# Patient Record
Sex: MALE | Race: WHITE | NOT HISPANIC OR LATINO | URBAN - METROPOLITAN AREA
[De-identification: names, ages, dates, MRNs, and addresses within clinical notes are randomized per-mention and may not be internally consistent; named-entity substitution may affect disease eponyms.]

---

## 2022-10-17 ENCOUNTER — INPATIENT (INPATIENT)
Facility: HOSPITAL | Age: 82
LOS: 20 days | Discharge: NURSING, REHAB OR LONG-TERM CARE | DRG: 291 | End: 2022-11-07
Attending: STUDENT IN AN ORGANIZED HEALTH CARE EDUCATION/TRAINING PROGRAM | Admitting: INTERNAL MEDICINE
Payer: MEDICARE

## 2022-10-17 VITALS
HEIGHT: 72 IN | RESPIRATION RATE: 19 BRPM | SYSTOLIC BLOOD PRESSURE: 117 MMHG | WEIGHT: 169.98 LBS | HEART RATE: 90 BPM | TEMPERATURE: 97 F | DIASTOLIC BLOOD PRESSURE: 73 MMHG

## 2022-10-17 DIAGNOSIS — Z95.1 PRESENCE OF AORTOCORONARY BYPASS GRAFT: Chronic | ICD-10-CM

## 2022-10-17 DIAGNOSIS — I50.9 HEART FAILURE, UNSPECIFIED: ICD-10-CM

## 2022-10-17 LAB
ALBUMIN SERPL ELPH-MCNC: 2.4 G/DL — LOW (ref 3.3–5)
ALP SERPL-CCNC: 230 U/L — HIGH (ref 40–120)
ALT FLD-CCNC: 131 U/L — HIGH (ref 10–45)
ANION GAP SERPL CALC-SCNC: 5 MMOL/L — SIGNIFICANT CHANGE UP (ref 5–17)
APPEARANCE UR: CLEAR — SIGNIFICANT CHANGE UP
AST SERPL-CCNC: 86 U/L — HIGH (ref 10–40)
BACTERIA # UR AUTO: ABNORMAL /HPF
BASOPHILS # BLD AUTO: 0.06 K/UL — SIGNIFICANT CHANGE UP (ref 0–0.2)
BASOPHILS NFR BLD AUTO: 0.3 % — SIGNIFICANT CHANGE UP (ref 0–2)
BILIRUB SERPL-MCNC: 0.3 MG/DL — SIGNIFICANT CHANGE UP (ref 0.2–1.2)
BILIRUB UR-MCNC: NEGATIVE — SIGNIFICANT CHANGE UP
BUN SERPL-MCNC: 28 MG/DL — HIGH (ref 7–23)
CALCIUM SERPL-MCNC: 8.3 MG/DL — LOW (ref 8.4–10.5)
CHLORIDE SERPL-SCNC: 108 MMOL/L — SIGNIFICANT CHANGE UP (ref 96–108)
CO2 SERPL-SCNC: 30 MMOL/L — SIGNIFICANT CHANGE UP (ref 22–31)
COLOR SPEC: YELLOW — SIGNIFICANT CHANGE UP
CREAT SERPL-MCNC: 1.14 MG/DL — SIGNIFICANT CHANGE UP (ref 0.5–1.3)
DIFF PNL FLD: ABNORMAL
EGFR: 64 ML/MIN/1.73M2 — SIGNIFICANT CHANGE UP
EOSINOPHIL # BLD AUTO: 0.25 K/UL — SIGNIFICANT CHANGE UP (ref 0–0.5)
EOSINOPHIL NFR BLD AUTO: 1.4 % — SIGNIFICANT CHANGE UP (ref 0–6)
EPI CELLS # UR: SIGNIFICANT CHANGE UP
GLUCOSE SERPL-MCNC: 105 MG/DL — HIGH (ref 70–99)
GLUCOSE UR QL: NEGATIVE — SIGNIFICANT CHANGE UP
HCT VFR BLD CALC: 31.4 % — LOW (ref 39–50)
HGB BLD-MCNC: 9.5 G/DL — LOW (ref 13–17)
IMM GRANULOCYTES NFR BLD AUTO: 0.7 % — SIGNIFICANT CHANGE UP (ref 0–0.9)
KETONES UR-MCNC: NEGATIVE — SIGNIFICANT CHANGE UP
LACTATE SERPL-SCNC: 1.3 MMOL/L — SIGNIFICANT CHANGE UP (ref 0.7–2)
LEUKOCYTE ESTERASE UR-ACNC: ABNORMAL
LYMPHOCYTES # BLD AUTO: 19.4 % — SIGNIFICANT CHANGE UP (ref 13–44)
LYMPHOCYTES # BLD AUTO: 3.46 K/UL — HIGH (ref 1–3.3)
MCHC RBC-ENTMCNC: 29.4 PG — SIGNIFICANT CHANGE UP (ref 27–34)
MCHC RBC-ENTMCNC: 30.3 GM/DL — LOW (ref 32–36)
MCV RBC AUTO: 97.2 FL — SIGNIFICANT CHANGE UP (ref 80–100)
MONOCYTES # BLD AUTO: 0.96 K/UL — HIGH (ref 0–0.9)
MONOCYTES NFR BLD AUTO: 5.4 % — SIGNIFICANT CHANGE UP (ref 2–14)
NEUTROPHILS # BLD AUTO: 12.99 K/UL — HIGH (ref 1.8–7.4)
NEUTROPHILS NFR BLD AUTO: 72.8 % — SIGNIFICANT CHANGE UP (ref 43–77)
NITRITE UR-MCNC: NEGATIVE — SIGNIFICANT CHANGE UP
NRBC # BLD: 0 /100 WBCS — SIGNIFICANT CHANGE UP (ref 0–0)
PH UR: 6.5 — SIGNIFICANT CHANGE UP (ref 5–8)
PLATELET # BLD AUTO: 435 K/UL — HIGH (ref 150–400)
POTASSIUM SERPL-MCNC: 4.1 MMOL/L — SIGNIFICANT CHANGE UP (ref 3.5–5.3)
POTASSIUM SERPL-SCNC: 4.1 MMOL/L — SIGNIFICANT CHANGE UP (ref 3.5–5.3)
PROT SERPL-MCNC: 6.1 G/DL — SIGNIFICANT CHANGE UP (ref 6–8.3)
PROT UR-MCNC: 30 MG/DL
RBC # BLD: 3.23 M/UL — LOW (ref 4.2–5.8)
RBC # FLD: 16.2 % — HIGH (ref 10.3–14.5)
RBC CASTS # UR COMP ASSIST: ABNORMAL /HPF (ref 0–4)
SODIUM SERPL-SCNC: 143 MMOL/L — SIGNIFICANT CHANGE UP (ref 135–145)
SP GR SPEC: 1 — LOW (ref 1.01–1.02)
TROPONIN I, HIGH SENSITIVITY RESULT: 86.4 NG/L — HIGH
UROBILINOGEN FLD QL: NEGATIVE — SIGNIFICANT CHANGE UP
WBC # BLD: 17.84 K/UL — HIGH (ref 3.8–10.5)
WBC # FLD AUTO: 17.84 K/UL — HIGH (ref 3.8–10.5)
WBC UR QL: >50 /HPF (ref 0–5)

## 2022-10-17 PROCEDURE — 99223 1ST HOSP IP/OBS HIGH 75: CPT

## 2022-10-17 PROCEDURE — 99285 EMERGENCY DEPT VISIT HI MDM: CPT

## 2022-10-17 PROCEDURE — 93010 ELECTROCARDIOGRAM REPORT: CPT

## 2022-10-17 PROCEDURE — 71250 CT THORAX DX C-: CPT | Mod: 26,MA

## 2022-10-17 PROCEDURE — 71045 X-RAY EXAM CHEST 1 VIEW: CPT | Mod: 26

## 2022-10-17 RX ORDER — FUROSEMIDE 40 MG
40 TABLET ORAL ONCE
Refills: 0 | Status: COMPLETED | OUTPATIENT
Start: 2022-10-17 | End: 2022-10-17

## 2022-10-17 RX ORDER — ACETAMINOPHEN 500 MG
650 TABLET ORAL ONCE
Refills: 0 | Status: COMPLETED | OUTPATIENT
Start: 2022-10-17 | End: 2022-10-17

## 2022-10-17 RX ORDER — CEFEPIME 1 G/1
1000 INJECTION, POWDER, FOR SOLUTION INTRAMUSCULAR; INTRAVENOUS ONCE
Refills: 0 | Status: COMPLETED | OUTPATIENT
Start: 2022-10-17 | End: 2022-10-17

## 2022-10-17 RX ADMIN — Medication 650 MILLIGRAM(S): at 23:10

## 2022-10-17 RX ADMIN — Medication 40 MILLIGRAM(S): at 22:20

## 2022-10-17 RX ADMIN — CEFEPIME 100 MILLIGRAM(S): 1 INJECTION, POWDER, FOR SOLUTION INTRAMUSCULAR; INTRAVENOUS at 22:41

## 2022-10-17 NOTE — ED ADULT TRIAGE NOTE - CHIEF COMPLAINT QUOTE
BIB [facility] from [facility] for low O2 sat 83% on baseline 3LNC. Per EMS, pt with poor perfusion to fingers, increased to 4LNC and O2 went up to 97%. pt now on 4LNC with O2 sat 88% on arrival. [facility] for low O2 sat 83% on baseline 2LNC. Per EMS, pt with poor perfusion to fingers, increased to 4LNC and O2 went up to 97%. pt now on 4LNC with O2 sat 88% on arrival.

## 2022-10-17 NOTE — INPATIENT CERTIFICATION FOR MEDICARE PATIENTS - PHYSICIAN CONCUR
INTERVAL HPI/OVERNIGHT EVENTS: Pt resting comfortably. No complaints    MEDICATIONS  (STANDING):  atorvastatin 10 milliGRAM(s) Oral at bedtime  clopidogrel Tablet 75 milliGRAM(s) Oral daily  diltiazem    milliGRAM(s) Oral daily  donepezil 5 milliGRAM(s) Oral at bedtime  multivitamin 1 Tablet(s) Oral daily  sodium chloride 0.9%. 1000 milliLiter(s) (75 mL/Hr) IV Continuous <Continuous>  tamsulosin 0.4 milliGRAM(s) Oral at bedtime  thiamine 100 milliGRAM(s) Oral daily    MEDICATIONS  (PRN):  haloperidol    Injectable 0.5 milliGRAM(s) IV Push every 6 hours PRN agitation      Vital Signs Last 24 Hrs  T(C): 36.7 (30 Jul 2019 05:47), Max: 36.7 (30 Jul 2019 05:47)  T(F): 98 (30 Jul 2019 05:47), Max: 98 (30 Jul 2019 05:47)  HR: 82 (30 Jul 2019 05:47) (68 - 84)  BP: 137/88 (30 Jul 2019 05:47) (114/65 - 137/88)  BP(mean): --  RR: 18 (30 Jul 2019 05:47) (18 - 18)  SpO2: 98% (30 Jul 2019 05:47) (96% - 100%)  I&O's Detail    Abdominal: Soft, NT, ND +BS, reducible, Lt inguino-scrotal harnia, containing sigmoid      LABS:    07-29    144  |  106  |  14  ----------------------------<  101<H>  3.6   |  27  |  1.07    Ca    9.8      29 Jul 2019 06:15            RADIOLOGY & ADDITIONAL STUDIES: I concur with the Admission Order and I certify that services are provided in accordance with Section 42 CFR § 412.3

## 2022-10-17 NOTE — H&P ADULT - HISTORY OF PRESENT ILLNESS
[facility] from [facility] for low O2 sat 83% on baseline 2LNC. Per EMS, pt with poor perfusion to fingers, increased to 4LNC and O2 went up to 97%. pt now on 4LNC with O2 sat 88% on arrival.  low O2 sat   83 y/o male with HTN, CAD, hx of CABG, severe cardiomyopathy (EF 20%), Afib (not on AC due to hematuria), BPH, hx of LUE DVT, recently hospitalized at [facility] for urinary retention, urosepsis, also has RUE DVT, BIB EMS from [facility] for lethargy and  low O2 sat 83%, even on O2 2LNC.  O2 increased to 4LNC and O2 went up to 97%. Cxray with bilat increased markings, pleural eff c/w CHF.  Pt has dubois cath.  He denies chest pain, fever, chills, cough, nausea, vomiting, abd pain.  Pt admits to intermittent dyspnea, he also c/o achiness all over.     81 y/o male with HTN, CAD, hx of CABG, severe cardiomyopathy (EF 20%), Afib (not on AC due to hematuria), BPH, hx of LUE DVT, recently hospitalized at [facility] for urinary retention, urosepsis, also has RUE DVT, BIB EMS from [facility] for lethargy and  low O2 sat 83%, even on O2 2LNC.  O2 increased to 4LNC and O2 went up to 97%. Cxray with bilat increased markings, pleural eff c/w CHF.  Pt has dubois cath.  He denies chest pain, fever, chills, cough, nausea, vomiting, abd pain.  Pt admits to intermittent dyspnea, he also c/o achiness all over.    Pt received Lasix 40 mg IVPx 1, with good diuresis.    81 y/o male with HTN, CAD, hx of CABG, severe cardiomyopathy (EF 20%), Afib (not on AC due to hematuria), BPH, hx of LUE DVT, recently hospitalized at [facility] for urinary retention, urosepsis, also has RUE DVT, BIB EMS from [facility] for lethargy and  low O2 sat 83%, even on O2 2LNC.  O2 increased to 4LNC and O2 went up to 97%. Cxray with bilat increased markings, pleural eff c/w CHF.  Pt has dubois cath.  He denies chest pain, fever, chills, cough, nausea, vomiting, abd pain.  Pt admits to intermittent dyspnea, he also c/o achiness all over.    Pt received Lasix 40 mg IVPx 1, with good diuresis.   Pt is not a good historian.

## 2022-10-17 NOTE — H&P ADULT - NSICDXPASTMEDICALHX_GEN_ALL_CORE_FT
PAST MEDICAL HISTORY:  BPH with obstruction/lower urinary tract symptoms     CAD (coronary artery disease)     Chronic atrial fibrillation     History of cardiomyopathy     History of CVA (cerebrovascular accident) left sided weakness    Hyperlipidemia

## 2022-10-17 NOTE — ED ADULT NURSE NOTE - NS ED NOTE ABUSE RESPONSE YN
Patient called back and informed with lab results and thyroid medication change.
Requesting refill for: oxyCODONE-acetaminophen (PERCOCET)  MG per tablet    Medication & Dose:   mg     Quantity requested: 30 day supply    Pharmacy:   [facility] -   Location or Phone Number: [phone]    Last office visit: Visit date not found   Next office visit: Visit date not found   
Unable to assess due to medical condition

## 2022-10-17 NOTE — ED ADULT NURSE REASSESSMENT NOTE - NS ED NURSE REASSESS COMMENT FT1
Pt with chronic dubois to be replaced in the ED as per orders. Pt noted to have a 26 Maltese on arrival, but only 22 Maltese available- MD Hsieh made aware and advised to place a 22 Maltese dubois at this time.

## 2022-10-17 NOTE — ED PROVIDER NOTE - PHYSICAL EXAMINATION
Gen: alert, NAD  HEENT:  NC/AT, PERR, no exophthalmos  CV:  well perfused, rrr   Pulm:  b/l crackles, normal RR, I/E ratio and chest excursion  Abd: s/nt/nd  MSK: moving all extremities  Neuro:  non-focal  Skin:  visualized areas intact

## 2022-10-17 NOTE — H&P ADULT - ASSESSMENT
81 y/o male with HTN, CAD, hx of CABG, severe cardiomyopathy (EF 20%), Afib (not on AC due to hematuria), BPH, hx of LUE DVT, recently hospitalized at [facility] for urinary retention, urosepsis, also has RUE DVT, BIB EMS from [facility] for lethargy and  low O2 sat 83%, even on O2 2LNC.  O2 increased to 4LNC and O2 went up to 97%. Cxray with bilat increased markings, pleural eff c/w CHF.  Pt has dubois cath.  He denies chest pain, fever, chills, cough, nausea, vomiting, abd pain.  Pt admits to intermittent dyspnea, he also c/o achiness all over.   83 y/o male with HTN, CAD, hx of CABG, severe cardiomyopathy (EF 20%), Afib (not on AC due to hematuria), BPH, hx of LUE DVT, recently hospitalized at [facility] for urinary retention, urosepsis, also has RUE DVT, BIB EMS from [facility] for lethargy and  low O2 sat 83%, even on O2 2LNC.  O2 increased to 4LNC and O2 went up to 97%. Cxray with bilat increased markings, pleural eff c/w CHF.  Pt has dubois cath.  He denies chest pain, fever, chills, cough, nausea, vomiting, abd pain.  Pt admits to intermittent dyspnea, he also c/o achiness all over.    Pt received Lasix 40 mg IVPx 1, with good diuresis.     Acute on chronic systolic CHF  Pt with severe cardiomyopathy  Hx of CAD, remote CABG  has RUE DVT, afib, but not on AC due to hematuria  Pt with BPH, urinary retention, has indwelling dubois    Admit  O2 supp-4 L Via NC to keep O2 sat >92%  Lasix 20 mg IVP daily for now, then would change to Lasix 40 mg/day   Trend trop, echo ordered  Cont current meds: ASA, Mexiletine, Amiodarone, Metoprolol, Simvastatin, ASA, Flomax  Elev WBC, but no left shift and PCT nml  Pt received a dose of Cefepime in the ED, would hold off further antibx for now  urinalysis also does not appear infected  Cardio consult  Palliative care consult  Pt's prognosis is poor, has MOLST and is full code  Will discuss with pt (when he's more alert), will reach out to   83 y/o male with HTN, CAD, hx of CABG, severe cardiomyopathy (EF 20%), Afib (not on AC due to hematuria), BPH, hx of LUE DVT, recently hospitalized at [facility] for urinary retention, urosepsis, also has RUE DVT, BIB EMS from [facility] for lethargy and  low O2 sat 83%, even on O2 2LNC.  O2 increased to 4LNC and O2 went up to 97%. Cxray with bilat increased markings, pleural eff c/w CHF.  Pt has dubois cath.  He denies chest pain, fever, chills, cough, nausea, vomiting, abd pain.  Pt admits to intermittent dyspnea, he also c/o achiness all over.    Pt received Lasix 40 mg IVPx 1, with good diuresis.     Acute on chronic systolic CHF  Pt with severe cardiomyopathy  Hx of CAD, remote CABG  has RUE DVT, afib, but not on AC due to hematuria  Pt with BPH, urinary retention, has indwelling dubois    Admit  O2 supp-4 L Via NC to keep O2 sat >92%  Lasix 20 mg IVP daily for now, then would change to Lasix 40 mg/day   Trend trop, echo ordered  Cont current meds: ASA, Mexiletine, Amiodarone, Metoprolol, Simvastatin, ASA, Flomax  Elev WBC, but no left shift and PCT nml  Pt received a dose of Cefepime in the ED, would hold off further antibx for now  urinalysis also does not appear infected  Leg dopplers ordered  DVT prophylaxis  FFup labs in AM  Cardio consult  Palliative care consult  Pt's prognosis is poor, has MOLST and is full code  Will discuss with pt (when he's more alert), will contact pt's wife in AM   81 y/o male with HTN, CAD, hx of CABG, severe cardiomyopathy (EF 20%), Afib (not on AC due to hematuria), BPH, hx of LUE DVT, recently hospitalized at [facility] for urinary retention, urosepsis, also has RUE DVT, BIB EMS from [facility] for lethargy and  low O2 sat 83%, even on O2 2LNC.  O2 increased to 4LNC and O2 went up to 97%. Cxray with bilat increased markings, pleural eff c/w CHF.  Pt has dubois cath.  He denies chest pain, fever, chills, cough, nausea, vomiting, abd pain.  Pt admits to intermittent dyspnea, he also c/o achiness all over.    Pt received Lasix 40 mg IVPx 1, with good diuresis.     Acute hypoxic resp failure due to Acute on chronic systolic CHF  Pt with severe cardiomyopathy  Hx of CAD, remote CABG  has RUE DVT, afib, but not on AC due to hematuria  Pt with BPH, urinary retention, has indwelling dubois    Admit  O2 supp-4 L Via NC to keep O2 sat >92%  Lasix 20 mg IVP daily for now, then would change to Lasix 40 mg/day   Trend trop, echo ordered  Cont current meds: ASA, Mexiletine, Amiodarone, Metoprolol, Simvastatin, ASA, Flomax  Elev WBC, but no left shift and PCT nml  Pt received a dose of Cefepime in the ED, would hold off further antibx for now  urinalysis also does not appear infected  Leg dopplers ordered  DVT prophylaxis  FFup labs in AM  Cardio consult  Palliative care consult  Pt's prognosis is poor, has MOLST and is full code  Will discuss with pt (when he's more alert), will contact pt's wife in AM

## 2022-10-17 NOTE — ED ADULT NURSE NOTE - OBJECTIVE STATEMENT
Pt BIB EMS from [facility] for c/o for low o2. As per [facility], pt has had an increased oxygen requirement for the past week and pts o2 sat was low today when they checked ( no specific value specified). Pt with hx HTN, CVA and CAD. Pt alert and confused and unable to provide any history or information. Pt on 3 L NC on arrival satting 99%.

## 2022-10-17 NOTE — ED PROVIDER NOTE - OBJECTIVE STATEMENT
pt sent from SNF for increasing oxygen requirement over the last 1 week, today oxygen saturation was very low at facility as per EMS. pt unable to provide HPI. pt w h/o cad, htn, cva, low EF 20%.

## 2022-10-17 NOTE — ED ADULT NURSE NOTE - NSIMPLEMENTINTERV_GEN_ALL_ED
Implemented All Fall with Harm Risk Interventions:  [place] to call system. Call bell, personal items and telephone within reach. Instruct patient to call for assistance. Room bathroom lighting operational. Non-slip footwear when patient is off stretcher. Physically safe environment: no spills, clutter or unnecessary equipment. Stretcher in lowest position, wheels locked, appropriate side rails in place. Provide visual cue, wrist band, yellow gown, etc. Monitor gait and stability. Monitor for mental status changes and reorient to person, place, and time. Review medications for side effects contributing to fall risk. Reinforce activity limits and safety measures with patient and family. Provide visual clues: red socks.

## 2022-10-17 NOTE — H&P ADULT - NSHPSOCIALHISTORY_GEN_ALL_CORE
lived at home prior to recent hospitalization - currently at [facility] for [facility]ILSON lived at home prior to recent hospitalization - currently at [facility] for [facility]  non smoker, denies etoh

## 2022-10-17 NOTE — H&P ADULT - NSHPPHYSICALEXAM_GEN_ALL_CORE
Vital Signs (24 Hrs):  T(C): 36.8 (10-17-22 @ 22:00), Max: 36.8 (10-17-22 @ 22:00)  HR: 77 (10-17-22 @ 22:00) (77 - 90)  BP: 114/72 (10-17-22 @ 22:00) (114/72 - 117/73)  RR: 18 (10-17-22 @ 22:00) (18 - 19)  SpO2: 100% (10-17-22 @ 22:00) (99% - 100%)  Daily Height in cm: 182.88 (17 Oct 2022 19:43)

## 2022-10-17 NOTE — ED ADULT NURSE NOTE - CHIEF COMPLAINT QUOTE
B[facility] for low O2 sat 83% on baseline 2LNC. Per EMS, pt with poor perfusion to fingers, increased to 4LNC and O2 went up to 97%. pt now on 4LNC with O2 sat 88% on arrival.

## 2022-10-17 NOTE — ED PROVIDER NOTE - CLINICAL SUMMARY MEDICAL DECISION MAKING FREE TEXT BOX
pt with acute chf exacerbation that was likely progressive over the last 1 week. will need admission for IV lasix.

## 2022-10-18 LAB — PROCALCITONIN SERPL-MCNC: 0.02 NG/ML — SIGNIFICANT CHANGE UP

## 2022-10-18 PROCEDURE — 99233 SBSQ HOSP IP/OBS HIGH 50: CPT

## 2022-10-18 PROCEDURE — 99222 1ST HOSP IP/OBS MODERATE 55: CPT

## 2022-10-18 PROCEDURE — 93306 TTE W/DOPPLER COMPLETE: CPT | Mod: 26

## 2022-10-18 PROCEDURE — 99497 ADVNCD CARE PLAN 30 MIN: CPT | Mod: 25

## 2022-10-18 PROCEDURE — 99223 1ST HOSP IP/OBS HIGH 75: CPT

## 2022-10-18 RX ORDER — FUROSEMIDE 40 MG
20 TABLET ORAL ONCE
Refills: 0 | Status: COMPLETED | OUTPATIENT
Start: 2022-10-18 | End: 2022-10-18

## 2022-10-18 RX ORDER — MEXILETINE HYDROCHLORIDE 150 MG/1
150 CAPSULE ORAL EVERY 12 HOURS
Refills: 0 | Status: ACTIVE | OUTPATIENT
Start: 2022-10-18 | End: 2023-09-16

## 2022-10-18 RX ORDER — LACTULOSE 10 G/15ML
20 SOLUTION ORAL DAILY
Refills: 0 | Status: ACTIVE | OUTPATIENT
Start: 2022-10-18 | End: 2023-09-16

## 2022-10-18 RX ORDER — SERTRALINE 25 MG/1
150 TABLET, FILM COATED ORAL DAILY
Refills: 0 | Status: DISCONTINUED | OUTPATIENT
Start: 2022-10-18 | End: 2022-11-03

## 2022-10-18 RX ORDER — TRAZODONE HCL 50 MG
25 TABLET ORAL AT BEDTIME
Refills: 0 | Status: ACTIVE | OUTPATIENT
Start: 2022-10-18 | End: 2023-09-16

## 2022-10-18 RX ORDER — ASPIRIN/CALCIUM CARB/MAGNESIUM 324 MG
81 TABLET ORAL DAILY
Refills: 0 | Status: DISCONTINUED | OUTPATIENT
Start: 2022-10-18 | End: 2022-10-19

## 2022-10-18 RX ORDER — FAMOTIDINE 10 MG/ML
20 INJECTION INTRAVENOUS DAILY
Refills: 0 | Status: ACTIVE | OUTPATIENT
Start: 2022-10-18 | End: 2023-09-16

## 2022-10-18 RX ORDER — ENOXAPARIN SODIUM 100 MG/ML
40 INJECTION SUBCUTANEOUS EVERY 24 HOURS
Refills: 0 | Status: DISCONTINUED | OUTPATIENT
Start: 2022-10-18 | End: 2022-10-19

## 2022-10-18 RX ORDER — METOPROLOL TARTRATE 50 MG
100 TABLET ORAL DAILY
Refills: 0 | Status: DISCONTINUED | OUTPATIENT
Start: 2022-10-18 | End: 2022-10-21

## 2022-10-18 RX ORDER — FUROSEMIDE 40 MG
40 TABLET ORAL DAILY
Refills: 0 | Status: DISCONTINUED | OUTPATIENT
Start: 2022-10-19 | End: 2022-10-21

## 2022-10-18 RX ORDER — AMIODARONE HYDROCHLORIDE 400 MG/1
200 TABLET ORAL DAILY
Refills: 0 | Status: ACTIVE | OUTPATIENT
Start: 2022-10-18 | End: 2023-09-16

## 2022-10-18 RX ORDER — SENNA PLUS 8.6 MG/1
1 TABLET ORAL AT BEDTIME
Refills: 0 | Status: ACTIVE | OUTPATIENT
Start: 2022-10-18 | End: 2023-09-16

## 2022-10-18 RX ORDER — LEVOTHYROXINE SODIUM 125 MCG
150 TABLET ORAL DAILY
Refills: 0 | Status: ACTIVE | OUTPATIENT
Start: 2022-10-18 | End: 2023-09-16

## 2022-10-18 RX ORDER — TAMSULOSIN HYDROCHLORIDE 0.4 MG/1
0.4 CAPSULE ORAL AT BEDTIME
Refills: 0 | Status: ACTIVE | OUTPATIENT
Start: 2022-10-18 | End: 2023-09-16

## 2022-10-18 RX ORDER — FUROSEMIDE 40 MG
20 TABLET ORAL DAILY
Refills: 0 | Status: DISCONTINUED | OUTPATIENT
Start: 2022-10-18 | End: 2022-10-18

## 2022-10-18 RX ORDER — ATORVASTATIN CALCIUM 80 MG/1
80 TABLET, FILM COATED ORAL AT BEDTIME
Refills: 0 | Status: DISCONTINUED | OUTPATIENT
Start: 2022-10-18 | End: 2022-10-18

## 2022-10-18 RX ADMIN — FAMOTIDINE 20 MILLIGRAM(S): 10 INJECTION INTRAVENOUS at 22:25

## 2022-10-18 RX ADMIN — Medication 81 MILLIGRAM(S): at 22:14

## 2022-10-18 RX ADMIN — ENOXAPARIN SODIUM 40 MILLIGRAM(S): 100 INJECTION SUBCUTANEOUS at 05:04

## 2022-10-18 RX ADMIN — Medication 100 MILLIGRAM(S): at 05:04

## 2022-10-18 RX ADMIN — TAMSULOSIN HYDROCHLORIDE 0.4 MILLIGRAM(S): 0.4 CAPSULE ORAL at 22:14

## 2022-10-18 RX ADMIN — MEXILETINE HYDROCHLORIDE 150 MILLIGRAM(S): 150 CAPSULE ORAL at 05:03

## 2022-10-18 RX ADMIN — Medication 20 MILLIGRAM(S): at 16:38

## 2022-10-18 RX ADMIN — Medication 20 MILLIGRAM(S): at 05:02

## 2022-10-18 RX ADMIN — AMIODARONE HYDROCHLORIDE 200 MILLIGRAM(S): 400 TABLET ORAL at 05:03

## 2022-10-18 RX ADMIN — Medication 25 MILLIGRAM(S): at 22:26

## 2022-10-18 RX ADMIN — SENNA PLUS 1 TABLET(S): 8.6 TABLET ORAL at 22:09

## 2022-10-18 RX ADMIN — MEXILETINE HYDROCHLORIDE 150 MILLIGRAM(S): 150 CAPSULE ORAL at 18:01

## 2022-10-18 RX ADMIN — SERTRALINE 150 MILLIGRAM(S): 25 TABLET, FILM COATED ORAL at 22:25

## 2022-10-18 RX ADMIN — Medication 150 MICROGRAM(S): at 05:02

## 2022-10-18 NOTE — CONSULT NOTE ADULT - SUBJECTIVE AND OBJECTIVE BOX
YUNIEL DE LA CRUZ  [id number]      HPI:    Grover De La Cruz is an 82 year old man with past medical history of Coronary artery disease (s/p CABG), HFrEF (LVEF 20% per chart notes), Hypertension, Atrial fibrillation (not on anticoagulation due to hematuria), CVA and BPH with recent hospitalization at [facility] for urosepsis and right upper extremity DVT was brought in by EMS from living facility due to lethargy and hypoxia.       ALLERGIES:  PC Pen VK (Other)      PAST MEDICAL & SURGICAL HISTORY:  Chronic atrial fibrillation  History of cardiomyopathy  CAD (coronary artery disease)  BPH with obstruction/lower urinary tract symptoms  Hyperlipidemia  History of CVA (cerebrovascular accident)  S/P CABG (coronary artery bypass graft)      CURRENT MEDICATIONS:  aMIOdarone    Tablet 200 milliGRAM(s) Oral daily  aspirin  chewable 81 milliGRAM(s) Oral daily  atorvastatin 80 milliGRAM(s) Oral at bedtime  bisacodyl Suppository 10 milliGRAM(s) Rectal daily PRN  enoxaparin Injectable 40 milliGRAM(s) SubCutaneous every 24 hours  famotidine    Tablet 20 milliGRAM(s) Oral daily  furosemide   Injectable 20 milliGRAM(s) IV Push daily  lactulose Syrup 20 Gram(s) Oral daily PRN  levothyroxine 150 MICROGram(s) Oral daily  metoprolol succinate  milliGRAM(s) Oral daily  mexiletine 150 milliGRAM(s) Oral every 12 hours  senna 1 Tablet(s) Oral at bedtime  sertraline 150 milliGRAM(s) Oral daily  tamsulosin 0.4 milliGRAM(s) Oral at bedtime  traZODone 25 milliGRAM(s) Oral at bedtime          ROS:  All 10 systems reviewed and positives noted in HPI    OBJECTIVE:    VITAL SIGNS:  Vital Signs Last 24 Hrs  T(C): 37.1 (18 Oct 2022 07:06), Max: 37.1 (18 Oct 2022 07:06)  T(F): 98.8 (18 Oct 2022 07:06), Max: 98.8 (18 Oct 2022 07:06)  HR: 84 (18 Oct 2022 07:06) (77 - 90)  BP: 146/85 (18 Oct 2022 07:06) (114/72 - 146/85)  BP(mean): --  RR: 18 (18 Oct 2022 07:06) (18 - 19)  SpO2: 95% (18 Oct 2022 07:06) (95% - 100%)    Parameters below as of 18 Oct 2022 07:06  Patient On (Oxygen Delivery Method): nasal cannula  O2 Flow (L/min): 3      PHYSICAL EXAM:  General: well appearing, no distress  HEENT: sclera anicteric  Neck: supple, no carotid bruits b/l  CVS: JVP ~ 7 cm H20, RRR, s1, s2, no murmurs/rubs/gallops  Chest: unlabored respirations, clear to auscultation b/l  Abdomen: non-distended  Extremities: no lower extremity edema b/l  Neuro: awake, alert & oriented x 3  Psych: normal affect      LABS:                        9.5    17.84 )-----------( 435      ( 17 Oct 2022 19:55 )             31.4     10-17    143  |  108  |  28<H>  ----------------------------<  105<H>  4.1   |  30  |  1.14    Ca    8.3<L>      17 Oct 2022 19:55    TPro  6.1  /  Alb  2.4<L>  /  TBili  0.3  /  DBili  x   /  AST  86<H>  /  ALT  131<H>  /  AlkPhos  230<H>  10-17      ECG (10/17/22): atrial fibrillation, old septal infarct, prolonged QT (no prior ECG for comparison)    CT Chest (10/17/22):  VESSELS: Ascending thoracic aorta measures 4.3 cm in diameter. Aberrant right subclavian artery. Atherosclerotic calcifications of the aorta. Status post CABG.  IMPRESSION:  Pulmonary edema, likely secondary to congestive heart failure.  Small right and small to moderate left pleural effusions.      No prior cardiac workup in chart   YUNIEL DE LA CRUZ  [id number]      HPI:    Grover De La Cruz is an 82 year old man with past medical history of Coronary artery disease (s/p CABG), HFrEF (LVEF 20% per chart notes), Hypertension, Atrial fibrillation (not on anticoagulation due to hematuria), CVA and BPH with recent hospitalization at [facility] for urosepsis and right upper extremity DVT was brought in by EMS from living facility due to lethargy and hypoxia, per chart notes.    Patient was seen and examined at bedside but is altered and unable to provide history. Denies chest pain or shortness of breath presently and appears comfortable.       ALLERGIES:  PC Pen VK (Other)      PAST MEDICAL & SURGICAL HISTORY:  Chronic atrial fibrillation  History of cardiomyopathy  CAD (coronary artery disease)  BPH with obstruction/lower urinary tract symptoms  Hyperlipidemia  History of CVA (cerebrovascular accident)  S/P CABG (coronary artery bypass graft)      CURRENT MEDICATIONS:  aMIOdarone    Tablet 200 milliGRAM(s) Oral daily  aspirin  chewable 81 milliGRAM(s) Oral daily  atorvastatin 80 milliGRAM(s) Oral at bedtime  bisacodyl Suppository 10 milliGRAM(s) Rectal daily PRN  enoxaparin Injectable 40 milliGRAM(s) SubCutaneous every 24 hours  famotidine    Tablet 20 milliGRAM(s) Oral daily  furosemide   Injectable 20 milliGRAM(s) IV Push daily  lactulose Syrup 20 Gram(s) Oral daily PRN  levothyroxine 150 MICROGram(s) Oral daily  metoprolol succinate  milliGRAM(s) Oral daily  mexiletine 150 milliGRAM(s) Oral every 12 hours  senna 1 Tablet(s) Oral at bedtime  sertraline 150 milliGRAM(s) Oral daily  tamsulosin 0.4 milliGRAM(s) Oral at bedtime  traZODone 25 milliGRAM(s) Oral at bedtime          ROS:  All 10 systems reviewed and positives noted in HPI    OBJECTIVE:    VITAL SIGNS:  Vital Signs Last 24 Hrs  T(C): 37.1 (18 Oct 2022 07:06), Max: 37.1 (18 Oct 2022 07:06)  T(F): 98.8 (18 Oct 2022 07:06), Max: 98.8 (18 Oct 2022 07:06)  HR: 84 (18 Oct 2022 07:06) (77 - 90)  BP: 146/85 (18 Oct 2022 07:06) (114/72 - 146/85)  BP(mean): --  RR: 18 (18 Oct 2022 07:06) (18 - 19)  SpO2: 95% (18 Oct 2022 07:06) (95% - 100%)    Parameters below as of 18 Oct 2022 07:06  Patient On (Oxygen Delivery Method): nasal cannula  O2 Flow (L/min): 3      PHYSICAL EXAM:  General: elderly man, no acute distress  HEENT: sclera anicteric  Neck: supple  CVS: JVP ~ 9 cm H20, irregularly irregular, s1, s2  Chest: unlabored respirations, decreased anterior breath sounds   Extremities: no lower extremity edema b/l  Neuro: awake, alert, not oriented       LABS:                        9.5    17.84 )-----------( 435      ( 17 Oct 2022 19:55 )             31.4     10-17    143  |  108  |  28<H>  ----------------------------<  105<H>  4.1   |  30  |  1.14    Ca    8.3<L>      17 Oct 2022 19:55    TPro  6.1  /  Alb  2.4<L>  /  TBili  0.3  /  DBili  x   /  AST  86<H>  /  ALT  131<H>  /  AlkPhos  230<H>  10-17      ECG (10/17/22): atrial fibrillation, old septal infarct, prolonged QT (no prior ECG for comparison)    CT Chest (10/17/22):  VESSELS: Ascending thoracic aorta measures 4.3 cm in diameter. Aberrant right subclavian artery. Atherosclerotic calcifications of the aorta. Status post CABG.  IMPRESSION:  Pulmonary edema, likely secondary to congestive heart failure.  Small right and small to moderate left pleural effusions.      No prior cardiac workup in chart

## 2022-10-18 NOTE — PROGRESS NOTE ADULT - ASSESSMENT
82 year old M PMH HTN, CAD s/p CABG, severe cardiomyopathy (EF 20%), afib (not on AC due to hematuria), BPH, hx of LUE DVT and now with RUE DVT, recently hospitalized at [facility] for uorsepsis and urinary retention (now with dubois), brought in from [facility] for low saturations, admitted for acute on chronic systolic CHF exacerbation.    #acute hypoxic respiratory failure  #acute on chronic systolic CHF exacerbation  - continue with lasix 20mg IV daily for now  - continue O2 as needed, wean off as tolerated  - monitor I&Os, daily weights  - follow up echo  - trop elevated x2 but possible due to demand ischemia  - cardio consulted, follow up recs    #CAD  - s/p CABG  - continue asa, simvastatin, metoprolol  - trops elevated, possible due to demand ischemia however given history monitor closely  - cardio consulted    #afib   - not on AC due to hematuria  - continue mexiletine, amiodarone metoprolol    leukocytosis  0     has RUE DVT, afib, but not on AC due to hematuria  Pt with BPH, urinary retention, has indwelling dubois    Cont current meds: ASA, Mexiletine, Amiodarone, Metoprolol, Simvastatin, ASA, Flomax  Elev WBC, but no left shift and PCT nml  Pt received a dose of Cefepime in the ED, would hold off further antibx for now  urinalysis also does not appear infected  Leg dopplers ordered   82 year old M PMH HTN, CAD s/p CABG, severe cardiomyopathy (EF 20%), afib (not on AC due to hematuria), BPH, hx of LUE DVT and now with RUE DVT, recently hospitalized at [facility] for uorsepsis and urinary retention (now with dubois), brought in from [facility] for low saturations, admitted for acute on chronic systolic CHF exacerbation.    #acute hypoxic respiratory failure  #acute on chronic systolic CHF exacerbation  - continue with lasix 20mg IV daily for now  - continue O2 as needed, wean off as tolerated  - monitor I&Os, daily weights  - follow up echo  - trop elevated x2 but possible due to demand ischemia  - cardio consulted, follow up recs    #CAD  - s/p CABG  - continue asa, atorvastatin, metoprolol succinate  - trops elevated, possible due to demand ischemia however given history monitor closely  - cardio consulted    #afib   - not on AC due to hematuria  - continue mexiletine, amiodarone metoprolol    #leukocytosis  - possibly elevated in setting of acute RUE DVT  - does not appear infectious at this time, afebrile  - s/p cefepime in the ED, will hold off on further abx for now, procal negative    #RUE DVT  - found last month during hospitalization at [facility]  - not on AC due to hematuria  - follow up dopplers b/l LE     #hypothyroid  - continue synthroid 150mcg    #DVT ppx  - lovenox    palliative care consult noted- full code as of now. Discussion to be continued tomorrow with wife after she speaks to son 82 year old M PMH HTN, CAD s/p CABG, severe cardiomyopathy (EF 20%), afib (not on AC due to hematuria), BPH, hx of LUE DVT and now with RUE DVT, recently hospitalized at [facility] for uorsepsis and urinary retention (now with dubois), brought in from [facility] for low saturations, admitted for acute on chronic systolic CHF exacerbation.    #acute hypoxic respiratory failure  #acute on chronic systolic CHF exacerbation  - increase lasix to 40mg IV daily  - continue O2 as needed, wean off as tolerated  - monitor I&Os, daily weights  - follow up echo  - trop elevated x2 but possible due to demand ischemia  - cardio consulted, follow up recs    #CAD  - s/p CABG  - continue asa, metoprolol succinate  - trops elevated, possible due to demand ischemia however given history monitor closely  - cardio consulted    #transaminitis  - hold statin for transaminitis  - follow up CMP in the AM    #afib   - not on AC due to hematuria  - continue mexiletine, amiodarone metoprolol    #leukocytosis  - possibly elevated in setting of acute RUE DVT  - does not appear infectious at this time, afebrile  - s/p cefepime in the ED, will hold off on further abx for now, procal negative    #RUE DVT  - found last month during hospitalization at [facility]  - not on AC due to hematuria  - follow up dopplers b/l LE     #hypothyroid  - continue synthroid 150mcg    #DVT ppx  - lovenox    palliative care consult noted- full code as of now. Discussion to be continued tomorrow with wife after she speaks to son 82 year old M PMH HTN, CAD s/p CABG, severe cardiomyopathy (EF 20%), afib (not on AC due to hematuria), BPH, hx of LUE DVT and now with RUE DVT, recently hospitalized at [facility] for uorsepsis and urinary retention (now with dubois), brought in from [facility] for low saturations, admitted for acute on chronic systolic CHF exacerbation.    #acute hypoxic respiratory failure  #acute on chronic systolic CHF exacerbation  - increase lasix to 40mg IV daily  - continue O2 as needed, wean off as tolerated  - monitor I&Os, daily weights  - follow up echo  - trop elevated x2 but possible due to demand ischemia  - will start on entresto/ spironolactone once BP is better and can allow agents to be started  - will consider starting farxiga on discharge once off IV lasix  - cardio consulted, follow up recs    #CAD  - s/p CABG  - continue asa, metoprolol succinate  - trops elevated, possible due to demand ischemia however given history monitor closely  - cardio consulted    #transaminitis  - hold statin for transaminitis  - follow up CMP in the AM    #afib   - not on AC due to hematuria  - continue mexiletine, amiodarone metoprolol    #leukocytosis  - possibly elevated in setting of acute RUE DVT  - does not appear infectious at this time, afebrile  - s/p cefepime in the ED, will hold off on further abx for now, procal negative    #RUE DVT  - found last month during hospitalization at [facility]  - not on AC due to hematuria  - follow up dopplers b/l LE     #hypothyroid  - continue synthroid 150mcg    #DVT ppx  - lovenox    palliative care consult noted- full code as of now. Discussion to be continued tomorrow with wife after she speaks to son 82 year old M PMH HTN, CAD s/p CABG, severe cardiomyopathy (EF 20%), afib (not on AC due to hematuria), BPH, hx of LUE DVT and now with RUE DVT, recently hospitalized at [facility] for uorsepsis and urinary retention (now with dubois), brought in from [facility] for low saturations, admitted for acute on chronic systolic CHF exacerbation.    #acute hypoxic respiratory failure  #acute on chronic systolic CHF exacerbation  - increase lasix to 40mg IV daily  - continue O2 as needed, wean off as tolerated  - monitor I&Os, daily weights  - follow up echo  - trop elevated x2 but possible due to demand ischemia  - will start on entresto/ spironolactone once BP is better and can allow agents to be started  - will consider starting farxiga on discharge once off IV lasix  - cardio consulted, follow up recs    #CAD  - s/p CABG  - continue asa, metoprolol succinate  - trops elevated, possible due to demand ischemia however given history monitor closely  - cardio consulted    #transaminitis  - hold statin for transaminitis  - follow up CMP in the AM    #afib   - not on AC due to hematuria  - continue mexiletine, amiodarone metoprolol    #leukocytosis  - possibly elevated in setting of acute RUE DVT  - does not appear infectious at this time, afebrile  - s/p cefepime in the ED, will hold off on further abx for now, procal negative    #RUE DVT  - found last month during hospitalization at [facility]  - not on AC due to hematuria  - follow up dopplers b/l LE     #hypothyroid  - continue synthroid 150mcg    #DVT ppx  - lovenox    palliative care consult noted- full code as of now. Discussion to be continued tomorrow with wife after she speaks to son    spoke to wife Kirsten and son Meliton at bedside

## 2022-10-18 NOTE — PHARMACOTHERAPY INTERVENTION NOTE - COMMENTS
patient with reduced ejection heart failure currently on metoprolol XL  recommended to begin Entresto, spironolactone and Farxiga.

## 2022-10-18 NOTE — PATIENT PROFILE ADULT - FALL HARM RISK - HARM RISK INTERVENTIONS

## 2022-10-18 NOTE — CONSULT NOTE ADULT - ASSESSMENT
Assessment:  Grover De La Cruz is an 82 year old man with past medical history of Coronary artery disease (s/p CABG), HFrEF (LVEF 20% per chart notes), Hypertension, Atrial fibrillation (not on anticoagulation due to hematuria), CVA and BPH with recent hospitalization at [facility] for urosepsis and right upper extremity DVT was brought in by EMS from living facility due to lethargy and hypoxia, found to have acute on chronic systolic heart failure exacerbation and urinary tract infection.  Assessment:  Grover De La Cruz is an 82 year old man with past medical history of Coronary artery disease (s/p CABG), HFrEF (LVEF 20% per chart notes), Hypertension, Atrial fibrillation (not on anticoagulation due to hematuria), CVA and BPH with recent hospitalization at [facility] for urosepsis and right upper extremity DVT was brought in by EMS from living facility due to lethargy and hypoxia, found to have acute on chronic systolic heart failure exacerbation and urinary tract infection.     ECG consistent with atrial fibrillation, old septal infarct, no prior ECG for comparison. Troponin mildly elevated and has peaked likely demand ischemia from CHF. CT chest consistent with thoracic ascending aortic aneurysm (4.3 m) and pulmonary edema.     Recommendations:  [] Acute on chronic systolic heart failure exacerbation: Notes report prior LVEF 20%, will follow up echo today. Recommend to obtain old echocardiogram report from [facility], and also cardiology as appears patient does not have ICD. Dose Lasix 40 mg IVP daily, monitor renal function. Patient does not appear to be on guideline-directed medical therapy. Would hold beta blocker if any signs of poor perfusion.   [] Atrial fibrillation: Currently rate controlled, not on anticoagulation due to hematuria (per chart notes)  [] CAD s/p CABG: Appears stable at this time, continue Aspirin 81 mg daily, would hold statin due to elevated LFTs  [] Ascending aortic aneurysm: Does not appear to be acute issues at this time, would benefit from Vascular evaluation   [] UTI: Treatment per primary team     We will continue to follow along.    Vignesh Wallace MD  Cardiology      Assessment:  Grover De La Cruz is an 82 year old man with past medical history of Coronary artery disease (s/p CABG), HFrEF (LVEF 20% per chart notes), Hypertension, Atrial fibrillation (not on anticoagulation due to hematuria), CVA and BPH with recent hospitalization at [facility] for urosepsis and right upper extremity DVT was brought in by EMS from living facility due to lethargy and hypoxia, found to have acute on chronic systolic heart failure exacerbation and urinary tract infection.     ECG consistent with atrial fibrillation, old septal infarct, no prior ECG for comparison. Troponin mildly elevated and has peaked likely demand ischemia from CHF. CT chest consistent with thoracic ascending aortic aneurysm (4.3 m) and pulmonary edema.     Recommendations:  [] Acute on chronic systolic heart failure exacerbation: Notes report prior LVEF 20%, will follow up echo today. Recommend to obtain old echocardiogram report from [facility], and also cardiology notes as appears patient does not have ICD. Dose Lasix 40 mg IVP daily, monitor renal function. Patient does not appear to be on guideline-directed medical therapy. Would hold beta blocker if any signs of poor perfusion.   [] Atrial fibrillation: Currently rate controlled, not on anticoagulation due to hematuria (per chart notes)  [] CAD s/p CABG: Appears stable at this time, continue Aspirin 81 mg daily, would hold statin due to elevated LFTs  [] Ascending aortic aneurysm: Does not appear to be acute issues at this time, would benefit from Vascular evaluation   [] UTI: Treatment per primary team     We will continue to follow along.    Vignesh Wallace MD  Cardiology

## 2022-10-18 NOTE — CONSULT NOTE ADULT - CONVERSATION DETAILS
Called wife Kirsten today , who is unable to visit in person ,as she does not drive, and does not have ride today. Explained role of palliative care . Wife reviewed pts history with me and spoke about his recent course of hospitalization at [facility]. Says pt was there for one month, and then went into [facility] for [facility]. Discussed pt currently not in distress and is comfortable and is eating. Reviewed pts poor heart function and that he has chf and explained what that meant. Wife says she has heard in past that his heart was not good. I asked her about family support, they have one son who is  and who is actively involved with helping his parents.  Asked wife if pt has any advanced directives, living will, she said no. I explained that it is important to know pts wishes, as far as how much intervention he would want if he declined. reviewed CPR and intubation. Wife became a little upset, but was leaning towards dnr. She said she must speak with her son first. She will be in to visit tomorrow, plan to meet to continue discussion.  Pt remains full code at this time. Called wife Kirsten today , who is unable to visit in person ,as she does not drive, and does not have ride today. Explained role of palliative care . Wife reviewed pts history with me and spoke about his recent course of hospitalization at [facility]. Says pt was there for one month, and then went into [facility] for [facility]. Discussed pt currently not in distress and is comfortable and is eating. Reviewed pts poor heart function and that he has chf and explained what that meant. Wife says she has heard in past that his heart was not good. I asked her about family support, they have one son who is  and who is actively involved with helping his parents.  Asked wife if pt has any advanced directives, living will, she said no. I explained that it is important to know pts wishes, as far as how much intervention he would want if he declined. reviewed CPR and intubation. Wife became a little upset, but was leaning towards dnr. She said she must speak with her son first. She will be in to visit tomorrow, plan to meet to continue discussion.   ADD:   Met with wife and son this afternoon, they had discussion at home and now they wanted to review Molst. We reviewed form and discussed cpr/intubation again , they have decided pt would not want that and completed form for dnr/dni , also do not want any alt means of nutrition .

## 2022-10-18 NOTE — CONSULT NOTE ADULT - ASSESSMENT
A/P 81 y/o male with HTN, CAD, hx of CABG, severe cardiomyopathy (EF 20%), Afib (not on AC due to hematuria), BPH, hx of LUE DVT, recently hospitalized at [facility] for urinary retention, urosepsis, also has RUE DVT, BIB EMS from [facility] for lethargy and hypoxia, low O2 sat 83%, even on O2 2LNC.    O2 increased to 4LNC and O2 went up to 97%.  Being admitted for chf, needs IV lasix .        Assessment/Plan:     Acute hypoxic resp failure due to Acute on chronic systolic CHF  severe cardiomyopathy  Hx of CAD, remote CABG  has RUE DVT, afib, - not on AC due to hematuria  Pt with BPH, urinary retention,   - indwelling dbuois    O2 supp-4 L Via NC to keep O2 sat >92%  Lasix 20 mg IVP daily  then consider change to Lasix 40 mg/day   Cont current meds: ASA, Mexiletine, Amiodarone, Metoprolol, Simvastatin, ASA, Flomax  Cardio consult- pending     Palliative ; asked for goc/ pt w/ heart failure, cardiomyopathy. Chart reviewed, pt seen in [facility], he is awake alert to self, sitting up feeding himself, in NAD. He has very good appetite , is not sob, denies pain , is on N/C.  he is confused as to where he is, and time,  but is able to make needs known. Pt here from [facility] for lethargy and hypoxic episode. Called and had lengthy conversation w/ wife Kirsten today . She is unable to visit today, she has no ride , but plans to come in tomorrow.  See Goc note above - advanced directives, cpr/intubation reviewed today - wife would like to speak to son first.  Currently  remains full code.    plan to follow pts clinical course, and cont goc discussions.          A/P 81 y/o male with HTN, CAD, hx of CABG, severe cardiomyopathy (EF 20%), Afib (not on AC due to hematuria), BPH, hx of LUE DVT, recently hospitalized at [facility] for urinary retention, urosepsis, also has RUE DVT, BIB EMS from [facility] for lethargy and hypoxia, low O2 sat 83%, even on O2 2LNC.    O2 increased to 4LNC and O2 went up to 97%.  Being admitted for chf, needs IV lasix .        Assessment/Plan:     Acute hypoxic resp failure due to Acute on chronic systolic CHF  severe cardiomyopathy  Hx of CAD, remote CABG  has RUE DVT, afib, - not on AC due to hematuria  Pt with BPH, urinary retention,   - indwelling dubois    O2 supp-4 L Via NC to keep O2 sat >92%  Lasix 20 mg IVP daily  then consider change to Lasix 40 mg/day   Cont current meds: ASA, Mexiletine, Amiodarone, Metoprolol, Simvastatin, ASA, Flomax  Cardio consult- pending     Palliative ; asked for goc/ pt w/ heart failure, cardiomyopathy. Chart reviewed, pt seen in [facility], he is awake alert to self, sitting up feeding himself, in NAD. He has very good appetite , is not sob, denies pain , is on N/C.  he is confused as to where he is, and time,  but is able to make needs known. Pt here from [facility] for lethargy and hypoxic episode. Called and had lengthy conversation w/ wife Kirsten today . She is unable to visit today, she has no ride , but plans to come in tomorrow.  See Goc note above - advanced directives, cpr/intubation reviewed today - wife would like to speak to son first.      plan to follow pts clinical course, and cont goc discussions.     ADD; family came in this afternoon to review and complete Molst for dnr/dni   see goc note addendum  . RN and med team made aware . Molst on chart .

## 2022-10-18 NOTE — PROGRESS NOTE ADULT - SUBJECTIVE AND OBJECTIVE BOX
Patient is a 82y old  Male who presents with a chief complaint of dyspnea, hypoxia - acute on chronic systolic CHF (18 Oct 2022 10:38)      Patient seen and examined at bedside. Admitted last night for CHF exacerbation. Patient currently on RA while eating breakfast, denies SOB, chest pain and feels a little better compared to yesterday. Poor historian therefore ROS limited    ALLERGIES:  PC Pen VK (Other)    MEDICATIONS  (STANDING):  aMIOdarone    Tablet 200 milliGRAM(s) Oral daily  aspirin  chewable 81 milliGRAM(s) Oral daily  atorvastatin 80 milliGRAM(s) Oral at bedtime  enoxaparin Injectable 40 milliGRAM(s) SubCutaneous every 24 hours  famotidine    Tablet 20 milliGRAM(s) Oral daily  furosemide   Injectable 20 milliGRAM(s) IV Push daily  levothyroxine 150 MICROGram(s) Oral daily  metoprolol succinate  milliGRAM(s) Oral daily  mexiletine 150 milliGRAM(s) Oral every 12 hours  senna 1 Tablet(s) Oral at bedtime  sertraline 150 milliGRAM(s) Oral daily  tamsulosin 0.4 milliGRAM(s) Oral at bedtime  traZODone 25 milliGRAM(s) Oral at bedtime    MEDICATIONS  (PRN):  bisacodyl Suppository 10 milliGRAM(s) Rectal daily PRN Constipation  lactulose Syrup 20 Gram(s) Oral daily PRN constipation    Vital Signs Last 24 Hrs  T(F): 98.8 (18 Oct 2022 07:06), Max: 98.8 (18 Oct 2022 07:06)  HR: 84 (18 Oct 2022 07:06) (77 - 90)  BP: 146/85 (18 Oct 2022 07:06) (114/72 - 146/85)  RR: 18 (18 Oct 2022 07:06) (18 - 19)  SpO2: 95% (18 Oct 2022 07:06) (95% - 100%)  I&O's Summary    17 Oct 2022 07:01  -  18 Oct 2022 07:00  --------------------------------------------------------  IN: 0 mL / OUT: 200 mL / NET: -200 mL    18 Oct 2022 07:01  -  18 Oct 2022 10:54  --------------------------------------------------------  IN: 0 mL / OUT: 800 mL / NET: -800 mL        PHYSICAL EXAM:  GENERAL: NAD, sitting in bed, elderly male  HEAD:  Atraumatic, Normocephalic  EYES: PEERL, conjunctiva and sclera clear  ENMT: Moist mucous membranes, Supple, No JVD  CHEST/LUNG: diminished breath sounds more at bases, bibasilar rales, no wheezing or rhonchi  HEART: irregular rate and rhythm; S1/S2, No murmur  ABDOMEN: Soft, Nontender, Nondistended; Bowel sounds present  VASCULAR: Normal pulses, Normal capillary refill  EXTREMITIES: No calf tenderness, No cyanosis, No edema  SKIN: Warm, perfused    LABS:                        9.5    17.84 )-----------( 435      ( 17 Oct 2022 19:55 )             31.4     10-17    143  |  108  |  28  ----------------------------<  105  4.1   |  30  |  1.14    Ca    8.3      17 Oct 2022 19:55    TPro  6.1  /  Alb  2.4  /  TBili  0.3  /  DBili  x   /  AST  86  /  ALT  131  /  AlkPhos  230  10-17        Lactate, Blood: 1.3 mmol/L (10-17 @ 22:40)    CARDIAC MARKERS ( 17 Oct 2022 23:15 )  x     / 86.4 ng/L / x     / x     / x      CARDIAC MARKERS ( 17 Oct 2022 19:55 )  x     / 88.7 ng/L / x     / x     / x                            Urinalysis Basic - ( 17 Oct 2022 22:40 )    Color: Yellow / Appearance: Clear / S.005 / pH: x  Gluc: x / Ketone: Negative  / Bili: Negative / Urobili: Negative   Blood: x / Protein: 30 mg/dL / Nitrite: Negative   Leuk Esterase: Moderate / RBC: 11-25 /HPF / WBC >50 /HPF   Sq Epi: x / Non Sq Epi: Neg.-Few / Bacteria: Few /HPF            RADIOLOGY & ADDITIONAL TESTS:    Care Discussed with Consultants/Other Providers:

## 2022-10-18 NOTE — CONSULT NOTE ADULT - SUBJECTIVE AND OBJECTIVE BOX
HPI: 81 y/o male with HTN, CAD, hx of CABG, severe cardiomyopathy (EF 20%), Afib (not on AC due to hematuria), BPH, hx of LUE DVT, recently hospitalized at [facility] for urinary retention, urosepsis, also has RUE DVT, BIB EMS from [facility] for lethargy and  low O2 sat 83%, even on O2 2LNC.  O2 increased to 4LNC and O2 went up to 97%. Cxray with bilat increased markings, pleural eff c/w CHF.  Pt has dubois cath.  He denied chest pain, fever, chills, cough, nausea, vomiting, abd pain.      Pt received Lasix 40 mg IVPx 1, in ED with good diuresis.   Pt is not a good historian.          PAST MEDICAL & SURGICAL HISTORY:  Chronic atrial fibrillation      History of cardiomyopathy      CAD (coronary artery disease)      BPH with obstruction/lower urinary tract symptoms      Hyperlipidemia      History of CVA (cerebrovascular accident)  left sided weakness      S/P CABG (coronary artery bypass graft)          SOCIAL HISTORY:    Admitted from:  [facility]   Substance abuse history:              Tobacco hx:                  Alcohol hx:              Home Opioid hx:  Voodoo:                                    Preferred Language:    Surrogate/HCP/:  wife  Kirsten          Phone#:  [phone]    FAMILY HISTORY:  No pertinent family history in first degree relatives      Baseline ADLs (prior to admission):    Allergies    PC Pen VK (Other)    Intolerances      Present Symptoms:   Dyspnea: no  Nausea/Vomiting:   Anxiety:  Depressed   Fatigue:  Loss of appetite: no  Pain:      no                          location:          Review of Systems:  Unable to obtain due to poor mentation    MEDICATIONS  (STANDING):  aMIOdarone    Tablet 200 milliGRAM(s) Oral daily  aspirin  chewable 81 milliGRAM(s) Oral daily  atorvastatin 80 milliGRAM(s) Oral at bedtime  enoxaparin Injectable 40 milliGRAM(s) SubCutaneous every 24 hours  famotidine    Tablet 20 milliGRAM(s) Oral daily  furosemide   Injectable 20 milliGRAM(s) IV Push daily  levothyroxine 150 MICROGram(s) Oral daily  metoprolol succinate  milliGRAM(s) Oral daily  mexiletine 150 milliGRAM(s) Oral every 12 hours  senna 1 Tablet(s) Oral at bedtime  sertraline 150 milliGRAM(s) Oral daily  tamsulosin 0.4 milliGRAM(s) Oral at bedtime  traZODone 25 milliGRAM(s) Oral at bedtime    MEDICATIONS  (PRN):  bisacodyl Suppository 10 milliGRAM(s) Rectal daily PRN Constipation  lactulose Syrup 20 Gram(s) Oral daily PRN constipation      PHYSICAL EXAM:    Vital Signs Last 24 Hrs  T(C): 37.1 (18 Oct 2022 07:06), Max: 37.1 (18 Oct 2022 07:06)  T(F): 98.8 (18 Oct 2022 07:06), Max: 98.8 (18 Oct 2022 07:06)  HR: 84 (18 Oct 2022 07:06) (77 - 90)  BP: 146/85 (18 Oct 2022 07:06) (114/72 - 146/85)  BP(mean): --  RR: 18 (18 Oct 2022 07:06) (18 - 19)  SpO2: 95% (18 Oct 2022 07:06) (95% - 100%)    Parameters below as of 18 Oct 2022 07:06  Patient On (Oxygen Delivery Method): nasal cannula  O2 Flow (L/min): 3      General: alert  oriented x 1, and family names, verbal, w/ confusion   Karnofsky Performance Score/Palliative Performance Status Version2:   40  %  PPSV: 40%  HEENT: n/c, a/t   dry mouth/lips    Lungs: ess clear , dim to bases, breathing comfortably   CV: normal rate   GI: abd soft, flat, n/t + bs    : normal , has dubois  Musculoskeletal: acosta's,  w/ weakness , no LE  edema   Skin: pale, w/d   Neuro: + deficits awake, alert, oriented to self, not location or time, follows commands   Oral intake ability: full capability  Diet: reg as emile - feeds self w/ set up     LABS:                        9.5    17.84 )-----------( 435      ( 17 Oct 2022 19:55 )             31.4     10-17    143  |  108  |  28<H>  ----------------------------<  105<H>  4.1   |  30  |  1.14    Ca    8.3<L>      17 Oct 2022 19:55    TPro  6.1  /  Alb  2.4<L>  /  TBili  0.3  /  DBili  x   /  AST  86<H>  /  ALT  131<H>  /  AlkPhos  230<H>  10-17    Urinalysis Basic - ( 17 Oct 2022 22:40 )    Color: Yellow / Appearance: Clear / S.005 / pH: x  Gluc: x / Ketone: Negative  / Bili: Negative / Urobili: Negative   Blood: x / Protein: 30 mg/dL / Nitrite: Negative   Leuk Esterase: Moderate / RBC: 11-25 /HPF / WBC >50 /HPF   Sq Epi: x / Non Sq Epi: Neg.-Few / Bacteria: Few /HPF        RADIOLOGY & ADDITIONAL STUDIES: < from: CT Chest No Cont (10.17.22 @ 21:24) >    ACC: [id number] EXAM:  CT CHEST                          PROCEDURE DATE:  10/17/2022          INTERPRETATION:  CLINICAL INFORMATION: Shortness of breath    COMPARISON: None.    CONTRAST/COMPLICATIONS:  IV Contrast: NONE  Oral Contrast: NONE  Complications: None reported at time of study completion    PROCEDURE:  CT of the Chest was performed.  Sagittal and coronal reformats were performed.    FINDINGS:    LUNGS AND AIRWAYS: Patent central airways.  Bilateral interlobular septal   thickening and patchy groundglass opacities. Bilateral lower lobe   compressive atelectasis adjacent to pleural effusions.  PLEURA: Small right and small-to-moderate left pleural effusions.  MEDIASTINUM AND GLENN: No lymphadenopathy.  VESSELS: Ascending thoracic aortameasures 4.3 cm in diameter. Aberrant   right subclavian artery. Atherosclerotic calcifications of the aorta.   Status post CABG..  HEART: Cardiomegaly. No pericardial effusion.  CHEST WALL AND LOWER NECK: Within normal limits.  VISUALIZED UPPER ABDOMEN: Colonic diverticulosis. Thickened adrenal   glands. Atherosclerotic calcifications. Abdominal wall edema.  BONES: Degenerative changes of the spine..    IMPRESSION:  Pulmonary edema, likely secondary to congestive heart failure.    Small right and small to moderate left pleural effusions.          ADVANCE DIRECTIVES:   Advanced Care Planning discussion total time spent:

## 2022-10-19 LAB
ALBUMIN SERPL ELPH-MCNC: 2.3 G/DL — LOW (ref 3.3–5)
ALBUMIN SERPL ELPH-MCNC: 2.4 G/DL — LOW (ref 3.3–5)
ALP SERPL-CCNC: 203 U/L — HIGH (ref 40–120)
ALP SERPL-CCNC: 208 U/L — HIGH (ref 40–120)
ALT FLD-CCNC: 97 U/L — HIGH (ref 10–45)
ALT FLD-CCNC: 98 U/L — HIGH (ref 10–45)
ANION GAP SERPL CALC-SCNC: 5 MMOL/L — SIGNIFICANT CHANGE UP (ref 5–17)
ANION GAP SERPL CALC-SCNC: 6 MMOL/L — SIGNIFICANT CHANGE UP (ref 5–17)
AST SERPL-CCNC: 48 U/L — HIGH (ref 10–40)
AST SERPL-CCNC: 51 U/L — HIGH (ref 10–40)
BASOPHILS # BLD AUTO: 0.06 K/UL — SIGNIFICANT CHANGE UP (ref 0–0.2)
BASOPHILS NFR BLD AUTO: 0.4 % — SIGNIFICANT CHANGE UP (ref 0–2)
BILIRUB SERPL-MCNC: 0.4 MG/DL — SIGNIFICANT CHANGE UP (ref 0.2–1.2)
BILIRUB SERPL-MCNC: 0.4 MG/DL — SIGNIFICANT CHANGE UP (ref 0.2–1.2)
BUN SERPL-MCNC: 25 MG/DL — HIGH (ref 7–23)
BUN SERPL-MCNC: 28 MG/DL — HIGH (ref 7–23)
CALCIUM SERPL-MCNC: 8.6 MG/DL — SIGNIFICANT CHANGE UP (ref 8.4–10.5)
CALCIUM SERPL-MCNC: 8.8 MG/DL — SIGNIFICANT CHANGE UP (ref 8.4–10.5)
CHLORIDE SERPL-SCNC: 100 MMOL/L — SIGNIFICANT CHANGE UP (ref 96–108)
CHLORIDE SERPL-SCNC: 101 MMOL/L — SIGNIFICANT CHANGE UP (ref 96–108)
CO2 SERPL-SCNC: 34 MMOL/L — HIGH (ref 22–31)
CO2 SERPL-SCNC: 35 MMOL/L — HIGH (ref 22–31)
CREAT SERPL-MCNC: 1.02 MG/DL — SIGNIFICANT CHANGE UP (ref 0.5–1.3)
CREAT SERPL-MCNC: 1.16 MG/DL — SIGNIFICANT CHANGE UP (ref 0.5–1.3)
EGFR: 63 ML/MIN/1.73M2 — SIGNIFICANT CHANGE UP
EGFR: 73 ML/MIN/1.73M2 — SIGNIFICANT CHANGE UP
EOSINOPHIL # BLD AUTO: 0.29 K/UL — SIGNIFICANT CHANGE UP (ref 0–0.5)
EOSINOPHIL NFR BLD AUTO: 1.7 % — SIGNIFICANT CHANGE UP (ref 0–6)
GLUCOSE SERPL-MCNC: 101 MG/DL — HIGH (ref 70–99)
GLUCOSE SERPL-MCNC: 94 MG/DL — SIGNIFICANT CHANGE UP (ref 70–99)
HCT VFR BLD CALC: 31.6 % — LOW (ref 39–50)
HGB BLD-MCNC: 9.8 G/DL — LOW (ref 13–17)
IMM GRANULOCYTES NFR BLD AUTO: 0.5 % — SIGNIFICANT CHANGE UP (ref 0–0.9)
LYMPHOCYTES # BLD AUTO: 30.5 % — SIGNIFICANT CHANGE UP (ref 13–44)
LYMPHOCYTES # BLD AUTO: 5.06 K/UL — HIGH (ref 1–3.3)
MAGNESIUM SERPL-MCNC: 1.8 MG/DL — SIGNIFICANT CHANGE UP (ref 1.6–2.6)
MCHC RBC-ENTMCNC: 29 PG — SIGNIFICANT CHANGE UP (ref 27–34)
MCHC RBC-ENTMCNC: 31 GM/DL — LOW (ref 32–36)
MCV RBC AUTO: 93.5 FL — SIGNIFICANT CHANGE UP (ref 80–100)
MONOCYTES # BLD AUTO: 0.78 K/UL — SIGNIFICANT CHANGE UP (ref 0–0.9)
MONOCYTES NFR BLD AUTO: 4.7 % — SIGNIFICANT CHANGE UP (ref 2–14)
NEUTROPHILS # BLD AUTO: 10.31 K/UL — HIGH (ref 1.8–7.4)
NEUTROPHILS NFR BLD AUTO: 62.2 % — SIGNIFICANT CHANGE UP (ref 43–77)
NRBC # BLD: 0 /100 WBCS — SIGNIFICANT CHANGE UP (ref 0–0)
PLATELET # BLD AUTO: 402 K/UL — HIGH (ref 150–400)
POTASSIUM SERPL-MCNC: 2.7 MMOL/L — CRITICAL LOW (ref 3.5–5.3)
POTASSIUM SERPL-MCNC: 3.4 MMOL/L — LOW (ref 3.5–5.3)
POTASSIUM SERPL-SCNC: 2.7 MMOL/L — CRITICAL LOW (ref 3.5–5.3)
POTASSIUM SERPL-SCNC: 3.4 MMOL/L — LOW (ref 3.5–5.3)
PROT SERPL-MCNC: 6.1 G/DL — SIGNIFICANT CHANGE UP (ref 6–8.3)
PROT SERPL-MCNC: 6.2 G/DL — SIGNIFICANT CHANGE UP (ref 6–8.3)
RBC # BLD: 3.38 M/UL — LOW (ref 4.2–5.8)
RBC # FLD: 16.1 % — HIGH (ref 10.3–14.5)
SODIUM SERPL-SCNC: 140 MMOL/L — SIGNIFICANT CHANGE UP (ref 135–145)
SODIUM SERPL-SCNC: 141 MMOL/L — SIGNIFICANT CHANGE UP (ref 135–145)
WBC # BLD: 16.59 K/UL — HIGH (ref 3.8–10.5)
WBC # FLD AUTO: 16.59 K/UL — HIGH (ref 3.8–10.5)

## 2022-10-19 PROCEDURE — 99233 SBSQ HOSP IP/OBS HIGH 50: CPT

## 2022-10-19 PROCEDURE — 93970 EXTREMITY STUDY: CPT | Mod: 26

## 2022-10-19 PROCEDURE — 99232 SBSQ HOSP IP/OBS MODERATE 35: CPT

## 2022-10-19 RX ORDER — POTASSIUM CHLORIDE 20 MEQ
40 PACKET (EA) ORAL ONCE
Refills: 0 | Status: COMPLETED | OUTPATIENT
Start: 2022-10-19 | End: 2022-10-19

## 2022-10-19 RX ORDER — LIDOCAINE 4 G/100G
1 CREAM TOPICAL THREE TIMES A DAY
Refills: 0 | Status: ACTIVE | OUTPATIENT
Start: 2022-10-19 | End: 2023-09-17

## 2022-10-19 RX ORDER — ENOXAPARIN SODIUM 100 MG/ML
80 INJECTION SUBCUTANEOUS EVERY 12 HOURS
Refills: 0 | Status: DISCONTINUED | OUTPATIENT
Start: 2022-10-19 | End: 2022-10-22

## 2022-10-19 RX ORDER — POTASSIUM CHLORIDE 20 MEQ
40 PACKET (EA) ORAL EVERY 4 HOURS
Refills: 0 | Status: COMPLETED | OUTPATIENT
Start: 2022-10-19 | End: 2022-10-19

## 2022-10-19 RX ORDER — IBUPROFEN 200 MG
400 TABLET ORAL ONCE
Refills: 0 | Status: COMPLETED | OUTPATIENT
Start: 2022-10-19 | End: 2022-10-19

## 2022-10-19 RX ORDER — POTASSIUM CHLORIDE 20 MEQ
10 PACKET (EA) ORAL
Refills: 0 | Status: COMPLETED | OUTPATIENT
Start: 2022-10-19 | End: 2022-10-19

## 2022-10-19 RX ORDER — ASPIRIN/CALCIUM CARB/MAGNESIUM 324 MG
81 TABLET ORAL DAILY
Refills: 0 | Status: DISCONTINUED | OUTPATIENT
Start: 2022-10-20 | End: 2022-10-30

## 2022-10-19 RX ADMIN — SERTRALINE 150 MILLIGRAM(S): 25 TABLET, FILM COATED ORAL at 21:53

## 2022-10-19 RX ADMIN — Medication 100 MILLIEQUIVALENT(S): at 09:00

## 2022-10-19 RX ADMIN — FAMOTIDINE 20 MILLIGRAM(S): 10 INJECTION INTRAVENOUS at 21:53

## 2022-10-19 RX ADMIN — TAMSULOSIN HYDROCHLORIDE 0.4 MILLIGRAM(S): 0.4 CAPSULE ORAL at 21:53

## 2022-10-19 RX ADMIN — Medication 40 MILLIEQUIVALENT(S): at 14:40

## 2022-10-19 RX ADMIN — Medication 400 MILLIGRAM(S): at 16:07

## 2022-10-19 RX ADMIN — ENOXAPARIN SODIUM 40 MILLIGRAM(S): 100 INJECTION SUBCUTANEOUS at 05:54

## 2022-10-19 RX ADMIN — Medication 100 MILLIEQUIVALENT(S): at 11:41

## 2022-10-19 RX ADMIN — MEXILETINE HYDROCHLORIDE 150 MILLIGRAM(S): 150 CAPSULE ORAL at 05:55

## 2022-10-19 RX ADMIN — SENNA PLUS 1 TABLET(S): 8.6 TABLET ORAL at 21:53

## 2022-10-19 RX ADMIN — Medication 100 MILLIEQUIVALENT(S): at 10:09

## 2022-10-19 RX ADMIN — Medication 40 MILLIEQUIVALENT(S): at 18:16

## 2022-10-19 RX ADMIN — MEXILETINE HYDROCHLORIDE 150 MILLIGRAM(S): 150 CAPSULE ORAL at 18:16

## 2022-10-19 RX ADMIN — Medication 100 MILLIGRAM(S): at 05:55

## 2022-10-19 RX ADMIN — ENOXAPARIN SODIUM 80 MILLIGRAM(S): 100 INJECTION SUBCUTANEOUS at 18:15

## 2022-10-19 RX ADMIN — Medication 81 MILLIGRAM(S): at 14:40

## 2022-10-19 RX ADMIN — Medication 40 MILLIGRAM(S): at 05:54

## 2022-10-19 RX ADMIN — Medication 40 MILLIEQUIVALENT(S): at 10:10

## 2022-10-19 RX ADMIN — Medication 400 MILLIGRAM(S): at 15:07

## 2022-10-19 RX ADMIN — Medication 25 MILLIGRAM(S): at 21:53

## 2022-10-19 RX ADMIN — AMIODARONE HYDROCHLORIDE 200 MILLIGRAM(S): 400 TABLET ORAL at 05:54

## 2022-10-19 RX ADMIN — Medication 150 MICROGRAM(S): at 05:54

## 2022-10-19 NOTE — CHART NOTE - NSCHARTNOTEFT_GEN_A_CORE
Radiologist called regarding positive results for bilateral lower extremity dopplers. Patient noted to have positive DVT in both lower extremities above the knee. Results were conveyed to both wife, Kirsten De La Cruz, and son, Gabriel De La Cruz, separately over the phone. It was explained that given patient's history of afib and limited mobility, he is at high risk for forming blood clots. Patient was started on Eliquis at [place] a month ago for afib and LUE DVT however was stopped due to hematuria. Given patient's stable H/H and no longer having hematuria, benefits of AC outweigh risk and this was explained to family, who are in agreement with plan and okay with continuing with AC at this time. Option of IVC filter was also explained however given patient's current medical status and fluid overload, would likely not be cleared at this time for the procedure. Family understands and is in agreement with plan, will start on full dose lovenox. Will monitor H/H and dubois closely for signs of bleeding. Radiologist called regarding positive results for bilateral lower extremity dopplers. Patient noted to have positive DVT in both lower extremities above the knee. Results were conveyed to both wife, Kirsten De La Cruz, and son, Gabriel De La Cruz, separately over the phone. It was explained that given patient's history of afib and limited mobility, he is at high risk for forming blood clots. Patient was started on Eliquis at [place] a month ago for afib and LUE DVT however was stopped due to hematuria. Given patient's stable H/H and no longer having hematuria, benefits of AC outweigh risk and this was explained to family, who are in agreement with plan and okay with continuing with AC at this time. Option of IVC filter was also explained however given patient's current medical status and fluid overload, would likely not be cleared at this time for the procedure. Family understands and is in agreement with plan, will start on full dose lovenox. Will monitor H/H and dubois closely for signs of bleeding. Will also stop aspirin as it was started in lieu of Eliquis for treatment of LUE DVT and afib. Radiologist called regarding positive results for bilateral lower extremity dopplers. Patient noted to have positive DVT in both lower extremities above the knee. Results were conveyed to both wife, Kirsten De La Cruz, and son, Gabriel De La Cruz, separately over the phone. It was explained that given patient's history of afib and limited mobility, he is at high risk for forming blood clots. Patient was started on Eliquis at [place] a month ago for afib and LUE DVT however was stopped due to hematuria. Given patient's stable H/H and no longer having hematuria, benefits of AC outweigh risk and this was explained to family, who are in agreement with plan and okay with continuing with AC at this time. Option of IVC filter was also explained however given patient's current medical status and fluid overload, would likely not be cleared at this time for the procedure. Family understands and is in agreement with plan, will start on full dose lovenox. Will monitor H/H and dubois closely for signs of bleeding.

## 2022-10-19 NOTE — PROGRESS NOTE ADULT - SUBJECTIVE AND OBJECTIVE BOX
YUNIEL ALBERTO  [id number]      Chief Complaint: Acute on chronic systolic heart failure exacerbation/UTI    Interval History: The patient has some confusion but reports breathing is stable. Denies chest pain.     Tele: atrial fibrillation 60s BPM      Current meds:   aMIOdarone    Tablet 200 milliGRAM(s) Oral daily  aspirin  chewable 81 milliGRAM(s) Oral daily  bisacodyl Suppository 10 milliGRAM(s) Rectal daily PRN  enoxaparin Injectable 40 milliGRAM(s) SubCutaneous every 24 hours  famotidine    Tablet 20 milliGRAM(s) Oral daily  furosemide   Injectable 40 milliGRAM(s) IV Push daily  lactulose Syrup 20 Gram(s) Oral daily PRN  levothyroxine 150 MICROGram(s) Oral daily  metoprolol succinate  milliGRAM(s) Oral daily  mexiletine 150 milliGRAM(s) Oral every 12 hours  potassium chloride   Powder 40 milliEquivalent(s) Oral every 4 hours  potassium chloride  10 mEq/100 mL IVPB 10 milliEquivalent(s) IV Intermittent every 1 hour  senna 1 Tablet(s) Oral at bedtime  sertraline 150 milliGRAM(s) Oral daily  tamsulosin 0.4 milliGRAM(s) Oral at bedtime  traZODone 25 milliGRAM(s) Oral at bedtime      Objective:     Vital Signs:   T(C): 36.6 (10-19-22 @ 06:00), Max: 36.9 (10-18-22 @ 15:50)  HR: 68 (10-19-22 @ 06:00) (68 - 78)  BP: 103/64 (10-19-22 @ 06:00) (103/64 - 125/76)  RR: 18 (10-19-22 @ 06:00) (18 - 18)  SpO2: 99% (10-19-22 @ 06:00) (96% - 100%)  Wt(kg): --    PHYSICAL EXAM:  General: elderly man, no acute distress  HEENT: sclera anicteric  Neck: supple  CVS: JVP ~ 9 cm H20, irregularly irregular, s1, s2  Chest: unlabored respirations, decreased anterior breath sounds   Extremities: no lower extremity edema b/l  Neuro: awake, alert, not oriented     Labs:   19 Oct 2022 07:00    141    |  101    |  25     ----------------------------<  94     2.7     |  34     |  1.02     Ca    8.8        19 Oct 2022 07:00    TPro  6.1    /  Alb  2.3    /  TBili  0.4    /  DBili  x      /  AST  48     /  ALT  98     /  AlkPhos  203    19 Oct 2022 07:00                          9.8    16.59 )-----------( 402      ( 19 Oct 2022 07:00 )             31.6               ECG (10/17/22): atrial fibrillation, old septal infarct, prolonged QT (no prior ECG for comparison)    CT Chest (10/17/22):  VESSELS: Ascending thoracic aorta measures 4.3 cm in diameter. Aberrant right subclavian artery. Atherosclerotic calcifications of the aorta. Status post CABG.  IMPRESSION:  Pulmonary edema, likely secondary to congestive heart failure.  Small right and small to moderate left pleural effusions.    TTE (10/18/22):   1. Severely decreased global left ventricular systolic function.   2. Left ventricular ejection fraction, by visual estimation, is 20 to 25%.   3. Severe global left ventricle hypokinesis with regional variation: the   inferolateral wall and inferior wall appear akinetic.   4. Normal left ventricular internal cavity size.   5. The mitral in-flow pattern reveals no discernable A-wave, therefore no comment on diastolic function can be made.   6. There is moderate septal left ventricular hypertrophy.   7. Moderately reduced RV systolic function.   8. Severely enlarged left atrium.   9. Right atrial enlargement.  10. Mild mitral annular calcification.  11. Mild thickening and calcification of the anterior and posterior   mitral valve leaflets.  12. Moderate mitral valve regurgitation.  13. Mild tricuspid regurgitation.  14. Mild aortic valve leaflet thickening and calcification.  15. Sclerotic aortic valve with normal opening.  16. Moderate aortic regurgitation.  17. Dilated proximal ascending aorta (4.4 cm).  18. Mildly dilated pulmonary artery.  19. Large pleural effusion in the left lateral region.  20. There is no evidence of pericardial effusion.      No prior cardiac workup in chart

## 2022-10-19 NOTE — DIETITIAN INITIAL EVALUATION ADULT - ORAL INTAKE PTA/DIET HISTORY
Pt reports good appetite at home. Does no follow any diet or eat in any particular way at home. Pt is a poor historian. Reports for breakfast consumes pancakes, oatmeal, eggs, or Telugu toast. Reports UBW to be 180lbs. Pt did not notice changes in his weight in the past 6 months.  Pt reports good appetite at home. Does no follow any diet or eat in any particular way at home. Obtained diet history although pt is noted to a poor historian. Reports for breakfast consumes pancakes, oatmeal, eggs, or [place] toast. Reports UBW to be 180lbs. Pt did not notice changes in his weight in the past 6 months.

## 2022-10-19 NOTE — DIETITIAN INITIAL EVALUATION ADULT - PERTINENT LABORATORY DATA
10-19    141  |  101  |  25<H>  ----------------------------<  94  2.7<LL>   |  34<H>  |  1.02    Ca    8.8      19 Oct 2022 07:00    TPro  6.1  /  Alb  2.3<L>  /  TBili  0.4  /  DBili  x   /  AST  48<H>  /  ALT  98<H>  /  AlkPhos  203<H>  10-19

## 2022-10-19 NOTE — DIETITIAN INITIAL EVALUATION ADULT - OTHER INFO
Observed pt this am while consuming his breakfast.  Pt has great appetite. Pt is tolerating current diet. Currently on Soft Bite Sized, however pt has no chewing or swallowing difficulties to denote him to be on one. Food preferences were noted. Conducted NFPE, severe signs of temporal, orbital, buccal and tricep depletion. Pt agreeable to having Ensure Plus High Protein BID (provides 700kcal 40g pro) as extra nutritional supplementation in-between meals. Pt reports no known food allergies at this time.  Observed pt this am while consuming his breakfast.  Pt has great appetite. Pt is tolerating current diet. Currently on Soft Bite Sized, however pt has no chewing or swallowing difficulties to denote him to be on one. Pt was transferred from a nursing home and previous diet was regular. Pt requested bread this AM and no signs of aspiration was noted. Food preferences were noted. Conducted NFPE, severe signs of temporal, orbital, buccal and tricep depletion. Pt agreeable to having Ensure Plus High Protein BID (provides 700kcal 40g pro) as extra nutritional supplementation in-between meals. Pt reports no known food allergies at this time.

## 2022-10-19 NOTE — DIETITIAN NUTRITION RISK NOTIFICATION - TREATMENT: THE FOLLOWING DIET HAS BEEN RECOMMENDED
Diet, Soft and Bite Sized:   DASH/TLC {Sodium & Cholesterol Restricted} (10-18-22 @ 02:22) [Active]

## 2022-10-19 NOTE — DIETITIAN INITIAL EVALUATION ADULT - NUTRITIONGOAL OUTCOME1
1. Pt to meet >75% nutritional needs per course of hospitalization  2. Pt to maintain weight per hospitalization course.

## 2022-10-19 NOTE — PHARMACOTHERAPY INTERVENTION NOTE - COMMENTS
discussed current medications with patients family  they expressed understanding  all questions answered  time spent on heart failure education 10 min

## 2022-10-19 NOTE — PROGRESS NOTE ADULT - SUBJECTIVE AND OBJECTIVE BOX
Patient is a 82y old  Male who presents with a chief complaint of dyspnea, hypoxia - acute on chronic systolic CHF (19 Oct 2022 09:54)      Patient seen and examined at bedside. No events overnight. Patient on 2L NC saturating well. Appears to be diuresing well. Denies SOB, chest pain, abd pain. Limited ROS due to mental status    ALLERGIES:  PC Pen VK (Other)    MEDICATIONS  (STANDING):  aMIOdarone    Tablet 200 milliGRAM(s) Oral daily  aspirin  chewable 81 milliGRAM(s) Oral daily  enoxaparin Injectable 40 milliGRAM(s) SubCutaneous every 24 hours  famotidine    Tablet 20 milliGRAM(s) Oral daily  furosemide   Injectable 40 milliGRAM(s) IV Push daily  levothyroxine 150 MICROGram(s) Oral daily  metoprolol succinate  milliGRAM(s) Oral daily  mexiletine 150 milliGRAM(s) Oral every 12 hours  potassium chloride   Powder 40 milliEquivalent(s) Oral every 4 hours  potassium chloride  10 mEq/100 mL IVPB 10 milliEquivalent(s) IV Intermittent every 1 hour  senna 1 Tablet(s) Oral at bedtime  sertraline 150 milliGRAM(s) Oral daily  tamsulosin 0.4 milliGRAM(s) Oral at bedtime  traZODone 25 milliGRAM(s) Oral at bedtime    MEDICATIONS  (PRN):  bisacodyl Suppository 10 milliGRAM(s) Rectal daily PRN Constipation  lactulose Syrup 20 Gram(s) Oral daily PRN constipation    Vital Signs Last 24 Hrs  T(F): 97.8 (19 Oct 2022 06:00), Max: 98.4 (18 Oct 2022 15:50)  HR: 68 (19 Oct 2022 06:00) (68 - 78)  BP: 103/64 (19 Oct 2022 06:00) (103/64 - 125/76)  RR: 18 (19 Oct 2022 06:00) (18 - 18)  SpO2: 99% (19 Oct 2022 06:00) (96% - 100%)  I&O's Summary    18 Oct 2022 07:01  -  19 Oct 2022 07:00  --------------------------------------------------------  IN: 0 mL / OUT: 3300 mL / NET: -3300 mL        PHYSICAL EXAM:  GENERAL: NAD, laying in bed, elderly male, on 2L NC  HEAD:  Atraumatic, Normocephalic  EYES: PEERL, conjunctiva and sclera clear  ENMT: Moist mucous membranes, Supple, No JVD  CHEST/LUNG: diminished breath sounds more at bases, bibasilar rales, no wheezing or rhonchi  HEART: irregular rate and rhythm; S1/S2, No murmur  ABDOMEN: Soft, Nontender, Nondistended; Bowel sounds present  VASCULAR: Normal pulses, Normal capillary refill  EXTREMITIES: No calf tenderness, No cyanosis, No edema  SKIN: Warm, perfused    LABS:                        9.8    16.59 )-----------( 402      ( 19 Oct 2022 07:00 )             31.6     10-    141  |  101  |  25  ----------------------------<  94  2.7   |  34  |  1.02    Ca    8.8      19 Oct 2022 07:00    TPro  6.1  /  Alb  2.3  /  TBili  0.4  /  DBili  x   /  AST  48  /  ALT  98  /  AlkPhos  203  10-19        Lactate, Blood: 1.3 mmol/L (10-17 @ 22:40)    CARDIAC MARKERS ( 17 Oct 2022 23:15 )  x     / 86.4 ng/L / x     / x     / x      CARDIAC MARKERS ( 17 Oct 2022 19:55 )  x     / 88.7 ng/L / x     / x     / x                            Urinalysis Basic - ( 17 Oct 2022 22:40 )    Color: Yellow / Appearance: Clear / S.005 / pH: x  Gluc: x / Ketone: Negative  / Bili: Negative / Urobili: Negative   Blood: x / Protein: 30 mg/dL / Nitrite: Negative   Leuk Esterase: Moderate / RBC: 11-25 /HPF / WBC >50 /HPF   Sq Epi: x / Non Sq Epi: Neg.-Few / Bacteria: Few /HPF        Culture - Blood (collected 17 Oct 2022 22:40)  Source: .Blood Blood  Preliminary Report (19 Oct 2022 05:01):    No growth to date.    Culture - Blood (collected 17 Oct 2022 22:40)  Source: .Blood Blood  Preliminary Report (19 Oct 2022 05:01):    No growth to date.          RADIOLOGY & ADDITIONAL TESTS:    Care Discussed with Consultants/Other Providers:

## 2022-10-19 NOTE — DIETITIAN INITIAL EVALUATION ADULT - PERTINENT MEDS FT
MEDICATIONS  (STANDING):  aMIOdarone    Tablet 200 milliGRAM(s) Oral daily  aspirin  chewable 81 milliGRAM(s) Oral daily  enoxaparin Injectable 40 milliGRAM(s) SubCutaneous every 24 hours  famotidine    Tablet 20 milliGRAM(s) Oral daily  furosemide   Injectable 40 milliGRAM(s) IV Push daily  levothyroxine 150 MICROGram(s) Oral daily  metoprolol succinate  milliGRAM(s) Oral daily  mexiletine 150 milliGRAM(s) Oral every 12 hours  potassium chloride   Powder 40 milliEquivalent(s) Oral every 4 hours  senna 1 Tablet(s) Oral at bedtime  sertraline 150 milliGRAM(s) Oral daily  tamsulosin 0.4 milliGRAM(s) Oral at bedtime  traZODone 25 milliGRAM(s) Oral at bedtime    MEDICATIONS  (PRN):  bisacodyl Suppository 10 milliGRAM(s) Rectal daily PRN Constipation  lactulose Syrup 20 Gram(s) Oral daily PRN constipation

## 2022-10-19 NOTE — DIETITIAN INITIAL EVALUATION ADULT - REASON FOR ADMISSION
82 year old M PMH HTN, CAD s/p CABG, severe cardiomyopathy (EF 20%), afib (not on AC due to hematuria), BPH, hx of LUE DVT and now with RUE DVT, recently hospitalized at [facility] for uorsepsis and urinary retention (now with dubois), brought in from [facility] for low saturations, admitted for acute on chronic systolic CHF exacerbation.

## 2022-10-19 NOTE — PROGRESS NOTE ADULT - ASSESSMENT
Assessment:  Grover De La Cruz is an 82 year old man with past medical history of Coronary artery disease (s/p CABG), HFrEF (LVEF 20% per chart notes), Hypertension, Atrial fibrillation (not on anticoagulation due to hematuria), CVA and BPH with recent hospitalization at [facility] for urosepsis and right upper extremity DVT was brought in by EMS from living facility due to lethargy and hypoxia, found to have acute on chronic systolic heart failure exacerbation and urinary tract infection.     ECG consistent with atrial fibrillation, old septal infarct, no prior ECG for comparison. Troponin mildly elevated and has peaked likely demand ischemia from CHF. CT chest consistent with thoracic ascending aortic aneurysm (4.3 m) and pulmonary edema.     Recommendations:  [] Acute on chronic systolic heart failure exacerbation: Notes report prior LVEF 20%. Echo here consistent with LVEF 20-25% with wall motion abnormalities, reduced RV function, large left pleural effusion.  Recommend to obtain old echocardiogram report from [facility], and also cardiology notes as appears patient does not have ICD. Continue Lasix 40 mg IVP daily, monitor renal function and replete electrolytes. Patient does not appear to be on guideline-directed medical therapy. Would hold beta blocker if any signs of poor perfusion.   [] Atrial fibrillation: Currently rate controlled, not on anticoagulation due to hematuria (per chart notes)  [] CAD s/p CABG: Appears stable at this time, continue Aspirin 81 mg daily, would hold statin due to elevated LFTs  [] Ascending aortic aneurysm: Does not appear to be acute issues at this time, would benefit from Vascular evaluation   [] UTI: Treatment per primary team     Will sign out to cardiologist to follow along tomorrow.     Vignesh Wallace MD  Cardiology      Assessment:  Grover De La Cruz is an 82 year old man with past medical history of Coronary artery disease (s/p CABG), HFrEF (LVEF 20% per chart notes), Hypertension, Atrial fibrillation (not on anticoagulation due to hematuria), CVA and BPH with recent hospitalization at [facility] for urosepsis and right upper extremity DVT was brought in by EMS from living facility due to lethargy and hypoxia, found to have acute on chronic systolic heart failure exacerbation and urinary tract infection.     ECG consistent with atrial fibrillation, old septal infarct, no prior ECG for comparison. Troponin mildly elevated and has peaked likely demand ischemia from CHF. CT chest consistent with thoracic ascending aortic aneurysm (4.3 m) and pulmonary edema.     Recommendations:  [] Acute on chronic systolic heart failure exacerbation: Notes report prior LVEF 20%. Echo here consistent with LVEF 20-25% with wall motion abnormalities, reduced RV function, large left pleural effusion.  Recommend to obtain old echocardiogram report from [facility], and also cardiology notes as appears patient does not have ICD. Continue Lasix 40 mg IVP daily, monitor renal function and replete electrolytes. Patient does not appear to be on guideline-directed medical therapy. Would hold beta blocker if any signs of poor perfusion.   [] Atrial fibrillation: Currently rate controlled, not on anticoagulation due to hematuria (per chart notes)  [] CAD s/p CABG: Appears stable at this time, continue Aspirin 81 mg daily, would hold statin due to elevated LFTs  [] Ascending aortic aneurysm: Does not appear to be acute issues at this time, would benefit from Vascular evaluation   [] UTI: Treatment per primary team     Addendum:  [facility] records obtained:    TTE (9/12/22):  LVEF 20%, minor regional variation  Normal RV size and severely reduced RV systolic function  Moderate MR, Mild AR   No pericardial effusion     Coronary angiogram (10/3/22):  LIMA-LAD: patent  SVG-LCx: occluded  SVG-RCA: occluded  Recommendations: Medical therapy, EP consult     Records obtained confirms that patient has a history of reduced LVEF 20% and history of occluded vein bypass grafts based on recent coronary angiogram above. Would consider EP evaluation regarding ICD if within goals of care in discussion with family     Will sign out to cardiologist to follow along tomorrow.     Vignesh Wallace MD  Cardiology

## 2022-10-19 NOTE — DIETITIAN INITIAL EVALUATION ADULT - NSFNSGIASSESSMENTFT_GEN_A_CORE
No issues with nausea, vomiting, constipation, diarrhea noted at this time. Pt reports last BM 2-3 days ago.

## 2022-10-19 NOTE — PROGRESS NOTE ADULT - ASSESSMENT
82 year old M PMH HTN, CAD s/p CABG, severe cardiomyopathy (EF 20%), afib (not on AC due to hematuria), BPH, hx of LUE DVT and now with RUE DVT, recently hospitalized at [facility] for uorsepsis and urinary retention (now with dubois), brought in from [facility] for low saturations, admitted for acute on chronic systolic CHF exacerbation.    #acute hypoxic respiratory failure  #acute on chronic systolic CHF exacerbation  - continue lasix 40mg IV daily  - continue O2 as needed, wean off as tolerated  - monitor I&Os, daily weights  - echo wth EF 20-25%, consistent with previous echos  - trop elevated x2 but possible due to demand ischemia  - will start on entresto/ spironolactone once BP is better and can allow agents to be started  - will consider starting farxiga on discharge once off IV lasix  - cardio consulted, recs appreciated  - as per family, cardiologist at [facility] was considering ICD however patient was "too weak" therefore decided against it     #CAD  - s/p CABG  - continue asa, metoprolol succinate  - trops elevated, possible due to demand ischemia however given history monitor closely  - cardio following    #transaminitis  - hold statin for transaminitis, trending down but still elevated  - follow up CMP in the AM    #afib   - not on AC due to hematuria  - continue mexiletine, amiodarone metoprolol    #leukocytosis  - possibly elevated in setting of acute RUE DVT  - does not appear infectious at this time, afebrile  - s/p cefepime in the ED, will hold off on further abx for now, procal negative    #RUE DVT  - found last month during hospitalization at [facility]  - not on AC due to hematuria  - follow up dopplers b/l LE     #hypothyroid  - continue synthroid 150mcg    #hypokalemia  - likely from diuresis  - repleted    #DVT ppx  - lovenox 82 year old M PMH HTN, CAD s/p CABG, severe cardiomyopathy (EF 20%), afib (not on AC due to hematuria), BPH, hx of LUE DVT and now with RUE DVT, recently hospitalized at [place] for uorsepsis and urinary retention (now with dubois), brought in from [facility] for low saturations, admitted for acute on chronic systolic CHF exacerbation.    #acute hypoxic respiratory failure  #acute on chronic systolic CHF exacerbation  - continue lasix 40mg IV daily  - continue O2 as needed, wean off as tolerated  - monitor I&Os, daily weights  - echo wth EF 20-25%, consistent with previous echos  - trop elevated x2 but possible due to demand ischemia  - will start on entresto/ spironolactone once BP is better and can allow agents to be started  - will consider starting farxiga on discharge once off IV lasix  - cardio consulted, recs appreciated  - as per family, cardiologist at [place] was considering ICD however patient was "too weak" therefore decided against it     #CAD  - s/p CABG  - continue asa, metoprolol succinate  - trops elevated, possible due to demand ischemia however given history monitor closely  - cardio following    #transaminitis  - hold statin for transaminitis, trending down but still elevated  - follow up CMP in the AM    #afib   - not on AC due to hematuria  - continue mexiletine, amiodarone metoprolol    #leukocytosis  - possibly elevated in setting of acute RUE DVT  - does not appear infectious at this time, afebrile  - s/p cefepime in the ED, will hold off on further abx for now, procal negative    #RUE DVT  - found last month during hospitalization at [facility]  - not on AC due to hematuria  - follow up dopplers b/l LE     #hypothyroid  - continue synthroid 150mcg    #hypokalemia  - likely from diuresis  - repleted    #DVT ppx  - lovenox    updated son Gabriel Ladd [phone] about patient's status, all questions answered. 82 year old M PMH HTN, CAD s/p CABG, severe cardiomyopathy (EF 20%), afib (not on AC due to hematuria), BPH, hx of RUE DVT and now with LUE DVT, recently hospitalized at [facility] for uorsepsis and urinary retention (now with dubois), brought in from [facility] for low saturations, admitted for acute on chronic systolic CHF exacerbation.    #acute hypoxic respiratory failure  #acute on chronic systolic CHF exacerbation  - continue lasix 40mg IV daily  - continue O2 as needed, wean off as tolerated  - monitor I&Os, daily weights  - echo wth EF 20-25%, consistent with previous echos  - trop elevated x2 but possible due to demand ischemia  - will start on entresto/ spironolactone once BP is better and can allow agents to be started  - will consider starting farxiga on discharge once off IV lasix  - cardio consulted, recs appreciated  - as per family, cardiologist at [facility] was considering ICD however patient was "too weak" therefore decided against it     #CAD  - s/p CABG  - continue asa, metoprolol succinate  - trops elevated, possible due to demand ischemia however given history monitor closely  - cardio following    #transaminitis  - hold statin for transaminitis, trending down but still elevated  - follow up CMP in the AM    #afib   - not on AC due to hematuria  - continue mexiletine, amiodarone metoprolol    #leukocytosis  - possibly elevated in setting of acute RUE DVT  - does not appear infectious at this time, afebrile  - s/p cefepime in the ED, will hold off on further abx for now, procal negative    #LUE DVT  - found last month during hospitalization at [facility]  - not on AC due to hematuria  - follow up dopplers b/l LE  - monitor follow up 11/10 for LUE DVT     #hypothyroid  - continue synthroid 150mcg    #hypokalemia  - likely from diuresis  - repleted    #DVT ppx  - lovenox    updated son Gabriel Ladd [phone] about patient's status, all questions answered. 82 year old M PMH HTN, CAD s/p CABG, severe cardiomyopathy (EF 20%), afib (not on AC due to hematuria), BPH, LUE DVT, history of LLE DVT, recently hospitalized at [place] for uorsepsis and urinary retention (now with dubois), brought in from [facility] for low saturations, admitted for acute on chronic systolic CHF exacerbation.    #acute hypoxic respiratory failure  #acute on chronic systolic CHF exacerbation  - continue lasix 40mg IV daily  - continue O2 as needed, wean off as tolerated  - monitor I&Os, daily weights  - echo wth EF 20-25%, consistent with previous echos  - trop elevated x2 but possible due to demand ischemia  - will start on entresto/ spironolactone once BP is better and can allow agents to be started  - will consider starting farxiga on discharge once off IV lasix  - cardio consulted, recs appreciated  - as per family, cardiologist at [place] was considering ICD however patient was "too weak" therefore decided against it     #CAD  - s/p CABG  - continue asa, metoprolol succinate  - trops elevated, possible due to demand ischemia however given history monitor closely  - cardio following    #transaminitis  - hold statin for transaminitis, trending down but still elevated  - follow up CMP in the AM    #afib   - not on AC due to hematuria  - continue mexiletine, amiodarone metoprolol    #leukocytosis  - possibly elevated in setting of acute RUE DVT  - does not appear infectious at this time, afebrile  - s/p cefepime in the ED, will hold off on further abx for now, procal negative    #LUE DVT  #history of LLE DVT  - found last month during hospitalization at [place]  - not on AC due to hematuria  - follow up dopplers b/l LE  - monitor follow up 11/10 for LUE DVT     #hypothyroid  - continue synthroid 150mcg    #hypokalemia  - likely from diuresis  - repleted    #BPH  - continue flomax  - dubois was placed at [place] about 3 weeks ago, would attempt TOV in 1-2 days    #DVT ppx  - lovenox    updated jose a Ladd [phone] about patient's status, all questions answered. Spoke to wife Kirsten and son Gabriel again at bedside.

## 2022-10-20 LAB
-  AMIKACIN: SIGNIFICANT CHANGE UP
-  AZTREONAM: SIGNIFICANT CHANGE UP
-  CEFEPIME: SIGNIFICANT CHANGE UP
-  CEFTAZIDIME: SIGNIFICANT CHANGE UP
-  CIPROFLOXACIN: SIGNIFICANT CHANGE UP
-  GENTAMICIN: SIGNIFICANT CHANGE UP
-  IMIPENEM: SIGNIFICANT CHANGE UP
-  LEVOFLOXACIN: SIGNIFICANT CHANGE UP
-  MEROPENEM: SIGNIFICANT CHANGE UP
-  PIPERACILLIN/TAZOBACTAM: SIGNIFICANT CHANGE UP
-  TOBRAMYCIN: SIGNIFICANT CHANGE UP
ALBUMIN SERPL ELPH-MCNC: 2.6 G/DL — LOW (ref 3.3–5)
ALP SERPL-CCNC: 215 U/L — HIGH (ref 40–120)
ALT FLD-CCNC: 112 U/L — HIGH (ref 10–45)
ANION GAP SERPL CALC-SCNC: 6 MMOL/L — SIGNIFICANT CHANGE UP (ref 5–17)
AST SERPL-CCNC: 60 U/L — HIGH (ref 10–40)
BASOPHILS # BLD AUTO: 0 K/UL — SIGNIFICANT CHANGE UP (ref 0–0.2)
BASOPHILS NFR BLD AUTO: 0 % — SIGNIFICANT CHANGE UP (ref 0–2)
BILIRUB SERPL-MCNC: 0.4 MG/DL — SIGNIFICANT CHANGE UP (ref 0.2–1.2)
BUN SERPL-MCNC: 30 MG/DL — HIGH (ref 7–23)
CALCIUM SERPL-MCNC: 9.2 MG/DL — SIGNIFICANT CHANGE UP (ref 8.4–10.5)
CHLORIDE SERPL-SCNC: 98 MMOL/L — SIGNIFICANT CHANGE UP (ref 96–108)
CO2 SERPL-SCNC: 34 MMOL/L — HIGH (ref 22–31)
CREAT SERPL-MCNC: 0.99 MG/DL — SIGNIFICANT CHANGE UP (ref 0.5–1.3)
CULTURE RESULTS: SIGNIFICANT CHANGE UP
EGFR: 76 ML/MIN/1.73M2 — SIGNIFICANT CHANGE UP
EOSINOPHIL # BLD AUTO: 0.33 K/UL — SIGNIFICANT CHANGE UP (ref 0–0.5)
EOSINOPHIL NFR BLD AUTO: 2 % — SIGNIFICANT CHANGE UP (ref 0–6)
GLUCOSE SERPL-MCNC: 84 MG/DL — SIGNIFICANT CHANGE UP (ref 70–99)
HCT VFR BLD CALC: 32.3 % — LOW (ref 39–50)
HGB BLD-MCNC: 10 G/DL — LOW (ref 13–17)
HYPOCHROMIA BLD QL: SLIGHT — SIGNIFICANT CHANGE UP
LYMPHOCYTES # BLD AUTO: 29 % — SIGNIFICANT CHANGE UP (ref 13–44)
LYMPHOCYTES # BLD AUTO: 4.81 K/UL — HIGH (ref 1–3.3)
MAGNESIUM SERPL-MCNC: 2.1 MG/DL — SIGNIFICANT CHANGE UP (ref 1.6–2.6)
MANUAL SMEAR VERIFICATION: SIGNIFICANT CHANGE UP
MCHC RBC-ENTMCNC: 28.9 PG — SIGNIFICANT CHANGE UP (ref 27–34)
MCHC RBC-ENTMCNC: 31 GM/DL — LOW (ref 32–36)
MCV RBC AUTO: 93.4 FL — SIGNIFICANT CHANGE UP (ref 80–100)
METHOD TYPE: SIGNIFICANT CHANGE UP
MONOCYTES # BLD AUTO: 1 K/UL — HIGH (ref 0–0.9)
MONOCYTES NFR BLD AUTO: 6 % — SIGNIFICANT CHANGE UP (ref 2–14)
NEUTROPHILS # BLD AUTO: 9.62 K/UL — HIGH (ref 1.8–7.4)
NEUTROPHILS NFR BLD AUTO: 58 % — SIGNIFICANT CHANGE UP (ref 43–77)
NRBC # BLD: 0 /100 — SIGNIFICANT CHANGE UP (ref 0–0)
ORGANISM # SPEC MICROSCOPIC CNT: SIGNIFICANT CHANGE UP
ORGANISM # SPEC MICROSCOPIC CNT: SIGNIFICANT CHANGE UP
PLAT MORPH BLD: NORMAL — SIGNIFICANT CHANGE UP
PLATELET # BLD AUTO: 416 K/UL — HIGH (ref 150–400)
POTASSIUM SERPL-MCNC: 3 MMOL/L — LOW (ref 3.5–5.3)
POTASSIUM SERPL-SCNC: 3 MMOL/L — LOW (ref 3.5–5.3)
PROT SERPL-MCNC: 6.5 G/DL — SIGNIFICANT CHANGE UP (ref 6–8.3)
RBC # BLD: 3.46 M/UL — LOW (ref 4.2–5.8)
RBC # FLD: 15.8 % — HIGH (ref 10.3–14.5)
RBC BLD AUTO: ABNORMAL
SODIUM SERPL-SCNC: 138 MMOL/L — SIGNIFICANT CHANGE UP (ref 135–145)
SPECIMEN SOURCE: SIGNIFICANT CHANGE UP
VARIANT LYMPHS # BLD: 5 % — SIGNIFICANT CHANGE UP (ref 0–6)
WBC # BLD: 16.59 K/UL — HIGH (ref 3.8–10.5)
WBC # FLD AUTO: 16.59 K/UL — HIGH (ref 3.8–10.5)

## 2022-10-20 PROCEDURE — 99232 SBSQ HOSP IP/OBS MODERATE 35: CPT

## 2022-10-20 PROCEDURE — 99233 SBSQ HOSP IP/OBS HIGH 50: CPT

## 2022-10-20 RX ORDER — POTASSIUM CHLORIDE 20 MEQ
40 PACKET (EA) ORAL EVERY 4 HOURS
Refills: 0 | Status: COMPLETED | OUTPATIENT
Start: 2022-10-20 | End: 2022-10-20

## 2022-10-20 RX ORDER — POTASSIUM CHLORIDE 20 MEQ
40 PACKET (EA) ORAL EVERY 4 HOURS
Refills: 0 | Status: DISCONTINUED | OUTPATIENT
Start: 2022-10-20 | End: 2022-10-20

## 2022-10-20 RX ORDER — SACUBITRIL AND VALSARTAN 24; 26 MG/1; MG/1
1 TABLET, FILM COATED ORAL
Refills: 0 | Status: DISCONTINUED | OUTPATIENT
Start: 2022-10-20 | End: 2022-10-21

## 2022-10-20 RX ADMIN — SENNA PLUS 1 TABLET(S): 8.6 TABLET ORAL at 21:34

## 2022-10-20 RX ADMIN — Medication 150 MICROGRAM(S): at 06:36

## 2022-10-20 RX ADMIN — Medication 100 MILLIGRAM(S): at 06:36

## 2022-10-20 RX ADMIN — Medication 40 MILLIEQUIVALENT(S): at 16:12

## 2022-10-20 RX ADMIN — SERTRALINE 150 MILLIGRAM(S): 25 TABLET, FILM COATED ORAL at 11:23

## 2022-10-20 RX ADMIN — MEXILETINE HYDROCHLORIDE 150 MILLIGRAM(S): 150 CAPSULE ORAL at 17:23

## 2022-10-20 RX ADMIN — ENOXAPARIN SODIUM 80 MILLIGRAM(S): 100 INJECTION SUBCUTANEOUS at 17:23

## 2022-10-20 RX ADMIN — Medication 40 MILLIEQUIVALENT(S): at 21:32

## 2022-10-20 RX ADMIN — MEXILETINE HYDROCHLORIDE 150 MILLIGRAM(S): 150 CAPSULE ORAL at 06:36

## 2022-10-20 RX ADMIN — Medication 81 MILLIGRAM(S): at 11:22

## 2022-10-20 RX ADMIN — FAMOTIDINE 20 MILLIGRAM(S): 10 INJECTION INTRAVENOUS at 11:23

## 2022-10-20 RX ADMIN — Medication 40 MILLIGRAM(S): at 06:35

## 2022-10-20 RX ADMIN — Medication 25 MILLIGRAM(S): at 21:33

## 2022-10-20 RX ADMIN — AMIODARONE HYDROCHLORIDE 200 MILLIGRAM(S): 400 TABLET ORAL at 06:36

## 2022-10-20 RX ADMIN — ENOXAPARIN SODIUM 80 MILLIGRAM(S): 100 INJECTION SUBCUTANEOUS at 06:36

## 2022-10-20 RX ADMIN — TAMSULOSIN HYDROCHLORIDE 0.4 MILLIGRAM(S): 0.4 CAPSULE ORAL at 21:33

## 2022-10-20 RX ADMIN — SACUBITRIL AND VALSARTAN 1 TABLET(S): 24; 26 TABLET, FILM COATED ORAL at 17:23

## 2022-10-20 NOTE — PROGRESS NOTE ADULT - SUBJECTIVE AND OBJECTIVE BOX
Patient is a 82y old  Male who presents with a chief complaint of dyspnea, hypoxia - acute on chronic systolic CHF (20 Oct 2022 11:56)    Patient seen and examined at bedside.  no acute events overnight    ALLERGIES:  PC Pen VK (Other)        Vital Signs Last 24 Hrs  T(F): 97.8 (20 Oct 2022 05:50), Max: 98 (19 Oct 2022 13:31)  HR: 97 (20 Oct 2022 05:50) (69 - 97)  BP: 127/76 (20 Oct 2022 05:50) (87/54 - 127/76)  RR: 18 (20 Oct 2022 05:50) (18 - 18)  SpO2: 96% (20 Oct 2022 05:50) (96% - 99%)  I&O's Summary    19 Oct 2022 07:01  -  20 Oct 2022 07:00  --------------------------------------------------------  IN: 240 mL / OUT: 2800 mL / NET: -2560 mL    20 Oct 2022 07:01  -  20 Oct 2022 13:08  --------------------------------------------------------  IN: 240 mL / OUT: 1000 mL / NET: -760 mL      MEDICATIONS:  aMIOdarone    Tablet 200 milliGRAM(s) Oral daily  aspirin  chewable 81 milliGRAM(s) Oral daily  bisacodyl Suppository 10 milliGRAM(s) Rectal daily PRN  enoxaparin Injectable 80 milliGRAM(s) SubCutaneous every 12 hours  famotidine    Tablet 20 milliGRAM(s) Oral daily  furosemide   Injectable 40 milliGRAM(s) IV Push daily  lactulose Syrup 20 Gram(s) Oral daily PRN  levothyroxine 150 MICROGram(s) Oral daily  lidocaine 2% Gel 1 Application(s) Topical three times a day PRN  metoprolol succinate  milliGRAM(s) Oral daily  mexiletine 150 milliGRAM(s) Oral every 12 hours  potassium chloride    Tablet ER 40 milliEquivalent(s) Oral every 4 hours  Preservision Areds2 2 Capsule(s) 2 Capsule(s) Oral daily  sacubitril 49 mG/valsartan 51 mG 1 Tablet(s) Oral two times a day  senna 1 Tablet(s) Oral at bedtime  sertraline 150 milliGRAM(s) Oral daily  tamsulosin 0.4 milliGRAM(s) Oral at bedtime  traZODone 25 milliGRAM(s) Oral at bedtime      PHYSICAL EXAM:  General: NAD, A/O x 3  ENT: MMM, no oral thrush  Neck: Supple, No JVD  Lungs: Diminished, good air entry, non labored  Cardio: S1S2 irregular  Abdomen: Soft, Nontender, Nondistended; Bowel sounds present  Extremities: No cyanosis, No edema    LABS:                        10.0   16.59 )-----------( 416      ( 20 Oct 2022 08:00 )             32.3     10-20    138  |  98  |  30  ----------------------------<  84  3.0   |  34  |  0.99    Ca    9.2      20 Oct 2022 08:00  Mg     2.1     10-20    TPro  6.5  /  Alb  2.6  /  TBili  0.4  /  DBili  x   /  AST  60  /  ALT  112  /  AlkPhos  215  10-20        Lactate, Blood: 1.3 mmol/L (10-17 @ 22:40)    CARDIAC MARKERS ( 17 Oct 2022 23:15 )  x     / 86.4 ng/L / x     / x     / x      CARDIAC MARKERS ( 17 Oct 2022 19:55 )  x     / 88.7 ng/L / x     / x     / x                            Urinalysis Basic - ( 17 Oct 2022 22:40 )    Color: Yellow / Appearance: Clear / S.005 / pH: x  Gluc: x / Ketone: Negative  / Bili: Negative / Urobili: Negative   Blood: x / Protein: 30 mg/dL / Nitrite: Negative   Leuk Esterase: Moderate / RBC: 11-25 /HPF / WBC >50 /HPF   Sq Epi: x / Non Sq Epi: Neg.-Few / Bacteria: Few /HPF        Culture - Urine (collected 17 Oct 2022 22:40)  Source: Catheterized Catheterized  Final Report (20 Oct 2022 09:47):    >100,000 CFU/ml Pseudomonas aeruginosa  Organism: Pseudomonas aeruginosa (20 Oct 2022 09:47)  Organism: Pseudomonas aeruginosa (20 Oct 2022 09:47)      -  Amikacin: S <=16      -  Aztreonam: S <=4      -  Cefepime: S <=2      -  Ceftazidime: S 4      -  Ciprofloxacin: S <=0.25      -  Gentamicin: S <=2      -  Imipenem: S <=1      -  Levofloxacin: S <=0.5      -  Meropenem: S <=1      -  Piperacillin/Tazobactam: S <=8      -  Tobramycin: S <=2      Method Type: ARIE    Culture - Blood (collected 17 Oct 2022 22:40)  Source: .Blood Blood  Preliminary Report (19 Oct 2022 05:01):    No growth to date.    Culture - Blood (collected 17 Oct 2022 22:40)  Source: .Blood Blood  Preliminary Report (19 Oct 2022 05:01):    No growth to date.          RADIOLOGY & ADDITIONAL TESTS:    < from: US Duplex Venous Lower Ext Complete, Bilateral (10.19.22 @ 16:41) >  IMPRESSION: Bilateral DVT involving the right deep femoral vein, left   common femoral and deep femoral veins.  Superficial thrombus is also seen the left greater saphenous vein.    Results were discussed with Dr. Regalado at 4:50 PM on 10/19/2022.    --- End of Report ---            JAISON PA MD; Attending Radiologist  This document has been electronically signed. Oct 19 2022  4:49PM    < end of copied text >      Care Discussed with Consultants/Other Providers:

## 2022-10-20 NOTE — PROGRESS NOTE ADULT - ASSESSMENT
82 year old man admitted with acute UTI and exacerbation of chronic systolic CHF.  Cardiac history includes CAD s/p remote CABG and recent angiogram with occluded grafts and medical therapy recommended.  Known HFrEF.  AF not on a/c due to hematuria.   Elevated troponin likely due to demand ischemia  s/p recent hospitalization at [facility]     Plan  - continue diuresis  - guideline directed medical therapy for HFrEF is indicated  - start long acting metoprolol and Entresto   - monitor labs and vital signs   - continue ASA and amiodarone  - he needs outpatient followup with his primary cardiologist to discuss prophylactic ICD     discussed with patient and with Medicine team

## 2022-10-20 NOTE — PROGRESS NOTE ADULT - SUBJECTIVE AND OBJECTIVE BOX
SUBJ:  Patient is a 82y old  Male who presents with a chief complaint of 82 year old M PMH HTN, CAD s/p CABG, severe cardiomyopathy (EF 20%), afib (not on AC due to hematuria), BPH, hx of LUE DVT and now with RUE DVT, recently hospitalized at [place] for uorsepsis and urinary retention (now with dubois), brought in from [facility] for low saturations, admitted for acute on chronic systolic CHF exacerbation.     (19 Oct 2022 12:34)    patient seen and examined  chart is reviewed  case discussed with Dr. Wallace   patient awake, confused, no distress       PAST MEDICAL & SURGICAL HISTORY:  Chronic atrial fibrillation      History of cardiomyopathy      CAD (coronary artery disease)      BPH with obstruction/lower urinary tract symptoms      Hyperlipidemia      History of CVA (cerebrovascular accident)  left sided weakness      S/P CABG (coronary artery bypass graft)          MEDICATIONS  (STANDING):  aMIOdarone    Tablet 200 milliGRAM(s) Oral daily  aspirin  chewable 81 milliGRAM(s) Oral daily  enoxaparin Injectable 80 milliGRAM(s) SubCutaneous every 12 hours  famotidine    Tablet 20 milliGRAM(s) Oral daily  furosemide   Injectable 40 milliGRAM(s) IV Push daily  levothyroxine 150 MICROGram(s) Oral daily  metoprolol succinate  milliGRAM(s) Oral daily  mexiletine 150 milliGRAM(s) Oral every 12 hours  Preservision Areds2 2 Capsule(s) 2 Capsule(s) Oral daily  senna 1 Tablet(s) Oral at bedtime  sertraline 150 milliGRAM(s) Oral daily  tamsulosin 0.4 milliGRAM(s) Oral at bedtime  traZODone 25 milliGRAM(s) Oral at bedtime    MEDICATIONS  (PRN):  bisacodyl Suppository 10 milliGRAM(s) Rectal daily PRN Constipation  lactulose Syrup 20 Gram(s) Oral daily PRN constipation  lidocaine 2% Gel 1 Application(s) Topical three times a day PRN pain at dubois          Vital Signs Last 24 Hrs  T(C): 36.6 (20 Oct 2022 05:50), Max: 36.7 (19 Oct 2022 13:31)  T(F): 97.8 (20 Oct 2022 05:50), Max: 98 (19 Oct 2022 13:31)  HR: 97 (20 Oct 2022 05:50) (69 - 97)  BP: 127/76 (20 Oct 2022 05:50) (87/54 - 127/76)  BP(mean): 75 (19 Oct 2022 14:35) (75 - 75)  RR: 18 (20 Oct 2022 05:50) (18 - 18)  SpO2: 96% (20 Oct 2022 05:50) (96% - 99%)    Parameters below as of 20 Oct 2022 05:50  Patient On (Oxygen Delivery Method): room air        REVIEW OF SYSTEMS: not able to obtain       PHYSICAL EXAM  Constitutional:  No acute distress  HEENT: normocephalic, atraumatic.  PERRLA. EOMI  Neck : No JVD. no carotid bruits  Lungs:  decreased breath sounds bilaterally   Heart:  S1 and S2. No S3, S4. II/VI systolic murmur.  Abdomen:  soft, non tender.  Extremities: No clubbing, cyanoisis or edema  Skin:  no rashes        LABS:                        10.0   16.59 )-----------( 416      ( 20 Oct 2022 08:00 )             32.3     10-20    138  |  98  |  30<H>  ----------------------------<  84  3.0<L>   |  34<H>  |  0.99    Ca    9.2      20 Oct 2022 08:00  Mg     2.1     10-20    TPro  6.5  /  Alb  2.6<L>  /  TBili  0.4  /  DBili  x   /  AST  60<H>  /  ALT  112<H>  /  AlkPhos  215<H>  10-20    I&O's Summary    19 Oct 2022 07:01  -  20 Oct 2022 07:00  --------------------------------------------------------  IN: 240 mL / OUT: 2800 mL / NET: -2560 mL    20 Oct 2022 07:01  -  20 Oct 2022 11:57  --------------------------------------------------------  IN: 240 mL / OUT: 1000 mL / NET: -760 mL    < from: 12 Lead ECG (10.17.22 @ 22:32) >  Diagnosis Line Atrial fibrillation  Septal infarct , age undetermined  Prolonged QT  Abnormal ECG  No previous ECGs available    < end of copied text >  < from: TTE Echo Complete w/o Contrast w/ Doppler (10.18.22 @ 07:55) >  1. Severely decreased global left ventricular systolic function.   2. Left ventricular ejection fraction, by visual estimation, is 20 to   25%.   3. Severe global left ventricle hypokinesis with regional variation: the   inferolateral wall and inferior wall appear akinetic.   4. Normal left ventricular internal cavity size.   5. The mitral in-flow pattern reveals no discernable A-wave, therefore   no comment on diastolic function can be made.   6. There is moderate septal left ventricular hypertrophy.   7. Moderately reduced RV systolic function.   8. Severely enlarged left atrium.   9. Right atrial enlargement.  10. Mild mitral annular calcification.  11. Mild thickening and calcification of the anterior and posterior   mitral valve leaflets.  12. Moderate mitral valve regurgitation.  13. Mild tricuspid regurgitation.  14. Mild aortic valve leaflet thickening and calcification.  15. Sclerotic aortic valve with normal opening.  16. Moderate aortic regurgitation.  17. Dilated proximal ascending aorta (4.4 cm).  18. Mildly dilated pulmonary artery.  19. Large pleural effusion in the left lateral region.  20. There is no evidence of pericardial effusion.    [id number] Vignesh Wallace MD Electronically signed on   10/18/2022 at 1:31:28 PM        *** Final ***    < end of copied text >

## 2022-10-20 NOTE — PROGRESS NOTE ADULT - ASSESSMENT
82 year old M PMH HTN, CAD s/p CABG, severe cardiomyopathy (EF 20%), afib (not on AC due to hematuria), BPH, LUE DVT, history of LLE DVT, recently hospitalized at [facility] for uorsepsis and urinary retention (now with dubois), brought in from [facility] for low saturations, admitted for acute on chronic systolic CHF exacerbation.    Acute Hypoxic Respiratory Failure  Acute on Chronic Systolic CHF Exacerbation  Severe Cardiomyopathy (EF 20%)  cardiology appreciated  ECHO with EF 20-25% c/w previous echos  supplemental oxygen therapy to maintain sats if necessary, wean as tolerated  guideline directed medical therapy for HFrEF indicated  plan to continue diuresis, continue long acting BB  started entresto today  continue ASA and amiodarone  consider farxiga on discharge once off IV lasix  will need follow up with primary cardiologist to discuss prophylactic ICD    Bilateral DVTs  US revealed B/L DVTs involving RT deep femoral vein, LT common femoral and deep veins  pt with hx of DVTs found last month during hospitalization at [facility]  not on AC due to hematuria  continue therapeutic lovenox for now    CAD s/p CABG  pt with hx of remote CABG and recent angiogram with occluded grafts and medical therapy recommended  elevated Tn likely demand ischemia  appears stable at this time, continue ASA, hold statin due to elevated LFTs    Ascending Aortic Aneurysm  CT chest showed ascending thoracic aorta 4.3 cm in diameter  aberrant right subclavian artery  atherosclerotic calcifications of the aorts s/p CABG  consider vascular evaluation outpatient    Transaminitis  hold statin for now, trending down  follow CMP    Atrial Fibrillation  not on AC due to hematuria  now on therapeutic lovenox +DVTs  continue mexiletine, amiodarone and metoprolol    Leukocytosis  may be reactive in setting of DVTs, no infectious etiology apparent, PCT negative  s/p cefepime in ED  monitor off antibiotics    Hypothyroid  continue synthroid 150 mcg daily    Hypokalemia  likely from diuresis  replete as needed  follow labs    BPH  continue flomax  dubois was placed at [facility] about four weeks ago, attempt TOV?    Severe Protein Calorie Malnutrition  nutrition evaluation appreciated  continue ensure plus high protein cans BID    Prophylactic Measure  therapuetic lovenox    spoke to and updated family at bedside today               82 year old M PMH HTN, CAD s/p CABG, severe cardiomyopathy (EF 20%), afib (not on AC due to hematuria), BPH, LUE DVT, history of LLE DVT, recently hospitalized at [place] for uorsepsis and urinary retention (now with dubois), brought in from [facility] for low saturations, admitted for acute on chronic systolic CHF exacerbation.    Acute Hypoxic Respiratory Failure  Acute on Chronic Systolic CHF Exacerbation  Severe Cardiomyopathy (EF 20%)  cardiology appreciated  ECHO with EF 20-25% c/w previous echos  supplemental oxygen therapy to maintain sats if necessary, wean as tolerated  guideline directed medical therapy for HFrEF indicated  plan to continue diuresis, continue long acting BB  started entresto today  continue ASA and amiodarone  consider farxiga on discharge once off IV lasix  will need follow up with primary cardiologist to discuss prophylactic ICD    Bilateral DVTs  US revealed B/L DVTs involving RT deep femoral vein, LT common femoral and deep veins  pt with hx of DVTs found last month during hospitalization at [facility]  not on AC due to hematuria  continue therapeutic lovenox for now    CAD s/p CABG  pt with hx of remote CABG and recent angiogram with occluded grafts and medical therapy recommended  elevated Tn likely demand ischemia  appears stable at this time, continue ASA, hold statin due to elevated LFTs    Ascending Aortic Aneurysm  CT chest showed ascending thoracic aorta 4.3 cm in diameter  aberrant right subclavian artery  atherosclerotic calcifications of the aorts s/p CABG  consider vascular evaluation outpatient    Transaminitis  hold statin for now, LFTs appear stable  follow CMP    Atrial Fibrillation  not on AC due to hematuria  now on therapeutic lovenox +DVTs  continue mexiletine, amiodarone and metoprolol    Leukocytosis  may be reactive in setting of DVTs, no infectious etiology apparent, PCT negative  s/p cefepime in ED  monitor off antibiotics    Hypothyroid  continue synthroid 150 mcg daily    Hypokalemia  likely from diuresis  replete as needed  follow labs    BPH  continue flomax  dubois was placed at [facility] about four weeks ago, attempt TOV?    Severe Protein Calorie Malnutrition  nutrition evaluation appreciated  continue ensure plus high protein cans BID    Prophylactic Measure  therapuetic lovenox    spoke to and updated family at bedside today               82 year old M [facility] HTN, CAD s/p CABG, severe cardiomyopathy (EF 20%), afib (not on AC due to hematuria), BPH, LUE DVT, history of LLE DVT, recently hospitalized at [place] for uorsepsis and urinary retention (now with dubois), brought in from [facility] for low saturations, admitted for acute on chronic systolic CHF exacerbation.    Acute Hypoxic Respiratory Failure  Acute on Chronic Systolic CHF Exacerbation  Severe Cardiomyopathy (EF 20%)  cardiology appreciated  ECHO with EF 20-25% c/w previous echos  supplemental oxygen therapy to maintain sats if necessary, wean as tolerated  guideline directed medical therapy for HFrEF indicated  plan to continue diuresis, continue long acting BB  started entresto today  continue ASA and amiodarone  consider farxiga on discharge once off IV lasix  will need follow up with primary cardiologist to discuss prophylactic ICD    Bilateral DVTs  US revealed B/L DVTs involving RT deep femoral vein, LT common femoral and deep veins  pt with hx of DVTs found last month during hospitalization at [facility]  not on AC due to hematuria  continue therapeutic lovenox for now    CAD s/p CABG  pt with hx of remote CABG and recent angiogram with occluded grafts and medical therapy recommended  elevated Tn likely demand ischemia  appears stable at this time, continue ASA, hold statin due to elevated LFTs    Ascending Aortic Aneurysm  CT chest showed ascending thoracic aorta 4.3 cm in diameter  aberrant right subclavian artery  atherosclerotic calcifications of the aorts s/p CABG  consider vascular evaluation outpatient    Transaminitis  hold statin for now, LFTs appear stable  follow CMP    Atrial Fibrillation  not on AC due to hematuria  now on therapeutic lovenox +DVTs  continue mexiletine, amiodarone and metoprolol    Leukocytosis  may be reactive in setting of DVTs, no infectious etiology apparent, PCT negative  s/p cefepime in ED  monitor off antibiotics    Hypothyroid  continue synthroid 150 mcg daily    Hypokalemia  likely from diuresis  replete as needed  follow labs    BPH  continue flomax  dubois was placed at [facility] about four weeks ago, attempt TOV?    Severe Protein Calorie Malnutrition  nutrition evaluation appreciated  continue ensure plus high protein cans BID    Prophylactic Measure  therapuetic lovenox    called and spoke to bushra cloud today [phone] and offered an update

## 2022-10-21 LAB
ALBUMIN SERPL ELPH-MCNC: 2.5 G/DL — LOW (ref 3.3–5)
ALP SERPL-CCNC: 201 U/L — HIGH (ref 40–120)
ALT FLD-CCNC: 91 U/L — HIGH (ref 10–45)
ANION GAP SERPL CALC-SCNC: 3 MMOL/L — LOW (ref 5–17)
AST SERPL-CCNC: 49 U/L — HIGH (ref 10–40)
BILIRUB SERPL-MCNC: 0.4 MG/DL — SIGNIFICANT CHANGE UP (ref 0.2–1.2)
BUN SERPL-MCNC: 25 MG/DL — HIGH (ref 7–23)
CALCIUM SERPL-MCNC: 9 MG/DL — SIGNIFICANT CHANGE UP (ref 8.4–10.5)
CHLORIDE SERPL-SCNC: 100 MMOL/L — SIGNIFICANT CHANGE UP (ref 96–108)
CO2 SERPL-SCNC: 35 MMOL/L — HIGH (ref 22–31)
CREAT SERPL-MCNC: 0.87 MG/DL — SIGNIFICANT CHANGE UP (ref 0.5–1.3)
EGFR: 86 ML/MIN/1.73M2 — SIGNIFICANT CHANGE UP
GLUCOSE SERPL-MCNC: 93 MG/DL — SIGNIFICANT CHANGE UP (ref 70–99)
HCT VFR BLD CALC: 36.9 % — LOW (ref 39–50)
HGB BLD-MCNC: 11.3 G/DL — LOW (ref 13–17)
MCHC RBC-ENTMCNC: 29 PG — SIGNIFICANT CHANGE UP (ref 27–34)
MCHC RBC-ENTMCNC: 30.6 GM/DL — LOW (ref 32–36)
MCV RBC AUTO: 94.6 FL — SIGNIFICANT CHANGE UP (ref 80–100)
NRBC # BLD: 0 /100 WBCS — SIGNIFICANT CHANGE UP (ref 0–0)
PLATELET # BLD AUTO: 449 K/UL — HIGH (ref 150–400)
POTASSIUM SERPL-MCNC: 3.8 MMOL/L — SIGNIFICANT CHANGE UP (ref 3.5–5.3)
POTASSIUM SERPL-SCNC: 3.8 MMOL/L — SIGNIFICANT CHANGE UP (ref 3.5–5.3)
PROT SERPL-MCNC: 6.5 G/DL — SIGNIFICANT CHANGE UP (ref 6–8.3)
RBC # BLD: 3.9 M/UL — LOW (ref 4.2–5.8)
RBC # FLD: 15.9 % — HIGH (ref 10.3–14.5)
SODIUM SERPL-SCNC: 138 MMOL/L — SIGNIFICANT CHANGE UP (ref 135–145)
WBC # BLD: 17.84 K/UL — HIGH (ref 3.8–10.5)
WBC # FLD AUTO: 17.84 K/UL — HIGH (ref 3.8–10.5)

## 2022-10-21 PROCEDURE — 93010 ELECTROCARDIOGRAM REPORT: CPT

## 2022-10-21 PROCEDURE — 99233 SBSQ HOSP IP/OBS HIGH 50: CPT

## 2022-10-21 PROCEDURE — 99232 SBSQ HOSP IP/OBS MODERATE 35: CPT

## 2022-10-21 RX ORDER — METOPROLOL TARTRATE 50 MG
50 TABLET ORAL DAILY
Refills: 0 | Status: DISCONTINUED | OUTPATIENT
Start: 2022-10-21 | End: 2022-10-23

## 2022-10-21 RX ORDER — CEFEPIME 1 G/1
INJECTION, POWDER, FOR SOLUTION INTRAMUSCULAR; INTRAVENOUS
Refills: 0 | Status: DISCONTINUED | OUTPATIENT
Start: 2022-10-21 | End: 2022-10-26

## 2022-10-21 RX ORDER — SACUBITRIL AND VALSARTAN 24; 26 MG/1; MG/1
1 TABLET, FILM COATED ORAL
Refills: 0 | Status: DISCONTINUED | OUTPATIENT
Start: 2022-10-21 | End: 2022-10-24

## 2022-10-21 RX ORDER — FUROSEMIDE 40 MG
20 TABLET ORAL DAILY
Refills: 0 | Status: DISCONTINUED | OUTPATIENT
Start: 2022-10-22 | End: 2022-10-24

## 2022-10-21 RX ORDER — CEFEPIME 1 G/1
1000 INJECTION, POWDER, FOR SOLUTION INTRAMUSCULAR; INTRAVENOUS ONCE
Refills: 0 | Status: COMPLETED | OUTPATIENT
Start: 2022-10-21 | End: 2022-10-21

## 2022-10-21 RX ORDER — CEFEPIME 1 G/1
1000 INJECTION, POWDER, FOR SOLUTION INTRAMUSCULAR; INTRAVENOUS EVERY 12 HOURS
Refills: 0 | Status: DISCONTINUED | OUTPATIENT
Start: 2022-10-21 | End: 2022-10-26

## 2022-10-21 RX ORDER — SODIUM CHLORIDE 9 MG/ML
1000 INJECTION INTRAMUSCULAR; INTRAVENOUS; SUBCUTANEOUS
Refills: 0 | Status: ACTIVE | OUTPATIENT
Start: 2022-10-21 | End: 2022-10-21

## 2022-10-21 RX ADMIN — Medication 25 MILLIGRAM(S): at 21:56

## 2022-10-21 RX ADMIN — Medication 40 MILLIGRAM(S): at 06:25

## 2022-10-21 RX ADMIN — FAMOTIDINE 20 MILLIGRAM(S): 10 INJECTION INTRAVENOUS at 12:14

## 2022-10-21 RX ADMIN — AMIODARONE HYDROCHLORIDE 200 MILLIGRAM(S): 400 TABLET ORAL at 06:24

## 2022-10-21 RX ADMIN — Medication 50 MILLIGRAM(S): at 17:52

## 2022-10-21 RX ADMIN — Medication 150 MICROGRAM(S): at 06:24

## 2022-10-21 RX ADMIN — SACUBITRIL AND VALSARTAN 1 TABLET(S): 24; 26 TABLET, FILM COATED ORAL at 06:24

## 2022-10-21 RX ADMIN — MEXILETINE HYDROCHLORIDE 150 MILLIGRAM(S): 150 CAPSULE ORAL at 18:00

## 2022-10-21 RX ADMIN — Medication 81 MILLIGRAM(S): at 14:45

## 2022-10-21 RX ADMIN — CEFEPIME 100 MILLIGRAM(S): 1 INJECTION, POWDER, FOR SOLUTION INTRAMUSCULAR; INTRAVENOUS at 21:58

## 2022-10-21 RX ADMIN — SENNA PLUS 1 TABLET(S): 8.6 TABLET ORAL at 21:58

## 2022-10-21 RX ADMIN — TAMSULOSIN HYDROCHLORIDE 0.4 MILLIGRAM(S): 0.4 CAPSULE ORAL at 21:56

## 2022-10-21 RX ADMIN — MEXILETINE HYDROCHLORIDE 150 MILLIGRAM(S): 150 CAPSULE ORAL at 06:24

## 2022-10-21 RX ADMIN — SERTRALINE 150 MILLIGRAM(S): 25 TABLET, FILM COATED ORAL at 12:14

## 2022-10-21 RX ADMIN — SODIUM CHLORIDE 50 MILLILITER(S): 9 INJECTION INTRAMUSCULAR; INTRAVENOUS; SUBCUTANEOUS at 12:15

## 2022-10-21 RX ADMIN — CEFEPIME 100 MILLIGRAM(S): 1 INJECTION, POWDER, FOR SOLUTION INTRAMUSCULAR; INTRAVENOUS at 12:15

## 2022-10-21 NOTE — PROGRESS NOTE ADULT - ASSESSMENT
82 year old man admitted with acute UTI and exacerbation of chronic systolic CHF.  Cardiac history includes CAD s/p remote CABG and recent angiogram with occluded grafts and medical therapy recommended.  Known HFrEF.  AF not on a/c due to hematuria.     +DVT. Hypotensive today. + UTI . Hematuria   Elevated troponin likely due to demand ischemia  s/p recent hospitalization at [facility]     Plan  - hold diuresis and metoprolol and Entresto   - gentle IV fluids  - encourage PO  - monitor labs   - consider a/c though recognize hematuria last night   - continue ASA and amiodarone and mexiletine   - he needs outpatient followup with his primary cardiologist to discuss prophylactic ICD     discussed with patient and with Medicine team

## 2022-10-21 NOTE — PROGRESS NOTE ADULT - SUBJECTIVE AND OBJECTIVE BOX
SUBJ:  Patient is a 82y old  Male who presents with a chief complaint of dyspnea, hypoxia - acute on chronic systolic CHF (21 Oct 2022 07:25)  patient seen and examined  chart is reviewed  patient eating breakfast... appears confused       PAST MEDICAL & SURGICAL HISTORY:  Chronic atrial fibrillation      History of cardiomyopathy      CAD (coronary artery disease)      BPH with obstruction/lower urinary tract symptoms      Hyperlipidemia      History of CVA (cerebrovascular accident)  left sided weakness      S/P CABG (coronary artery bypass graft)          MEDICATIONS  (STANDING):  aMIOdarone    Tablet 200 milliGRAM(s) Oral daily  aspirin  chewable 81 milliGRAM(s) Oral daily  cefepime   IVPB      cefepime   IVPB 1000 milliGRAM(s) IV Intermittent once  cefepime   IVPB 1000 milliGRAM(s) IV Intermittent every 12 hours  enoxaparin Injectable 80 milliGRAM(s) SubCutaneous every 12 hours  famotidine    Tablet 20 milliGRAM(s) Oral daily  furosemide   Injectable 40 milliGRAM(s) IV Push daily  levothyroxine 150 MICROGram(s) Oral daily  metoprolol succinate  milliGRAM(s) Oral daily  mexiletine 150 milliGRAM(s) Oral every 12 hours  Preservision Areds2 2 Capsule(s) 2 Capsule(s) Oral daily  sacubitril 49 mG/valsartan 51 mG 1 Tablet(s) Oral two times a day  senna 1 Tablet(s) Oral at bedtime  sertraline 150 milliGRAM(s) Oral daily  tamsulosin 0.4 milliGRAM(s) Oral at bedtime  traZODone 25 milliGRAM(s) Oral at bedtime    MEDICATIONS  (PRN):  bisacodyl Suppository 10 milliGRAM(s) Rectal daily PRN Constipation  lactulose Syrup 20 Gram(s) Oral daily PRN constipation  lidocaine 2% Gel 1 Application(s) Topical three times a day PRN pain at dubois          Vital Signs Last 24 Hrs  T(C): 36.3 (21 Oct 2022 08:40), Max: 36.4 (20 Oct 2022 14:21)  T(F): 97.4 (21 Oct 2022 08:40), Max: 97.5 (20 Oct 2022 14:21)  HR: 110 (21 Oct 2022 10:39) (70 - 111)  BP: 83/49 (21 Oct 2022 10:39) (80/58 - 112/73)  BP(mean): --  RR: 20 (21 Oct 2022 10:39) (15 - 20)  SpO2: 96% (21 Oct 2022 06:50) (93% - 99%)    Parameters below as of 21 Oct 2022 06:50  Patient On (Oxygen Delivery Method): room air        REVIEW OF SYSTEMS:  CONSTITUTIONAL: fatigue   RESPIRATORY: No cough, wheezing, chills or hemoptysis; No shortness of breath  CARDIOVASCULAR: No chest pain or chest pressure.  No shortness of breath or dyspnea on exertion.  No palpitations, dizziness, light headedness, syncope or near syncope.  No edema, no orthopnea.   NEUROLOGICAL: No headaches, memory loss, loss of strength, numbness, or tremors      PHYSICAL EXAM  Constitutional:  No acute distress  HEENT: normocephalic, atraumatic.  PERRLA. EOMI  Neck : No JVD. no carotid bruits  Lungs:  decreased breath sounds bilaterally   Heart:  S1 and S2. No S3, S4. II/VI systolic murmur.  Abdomen:  soft, non tender.  Extremities: No clubbing, cyanoisis or edema  Skin:  no rashes        LABS:                        11.3   17.84 )-----------( 449      ( 21 Oct 2022 06:30 )             36.9     10-21    138  |  100  |  25<H>  ----------------------------<  93  3.8   |  35<H>  |  0.87    Ca    9.0      21 Oct 2022 06:30  Mg     2.1     10-20    TPro  6.5  /  Alb  2.5<L>  /  TBili  0.4  /  DBili  x   /  AST  49<H>  /  ALT  91<H>  /  AlkPhos  201<H>  10-21    I&O's Summary    20 Oct 2022 07:01  -  21 Oct 2022 07:00  --------------------------------------------------------  IN: 240 mL / OUT: 1600 mL / NET: -1360 mL    < from: 12 Lead ECG (10.21.22 @ 08:07) >  Diagnosis Line Atrial fibrillation  Left axis deviation  Septal infarct (cited on or before 17-OCT-2022)  Prolonged QT  Abnormal ECG  When compared with ECG of 17-OCT-2022 22:32,  there does not appear to be a signficant interval change    < end of copied text >  < from: TTE Echo Complete w/o Contrast w/ Doppler (10.18.22 @ 07:55) >  1. Severely decreased global left ventricular systolic function.   2. Left ventricular ejection fraction, by visual estimation, is 20 to   25%.   3. Severe global left ventricle hypokinesis with regional variation: the   inferolateral wall and inferior wall appear akinetic.   4. Normal left ventricular internal cavity size.   5. The mitral in-flow pattern reveals no discernable A-wave, therefore   no comment on diastolic function can be made.   6. There is moderate septal left ventricular hypertrophy.   7. Moderately reduced RV systolic function.   8. Severely enlarged left atrium.   9. Right atrial enlargement.  10. Mild mitral annular calcification.  11. Mild thickening and calcification of the anterior and posterior   mitral valve leaflets.  12. Moderate mitral valve regurgitation.  13. Mild tricuspid regurgitation.  14. Mild aortic valve leaflet thickening and calcification.  15. Sclerotic aortic valve with normal opening.  16. Moderate aortic regurgitation.  17. Dilated proximal ascending aorta (4.4 cm).  18. Mildly dilated pulmonary artery.  19. Large pleural effusion in the left lateral region.  20. There is no evidence of pericardial effusion.    [id number] Vignesh Wallace MD Electronically signed on   10/18/2022 at 1:31:28 PM        < end of copied text >

## 2022-10-21 NOTE — PROGRESS NOTE ADULT - ASSESSMENT
82 year old M [facility] HTN, CAD s/p CABG, severe cardiomyopathy (EF 20%), afib (not on AC due to hematuria), BPH, LUE DVT, history of LLE DVT, recently hospitalized at [place] for uorsepsis and urinary retention (now with dubois), brought in from [facility] for low saturations, admitted for acute on chronic systolic CHF exacerbation.    Acute Hypoxic Respiratory Failure  Acute on Chronic Systolic CHF Exacerbation  Severe Cardiomyopathy (EF 20%)  cardiology appreciated  ECHO with EF 20-25% c/w previous echos  supplemental oxygen therapy to maintain sats if necessary, wean as tolerated  guideline directed medical therapy for HFrEF indicated  plan to continue diuresis, continue long acting BB  started entresto today  continue ASA and amiodarone  consider farxiga on discharge once off IV lasix  will need follow up with primary cardiologist to discuss prophylactic ICD    Bilateral DVTs  US revealed B/L DVTs involving RT deep femoral vein, LT common femoral and deep veins  pt with hx of DVTs found last month during hospitalization at [facility]  not on AC due to hematuria  continue therapeutic lovenox for now    CAD s/p CABG  pt with hx of remote CABG and recent angiogram with occluded grafts and medical therapy recommended  elevated Tn likely demand ischemia  appears stable at this time, continue ASA, hold statin due to elevated LFTs    Ascending Aortic Aneurysm  CT chest showed ascending thoracic aorta 4.3 cm in diameter  aberrant right subclavian artery  atherosclerotic calcifications of the aorts s/p CABG  consider vascular evaluation outpatient    Transaminitis  hold statin for now, LFTs appear stable  follow CMP    Atrial Fibrillation  not on AC due to hematuria  now on therapeutic lovenox +DVTs  continue mexiletine, amiodarone and metoprolol    Leukocytosis  may be reactive in setting of DVTs, no infectious etiology apparent, PCT negative  s/p cefepime in ED  monitor off antibiotics    Hypothyroid  continue synthroid 150 mcg daily    Hypokalemia  likely from diuresis  replete as needed  follow labs    BPH  continue flomax  dubois was placed at [facility] about four weeks ago, attempt TOV?    Severe Protein Calorie Malnutrition  nutrition evaluation appreciated  continue ensure plus high protein cans BID    Prophylactic Measure  therapuetic lovenox     bushra cloud [phone]               82 year old M PMH HTN, CAD s/p CABG, severe cardiomyopathy (EF 20%), afib (not on AC due to hematuria), BPH, LUE DVT, history of LLE DVT, recently hospitalized at [place] for uorsepsis and urinary retention (now with dubois), brought in from [facility] for low saturations, admitted for acute on chronic systolic CHF exacerbation.    Acute Hypoxic Respiratory Failure  Acute on Chronic Systolic CHF Exacerbation  Severe Cardiomyopathy (EF 20%)  cardiology following Discussion with Dr Valverde about andticoagulation and hematuria as well as hypotension and low BP   ECHO with EF 20-25% c/w previous echos  supplemental oxygen therapy to maintain sats if necessary, wean as tolerated  guideline directed medical therapy for HFrEF indicated  plan to continue diuresis, continue long acting BB  continue entresto   continue ASA and amiodarone  consider farxiga on discharge once off IV lasix  follow up with primary cardiologist to discuss prophylactic ICD    Bilateral DVTs  US revealed B/L DVTs involving RT deep femoral vein, LT common femoral and deep veins  pt with hx of DVTs found last month during hospitalization at [facility]  not on AC at home  due to hematuria  currently  lovenox on hold for hematuria . Will continue to hold and monitor bleeding     CAD s/p CABG  pt with hx of remote CABG and recent angiogram with occluded grafts and medical therapy recommended  elevated Tn likely demand ischemia  appears stable at this time, continue ASA, hold statin due to elevated LFTs    Ascending Aortic Aneurysm  CT chest showed ascending thoracic aorta 4.3 cm in diameter  aberrant right subclavian artery  atherosclerotic calcifications of the aorts s/p CABG  consider vascular evaluation outpatient    Transaminitis  hold statin for now, LFTs slowly down trending   monitor CMP    Atrial Fibrillation  not on AC at home due to hematuria  currently  lovenox  on hold due to hematuria   continue mexiletine an d  amiodarone - metoprolol held this am due to hypotension     Leukocytosis  found to have UTI -pseudo A -  will start cefepime - today   s/p cefepime in ED  blood cx negative     h/o Hypothyroid  continue synthroid 150 mcg daily  stable    Hypokalemia resolved   mostlikely due to diuretics  continue to monitor       BPH  continue flomax  dubois was placed at [facility] about four weeks ago, attempt TOV? currently with hematuria     Severe Protein Calorie Malnutrition  nutrition evaluation appreciated  continue ensure plus high protein cans BID    Prophylactic Measure  therapuetic lovenox    10/21 updated bushra cloud [phone]               82 year old M PMH HTN, CAD s/p CABG, severe cardiomyopathy (EF 20%), afib (not on AC due to hematuria), BPH, LUE DVT, history of LLE DVT, recently hospitalized at [place] for uorsepsis and urinary retention (now with dubois), brought in from [facility] for low saturations, admitted for acute on chronic systolic CHF exacerbation.    Acute Hypoxic Respiratory Failure  Acute on Chronic Systolic CHF Exacerbation  Severe Cardiomyopathy (EF 20%)  cardiology following Discussion with Dr Valverde about andticoagulation and hematuria as well as hypotension and low BP   ECHO with EF 20-25% c/w previous echos  supplemental oxygen therapy to maintain sats if necessary, wean as tolerated  guideline directed medical therapy for HFrEF indicated  plan to hold diuresis, hold  long acting BB- for hypotension   hold  entresto - as per cardio  continue ASA and amiodarone  consider farxiga on discharge once off IV lasix  follow up with primary cardiologist to discuss prophylactic ICD    Bilateral DVTs  US revealed B/L DVTs involving RT deep femoral vein, LT common femoral and deep veins  pt with hx of DVTs found last month during hospitalization at [facility]  not on AC at home  due to hematuria  currently  lovenox on hold for hematuria . Will continue to hold and monitor bleeding     CAD s/p CABG  pt with hx of remote CABG and recent angiogram with occluded grafts and medical therapy recommended  elevated Tn likely demand ischemia  appears stable at this time, continue ASA, hold statin due to elevated LFTs    Ascending Aortic Aneurysm  CT chest showed ascending thoracic aorta 4.3 cm in diameter  aberrant right subclavian artery  atherosclerotic calcifications of the aorts s/p CABG  consider vascular evaluation outpatient    Transaminitis  hold statin for now, LFTs slowly down trending   monitor CMP    Atrial Fibrillation  not on AC at home due to hematuria  currently  lovenox  on hold due to hematuria   continue mexiletine and  amiodarone - metoprolol held this am due to hypotension then will restart at 50 mg daily with parameters     Leukocytosis  UTI   found to have UTI -pseudo A -  will start cefepime - today  NS at 50cc/hr for 5 hours    s/p cefepime in ED  blood cx negative     h/o Hypothyroid  continue synthroid 150 mcg daily  stable    Hypokalemia resolved   mostlikely due to diuretics  continue to monitor     BPH  continue flomax  dubois was placed at [facility] about four weeks ago, attempt TOV? currently with hematuria     Severe Protein Calorie Malnutrition  nutrition evaluation appreciated  continue ensure plus high protein cans BID    Prophylactic Measure  therapuetic lovenox    10/21 updated bushra cloud [phone]

## 2022-10-21 NOTE — PROGRESS NOTE ADULT - SUBJECTIVE AND OBJECTIVE BOX
Patient is a 82y old  Male who presents with a chief complaint of dyspnea, hypoxia - acute on chronic systolic CHF (20 Oct 2022 13:07)      Patient seen and examined at bedside.    ALLERGIES:  PC Pen VK (Other)    MEDICATIONS  (STANDING):  aMIOdarone    Tablet 200 milliGRAM(s) Oral daily  aspirin  chewable 81 milliGRAM(s) Oral daily  enoxaparin Injectable 80 milliGRAM(s) SubCutaneous every 12 hours  famotidine    Tablet 20 milliGRAM(s) Oral daily  furosemide   Injectable 40 milliGRAM(s) IV Push daily  levothyroxine 150 MICROGram(s) Oral daily  metoprolol succinate  milliGRAM(s) Oral daily  mexiletine 150 milliGRAM(s) Oral every 12 hours  Preservision Areds2 2 Capsule(s) 2 Capsule(s) Oral daily  sacubitril 49 mG/valsartan 51 mG 1 Tablet(s) Oral two times a day  senna 1 Tablet(s) Oral at bedtime  sertraline 150 milliGRAM(s) Oral daily  tamsulosin 0.4 milliGRAM(s) Oral at bedtime  traZODone 25 milliGRAM(s) Oral at bedtime    MEDICATIONS  (PRN):  bisacodyl Suppository 10 milliGRAM(s) Rectal daily PRN Constipation  lactulose Syrup 20 Gram(s) Oral daily PRN constipation  lidocaine 2% Gel 1 Application(s) Topical three times a day PRN pain at dubois    Vital Signs Last 24 Hrs  T(F): 97.2 (21 Oct 2022 06:50), Max: 97.5 (20 Oct 2022 14:21)  HR: 96 (21 Oct 2022 06:50) (70 - 96)  BP: 98/64 (21 Oct 2022 06:50) (93/62 - 112/73)  RR: 15 (21 Oct 2022 06:50) (15 - 16)  SpO2: 96% (21 Oct 2022 06:50) (93% - 99%)  I&O's Summary    20 Oct 2022 07:01  -  21 Oct 2022 07:00  --------------------------------------------------------  IN: 240 mL / OUT: 1600 mL / NET: -1360 mL      PHYSICAL EXAM:  General: NAD, A/O x 3  ENT: MMM  Neck: Supple, No JVD  Lungs: Clear to auscultation bilaterally, Non labored breathing   Cardio: RRR, S1/S2, No murmurs  Abdomen: Soft, Nontender, Nondistended; Bowel sounds present  Extremities: No calf tenderness, No pitting edema    LABS:                        11.3   17.84 )-----------( 449      ( 21 Oct 2022 06:30 )             36.9     10-20    138  |  98  |  30  ----------------------------<  84  3.0   |  34  |  0.99    Ca    9.2      20 Oct 2022 08:00  Mg     2.1     10-20    TPro  6.5  /  Alb  2.6  /  TBili  0.4  /  DBili  x   /  AST  60  /  ALT  112  /  AlkPhos  215  10-20                                    Culture - Urine (collected 17 Oct 2022 22:40)  Source: Catheterized Catheterized  Final Report (20 Oct 2022 09:47):    >100,000 CFU/ml Pseudomonas aeruginosa  Organism: Pseudomonas aeruginosa (20 Oct 2022 09:47)  Organism: Pseudomonas aeruginosa (20 Oct 2022 09:47)      -  Amikacin: S <=16      -  Aztreonam: S <=4      -  Cefepime: S <=2      -  Ceftazidime: S 4      -  Ciprofloxacin: S <=0.25      -  Gentamicin: S <=2      -  Imipenem: S <=1      -  Levofloxacin: S <=0.5      -  Meropenem: S <=1      -  Piperacillin/Tazobactam: S <=8      -  Tobramycin: S <=2      Method Type: ARIE    Culture - Blood (collected 17 Oct 2022 22:40)  Source: .Blood Blood  Preliminary Report (19 Oct 2022 05:01):    No growth to date.    Culture - Blood (collected 17 Oct 2022 22:40)  Source: .Blood Blood  Preliminary Report (19 Oct 2022 05:01):    No growth to date.        RADIOLOGY & ADDITIONAL TESTS:    Care Discussed with Consultants/Other Providers:    Patient is a 82y old  Male who presents with a chief complaint of dyspnea, hypoxia - acute on chronic systolic CHF (20 Oct 2022 13:07)      Patient seen and examined at bedside.    ALLERGIES:  PC Pen VK (Other)    MEDICATIONS  (STANDING):  aMIOdarone    Tablet 200 milliGRAM(s) Oral daily  aspirin  chewable 81 milliGRAM(s) Oral daily  enoxaparin Injectable 80 milliGRAM(s) SubCutaneous every 12 hours  famotidine    Tablet 20 milliGRAM(s) Oral daily  furosemide   Injectable 40 milliGRAM(s) IV Push daily  levothyroxine 150 MICROGram(s) Oral daily  metoprolol succinate  milliGRAM(s) Oral daily  mexiletine 150 milliGRAM(s) Oral every 12 hours  Preservision Areds2 2 Capsule(s) 2 Capsule(s) Oral daily  sacubitril 49 mG/valsartan 51 mG 1 Tablet(s) Oral two times a day  senna 1 Tablet(s) Oral at bedtime  sertraline 150 milliGRAM(s) Oral daily  tamsulosin 0.4 milliGRAM(s) Oral at bedtime  traZODone 25 milliGRAM(s) Oral at bedtime    MEDICATIONS  (PRN):  bisacodyl Suppository 10 milliGRAM(s) Rectal daily PRN Constipation  lactulose Syrup 20 Gram(s) Oral daily PRN constipation  lidocaine 2% Gel 1 Application(s) Topical three times a day PRN pain at dubois    Vital Signs Last 24 Hrs  T(F): 97.2 (21 Oct 2022 06:50), Max: 97.5 (20 Oct 2022 14:21)  HR: 96 (21 Oct 2022 06:50) (70 - 96)  BP: 98/64 (21 Oct 2022 06:50) (93/62 - 112/73)  RR: 15 (21 Oct 2022 06:50) (15 - 16)  SpO2: 96% (21 Oct 2022 06:50) (93% - 99%)  I&O's Summary    20 Oct 2022 07:01  -  21 Oct 2022 07:00  --------------------------------------------------------  IN: 240 mL / OUT: 1600 mL / NET: -1360 mL    PHYSICAL EXAM:  General: NAD, A/O x 3  ENT: MMM  Neck: Supple, No JVD  Lungs: Clear to auscultation bilaterally, Non labored breathing   Cardio: RRR, S1/S2, No murmurs  Abdomen: Soft, Nontender, Nondistended; Bowel sounds present  Extremities: No calf tenderness, No pitting edema    LABS:                        11.3   17.84 )-----------( 449      ( 21 Oct 2022 06:30 )             36.9     10-20    138  |  98  |  30  ----------------------------<  84  3.0   |  34  |  0.99    Ca    9.2      20 Oct 2022 08:00  Mg     2.1     10-20    TPro  6.5  /  Alb  2.6  /  TBili  0.4  /  DBili  x   /  AST  60  /  ALT  112  /  AlkPhos  215  10-20    Culture - Urine (collected 17 Oct 2022 22:40)  Source: Catheterized Catheterized  Final Report (20 Oct 2022 09:47):    >100,000 CFU/ml Pseudomonas aeruginosa  Organism: Pseudomonas aeruginosa (20 Oct 2022 09:47)  Organism: Pseudomonas aeruginosa (20 Oct 2022 09:47)      -  Amikacin: S <=16      -  Aztreonam: S <=4      -  Cefepime: S <=2      -  Ceftazidime: S 4      -  Ciprofloxacin: S <=0.25      -  Gentamicin: S <=2      -  Imipenem: S <=1      -  Levofloxacin: S <=0.5      -  Meropenem: S <=1      -  Piperacillin/Tazobactam: S <=8      -  Tobramycin: S <=2      Method Type: ARIE    Culture - Blood (collected 17 Oct 2022 22:40)  Source: .Blood Blood  Preliminary Report (19 Oct 2022 05:01):    No growth to date.    Culture - Blood (collected 17 Oct 2022 22:40)  Source: .Blood Blood  Preliminary Report (19 Oct 2022 05:01):    No growth to date.    RADIOLOGY & ADDITIONAL TESTS:    Care Discussed with Consultants/Other Providers:

## 2022-10-22 LAB
ANION GAP SERPL CALC-SCNC: 4 MMOL/L — LOW (ref 5–17)
BUN SERPL-MCNC: 30 MG/DL — HIGH (ref 7–23)
CALCIUM SERPL-MCNC: 8.9 MG/DL — SIGNIFICANT CHANGE UP (ref 8.4–10.5)
CHLORIDE SERPL-SCNC: 101 MMOL/L — SIGNIFICANT CHANGE UP (ref 96–108)
CO2 SERPL-SCNC: 34 MMOL/L — HIGH (ref 22–31)
CREAT SERPL-MCNC: 1.05 MG/DL — SIGNIFICANT CHANGE UP (ref 0.5–1.3)
EGFR: 71 ML/MIN/1.73M2 — SIGNIFICANT CHANGE UP
GLUCOSE SERPL-MCNC: 96 MG/DL — SIGNIFICANT CHANGE UP (ref 70–99)
HCT VFR BLD CALC: 33.8 % — LOW (ref 39–50)
HGB BLD-MCNC: 10.7 G/DL — LOW (ref 13–17)
MCHC RBC-ENTMCNC: 29.6 PG — SIGNIFICANT CHANGE UP (ref 27–34)
MCHC RBC-ENTMCNC: 31.7 GM/DL — LOW (ref 32–36)
MCV RBC AUTO: 93.4 FL — SIGNIFICANT CHANGE UP (ref 80–100)
NRBC # BLD: 0 /100 WBCS — SIGNIFICANT CHANGE UP (ref 0–0)
PLATELET # BLD AUTO: 413 K/UL — HIGH (ref 150–400)
POTASSIUM SERPL-MCNC: 3.4 MMOL/L — LOW (ref 3.5–5.3)
POTASSIUM SERPL-SCNC: 3.4 MMOL/L — LOW (ref 3.5–5.3)
RBC # BLD: 3.62 M/UL — LOW (ref 4.2–5.8)
RBC # FLD: 15.7 % — HIGH (ref 10.3–14.5)
SODIUM SERPL-SCNC: 139 MMOL/L — SIGNIFICANT CHANGE UP (ref 135–145)
WBC # BLD: 17.5 K/UL — HIGH (ref 3.8–10.5)
WBC # FLD AUTO: 17.5 K/UL — HIGH (ref 3.8–10.5)

## 2022-10-22 PROCEDURE — 99232 SBSQ HOSP IP/OBS MODERATE 35: CPT

## 2022-10-22 RX ORDER — POTASSIUM CHLORIDE 20 MEQ
40 PACKET (EA) ORAL DAILY
Refills: 0 | Status: COMPLETED | OUTPATIENT
Start: 2022-10-22 | End: 2022-10-25

## 2022-10-22 RX ORDER — APIXABAN 2.5 MG/1
10 TABLET, FILM COATED ORAL
Refills: 0 | Status: DISCONTINUED | OUTPATIENT
Start: 2022-10-22 | End: 2022-10-22

## 2022-10-22 RX ORDER — APIXABAN 2.5 MG/1
10 TABLET, FILM COATED ORAL
Refills: 0 | Status: DISCONTINUED | OUTPATIENT
Start: 2022-10-22 | End: 2022-10-25

## 2022-10-22 RX ORDER — MAGNESIUM SULFATE 500 MG/ML
1 VIAL (ML) INJECTION ONCE
Refills: 0 | Status: COMPLETED | OUTPATIENT
Start: 2022-10-22 | End: 2022-10-22

## 2022-10-22 RX ADMIN — MEXILETINE HYDROCHLORIDE 150 MILLIGRAM(S): 150 CAPSULE ORAL at 06:22

## 2022-10-22 RX ADMIN — AMIODARONE HYDROCHLORIDE 200 MILLIGRAM(S): 400 TABLET ORAL at 06:22

## 2022-10-22 RX ADMIN — CEFEPIME 100 MILLIGRAM(S): 1 INJECTION, POWDER, FOR SOLUTION INTRAMUSCULAR; INTRAVENOUS at 06:20

## 2022-10-22 RX ADMIN — Medication 25 MILLIGRAM(S): at 21:20

## 2022-10-22 RX ADMIN — Medication 100 GRAM(S): at 16:31

## 2022-10-22 RX ADMIN — CEFEPIME 100 MILLIGRAM(S): 1 INJECTION, POWDER, FOR SOLUTION INTRAMUSCULAR; INTRAVENOUS at 17:46

## 2022-10-22 RX ADMIN — SENNA PLUS 1 TABLET(S): 8.6 TABLET ORAL at 21:21

## 2022-10-22 RX ADMIN — APIXABAN 10 MILLIGRAM(S): 2.5 TABLET, FILM COATED ORAL at 11:49

## 2022-10-22 RX ADMIN — SERTRALINE 150 MILLIGRAM(S): 25 TABLET, FILM COATED ORAL at 17:56

## 2022-10-22 RX ADMIN — Medication 150 MICROGRAM(S): at 06:27

## 2022-10-22 RX ADMIN — FAMOTIDINE 20 MILLIGRAM(S): 10 INJECTION INTRAVENOUS at 11:30

## 2022-10-22 RX ADMIN — APIXABAN 10 MILLIGRAM(S): 2.5 TABLET, FILM COATED ORAL at 21:22

## 2022-10-22 RX ADMIN — MEXILETINE HYDROCHLORIDE 150 MILLIGRAM(S): 150 CAPSULE ORAL at 17:55

## 2022-10-22 RX ADMIN — Medication 40 MILLIEQUIVALENT(S): at 17:53

## 2022-10-22 RX ADMIN — SACUBITRIL AND VALSARTAN 1 TABLET(S): 24; 26 TABLET, FILM COATED ORAL at 06:22

## 2022-10-22 RX ADMIN — TAMSULOSIN HYDROCHLORIDE 0.4 MILLIGRAM(S): 0.4 CAPSULE ORAL at 21:21

## 2022-10-22 RX ADMIN — Medication 50 MILLIGRAM(S): at 06:22

## 2022-10-22 RX ADMIN — Medication 81 MILLIGRAM(S): at 11:30

## 2022-10-22 RX ADMIN — Medication 20 MILLIGRAM(S): at 06:33

## 2022-10-22 NOTE — PROGRESS NOTE ADULT - SUBJECTIVE AND OBJECTIVE BOX
Follow up for  SUBJ:  PMH  Chronic atrial fibrillation    History of cardiomyopathy    CAD (coronary artery disease)    BPH with obstruction/lower urinary tract symptoms    Hyperlipidemia    History of CVA (cerebrovascular accident)        MEDICATIONS  (STANDING):  aMIOdarone    Tablet 200 milliGRAM(s) Oral daily  apixaban 10 milliGRAM(s) Oral two times a day  aspirin  chewable 81 milliGRAM(s) Oral daily  cefepime   IVPB      cefepime   IVPB 1000 milliGRAM(s) IV Intermittent every 12 hours  famotidine    Tablet 20 milliGRAM(s) Oral daily  furosemide   Injectable 20 milliGRAM(s) IV Push daily  levothyroxine 150 MICROGram(s) Oral daily  metoprolol succinate ER 50 milliGRAM(s) Oral daily  mexiletine 150 milliGRAM(s) Oral every 12 hours  Preservision Areds2 2 Capsule(s) 2 Capsule(s) Oral daily  sacubitril 49 mG/valsartan 51 mG 1 Tablet(s) Oral two times a day  senna 1 Tablet(s) Oral at bedtime  sertraline 150 milliGRAM(s) Oral daily  tamsulosin 0.4 milliGRAM(s) Oral at bedtime  traZODone 25 milliGRAM(s) Oral at bedtime    MEDICATIONS  (PRN):  bisacodyl Suppository 10 milliGRAM(s) Rectal daily PRN Constipation  lactulose Syrup 20 Gram(s) Oral daily PRN constipation  lidocaine 2% Gel 1 Application(s) Topical three times a day PRN pain at dubois        PHYSICAL EXAM:  Vital Signs Last 24 Hrs  T(C): 36.4 (22 Oct 2022 12:25), Max: 37.2 (21 Oct 2022 16:02)  T(F): 97.6 (22 Oct 2022 12:25), Max: 98.9 (21 Oct 2022 16:02)  HR: 67 (22 Oct 2022 12:25) (67 - 110)  BP: 94/57 (22 Oct 2022 12:25) (94/57 - 107/70)  BP(mean): --  RR: 16 (22 Oct 2022 12:25) (10 - 18)  SpO2: 96% (22 Oct 2022 12:25) (90% - 96%)    Parameters below as of 22 Oct 2022 12:25  Patient On (Oxygen Delivery Method): room air        GENERAL: NAD, well-groomed, well-developed  HEAD:  Atraumatic, Normocephalic  EYES: EOMI, PERRLA, conjunctiva and sclera clear  ENT: Moist mucous membranes,  NECK: Supple, No JVD, no bruits  CHEST/LUNG: Clear to percussion bilaterally; No rales, rhonchi, wheezing, or rubs  HEART: Regular rate and rhythm; No murmurs, rubs, or gallops PMI non displaced.  ABDOMEN: Soft, Nontender, Nondistended; Bowel sounds present  EXTREMITIES:  2+ Peripheral Pulses, No clubbing, cyanosis, or edema  SKIN: No rashes or lesions        TELEMETRY:af with moderate response occ wct most probably consistent with aberration        LABS:                        10.7   17.50 )-----------( 413      ( 22 Oct 2022 11:24 )             33.8     10-22    139  |  101  |  30<H>  ----------------------------<  96  3.4<L>   |  34<H>  |  1.05    Ca    8.9      22 Oct 2022 11:24    TPro  6.5  /  Alb  2.5<L>  /  TBili  0.4  /  DBili  x   /  AST  49<H>  /  ALT  91<H>  /  AlkPhos  201<H>  10-21            I&O's Summary    21 Oct 2022 07:01  -  22 Oct 2022 07:00  --------------------------------------------------------  IN: 0 mL / OUT: 600 mL / NET: -600 mL    22 Oct 2022 07:01  -  22 Oct 2022 12:48  --------------------------------------------------------  IN: 0 mL / OUT: 900 mL / NET: -900 mL      BNP    RADIOLOGY & ADDITIONAL STUDIES:    ECHO:

## 2022-10-22 NOTE — PROGRESS NOTE ADULT - SUBJECTIVE AND OBJECTIVE BOX
Patient is a 82y old  Male who presents with a chief complaint of dyspnea, hypoxia - acute on chronic systolic CHF (21 Oct 2022 11:20)      Patient seen and examined at bedside.    ALLERGIES:  PC Pen VK (Other)    MEDICATIONS  (STANDING):  aMIOdarone    Tablet 200 milliGRAM(s) Oral daily  aspirin  chewable 81 milliGRAM(s) Oral daily  cefepime   IVPB      cefepime   IVPB 1000 milliGRAM(s) IV Intermittent every 12 hours  enoxaparin Injectable 80 milliGRAM(s) SubCutaneous every 12 hours  famotidine    Tablet 20 milliGRAM(s) Oral daily  furosemide   Injectable 20 milliGRAM(s) IV Push daily  levothyroxine 150 MICROGram(s) Oral daily  metoprolol succinate ER 50 milliGRAM(s) Oral daily  mexiletine 150 milliGRAM(s) Oral every 12 hours  Preservision Areds2 2 Capsule(s) 2 Capsule(s) Oral daily  sacubitril 49 mG/valsartan 51 mG 1 Tablet(s) Oral two times a day  senna 1 Tablet(s) Oral at bedtime  sertraline 150 milliGRAM(s) Oral daily  sodium chloride 0.9%. 1000 milliLiter(s) (50 mL/Hr) IV Continuous <Continuous>  tamsulosin 0.4 milliGRAM(s) Oral at bedtime  traZODone 25 milliGRAM(s) Oral at bedtime    MEDICATIONS  (PRN):  bisacodyl Suppository 10 milliGRAM(s) Rectal daily PRN Constipation  lactulose Syrup 20 Gram(s) Oral daily PRN constipation  lidocaine 2% Gel 1 Application(s) Topical three times a day PRN pain at dubois    Vital Signs Last 24 Hrs  T(F): 97.5 (22 Oct 2022 06:21), Max: 98.9 (21 Oct 2022 16:02)  HR: 93 (22 Oct 2022 06:21) (93 - 111)  BP: 105/69 (22 Oct 2022 06:21) (80/58 - 107/70)  RR: 18 (22 Oct 2022 06:21) (10 - 20)  SpO2: 95% (22 Oct 2022 06:21) (90% - 96%)  I&O's Summary    21 Oct 2022 07:01  -  22 Oct 2022 07:00  --------------------------------------------------------  IN: 0 mL / OUT: 600 mL / NET: -600 mL      PHYSICAL EXAM:  General: NAD, A/O x 3  ENT: MMM  Neck: Supple, No JVD  Lungs: Clear to auscultation bilaterally, Non labored breathing   Cardio: RRR, S1/S2, No murmurs  Abdomen: Soft, Nontender, Nondistended; Bowel sounds present  Extremities: No calf tenderness, No pitting edema    LABS:                        11.3   17.84 )-----------( 449      ( 21 Oct 2022 06:30 )             36.9     10-21    138  |  100  |  25  ----------------------------<  93  3.8   |  35  |  0.87    Ca    9.0      21 Oct 2022 06:30  Mg     2.1     10-20    TPro  6.5  /  Alb  2.5  /  TBili  0.4  /  DBili  x   /  AST  49  /  ALT  91  /  AlkPhos  201  10-21                                    Culture - Urine (collected 17 Oct 2022 22:40)  Source: Catheterized Catheterized  Final Report (20 Oct 2022 09:47):    >100,000 CFU/ml Pseudomonas aeruginosa  Organism: Pseudomonas aeruginosa (20 Oct 2022 09:47)  Organism: Pseudomonas aeruginosa (20 Oct 2022 09:47)      -  Amikacin: S <=16      -  Aztreonam: S <=4      -  Cefepime: S <=2      -  Ceftazidime: S 4      -  Ciprofloxacin: S <=0.25      -  Gentamicin: S <=2      -  Imipenem: S <=1      -  Levofloxacin: S <=0.5      -  Meropenem: S <=1      -  Piperacillin/Tazobactam: S <=8      -  Tobramycin: S <=2      Method Type: ARIE    Culture - Blood (collected 17 Oct 2022 22:40)  Source: .Blood Blood  Preliminary Report (19 Oct 2022 05:01):    No growth to date.    Culture - Blood (collected 17 Oct 2022 22:40)  Source: .Blood Blood  Preliminary Report (19 Oct 2022 05:01):    No growth to date.        RADIOLOGY & ADDITIONAL TESTS:    Care Discussed with Consultants/Other Providers:    Patient is a 82y old  Male who presents with a chief complaint of dyspnea, hypoxia - acute on chronic systolic CHF with new biat lower ext DVTs       Patient seen and examined at bedside.  Pt more awake this am .  denies pain . not events overnight - hematuria resolved over night off lovenox    ALLERGIES:  PC Pen VK (Other)    MEDICATIONS  (STANDING):  aMIOdarone    Tablet 200 milliGRAM(s) Oral daily  aspirin  chewable 81 milliGRAM(s) Oral daily  cefepime   IVPB      cefepime   IVPB 1000 milliGRAM(s) IV Intermittent every 12 hours  enoxaparin Injectable 80 milliGRAM(s) SubCutaneous every 12 hours  famotidine    Tablet 20 milliGRAM(s) Oral daily  furosemide   Injectable 20 milliGRAM(s) IV Push daily  levothyroxine 150 MICROGram(s) Oral daily  metoprolol succinate ER 50 milliGRAM(s) Oral daily  mexiletine 150 milliGRAM(s) Oral every 12 hours  Preservision Areds2 2 Capsule(s) 2 Capsule(s) Oral daily  sacubitril 49 mG/valsartan 51 mG 1 Tablet(s) Oral two times a day  senna 1 Tablet(s) Oral at bedtime  sertraline 150 milliGRAM(s) Oral daily  sodium chloride 0.9%. 1000 milliLiter(s) (50 mL/Hr) IV Continuous <Continuous>  tamsulosin 0.4 milliGRAM(s) Oral at bedtime  traZODone 25 milliGRAM(s) Oral at bedtime    MEDICATIONS  (PRN):  bisacodyl Suppository 10 milliGRAM(s) Rectal daily PRN Constipation  lactulose Syrup 20 Gram(s) Oral daily PRN constipation  lidocaine 2% Gel 1 Application(s) Topical three times a day PRN pain at dubois    Vital Signs Last 24 Hrs  T(F): 97.5 (22 Oct 2022 06:21), Max: 98.9 (21 Oct 2022 16:02)  HR: 93 (22 Oct 2022 06:21) (93 - 111)  BP: 105/69 (22 Oct 2022 06:21) (80/58 - 107/70)  RR: 18 (22 Oct 2022 06:21) (10 - 20)  SpO2: 95% (22 Oct 2022 06:21) (90% - 96%)  I&O's Summary    21 Oct 2022 07:01  -  22 Oct 2022 07:00  --------------------------------------------------------  IN: 0 mL / OUT: 600 mL / NET: -600 mL      PHYSICAL EXAM:  General: 81 y/o male in NAD, A/O x 3   ENT: MMM  Neck: Supple, No JVD  Lungs: Clear to auscultation bilaterally, Non labored breathing   Cardio: RRR, S1/S2, No murmurs  Abdomen: Soft, Nontender, Nondistended; Bowel sounds present  Extremities: No calf tenderness, No pitting edema  Left upper ext with pitting edema     LABS:                        11.3   17.84 )-----------( 449      ( 21 Oct 2022 06:30 )             36.9     10-21    138  |  100  |  25  ----------------------------<  93  3.8   |  35  |  0.87    Ca    9.0      21 Oct 2022 06:30  Mg     2.1     10-20    TPro  6.5  /  Alb  2.5  /  TBili  0.4  /  DBili  x   /  AST  49  /  ALT  91  /  AlkPhos  201  10-21                                    Culture - Urine (collected 17 Oct 2022 22:40)  Source: Catheterized Catheterized  Final Report (20 Oct 2022 09:47):    >100,000 CFU/ml Pseudomonas aeruginosa  Organism: Pseudomonas aeruginosa (20 Oct 2022 09:47)  Organism: Pseudomonas aeruginosa (20 Oct 2022 09:47)      -  Amikacin: S <=16      -  Aztreonam: S <=4      -  Cefepime: S <=2      -  Ceftazidime: S 4      -  Ciprofloxacin: S <=0.25      -  Gentamicin: S <=2      -  Imipenem: S <=1      -  Levofloxacin: S <=0.5      -  Meropenem: S <=1      -  Piperacillin/Tazobactam: S <=8      -  Tobramycin: S <=2      Method Type: ARIE    Culture - Blood (collected 17 Oct 2022 22:40)  Source: .Blood Blood  Preliminary Report (19 Oct 2022 05:01):    No growth to date.    Culture - Blood (collected 17 Oct 2022 22:40)  Source: .Blood Blood  Preliminary Report (19 Oct 2022 05:01):    No growth to date.        RADIOLOGY & ADDITIONAL TESTS:    Care Discussed with Consultants/Other Providers:

## 2022-10-22 NOTE — PROGRESS NOTE ADULT - ASSESSMENT
82 year old M PMH HTN, CAD s/p CABG, severe cardiomyopathy (EF 20%), afib (not on AC due to hematuria), BPH, LUE DVT, history of LLE DVT, recently hospitalized at [facility] for uorsepsis and urinary retention (now with dubois), brought in from [facility] for low saturations, admitted for acute on chronic systolic CHF exacerbation.    Acute Hypoxic Respiratory Failure  Acute on Chronic Systolic CHF Exacerbation  Severe Cardiomyopathy (EF 20%)  cardiology following Discussion with Dr Valverde about andticoagulation and hematuria as well as hypotension and low BP   ECHO with EF 20-25% c/w previous echos  supplemental oxygen therapy to maintain sats if necessary, wean as tolerated  guideline directed medical therapy for HFrEF indicated  plan to hold diuresis, hold  long acting BB- for hypotension   hold  entresto - as per cardio  continue ASA and amiodarone  consider farxiga on discharge once off IV lasix  follow up with primary cardiologist to discuss prophylactic ICD    Bilateral DVTs  US revealed B/L DVTs involving RT deep femoral vein, LT common femoral and deep veins  pt with hx of DVTs found last month during hospitalization at [facility]  not on AC at home  due to hematuria  currently  lovenox on hold for hematuria . Will continue to hold and monitor bleeding     CAD s/p CABG  pt with hx of remote CABG and recent angiogram with occluded grafts and medical therapy recommended  elevated Tn likely demand ischemia  appears stable at this time, continue ASA, hold statin due to elevated LFTs    Ascending Aortic Aneurysm  CT chest showed ascending thoracic aorta 4.3 cm in diameter  aberrant right subclavian artery  atherosclerotic calcifications of the aorts s/p CABG  consider vascular evaluation outpatient    Transaminitis  hold statin for now, LFTs slowly down trending   monitor CMP    Atrial Fibrillation  not on AC at home due to hematuria  currently  lovenox  on hold due to hematuria   continue mexiletine and  amiodarone - metoprolol held this am due to hypotension then will restart at 50 mg daily with parameters     Leukocytosis  UTI   found to have UTI -pseudo A -  will start cefepime - today  NS at 50cc/hr for 5 hours    s/p cefepime in ED  blood cx negative     h/o Hypothyroid  continue synthroid 150 mcg daily  stable    Hypokalemia resolved   mostlikely due to diuretics  continue to monitor     BPH  continue flomax  dubois was placed at [facility] about four weeks ago, attempt TOV? currently with hematuria     Severe Protein Calorie Malnutrition  nutrition evaluation appreciated  continue ensure plus high protein cans BID    Prophylactic Measure  therapuetic lovenox    10/22 updated [facility] cloud [phone]               82 year old M PMH HTN, CAD s/p CABG, severe cardiomyopathy (EF 20%), afib (not on AC due to hematuria), BPH, LUE DVT, history of LLE DVT, recently hospitalized at [facility] for uorsepsis and urinary retention (now with dubois), brought in from [facility] for low saturations, admitted for acute on chronic systolic CHF exacerbation.    Acute Hypoxic Respiratory Failure  Acute on Chronic Systolic CHF Exacerbation  Severe Cardiomyopathy (EF 20%)  cardiology following Discussion with Dr Aguilar about anticoagulation and hematuria  - will start eliquis today and monitor for bleeding   ECHO with EF 20-25% c/w previous echos  supplemental oxygen therapy to maintain sats if necessary, wean as tolerated  guideline directed medical therapy for HFrEF indicated  plan to hold diuresis, hold  long acting BB- for hypotension   hold  entresto - as per cardio  continue ASA and amiodarone  consider farxiga on discharge once off IV lasix  follow up with primary cardiologist to discuss prophylactic ICD    Bilateral DVTs  US revealed B/L DVTs involving RT deep femoral vein, LT common femoral and deep veins  pt with hx of DVTs found last month during hospitalization at [facility]  not on AC at home  due to hematuria  hematuria resolved over night off lovenox will start eliquis today and monitor for bleeding     CAD s/p CABG  pt with hx of remote CABG and recent angiogram with occluded grafts and medical therapy recommended  elevated Tn likely demand ischemia  appears stable at this time, continue ASA, hold statin due to elevated LFTs    Ascending Aortic Aneurysm  CT chest showed ascending thoracic aorta 4.3 cm in diameter  aberrant right subclavian artery  atherosclerotic calcifications of the aorts s/p CABG  consider vascular evaluation outpatient    Transaminitis  hold statin for now, LFTs slowly down trending   monitor CMP    Atrial Fibrillation  not on AC at home due to hematuria  currently  lovenox  on hold due to hematuria   continue mexiletine and  amiodarone - metoprolol held this am due to hypotension then will restart at 50 mg daily with parameters     Leukocytosis  UTI   found to have UTI -pseudo A -  continue  cefepime - day 2   s/p cefepime in ED  blood cx negative   labs pending     h/o Hypothyroid  continue synthroid 150 mcg daily  stable    Hypokalemia resolved   mostlikely due to diuretics  continue to monitor     BPH  continue flomax  dubois was placed at [facility] about four weeks ago, attempt TOV? currently with hematuria     Severe Protein Calorie Malnutrition  nutrition evaluation appreciated  continue ensure plus high protein cans BID    Prophylactic Measure  therapuetic lovenox    10/22 updated [facility] cloud [phone]

## 2022-10-22 NOTE — PROGRESS NOTE ADULT - ASSESSMENT
imp    chf in pt with cad af and lv systolic dysfunction    suggest  continue current regimen  add farxiga if no contraindication  repeat cxr

## 2022-10-23 LAB
ANION GAP SERPL CALC-SCNC: 4 MMOL/L — LOW (ref 5–17)
BUN SERPL-MCNC: 28 MG/DL — HIGH (ref 7–23)
CALCIUM SERPL-MCNC: 8.9 MG/DL — SIGNIFICANT CHANGE UP (ref 8.4–10.5)
CHLORIDE SERPL-SCNC: 102 MMOL/L — SIGNIFICANT CHANGE UP (ref 96–108)
CO2 SERPL-SCNC: 32 MMOL/L — HIGH (ref 22–31)
CREAT SERPL-MCNC: 0.92 MG/DL — SIGNIFICANT CHANGE UP (ref 0.5–1.3)
CULTURE RESULTS: SIGNIFICANT CHANGE UP
CULTURE RESULTS: SIGNIFICANT CHANGE UP
EGFR: 83 ML/MIN/1.73M2 — SIGNIFICANT CHANGE UP
GLUCOSE SERPL-MCNC: 78 MG/DL — SIGNIFICANT CHANGE UP (ref 70–99)
HCT VFR BLD CALC: 33.9 % — LOW (ref 39–50)
HGB BLD-MCNC: 10.5 G/DL — LOW (ref 13–17)
MAGNESIUM SERPL-MCNC: 2.2 MG/DL — SIGNIFICANT CHANGE UP (ref 1.6–2.6)
MCHC RBC-ENTMCNC: 28.8 PG — SIGNIFICANT CHANGE UP (ref 27–34)
MCHC RBC-ENTMCNC: 31 GM/DL — LOW (ref 32–36)
MCV RBC AUTO: 92.9 FL — SIGNIFICANT CHANGE UP (ref 80–100)
NRBC # BLD: 0 /100 WBCS — SIGNIFICANT CHANGE UP (ref 0–0)
PLATELET # BLD AUTO: 390 K/UL — SIGNIFICANT CHANGE UP (ref 150–400)
POTASSIUM SERPL-MCNC: 4.1 MMOL/L — SIGNIFICANT CHANGE UP (ref 3.5–5.3)
POTASSIUM SERPL-SCNC: 4.1 MMOL/L — SIGNIFICANT CHANGE UP (ref 3.5–5.3)
RBC # BLD: 3.65 M/UL — LOW (ref 4.2–5.8)
RBC # FLD: 15.9 % — HIGH (ref 10.3–14.5)
SODIUM SERPL-SCNC: 138 MMOL/L — SIGNIFICANT CHANGE UP (ref 135–145)
SPECIMEN SOURCE: SIGNIFICANT CHANGE UP
SPECIMEN SOURCE: SIGNIFICANT CHANGE UP
WBC # BLD: 17.04 K/UL — HIGH (ref 3.8–10.5)
WBC # FLD AUTO: 17.04 K/UL — HIGH (ref 3.8–10.5)

## 2022-10-23 RX ORDER — METOPROLOL TARTRATE 50 MG
100 TABLET ORAL DAILY
Refills: 0 | Status: DISCONTINUED | OUTPATIENT
Start: 2022-10-23 | End: 2022-10-23

## 2022-10-23 RX ORDER — METOPROLOL TARTRATE 50 MG
50 TABLET ORAL DAILY
Refills: 0 | Status: DISCONTINUED | OUTPATIENT
Start: 2022-10-24 | End: 2022-10-24

## 2022-10-23 RX ADMIN — SENNA PLUS 1 TABLET(S): 8.6 TABLET ORAL at 20:18

## 2022-10-23 RX ADMIN — AMIODARONE HYDROCHLORIDE 200 MILLIGRAM(S): 400 TABLET ORAL at 06:14

## 2022-10-23 RX ADMIN — TAMSULOSIN HYDROCHLORIDE 0.4 MILLIGRAM(S): 0.4 CAPSULE ORAL at 20:18

## 2022-10-23 RX ADMIN — MEXILETINE HYDROCHLORIDE 150 MILLIGRAM(S): 150 CAPSULE ORAL at 17:42

## 2022-10-23 RX ADMIN — Medication 40 MILLIEQUIVALENT(S): at 12:18

## 2022-10-23 RX ADMIN — Medication 150 MICROGRAM(S): at 06:12

## 2022-10-23 RX ADMIN — Medication 81 MILLIGRAM(S): at 12:17

## 2022-10-23 RX ADMIN — MEXILETINE HYDROCHLORIDE 150 MILLIGRAM(S): 150 CAPSULE ORAL at 06:14

## 2022-10-23 RX ADMIN — Medication 20 MILLIGRAM(S): at 06:14

## 2022-10-23 RX ADMIN — APIXABAN 10 MILLIGRAM(S): 2.5 TABLET, FILM COATED ORAL at 06:12

## 2022-10-23 RX ADMIN — SACUBITRIL AND VALSARTAN 1 TABLET(S): 24; 26 TABLET, FILM COATED ORAL at 17:43

## 2022-10-23 RX ADMIN — Medication 25 MILLIGRAM(S): at 20:19

## 2022-10-23 RX ADMIN — SERTRALINE 150 MILLIGRAM(S): 25 TABLET, FILM COATED ORAL at 12:17

## 2022-10-23 RX ADMIN — CEFEPIME 100 MILLIGRAM(S): 1 INJECTION, POWDER, FOR SOLUTION INTRAMUSCULAR; INTRAVENOUS at 17:30

## 2022-10-23 RX ADMIN — Medication 50 MILLIGRAM(S): at 06:13

## 2022-10-23 RX ADMIN — CEFEPIME 100 MILLIGRAM(S): 1 INJECTION, POWDER, FOR SOLUTION INTRAMUSCULAR; INTRAVENOUS at 06:14

## 2022-10-23 RX ADMIN — FAMOTIDINE 20 MILLIGRAM(S): 10 INJECTION INTRAVENOUS at 12:17

## 2022-10-23 RX ADMIN — APIXABAN 10 MILLIGRAM(S): 2.5 TABLET, FILM COATED ORAL at 17:44

## 2022-10-23 NOTE — PROGRESS NOTE ADULT - ASSESSMENT
82 year old M PMH HTN, CAD s/p CABG, severe cardiomyopathy (EF 20%), afib (not on AC due to hematuria), BPH, LUE DVT, history of LLE DVT, recently hospitalized at [facility] for uorsepsis and urinary retention (now with dubois), brought in from [facility] for low saturations, admitted for acute on chronic systolic CHF exacerbation.    Acute Hypoxic Respiratory Failure  Acute on Chronic Systolic CHF Exacerbation  Severe Cardiomyopathy (EF 20%)  cardiology following Discussion with Dr Aguilar about anticoagulation and hematuria  - will start eliquis today and monitor for bleeding   ECHO with EF 20-25% c/w previous echos  supplemental oxygen therapy to maintain sats if necessary, wean as tolerated  guideline directed medical therapy for HFrEF indicated  plan to hold diuresis, hold  long acting BB- for hypotension   hold  entresto - as per cardio  continue ASA and amiodarone  consider farxiga on discharge once off IV lasix  follow up with primary cardiologist to discuss prophylactic ICD    Bilateral DVTs  US revealed B/L DVTs involving RT deep femoral vein, LT common femoral and deep veins  pt with hx of DVTs found last month during hospitalization at [facility]  not on AC at home  due to hematuria  hematuria resolved over night off lovenox will start eliquis today and monitor for bleeding     CAD s/p CABG  pt with hx of remote CABG and recent angiogram with occluded grafts and medical therapy recommended  elevated Tn likely demand ischemia  appears stable at this time, continue ASA, hold statin due to elevated LFTs    Ascending Aortic Aneurysm  CT chest showed ascending thoracic aorta 4.3 cm in diameter  aberrant right subclavian artery  atherosclerotic calcifications of the aorts s/p CABG  consider vascular evaluation outpatient    Transaminitis  hold statin for now, LFTs slowly down trending   monitor CMP    Atrial Fibrillation  not on AC at home due to hematuria  currently  lovenox  on hold due to hematuria   continue mexiletine and  amiodarone - metoprolol held this am due to hypotension then will restart at 50 mg daily with parameters     Leukocytosis  UTI   found to have UTI -pseudo A -  continue  cefepime - day 2   s/p cefepime in ED  blood cx negative   labs pending     h/o Hypothyroid  continue synthroid 150 mcg daily  stable    Hypokalemia resolved   mostlikely due to diuretics  continue to monitor     BPH  continue flomax  dubois was placed at [facility] about four weeks ago, attempt TOV? currently with hematuria     Severe Protein Calorie Malnutrition  nutrition evaluation appreciated  continue ensure plus high protein cans BID    Prophylactic Measure  therapuetic lovenox    10/23 updated bush[facility] cloud [phone]               82 year old M PMH HTN, CAD s/p CABG, severe cardiomyopathy (EF 20%), afib (not on AC due to hematuria), BPH, LUE DVT, history of LLE DVT, recently hospitalized at [place] for uorsepsis and urinary retention (now with dubois), brought in from [facility] for low saturations, admitted for acute on chronic systolic CHF exacerbation.    Acute Hypoxic Respiratory Failure  Acute on Chronic Systolic CHF Exacerbation  Severe Cardiomyopathy (EF 20%)  cardiology following Discussion with Dr Aguilar about anticoagulation and hematuria  -no bleeding over night on Eliquis will continue and monitor   ECHO with EF 20-25% c/w previous echos  supplemental oxygen therapy to maintain sats if necessary, wean as tolerated  guideline directed medical therapy for HFrEF indicated  plan to hold diuresis, hold  long acting BB- for hypotension   hold  entresto - as per cardio  continue ASA and amiodarone  consider farxiga on discharge   follow up with primary cardiologist to discuss prophylactic ICD    Bilateral DVTs  US revealed B/L DVTs involving RT deep femoral vein, LT common femoral and deep veins  pt with hx of DVTs found last month during hospitalization at [facility]  not on AC at home  due to hematuria  hematuria resolved over night off lovenox will start eliquis today and monitor for bleeding     CAD s/p CABG  pt with hx of remote CABG and recent angiogram with occluded grafts and medical therapy recommended  elevated Tn likely demand ischemia  appears stable at this time, continue ASA, hold statin due to elevated LFTs    Ascending Aortic Aneurysm  CT chest showed ascending thoracic aorta 4.3 cm in diameter  aberrant right subclavian artery  atherosclerotic calcifications of the aorts s/p CABG  consider vascular evaluation outpatient    Transaminitis  Continue holding statin for now, LFTs slowly down trending   monitor CMP in am     Atrial Fibrillation  Eliquis started 10/22 no hematuria noted at this point- continue to monitor   continue mexiletine and  amiodarone - metoprolol  50 mg daily with parameters     Leukocytosis  UTI   found to have UTI -pseudo A -  continue  cefepime - day 3   s/p cefepime in ED  blood cx negative to date      h/o Hypothyroid  continue synthroid 150 mcg daily  stable    Hypokalemia resolved 4.1  mostlikely due to diuretics  continue to monitor     BPH  continue flomax  dubois was placed at [facility] about four weeks ago, continue for now     Severe Protein Calorie Malnutrition  nutrition evaluation appreciated  continue ensure plus high protein cans BID    Prophylactic Measure  therapuetic lovenox  DNR     10/23 updated [facility] cloud [phone]

## 2022-10-23 NOTE — PROGRESS NOTE ADULT - SUBJECTIVE AND OBJECTIVE BOX
Patient is a 82y old  Male who presents with a chief complaint of dyspnea, hypoxia - acute on chronic systolic CHF (22 Oct 2022 12:48)      Patient seen and examined at bedside.    ALLERGIES:  PC Pen VK (Other)    MEDICATIONS  (STANDING):  aMIOdarone    Tablet 200 milliGRAM(s) Oral daily  apixaban 10 milliGRAM(s) Oral two times a day  aspirin  chewable 81 milliGRAM(s) Oral daily  cefepime   IVPB      cefepime   IVPB 1000 milliGRAM(s) IV Intermittent every 12 hours  famotidine    Tablet 20 milliGRAM(s) Oral daily  furosemide   Injectable 20 milliGRAM(s) IV Push daily  levothyroxine 150 MICROGram(s) Oral daily  metoprolol succinate ER 50 milliGRAM(s) Oral daily  mexiletine 150 milliGRAM(s) Oral every 12 hours  potassium chloride   Solution 40 milliEquivalent(s) Oral daily  Preservision Areds2 2 Capsule(s) 2 Capsule(s) Oral daily  sacubitril 49 mG/valsartan 51 mG 1 Tablet(s) Oral two times a day  senna 1 Tablet(s) Oral at bedtime  sertraline 150 milliGRAM(s) Oral daily  tamsulosin 0.4 milliGRAM(s) Oral at bedtime  traZODone 25 milliGRAM(s) Oral at bedtime    MEDICATIONS  (PRN):  bisacodyl Suppository 10 milliGRAM(s) Rectal daily PRN Constipation  lactulose Syrup 20 Gram(s) Oral daily PRN constipation  lidocaine 2% Gel 1 Application(s) Topical three times a day PRN pain at dubois    Vital Signs Last 24 Hrs  T(F): 97.5 (23 Oct 2022 05:58), Max: 97.6 (22 Oct 2022 12:25)  HR: 73 (23 Oct 2022 05:58) (59 - 73)  BP: 104/68 (23 Oct 2022 05:58) (93/49 - 104/68)  RR: 18 (23 Oct 2022 05:58) (16 - 18)  SpO2: 94% (23 Oct 2022 05:58) (94% - 97%)  I&O's Summary    22 Oct 2022 07:01  -  23 Oct 2022 07:00  --------------------------------------------------------  IN: 0 mL / OUT: 1700 mL / NET: -1700 mL      PHYSICAL EXAM:  General: NAD, A/O x 3  ENT: MMM  Neck: Supple, No JVD  Lungs: Clear to auscultation bilaterally, Non labored breathing   Cardio: RRR, S1/S2, No murmurs  Abdomen: Soft, Nontender, Nondistended; Bowel sounds present  Extremities: No calf tenderness, No pitting edema    LABS:                        10.5   17.04 )-----------( 390      ( 23 Oct 2022 06:15 )             33.9     10-22    139  |  101  |  30  ----------------------------<  96  3.4   |  34  |  1.05    Ca    8.9      22 Oct 2022 11:24  Mg     2.1     10-20    TPro  6.5  /  Alb  2.5  /  TBili  0.4  /  DBili  x   /  AST  49  /  ALT  91  /  AlkPhos  201  10-21                                    Culture - Urine (collected 17 Oct 2022 22:40)  Source: Catheterized Catheterized  Final Report (20 Oct 2022 09:47):    >100,000 CFU/ml Pseudomonas aeruginosa  Organism: Pseudomonas aeruginosa (20 Oct 2022 09:47)  Organism: Pseudomonas aeruginosa (20 Oct 2022 09:47)      -  Amikacin: S <=16      -  Aztreonam: S <=4      -  Cefepime: S <=2      -  Ceftazidime: S 4      -  Ciprofloxacin: S <=0.25      -  Gentamicin: S <=2      -  Imipenem: S <=1      -  Levofloxacin: S <=0.5      -  Meropenem: S <=1      -  Piperacillin/Tazobactam: S <=8      -  Tobramycin: S <=2      Method Type: ARIE    Culture - Blood (collected 17 Oct 2022 22:40)  Source: .Blood Blood  Final Report (23 Oct 2022 05:00):    No Growth Final    Culture - Blood (collected 17 Oct 2022 22:40)  Source: .Blood Blood  Final Report (23 Oct 2022 05:00):    No Growth Final        RADIOLOGY & ADDITIONAL TESTS:    Care Discussed with Consultants/Other Providers:    Patient is a 82y old  Male who presents with a chief complaint of dyspnea, hypoxia - acute on chronic systolic CHF (22 Oct 2022 12:48)      Patient seen and examined at bedside.  Pt doing well this am.  no complaints.      ALLERGIES:  PC Pen VK (Other)    MEDICATIONS  (STANDING):  aMIOdarone    Tablet 200 milliGRAM(s) Oral daily  apixaban 10 milliGRAM(s) Oral two times a day  aspirin  chewable 81 milliGRAM(s) Oral daily  cefepime   IVPB      cefepime   IVPB 1000 milliGRAM(s) IV Intermittent every 12 hours  famotidine    Tablet 20 milliGRAM(s) Oral daily  furosemide   Injectable 20 milliGRAM(s) IV Push daily  levothyroxine 150 MICROGram(s) Oral daily  metoprolol succinate ER 50 milliGRAM(s) Oral daily  mexiletine 150 milliGRAM(s) Oral every 12 hours  potassium chloride   Solution 40 milliEquivalent(s) Oral daily  Preservision Areds2 2 Capsule(s) 2 Capsule(s) Oral daily  sacubitril 49 mG/valsartan 51 mG 1 Tablet(s) Oral two times a day  senna 1 Tablet(s) Oral at bedtime  sertraline 150 milliGRAM(s) Oral daily  tamsulosin 0.4 milliGRAM(s) Oral at bedtime  traZODone 25 milliGRAM(s) Oral at bedtime    MEDICATIONS  (PRN):  bisacodyl Suppository 10 milliGRAM(s) Rectal daily PRN Constipation  lactulose Syrup 20 Gram(s) Oral daily PRN constipation  lidocaine 2% Gel 1 Application(s) Topical three times a day PRN pain at dubois    Vital Signs Last 24 Hrs  T(F): 97.5 (23 Oct 2022 05:58), Max: 97.6 (22 Oct 2022 12:25)  HR: 73 (23 Oct 2022 05:58) (59 - 73)  BP: 104/68 (23 Oct 2022 05:58) (93/49 - 104/68)  RR: 18 (23 Oct 2022 05:58) (16 - 18)  SpO2: 94% (23 Oct 2022 05:58) (94% - 97%)  I&O's Summary    22 Oct 2022 07:01  -  23 Oct 2022 07:00  --------------------------------------------------------  IN: 0 mL / OUT: 1700 mL / NET: -1700 mL      PHYSICAL EXAM:  General: 81 y/o male in NAD, A/O x 2-3  ENT: MMM  Neck: Supple, No JVD  Lungs: Clear to auscultation bilaterally, Non labored breathing   Cardio: RRR, S1/S2, No murmurs  Abdomen: Soft, Nontender, Nondistended; Bowel sounds present  Extremities: No calf tenderness, No pitting edema Left upper extremity swelling - Hx of DVT     LABS:                        10.5   17.04 )-----------( 390      ( 23 Oct 2022 06:15 )             33.9     10-22    139  |  101  |  30  ----------------------------<  96  3.4   |  34  |  1.05    Ca    8.9      22 Oct 2022 11:24  Mg     2.1     10-20    TPro  6.5  /  Alb  2.5  /  TBili  0.4  /  DBili  x   /  AST  49  /  ALT  91  /  AlkPhos  201  10-21                                    Culture - Urine (collected 17 Oct 2022 22:40)  Source: Catheterized Catheterized  Final Report (20 Oct 2022 09:47):    >100,000 CFU/ml Pseudomonas aeruginosa  Organism: Pseudomonas aeruginosa (20 Oct 2022 09:47)  Organism: Pseudomonas aeruginosa (20 Oct 2022 09:47)      -  Amikacin: S <=16      -  Aztreonam: S <=4      -  Cefepime: S <=2      -  Ceftazidime: S 4      -  Ciprofloxacin: S <=0.25      -  Gentamicin: S <=2      -  Imipenem: S <=1      -  Levofloxacin: S <=0.5      -  Meropenem: S <=1      -  Piperacillin/Tazobactam: S <=8      -  Tobramycin: S <=2      Method Type: ARIE    Culture - Blood (collected 17 Oct 2022 22:40)  Source: .Blood Blood  Final Report (23 Oct 2022 05:00):    No Growth Final    Culture - Blood (collected 17 Oct 2022 22:40)  Source: .Blood Blood  Final Report (23 Oct 2022 05:00):    No Growth Final        RADIOLOGY & ADDITIONAL TESTS:    Care Discussed with Consultants/Other Providers:    Patient is a 82y old  Male who presents with a chief complaint of dyspnea, hypoxia - acute on chronic systolic CHF (22 Oct 2022 12:48)    Patient seen and examined at bedside.  Pt doing well this am.  no complaints.      ALLERGIES:  PC Pen VK (Other)    MEDICATIONS  (STANDING):  aMIOdarone    Tablet 200 milliGRAM(s) Oral daily  apixaban 10 milliGRAM(s) Oral two times a day  aspirin  chewable 81 milliGRAM(s) Oral daily  cefepime   IVPB      cefepime   IVPB 1000 milliGRAM(s) IV Intermittent every 12 hours  famotidine    Tablet 20 milliGRAM(s) Oral daily  furosemide   Injectable 20 milliGRAM(s) IV Push daily  levothyroxine 150 MICROGram(s) Oral daily  metoprolol succinate ER 50 milliGRAM(s) Oral daily  mexiletine 150 milliGRAM(s) Oral every 12 hours  potassium chloride   Solution 40 milliEquivalent(s) Oral daily  Preservision Areds2 2 Capsule(s) 2 Capsule(s) Oral daily  sacubitril 49 mG/valsartan 51 mG 1 Tablet(s) Oral two times a day  senna 1 Tablet(s) Oral at bedtime  sertraline 150 milliGRAM(s) Oral daily  tamsulosin 0.4 milliGRAM(s) Oral at bedtime  traZODone 25 milliGRAM(s) Oral at bedtime    MEDICATIONS  (PRN):  bisacodyl Suppository 10 milliGRAM(s) Rectal daily PRN Constipation  lactulose Syrup 20 Gram(s) Oral daily PRN constipation  lidocaine 2% Gel 1 Application(s) Topical three times a day PRN pain at dubois    Vital Signs Last 24 Hrs  T(F): 97.5 (23 Oct 2022 05:58), Max: 97.6 (22 Oct 2022 12:25)  HR: 73 (23 Oct 2022 05:58) (59 - 73)  BP: 104/68 (23 Oct 2022 05:58) (93/49 - 104/68)  RR: 18 (23 Oct 2022 05:58) (16 - 18)  SpO2: 94% (23 Oct 2022 05:58) (94% - 97%)  I&O's Summary    22 Oct 2022 07:01  -  23 Oct 2022 07:00  --------------------------------------------------------  IN: 0 mL / OUT: 1700 mL / NET: -1700 mL      PHYSICAL EXAM:  General: 81 y/o male in NAD, A/O x 2-3  ENT: MMM  Neck: Supple, No JVD  Lungs: Clear to auscultation bilaterally, Non labored breathing   Cardio: RRR, S1/S2, No murmurs  Abdomen: Soft, Nontender, Nondistended; Bowel sounds present  Extremities: No calf tenderness, No pitting edema Left upper extremity swelling - Hx of DVT     LABS:                        10.5   17.04 )-----------( 390      ( 23 Oct 2022 06:15 )             33.9     10-22    139  |  101  |  30  ----------------------------<  96  3.4   |  34  |  1.05    Ca    8.9      22 Oct 2022 11:24  Mg     2.1     10-20    TPro  6.5  /  Alb  2.5  /  TBili  0.4  /  DBili  x   /  AST  49  /  ALT  91  /  AlkPhos  201  10-21                                    Culture - Urine (collected 17 Oct 2022 22:40)  Source: Catheterized Catheterized  Final Report (20 Oct 2022 09:47):    >100,000 CFU/ml Pseudomonas aeruginosa  Organism: Pseudomonas aeruginosa (20 Oct 2022 09:47)  Organism: Pseudomonas aeruginosa (20 Oct 2022 09:47)      -  Amikacin: S <=16      -  Aztreonam: S <=4      -  Cefepime: S <=2      -  Ceftazidime: S 4      -  Ciprofloxacin: S <=0.25      -  Gentamicin: S <=2      -  Imipenem: S <=1      -  Levofloxacin: S <=0.5      -  Meropenem: S <=1      -  Piperacillin/Tazobactam: S <=8      -  Tobramycin: S <=2      Method Type: ARIE    Culture - Blood (collected 17 Oct 2022 22:40)  Source: .Blood Blood  Final Report (23 Oct 2022 05:00):    No Growth Final    Culture - Blood (collected 17 Oct 2022 22:40)  Source: .Blood Blood  Final Report (23 Oct 2022 05:00):    No Growth Final        RADIOLOGY & ADDITIONAL TESTS:    Care Discussed with Consultants/Other Providers:

## 2022-10-24 PROCEDURE — 99232 SBSQ HOSP IP/OBS MODERATE 35: CPT

## 2022-10-24 RX ORDER — DAPAGLIFLOZIN 10 MG/1
10 TABLET, FILM COATED ORAL EVERY 24 HOURS
Refills: 0 | Status: DISCONTINUED | OUTPATIENT
Start: 2022-10-24 | End: 2022-10-24

## 2022-10-24 RX ADMIN — Medication 81 MILLIGRAM(S): at 12:39

## 2022-10-24 RX ADMIN — Medication 40 MILLIEQUIVALENT(S): at 12:39

## 2022-10-24 RX ADMIN — Medication 150 MICROGRAM(S): at 05:54

## 2022-10-24 RX ADMIN — SACUBITRIL AND VALSARTAN 1 TABLET(S): 24; 26 TABLET, FILM COATED ORAL at 05:54

## 2022-10-24 RX ADMIN — AMIODARONE HYDROCHLORIDE 200 MILLIGRAM(S): 400 TABLET ORAL at 05:54

## 2022-10-24 RX ADMIN — SERTRALINE 150 MILLIGRAM(S): 25 TABLET, FILM COATED ORAL at 12:38

## 2022-10-24 RX ADMIN — APIXABAN 10 MILLIGRAM(S): 2.5 TABLET, FILM COATED ORAL at 05:56

## 2022-10-24 RX ADMIN — APIXABAN 10 MILLIGRAM(S): 2.5 TABLET, FILM COATED ORAL at 18:39

## 2022-10-24 RX ADMIN — CEFEPIME 100 MILLIGRAM(S): 1 INJECTION, POWDER, FOR SOLUTION INTRAMUSCULAR; INTRAVENOUS at 05:53

## 2022-10-24 RX ADMIN — FAMOTIDINE 20 MILLIGRAM(S): 10 INJECTION INTRAVENOUS at 12:39

## 2022-10-24 RX ADMIN — Medication 25 MILLIGRAM(S): at 21:26

## 2022-10-24 RX ADMIN — MEXILETINE HYDROCHLORIDE 150 MILLIGRAM(S): 150 CAPSULE ORAL at 18:39

## 2022-10-24 RX ADMIN — Medication 20 MILLIGRAM(S): at 05:55

## 2022-10-24 RX ADMIN — SENNA PLUS 1 TABLET(S): 8.6 TABLET ORAL at 21:26

## 2022-10-24 RX ADMIN — Medication 50 MILLIGRAM(S): at 05:54

## 2022-10-24 RX ADMIN — CEFEPIME 100 MILLIGRAM(S): 1 INJECTION, POWDER, FOR SOLUTION INTRAMUSCULAR; INTRAVENOUS at 18:39

## 2022-10-24 RX ADMIN — MEXILETINE HYDROCHLORIDE 150 MILLIGRAM(S): 150 CAPSULE ORAL at 05:54

## 2022-10-24 RX ADMIN — TAMSULOSIN HYDROCHLORIDE 0.4 MILLIGRAM(S): 0.4 CAPSULE ORAL at 21:26

## 2022-10-24 NOTE — CHART NOTE - NSCHARTNOTEFT_GEN_A_CORE
Nutrition Follow Up Note  Hospital Course (Per Electronic Medical Record):   Source: Medical Record [X]  Nursing Staff [X]     Diet: DASH/TLC , Ensure Plus BID    Patient noted tolerating diet , po intake appears good as per nursing flow sheet , consuming % . (+) BM (10/23) dx of acute severe protein calorie malnutrition noted patient is receiving Ensure Plus supplement BID due to same. Weight status noted , (+) Lasix Therapy     Current Weight: (10/24) 153.4/69.6kg                          (10/23) 154.3/70kg                          (10/20) 167.1/75.8kg   ? weight status due to admit weight noted 170/77.3kg possible change in fluid status , Lasix therapy noted     Pertinent Medications: MEDICATIONS  (STANDING):  aMIOdarone    Tablet 200 milliGRAM(s) Oral daily  apixaban 10 milliGRAM(s) Oral two times a day  aspirin  chewable 81 milliGRAM(s) Oral daily  cefepime   IVPB      cefepime   IVPB 1000 milliGRAM(s) IV Intermittent every 12 hours  dapagliflozin 10 milliGRAM(s) Oral every 24 hours  famotidine    Tablet 20 milliGRAM(s) Oral daily  levothyroxine 150 MICROGram(s) Oral daily  metoprolol succinate ER 50 milliGRAM(s) Oral daily  mexiletine 150 milliGRAM(s) Oral every 12 hours  potassium chloride   Solution 40 milliEquivalent(s) Oral daily  Preservision Areds2 2 Capsule(s) 2 Capsule(s) Oral daily  sacubitril 49 mG/valsartan 51 mG 1 Tablet(s) Oral two times a day  senna 1 Tablet(s) Oral at bedtime  sertraline 150 milliGRAM(s) Oral daily  tamsulosin 0.4 milliGRAM(s) Oral at bedtime  traZODone 25 milliGRAM(s) Oral at bedtime    MEDICATIONS  (PRN):  bisacodyl Suppository 10 milliGRAM(s) Rectal daily PRN Constipation  lactulose Syrup 20 Gram(s) Oral daily PRN constipation  lidocaine 2% Gel 1 Application(s) Topical three times a day PRN pain at dubois      Pertinent Labs:  10-23 Na138 mmol/L Glu 78 mg/dL K+ 4.1 mmol/L Cr  0.92 mg/dL BUN 28 mg/dL<H> 10-21 Alb 2.5 g/dL<L>        Skin: ecchymotic     Edema: (+2) (L) arm     Last BM: (10/23)     Estimated Needs:   [X] No Change since Previous Assessment    Previous Nutrition Diagnosis: Acute Severe Protein Calorie Malnutrition     Nutrition Diagnosis is [X] Ongoing       New Nutrition Diagnosis: [X] Not Applicable    Interventions:   1. continue current diet       Monitoring & Evaluation: will monitor:  [X] Weights   [X] PO Intake   [X] Follow Up (Per Protocol)  [X] Tolerance to Diet Prescription       RD to follow as per Nutrition protocol  Rylee Plunkett RDN

## 2022-10-24 NOTE — PROGRESS NOTE ADULT - SUBJECTIVE AND OBJECTIVE BOX
Patient is a 82y old  Male who presents with a chief complaint of dyspnea, hypoxia - acute on chronic systolic CHF (23 Oct 2022 07:13)      Patient seen and examined at bedside. No overnight events reported.     ALLERGIES:  PC Pen VK (Other)    MEDICATIONS  (STANDING):  aMIOdarone    Tablet 200 milliGRAM(s) Oral daily  apixaban 10 milliGRAM(s) Oral two times a day  aspirin  chewable 81 milliGRAM(s) Oral daily  cefepime   IVPB      cefepime   IVPB 1000 milliGRAM(s) IV Intermittent every 12 hours  dapagliflozin 10 milliGRAM(s) Oral every 24 hours  famotidine    Tablet 20 milliGRAM(s) Oral daily  levothyroxine 150 MICROGram(s) Oral daily  metoprolol succinate ER 50 milliGRAM(s) Oral daily  mexiletine 150 milliGRAM(s) Oral every 12 hours  potassium chloride   Solution 40 milliEquivalent(s) Oral daily  Preservision Areds2 2 Capsule(s) 2 Capsule(s) Oral daily  sacubitril 49 mG/valsartan 51 mG 1 Tablet(s) Oral two times a day  senna 1 Tablet(s) Oral at bedtime  sertraline 150 milliGRAM(s) Oral daily  tamsulosin 0.4 milliGRAM(s) Oral at bedtime  traZODone 25 milliGRAM(s) Oral at bedtime    MEDICATIONS  (PRN):  bisacodyl Suppository 10 milliGRAM(s) Rectal daily PRN Constipation  lactulose Syrup 20 Gram(s) Oral daily PRN constipation  lidocaine 2% Gel 1 Application(s) Topical three times a day PRN pain at dubois    Vital Signs Last 24 Hrs  T(F): 97.9 (24 Oct 2022 09:30), Max: 98.1 (23 Oct 2022 14:00)  HR: 88 (24 Oct 2022 12:30) (67 - 88)  BP: 78/48 (24 Oct 2022 12:30) (78/48 - 117/76)  RR: 18 (24 Oct 2022 09:30) (16 - 18)  SpO2: 94% (24 Oct 2022 09:30) (94% - 99%)  I&O's Summary    23 Oct 2022 07:01  -  24 Oct 2022 07:00  --------------------------------------------------------  IN: 0 mL / OUT: 1225 mL / NET: -1225 mL      PHYSICAL EXAM:  General: NAD, A/O x 3  ENT: No gross hearing impairment, Moist mucous membranes, no thrush  Neck: Supple, No JVD  Lungs: Clear to auscultation bilaterally, good air entry, non-labored breathing  Cardio: RRR, S1/S2, No murmur  Abdomen: Soft, Nontender, Nondistended; Bowel sounds present  Extremities: No calf tenderness, No cyanosis, No pitting edema  Psych: Appropriate mood and affect    LABS:                        10.5   17.04 )-----------( 390      ( 23 Oct 2022 06:15 )             33.9     10-23    138  |  102  |  28  ----------------------------<  78  4.1   |  32  |  0.92    Ca    8.9      23 Oct 2022 06:15  Mg     2.2     10-23                                            Culture - Urine (collected 17 Oct 2022 22:40)  Source: Catheterized Catheterized  Final Report (20 Oct 2022 09:47):    >100,000 CFU/ml Pseudomonas aeruginosa  Organism: Pseudomonas aeruginosa (20 Oct 2022 09:47)  Organism: Pseudomonas aeruginosa (20 Oct 2022 09:47)      -  Amikacin: S <=16      -  Aztreonam: S <=4      -  Cefepime: S <=2      -  Ceftazidime: S 4      -  Ciprofloxacin: S <=0.25      -  Gentamicin: S <=2      -  Imipenem: S <=1      -  Levofloxacin: S <=0.5      -  Meropenem: S <=1      -  Piperacillin/Tazobactam: S <=8      -  Tobramycin: S <=2      Method Type: ARIE    Culture - Blood (collected 17 Oct 2022 22:40)  Source: .Blood Blood  Final Report (23 Oct 2022 05:00):    No Growth Final    Culture - Blood (collected 17 Oct 2022 22:40)  Source: .Blood Blood  Final Report (23 Oct 2022 05:00):    No Growth Final        RADIOLOGY & ADDITIONAL TESTS:    Care Discussed with Consultants/Other Providers:

## 2022-10-24 NOTE — PROGRESS NOTE ADULT - ASSESSMENT
82 year old M PMH HTN, CAD s/p CABG, severe cardiomyopathy (EF 20%), afib (not on AC due to hematuria), BPH, LUE DVT, history of LLE DVT, recently hospitalized at [facility] for uorsepsis and urinary retention (now with dubois), brought in from [facility] for low saturations, admitted for acute on chronic systolic CHF exacerbation.    Acute Hypoxic Respiratory Failure  Acute on Chronic Systolic CHF Exacerbation  Severe Cardiomyopathy (EF 20%)  Hypotension  ECHO with EF 20-25% c/w previous echos  off supplemental oxygen therapy; sats on RA 95%  holding Lasix, Entresto and Metoprolol for now per Cardiology  await more input from Cardiology  continue ASA and amiodarone  started on Farxiga today   follow up with primary cardiologist to discuss prophylactic ICD    Bilateral DVTs  US revealed b/l DVTs involving RT deep femoral vein, LT common femoral and deep veins  pt with hx of DVTs found last month during hospitalization at [facility]  continue Apixaban    CAD s/p CABG  pt with hx of remote CABG and recent angiogram with occluded grafts and medical therapy recommended  elevated Tn likely demand ischemia  appears stable at this time, continue ASA, hold statin due to elevated LFTs    Ascending Aortic Aneurysm  CT chest showed ascending thoracic aorta 4.3 cm in diameter  aberrant right subclavian artery  atherosclerotic calcifications of the aorta s/p CABG  consider vascular evaluation outpatient    Transaminitis  Continue holding statin for now, LFTs slowly down trending      Atrial Fibrillation  Eliquis started 10/22 no hematuria noted at this point- continue to monitor   continue mexiletine and amiodarone, and metoprolol held due to hypotension    Leukocytosis  UTI   culture + for Pseudomonas Aeruginosa, on cefepime - day 4  blood cx negative to date    h/o Hypothyroid  continue synthroid 150 mcg daily  stable    Hypokalemia resolved 4.1  most likely due to diuretics  continue to monitor     BPH  continue flomax  dubois was placed at [facility] about four weeks ago, continue for now     Severe Protein Calorie Malnutrition  nutrition evaluation appreciated  continue ensure plus high protein cans BID    DVT ppx - Apixaban    Code status:  DNR     Dispo:  updated Family, spouse Kirsten De La Cruz on plan of care.

## 2022-10-24 NOTE — PHYSICAL THERAPY INITIAL EVALUATION ADULT - PERTINENT HX OF CURRENT PROBLEM, REHAB EVAL
81 y/o male with HTN, CAD, hx of CABG, severe cardiomyopathy (EF 20%), Afib (not on AC due to hematuria), BPH, hx of LUE DVT, recently hospitalized at [facility] for urinary retention, urosepsis, also has RUE DVT, BIB EMS from [facility] for lethargy and  low O2 sat 83%, even on O2 2LNC.  O2 increased to 4LNC and O2 went up to 97%. Cxray with bilat increased markings, pleural eff c/w CHF

## 2022-10-24 NOTE — PHYSICAL THERAPY INITIAL EVALUATION ADULT - ADDITIONAL COMMENTS
pt poor informant, from [facility] 2[facility] prolonged hospitalization, mostly wheelchair bound per chart pt poor informant, from [facility] 2[facility] prolonged hospitalization, mostly wheelchair bound per chart, completely dependent for transfers, requires assist w/most ADL's.

## 2022-10-24 NOTE — PROGRESS NOTE ADULT - SUBJECTIVE AND OBJECTIVE BOX
YUNIEL ALBERTO  [id number]        Chief Complaint: Acute on chronic systolic heart failure exacerbation/UTI/Bilateral DVT    Interval History:    Tele:       Current meds:   aMIOdarone    Tablet 200 milliGRAM(s) Oral daily  apixaban 10 milliGRAM(s) Oral two times a day  aspirin  chewable 81 milliGRAM(s) Oral daily  bisacodyl Suppository 10 milliGRAM(s) Rectal daily PRN  cefepime   IVPB      cefepime   IVPB 1000 milliGRAM(s) IV Intermittent every 12 hours  dapagliflozin 10 milliGRAM(s) Oral every 24 hours  famotidine    Tablet 20 milliGRAM(s) Oral daily  lactulose Syrup 20 Gram(s) Oral daily PRN  levothyroxine 150 MICROGram(s) Oral daily  lidocaine 2% Gel 1 Application(s) Topical three times a day PRN  mexiletine 150 milliGRAM(s) Oral every 12 hours  potassium chloride   Solution 40 milliEquivalent(s) Oral daily  Preservision Areds2 2 Capsule(s) 2 Capsule(s) Oral daily  senna 1 Tablet(s) Oral at bedtime  sertraline 150 milliGRAM(s) Oral daily  tamsulosin 0.4 milliGRAM(s) Oral at bedtime  traZODone 25 milliGRAM(s) Oral at bedtime      Objective:     Vital Signs:   T(C): 36.4 (10-24-22 @ 12:30), Max: 36.6 (10-24-22 @ 09:30)  HR: 88 (10-24-22 @ 12:30) (78 - 88)  BP: 82/66 (10-24-22 @ 12:37) (78/48 - 117/76)  RR: 18 (10-24-22 @ 12:30) (16 - 18)  SpO2: 95% (10-24-22 @ 12:30) (94% - 98%)  Wt(kg): --    PHYSICAL EXAM:  General: elderly man, no acute distress  HEENT: sclera anicteric  Neck: supple  CVS: JVP ~ 9 cm H20, irregularly irregular, s1, s2  Chest: unlabored respirations, decreased anterior breath sounds   Extremities: no lower extremity edema b/l  Neuro: awake, alert, not oriented     Labs:   23 Oct 2022 06:15    138    |  102    |  28     ----------------------------<  78     4.1     |  32     |  0.92     Ca    8.9        23 Oct 2022 06:15  Mg     2.2       23 Oct 2022 06:15                            10.5   17.04 )-----------( 390      ( 23 Oct 2022 06:15 )             33.9                 ECG (10/17/22): atrial fibrillation, old septal infarct, prolonged QT (no prior ECG for comparison)    CT Chest (10/17/22):  VESSELS: Ascending thoracic aorta measures 4.3 cm in diameter. Aberrant right subclavian artery. Atherosclerotic calcifications of the aorta. Status post CABG.  IMPRESSION:  Pulmonary edema, likely secondary to congestive heart failure.  Small right and small to moderate left pleural effusions.    TTE (10/18/22):   1. Severely decreased global left ventricular systolic function.   2. Left ventricular ejection fraction, by visual estimation, is 20 to 25%.   3. Severe global left ventricle hypokinesis with regional variation: the   inferolateral wall and inferior wall appear akinetic.   4. Normal left ventricular internal cavity size.   5. The mitral in-flow pattern reveals no discernable A-wave, therefore no comment on diastolic function can be made.   6. There is moderate septal left ventricular hypertrophy.   7. Moderately reduced RV systolic function.   8. Severely enlarged left atrium.   9. Right atrial enlargement.  10. Mild mitral annular calcification.  11. Mild thickening and calcification of the anterior and posterior   mitral valve leaflets.  12. Moderate mitral valve regurgitation.  13. Mild tricuspid regurgitation.  14. Mild aortic valve leaflet thickening and calcification.  15. Sclerotic aortic valve with normal opening.  16. Moderate aortic regurgitation.  17. Dilated proximal ascending aorta (4.4 cm).  18. Mildly dilated pulmonary artery.  19. Large pleural effusion in the left lateral region.  20. There is no evidence of pericardial effusion.      [facility] records obtained:    TTE (9/12/22):  LVEF 20%, minor regional variation  Normal RV size and severely reduced RV systolic function  Moderate MR, Mild AR   No pericardial effusion     Coronary angiogram (10/3/22):  LIMA-LAD: patent  SVG-LCx: occluded  SVG-RCA: occluded  Recommendations: Medical therapy, EP consult    YUNIEL HARPALNATE  [id number]        Chief Complaint: Acute on chronic systolic heart failure exacerbation/Atrial flutter/UTI/Bilateral DVT    Interval History: The patient does not want to answer questions at this time, appears to be in no distress.    Tele: atrial flutter 90s BPM      Current meds:   aMIOdarone    Tablet 200 milliGRAM(s) Oral daily  apixaban 10 milliGRAM(s) Oral two times a day  aspirin  chewable 81 milliGRAM(s) Oral daily  bisacodyl Suppository 10 milliGRAM(s) Rectal daily PRN  cefepime   IVPB      cefepime   IVPB 1000 milliGRAM(s) IV Intermittent every 12 hours  dapagliflozin 10 milliGRAM(s) Oral every 24 hours  famotidine    Tablet 20 milliGRAM(s) Oral daily  lactulose Syrup 20 Gram(s) Oral daily PRN  levothyroxine 150 MICROGram(s) Oral daily  lidocaine 2% Gel 1 Application(s) Topical three times a day PRN  mexiletine 150 milliGRAM(s) Oral every 12 hours  potassium chloride   Solution 40 milliEquivalent(s) Oral daily  Preservision Areds2 2 Capsule(s) 2 Capsule(s) Oral daily  senna 1 Tablet(s) Oral at bedtime  sertraline 150 milliGRAM(s) Oral daily  tamsulosin 0.4 milliGRAM(s) Oral at bedtime  traZODone 25 milliGRAM(s) Oral at bedtime      Objective:     Vital Signs:   T(C): 36.4 (10-24-22 @ 12:30), Max: 36.6 (10-24-22 @ 09:30)  HR: 88 (10-24-22 @ 12:30) (78 - 88)  BP: 82/66 (10-24-22 @ 12:37) (78/48 - 117/76)  RR: 18 (10-24-22 @ 12:30) (16 - 18)  SpO2: 95% (10-24-22 @ 12:30) (94% - 98%)  Wt(kg): --    PHYSICAL EXAM:  General: elderly man, no acute distress, rest of exam deferred per patients request    Labs:   23 Oct 2022 06:15    138    |  102    |  28     ----------------------------<  78     4.1     |  32     |  0.92     Ca    8.9        23 Oct 2022 06:15  Mg     2.2       23 Oct 2022 06:15                            10.5   17.04 )-----------( 390      ( 23 Oct 2022 06:15 )             33.9                 ECG (10/17/22): atrial fibrillation, old septal infarct, prolonged QT (no prior ECG for comparison)    CT Chest (10/17/22):  VESSELS: Ascending thoracic aorta measures 4.3 cm in diameter. Aberrant right subclavian artery. Atherosclerotic calcifications of the aorta. Status post CABG.  IMPRESSION:  Pulmonary edema, likely secondary to congestive heart failure.  Small right and small to moderate left pleural effusions.    TTE (10/18/22):   1. Severely decreased global left ventricular systolic function.   2. Left ventricular ejection fraction, by visual estimation, is 20 to 25%.   3. Severe global left ventricle hypokinesis with regional variation: the   inferolateral wall and inferior wall appear akinetic.   4. Normal left ventricular internal cavity size.   5. The mitral in-flow pattern reveals no discernable A-wave, therefore no comment on diastolic function can be made.   6. There is moderate septal left ventricular hypertrophy.   7. Moderately reduced RV systolic function.   8. Severely enlarged left atrium.   9. Right atrial enlargement.  10. Mild mitral annular calcification.  11. Mild thickening and calcification of the anterior and posterior   mitral valve leaflets.  12. Moderate mitral valve regurgitation.  13. Mild tricuspid regurgitation.  14. Mild aortic valve leaflet thickening and calcification.  15. Sclerotic aortic valve with normal opening.  16. Moderate aortic regurgitation.  17. Dilated proximal ascending aorta (4.4 cm).  18. Mildly dilated pulmonary artery.  19. Large pleural effusion in the left lateral region.  20. There is no evidence of pericardial effusion.      [facility] records obtained:    TTE (9/12/22):  LVEF 20%, minor regional variation  Normal RV size and severely reduced RV systolic function  Moderate MR, Mild AR   No pericardial effusion     Coronary angiogram (10/3/22):  LIMA-LAD: patent  SVG-LCx: occluded  SVG-RCA: occluded  Recommendations: Medical therapy, EP consult

## 2022-10-24 NOTE — PROGRESS NOTE ADULT - ASSESSMENT
Assessment:  Grover De La Cruz is an 82 year old man with past medical history of Coronary artery disease (s/p CABG), HFrEF (LVEF 20% per chart notes), Hypertension, Atrial fibrillation (not on anticoagulation due to hematuria), CVA and BPH with recent hospitalization at [facility] for urosepsis and right upper extremity DVT was brought in by EMS from living facility due to lethargy and hypoxia, found to have acute on chronic systolic heart failure exacerbation and urinary tract infection, also with bilateral DVTs.    ECG consistent with atrial fibrillation, old septal infarct, no prior ECG for comparison. Troponin mildly elevated and has peaked likely demand ischemia from CHF. CT chest consistent with thoracic ascending aortic aneurysm (4.3 m) and pulmonary edema.     Records obtained confirms that patient has a history of reduced LVEF 20% and history of occluded vein bypass grafts based on recent coronary angiogram above. Would consider EP evaluation regarding ICD if within goals of care in discussion with family       Recommendations:  [] Acute on chronic systolic heart failure exacerbation: Notes report prior LVEF 20%. Echo here consistent with LVEF 20-25% with wall motion abnormalities, reduced RV function, large left pleural effusion.  Recommend to obtain old echocardiogram report from [facility], and also cardiology notes as appears patient does not have ICD. Continue Lasix 40 mg IVP daily, monitor renal function and replete electrolytes. Patient does not appear to be on guideline-directed medical therapy. Would hold beta blocker if any signs of poor perfusion.   [] Atrial fibrillation: Currently rate controlled, not on anticoagulation due to hematuria (per chart notes)  [] CAD s/p CABG: Appears stable at this time, continue Aspirin 81 mg daily, would hold statin due to elevated LFTs  [] Ascending aortic aneurysm: Does not appear to be acute issues at this time, would benefit from Vascular evaluation   [] UTI: Treatment per primary team     Will sign out to cardiologist to follow along tomorrow.     Vignesh Wallace MD  Cardiology          Assessment:  Grover De La Cruz is an 82 year old man with past medical history of Coronary artery disease (s/p CABG), HFrEF (LVEF 20% per chart notes), Hypertension, Atrial fibrillation (not on anticoagulation due to hematuria), CVA and BPH with recent hospitalization at [facility] for urosepsis and right upper extremity DVT was brought in by EMS from living facility due to lethargy and hypoxia, found to have acute on chronic systolic heart failure exacerbation, urinary tract infection, also with bilateral DVTs.    ECG consistent with atrial fibrillation, old septal infarct, no prior ECG for comparison. Troponin mildly elevated and has peaked likely demand ischemia from CHF. CT chest consistent with thoracic ascending aortic aneurysm (4.3 m) and pulmonary edema.     Records obtained confirms that patient has a history of reduced LVEF 20% and history of occluded vein bypass grafts based on recent coronary angiogram above.     Recommendations:  [] Acute on chronic systolic heart failure exacerbation: LVEF 20% on recent [facility] echo. Echo here consistent with LVEF 20-25% with wall motion abnormalities, reduced RV function, large left pleural effusion. Patient more hypotensive and appears euvolemic, would hold diuretics today. Further optimization of guideline medical therapy is limited due to hypotension. Plan for EP evaluation for ICD if within goals of care, to be discussed with primary cardiologist  [] Atrial fibrillation: Currently rate controlled, was not on anticoagulation due to hematuria in past, now appears on Apixaban   [] CAD s/p CABG: Appears stable at this time, continue Aspirin 81 mg daily  [] Ascending aortic aneurysm: Does not appear to be acute issues at this time, would benefit from Vascular evaluation       We will continue to follow along.    Vignesh Wallace MD  Cardiology

## 2022-10-25 LAB
ANION GAP SERPL CALC-SCNC: 7 MMOL/L — SIGNIFICANT CHANGE UP (ref 5–17)
BUN SERPL-MCNC: 37 MG/DL — HIGH (ref 7–23)
CALCIUM SERPL-MCNC: 8.6 MG/DL — SIGNIFICANT CHANGE UP (ref 8.4–10.5)
CHLORIDE SERPL-SCNC: 102 MMOL/L — SIGNIFICANT CHANGE UP (ref 96–108)
CO2 SERPL-SCNC: 28 MMOL/L — SIGNIFICANT CHANGE UP (ref 22–31)
CREAT SERPL-MCNC: 0.86 MG/DL — SIGNIFICANT CHANGE UP (ref 0.5–1.3)
EGFR: 86 ML/MIN/1.73M2 — SIGNIFICANT CHANGE UP
GLUCOSE SERPL-MCNC: 74 MG/DL — SIGNIFICANT CHANGE UP (ref 70–99)
HCT VFR BLD CALC: 34.8 % — LOW (ref 39–50)
HGB BLD-MCNC: 10.9 G/DL — LOW (ref 13–17)
MCHC RBC-ENTMCNC: 29 PG — SIGNIFICANT CHANGE UP (ref 27–34)
MCHC RBC-ENTMCNC: 31.3 GM/DL — LOW (ref 32–36)
MCV RBC AUTO: 92.6 FL — SIGNIFICANT CHANGE UP (ref 80–100)
NRBC # BLD: 0 /100 WBCS — SIGNIFICANT CHANGE UP (ref 0–0)
PLATELET # BLD AUTO: 345 K/UL — SIGNIFICANT CHANGE UP (ref 150–400)
POTASSIUM SERPL-MCNC: 4 MMOL/L — SIGNIFICANT CHANGE UP (ref 3.5–5.3)
POTASSIUM SERPL-SCNC: 4 MMOL/L — SIGNIFICANT CHANGE UP (ref 3.5–5.3)
RBC # BLD: 3.76 M/UL — LOW (ref 4.2–5.8)
RBC # FLD: 16 % — HIGH (ref 10.3–14.5)
SODIUM SERPL-SCNC: 137 MMOL/L — SIGNIFICANT CHANGE UP (ref 135–145)
WBC # BLD: 14.79 K/UL — HIGH (ref 3.8–10.5)
WBC # FLD AUTO: 14.79 K/UL — HIGH (ref 3.8–10.5)

## 2022-10-25 PROCEDURE — 99232 SBSQ HOSP IP/OBS MODERATE 35: CPT

## 2022-10-25 RX ORDER — METOPROLOL TARTRATE 50 MG
50 TABLET ORAL DAILY
Refills: 0 | Status: ACTIVE | OUTPATIENT
Start: 2022-10-25 | End: 2023-09-23

## 2022-10-25 RX ADMIN — Medication 40 MILLIEQUIVALENT(S): at 11:34

## 2022-10-25 RX ADMIN — SERTRALINE 150 MILLIGRAM(S): 25 TABLET, FILM COATED ORAL at 11:33

## 2022-10-25 RX ADMIN — SENNA PLUS 1 TABLET(S): 8.6 TABLET ORAL at 21:38

## 2022-10-25 RX ADMIN — AMIODARONE HYDROCHLORIDE 200 MILLIGRAM(S): 400 TABLET ORAL at 05:12

## 2022-10-25 RX ADMIN — Medication 150 MICROGRAM(S): at 05:13

## 2022-10-25 RX ADMIN — CEFEPIME 100 MILLIGRAM(S): 1 INJECTION, POWDER, FOR SOLUTION INTRAMUSCULAR; INTRAVENOUS at 17:33

## 2022-10-25 RX ADMIN — CEFEPIME 100 MILLIGRAM(S): 1 INJECTION, POWDER, FOR SOLUTION INTRAMUSCULAR; INTRAVENOUS at 05:15

## 2022-10-25 RX ADMIN — Medication 81 MILLIGRAM(S): at 11:33

## 2022-10-25 RX ADMIN — MEXILETINE HYDROCHLORIDE 150 MILLIGRAM(S): 150 CAPSULE ORAL at 05:12

## 2022-10-25 RX ADMIN — Medication 25 MILLIGRAM(S): at 21:37

## 2022-10-25 RX ADMIN — TAMSULOSIN HYDROCHLORIDE 0.4 MILLIGRAM(S): 0.4 CAPSULE ORAL at 21:37

## 2022-10-25 RX ADMIN — FAMOTIDINE 20 MILLIGRAM(S): 10 INJECTION INTRAVENOUS at 11:33

## 2022-10-25 RX ADMIN — APIXABAN 10 MILLIGRAM(S): 2.5 TABLET, FILM COATED ORAL at 05:13

## 2022-10-25 RX ADMIN — MEXILETINE HYDROCHLORIDE 150 MILLIGRAM(S): 150 CAPSULE ORAL at 17:34

## 2022-10-25 NOTE — PROGRESS NOTE ADULT - SUBJECTIVE AND OBJECTIVE BOX
Patient is a 82y old  Male who presents with a chief complaint of dyspnea, hypoxia - acute on chronic systolic CHF (25 Oct 2022 09:38)      Patient seen and examined at bedside.  Pt with hypotension overnight per nurse report.     ALLERGIES:  PC Pen VK (Other)    MEDICATIONS  (STANDING):  aMIOdarone    Tablet 200 milliGRAM(s) Oral daily  aspirin  chewable 81 milliGRAM(s) Oral daily  cefepime   IVPB      cefepime   IVPB 1000 milliGRAM(s) IV Intermittent every 12 hours  famotidine    Tablet 20 milliGRAM(s) Oral daily  levothyroxine 150 MICROGram(s) Oral daily  metoprolol succinate ER 50 milliGRAM(s) Oral daily  mexiletine 150 milliGRAM(s) Oral every 12 hours  Preservision Areds2 2 Capsule(s) 2 Capsule(s) Oral daily  senna 1 Tablet(s) Oral at bedtime  sertraline 150 milliGRAM(s) Oral daily  tamsulosin 0.4 milliGRAM(s) Oral at bedtime  traZODone 25 milliGRAM(s) Oral at bedtime    MEDICATIONS  (PRN):  bisacodyl Suppository 10 milliGRAM(s) Rectal daily PRN Constipation  lactulose Syrup 20 Gram(s) Oral daily PRN constipation  lidocaine 2% Gel 1 Application(s) Topical three times a day PRN pain at [place]    Vital Signs Last 24 Hrs  T(F): 97.4 (25 Oct 2022 11:59), Max: 97.8 (24 Oct 2022 21:23)  HR: 63 (25 Oct 2022 11:59) (49 - 93)  BP: 109/52 (25 Oct 2022 11:59) (80/44 - 109/52)  RR: 17 (25 Oct 2022 11:59) (16 - 17)  SpO2: 98% (25 Oct 2022 11:59) (95% - 98%)  I&O's Summary    24 Oct 2022 07:01  -  25 Oct 2022 07:00  --------------------------------------------------------  IN: 0 mL / OUT: 1000 mL / NET: -1000 mL      PHYSICAL EXAM:  General: NAD, A/O x 1-2, confused.  ENT: No gross hearing impairment, Moist mucous membranes, no thrush  Neck: Supple, No JVD  Lungs:  good air entry, non-labored breathing, diminished breath sounds  Cardio: irreg, No murmur  Abdomen: Soft, Nontender, Nondistended; Bowel sounds present  G/U:  +DUBOIS with hematuria in bag  Extremities: No calf tenderness, No cyanosis, No pitting edema  Neuro: alert, confused    LABS:                        10.9   14.79 )-----------( 345      ( 25 Oct 2022 06:50 )             34.8     10-25    137  |  102  |  37  ----------------------------<  74  4.0   |  28  |  0.86    Ca    8.6      25 Oct 2022 06:50  Mg     2.2     10-23                                              RADIOLOGY & ADDITIONAL TESTS:    Care Discussed with Consultants/Other Providers:

## 2022-10-25 NOTE — PROGRESS NOTE ADULT - SUBJECTIVE AND OBJECTIVE BOX
YUNIEL ALBERTO  [id number]      Chief Complaint: Acute on chronic systolic heart failure exacerbation/Atrial flutter/UTI/Bilateral DVT    Interval History: The patient reports feeling ok, denies dizziness, chest pain or shortness of breath.     Tele: atrial flutter 80s BPM      Current meds:   aMIOdarone    Tablet 200 milliGRAM(s) Oral daily  aspirin  chewable 81 milliGRAM(s) Oral daily  bisacodyl Suppository 10 milliGRAM(s) Rectal daily PRN  cefepime   IVPB      cefepime   IVPB 1000 milliGRAM(s) IV Intermittent every 12 hours  famotidine    Tablet 20 milliGRAM(s) Oral daily  lactulose Syrup 20 Gram(s) Oral daily PRN  levothyroxine 150 MICROGram(s) Oral daily  lidocaine 2% Gel 1 Application(s) Topical three times a day PRN  metoprolol succinate ER 50 milliGRAM(s) Oral daily  mexiletine 150 milliGRAM(s) Oral every 12 hours  potassium chloride   Solution 40 milliEquivalent(s) Oral daily  Preservision Areds2 2 Capsule(s) 2 Capsule(s) Oral daily  senna 1 Tablet(s) Oral at bedtime  sertraline 150 milliGRAM(s) Oral daily  tamsulosin 0.4 milliGRAM(s) Oral at bedtime  traZODone 25 milliGRAM(s) Oral at bedtime      Objective:     Vital Signs:   T(C): 36.4 (10-25-22 @ 06:16), Max: 36.6 (10-24-22 @ 21:23)  HR: 93 (10-25-22 @ 06:16) (49 - 93)  BP: 103/67 (10-25-22 @ 06:16) (78/48 - 106/70)  RR: 16 (10-25-22 @ 06:16) (16 - 18)  SpO2: 95% (10-25-22 @ 06:16) (95% - 97%)  Wt(kg): --    Physical Exam:   General: elderly man, frail, no acute distress  Neck: supple  CVS: JVP ~ 7 cm H20, irregularly irregular, s1, s2  Pulm: unlabored respirations, decreased breath sounds  Abd: non-distended  Ext: no lower extremity edema b/l  Neuro: awake, alert   Psych: Normal affect      Labs:   25 Oct 2022 06:50    137    |  102    |  37     ----------------------------<  74     4.0     |  28     |  0.86     Ca    8.6        25 Oct 2022 06:50                            10.9   14.79 )-----------( 345      ( 25 Oct 2022 06:50 )             34.8               ECG (10/17/22): atrial fibrillation, old septal infarct, prolonged QT (no prior ECG for comparison)    CT Chest (10/17/22):  VESSELS: Ascending thoracic aorta measures 4.3 cm in diameter. Aberrant right subclavian artery. Atherosclerotic calcifications of the aorta. Status post CABG.  IMPRESSION:  Pulmonary edema, likely secondary to congestive heart failure.  Small right and small to moderate left pleural effusions.    TTE (10/18/22):   1. Severely decreased global left ventricular systolic function.   2. Left ventricular ejection fraction, by visual estimation, is 20 to 25%.   3. Severe global left ventricle hypokinesis with regional variation: the   inferolateral wall and inferior wall appear akinetic.   4. Normal left ventricular internal cavity size.   5. The mitral in-flow pattern reveals no discernable A-wave, therefore no comment on diastolic function can be made.   6. There is moderate septal left ventricular hypertrophy.   7. Moderately reduced RV systolic function.   8. Severely enlarged left atrium.   9. Right atrial enlargement.  10. Mild mitral annular calcification.  11. Mild thickening and calcification of the anterior and posterior   mitral valve leaflets.  12. Moderate mitral valve regurgitation.  13. Mild tricuspid regurgitation.  14. Mild aortic valve leaflet thickening and calcification.  15. Sclerotic aortic valve with normal opening.  16. Moderate aortic regurgitation.  17. Dilated proximal ascending aorta (4.4 cm).  18. Mildly dilated pulmonary artery.  19. Large pleural effusion in the left lateral region.  20. There is no evidence of pericardial effusion.      [facility] records obtained:    TTE (9/12/22):  LVEF 20%, minor regional variation  Normal RV size and severely reduced RV systolic function  Moderate MR, Mild AR   No pericardial effusion     Coronary angiogram (10/3/22):  LIMA-LAD: patent  SVG-LCx: occluded  SVG-RCA: occluded  Recommendations: Medical therapy, EP consult

## 2022-10-25 NOTE — PROGRESS NOTE ADULT - ASSESSMENT
82 year old M [facility] HTN, CAD s/p CABG, severe cardiomyopathy (EF 20%), afib (not on AC due to hematuria), BPH, LUE DVT, history of LLE DVT, recently hospitalized at [facility] for uorsepsis and urinary retention (now with dubois), brought in from [facility] for low saturations, admitted for acute on chronic systolic CHF exacerbation.    Acute Hypoxic Respiratory Failure  Acute on Chronic Systolic CHF Exacerbation  Severe Cardiomyopathy (EF 20%)  Hypotension  ECHO with EF 20-25% c/w previous echos  off supplemental oxygen therapy; sats on RA 95%  Pt not on Entresto, ACE-I, ARB, Aldactone due to hypotension-- further optimization of medical therapy for CHF guidelines  No Farxiga due to hypoglycemia  continue ASA and amiodarone  Restarted BB with parameters  follow up with primary cardiologist to discuss prophylactic ICD    Bilateral DVTs  US revealed b/l DVTs involving RT deep femoral vein, LT common femoral and deep veins  pt with hx of DVTs found last month during hospitalization at [facility]  continue Apixaban    CAD s/p CABG  pt with hx of remote CABG and recent angiogram with occluded grafts and medical therapy recommended  elevated Tn likely demand ischemia  appears stable at this time, continue ASA, hold statin due to elevated LFTs    Ascending Aortic Aneurysm  CT chest showed ascending thoracic aorta 4.3 cm in diameter  aberrant right subclavian artery  atherosclerotic calcifications of the aorta s/p CABG  consider vascular evaluation outpatient    Transaminitis  Continue holding statin for now, LFTs slowly down trending      Atrial Fibrillation  Eliquis started 10/22 no hematuria noted at this point- continue to monitor   continue mexiletine and amiodarone, and metoprolol held due to hypotension    Leukocytosis  UTI   culture + for Pseudomonas Aeruginosa, on cefepime - day 4  blood cx negative to date    h/o Hypothyroid  continue synthroid 150 mcg daily  stable    Hypokalemia resolved 4.1  most likely due to diuretics  continue to monitor     BPH  continue flomax  dubois was placed at [facility] about four weeks ago, continue for now     Severe Protein Calorie Malnutrition  nutrition evaluation appreciated  continue ensure plus high protein cans BID    DVT ppx - Apixaban    Code status:  DNR     Dispo:  updated Family, spouse Kirsten De La Cruz on plan of care.             82 year old M [facility] HTN, CAD s/p CABG, severe cardiomyopathy (EF 20%), afib (not on AC due to hematuria), BPH, LUE DVT, history of LLE DVT, recently hospitalized at [place] for uorsepsis and urinary retention (now with dubois), brought in from [facility] for low saturations, admitted for acute on chronic systolic CHF exacerbation.    Acute Hypoxic Respiratory Failure  Acute on Chronic Systolic CHF Exacerbation  Severe Cardiomyopathy (EF 20%)  Hypotension  -ECHO with EF 20-25% c/w previous echos  -off supplemental oxygen therapy; sats on RA 95%  -Pt not on Entresto, ACE-I, ARB, Aldactone due to hypotension-- unable to further optimize medical therapy for CHF guidelines  -No Farxiga due to hypoglycemia  -continue ASA and amiodarone  -Restarted BB with parameters today, it was held due to hypotension  -follow up with primary cardiologist to discuss prophylactic ICD    Bilateral DVTs  -US revealed b/l DVTs involving RT deep femoral vein, LT common femoral and deep veins  -pt with hx of DVTs found last month during hospitalization at [facility]  -stopped AC today due to hematuria    Hematuria  -Urology consult pending; Dr. Anderson notified  -stopped Eliquis    CAD s/p CABG  -pt with hx of remote CABG and recent angiogram with occluded grafts and medical therapy recommended  -elevated Tn likely demand ischemia  -appears stable at this time, continue ASA, hold statin due to elevated LFTs    Ascending Aortic Aneurysm  -CT chest showed ascending thoracic aorta 4.3 cm in diameter  -aberrant right subclavian artery  -atherosclerotic calcifications of the aorta s/p CABG  -consider vascular evaluation outpatient    Transaminitis  -Continue holding statin for now, LFTs slowly down trending      Atrial Fibrillation; chronic  -Eliquis started 10/22, now again w hematuria so stopped again   -continue mexiletine and amiodarone, and metoprolol     Leukocytosis  UTI   -culture + for Pseudomonas Aeruginosa, on cefepime - day 5, stop after today's dose  -blood cx negative to date    h/o Hypothyroid  -continue synthroid 150 mcg daily    Hypokalemia resolved  -most likely due to diuretics  -continue to monitor     BPH  -continue flomax  -dubois was placed at [facility] about four weeks ago, continue for now     Severe Protein Calorie Malnutrition  -nutrition evaluation appreciated  -continue ensure plus high protein cans BID    DVT ppx - with hematuria so AC on hold    Code status:  DNR     Dispo:  updated spouse Kirsten De La Cruz on plan of care.  [phone].  When pt is medically stable will go back to [facility].

## 2022-10-26 LAB
ALBUMIN SERPL ELPH-MCNC: 2.6 G/DL — LOW (ref 3.3–5)
ALP SERPL-CCNC: 157 U/L — HIGH (ref 40–120)
ALT FLD-CCNC: 83 U/L — HIGH (ref 10–45)
ANION GAP SERPL CALC-SCNC: 6 MMOL/L — SIGNIFICANT CHANGE UP (ref 5–17)
AST SERPL-CCNC: 52 U/L — HIGH (ref 10–40)
BILIRUB SERPL-MCNC: 0.3 MG/DL — SIGNIFICANT CHANGE UP (ref 0.2–1.2)
BLD GP AB SCN SERPL QL: SIGNIFICANT CHANGE UP
BUN SERPL-MCNC: 34 MG/DL — HIGH (ref 7–23)
CALCIUM SERPL-MCNC: 8.6 MG/DL — SIGNIFICANT CHANGE UP (ref 8.4–10.5)
CHLORIDE SERPL-SCNC: 103 MMOL/L — SIGNIFICANT CHANGE UP (ref 96–108)
CO2 SERPL-SCNC: 28 MMOL/L — SIGNIFICANT CHANGE UP (ref 22–31)
CREAT SERPL-MCNC: 0.82 MG/DL — SIGNIFICANT CHANGE UP (ref 0.5–1.3)
EGFR: 87 ML/MIN/1.73M2 — SIGNIFICANT CHANGE UP
GLUCOSE SERPL-MCNC: 88 MG/DL — SIGNIFICANT CHANGE UP (ref 70–99)
HCT VFR BLD CALC: 28.7 % — LOW (ref 39–50)
HCT VFR BLD CALC: 31.1 % — LOW (ref 39–50)
HGB BLD-MCNC: 9 G/DL — LOW (ref 13–17)
HGB BLD-MCNC: 9.3 G/DL — LOW (ref 13–17)
MAGNESIUM SERPL-MCNC: 2.1 MG/DL — SIGNIFICANT CHANGE UP (ref 1.6–2.6)
MCHC RBC-ENTMCNC: 28.4 PG — SIGNIFICANT CHANGE UP (ref 27–34)
MCHC RBC-ENTMCNC: 28.9 PG — SIGNIFICANT CHANGE UP (ref 27–34)
MCHC RBC-ENTMCNC: 29.9 GM/DL — LOW (ref 32–36)
MCHC RBC-ENTMCNC: 31.4 GM/DL — LOW (ref 32–36)
MCV RBC AUTO: 92.3 FL — SIGNIFICANT CHANGE UP (ref 80–100)
MCV RBC AUTO: 94.8 FL — SIGNIFICANT CHANGE UP (ref 80–100)
NRBC # BLD: 0 /100 WBCS — SIGNIFICANT CHANGE UP (ref 0–0)
NRBC # BLD: 0 /100 WBCS — SIGNIFICANT CHANGE UP (ref 0–0)
PLATELET # BLD AUTO: 293 K/UL — SIGNIFICANT CHANGE UP (ref 150–400)
PLATELET # BLD AUTO: 313 K/UL — SIGNIFICANT CHANGE UP (ref 150–400)
POTASSIUM SERPL-MCNC: 4.1 MMOL/L — SIGNIFICANT CHANGE UP (ref 3.5–5.3)
POTASSIUM SERPL-SCNC: 4.1 MMOL/L — SIGNIFICANT CHANGE UP (ref 3.5–5.3)
PROT SERPL-MCNC: 6.5 G/DL — SIGNIFICANT CHANGE UP (ref 6–8.3)
RBC # BLD: 3.11 M/UL — LOW (ref 4.2–5.8)
RBC # BLD: 3.28 M/UL — LOW (ref 4.2–5.8)
RBC # FLD: 16.1 % — HIGH (ref 10.3–14.5)
RBC # FLD: 16.2 % — HIGH (ref 10.3–14.5)
SODIUM SERPL-SCNC: 137 MMOL/L — SIGNIFICANT CHANGE UP (ref 135–145)
WBC # BLD: 14.34 K/UL — HIGH (ref 3.8–10.5)
WBC # BLD: 15.27 K/UL — HIGH (ref 3.8–10.5)
WBC # FLD AUTO: 14.34 K/UL — HIGH (ref 3.8–10.5)
WBC # FLD AUTO: 15.27 K/UL — HIGH (ref 3.8–10.5)

## 2022-10-26 PROCEDURE — 99233 SBSQ HOSP IP/OBS HIGH 50: CPT | Mod: FS

## 2022-10-26 PROCEDURE — 76770 US EXAM ABDO BACK WALL COMP: CPT | Mod: 26

## 2022-10-26 PROCEDURE — 99232 SBSQ HOSP IP/OBS MODERATE 35: CPT

## 2022-10-26 RX ADMIN — SENNA PLUS 1 TABLET(S): 8.6 TABLET ORAL at 21:18

## 2022-10-26 RX ADMIN — Medication 150 MICROGRAM(S): at 05:28

## 2022-10-26 RX ADMIN — Medication 50 MILLIGRAM(S): at 07:19

## 2022-10-26 RX ADMIN — TAMSULOSIN HYDROCHLORIDE 0.4 MILLIGRAM(S): 0.4 CAPSULE ORAL at 21:18

## 2022-10-26 RX ADMIN — Medication 25 MILLIGRAM(S): at 21:19

## 2022-10-26 RX ADMIN — AMIODARONE HYDROCHLORIDE 200 MILLIGRAM(S): 400 TABLET ORAL at 05:28

## 2022-10-26 RX ADMIN — SERTRALINE 150 MILLIGRAM(S): 25 TABLET, FILM COATED ORAL at 12:39

## 2022-10-26 RX ADMIN — MEXILETINE HYDROCHLORIDE 150 MILLIGRAM(S): 150 CAPSULE ORAL at 17:17

## 2022-10-26 RX ADMIN — Medication 81 MILLIGRAM(S): at 12:39

## 2022-10-26 RX ADMIN — MEXILETINE HYDROCHLORIDE 150 MILLIGRAM(S): 150 CAPSULE ORAL at 06:12

## 2022-10-26 RX ADMIN — CEFEPIME 100 MILLIGRAM(S): 1 INJECTION, POWDER, FOR SOLUTION INTRAMUSCULAR; INTRAVENOUS at 05:28

## 2022-10-26 RX ADMIN — FAMOTIDINE 20 MILLIGRAM(S): 10 INJECTION INTRAVENOUS at 12:39

## 2022-10-26 NOTE — CONSULT NOTE ADULT - SUBJECTIVE AND OBJECTIVE BOX
HPI:  81 y/o male with HTN, CAD, hx of CABG, severe cardiomyopathy (EF 20%), Afib (not on AC due to hematuria), BPH, hx of LUE DVT, recently hospitalized at [facility] for urinary retention, urosepsis, also has RUE DVT, BIB EMS from [facility] for lethargy and  low O2 sat 83%, even on O2 2LNC.  O2 increased to 4LNC and O2 went up to 97%. Cxray with bilat increased markings, pleural eff c/w CHF.  Pt has dubois cath.  He denies chest pain, fever, chills, cough, nausea, vomiting, abd pain.  Pt admits to intermittent dyspnea, he also c/o achiness all over.    Pt received Lasix 40 mg IVPx 1, with good diuresis.   Pt is not a good historian.       On abx for UTI.    Called for hematuria per Dubois. Denies any abdominal or flank pain.      PAST MEDICAL & SURGICAL HISTORY:  Chronic atrial fibrillation      History of cardiomyopathy      CAD (coronary artery disease)      BPH with obstruction/lower urinary tract symptoms      Hyperlipidemia      History of CVA (cerebrovascular accident)  left sided weakness      S/P CABG (coronary artery bypass graft)          REVIEW OF SYSTEMS:    CONSTITUTIONAL:  no fevers or chills  RESPIRATORY: No shortness of breath  CARDIOVASCULAR: No chest pain  GASTROINTESTINAL: No abdominal or epigastric pain.      MEDICATIONS  (STANDING):  aMIOdarone    Tablet 200 milliGRAM(s) Oral daily  aspirin  chewable 81 milliGRAM(s) Oral daily  famotidine    Tablet 20 milliGRAM(s) Oral daily  levothyroxine 150 MICROGram(s) Oral daily  metoprolol succinate ER 50 milliGRAM(s) Oral daily  mexiletine 150 milliGRAM(s) Oral every 12 hours  Preservision Areds2 2 Capsule(s) 2 Capsule(s) Oral daily  senna 1 Tablet(s) Oral at bedtime  sertraline 150 milliGRAM(s) Oral daily  tamsulosin 0.4 milliGRAM(s) Oral at bedtime  traZODone 25 milliGRAM(s) Oral at bedtime    MEDICATIONS  (PRN):  bisacodyl Suppository 10 milliGRAM(s) Rectal daily PRN Constipation  lactulose Syrup 20 Gram(s) Oral daily PRN constipation  lidocaine 2% Gel 1 Application(s) Topical three times a day PRN pain at dubois      Allergies    PC Pen VK (Other)    SOCIAL HISTORY: No illicit drug use    FAMILY HISTORY:  No pertinent family history in first degree relatives        Vital Signs Last 24 Hrs  T(C): 36.6 (26 Oct 2022 11:58), Max: 36.6 (26 Oct 2022 11:58)  T(F): 97.9 (26 Oct 2022 11:58), Max: 97.9 (26 Oct 2022 11:58)  HR: 89 (26 Oct 2022 11:58) (89 - 100)  BP: 121/80         PHYSICAL EXAM:    Constitutional: NAD  Respiratory: No accessory respiratory muscle use  Abd: Soft, NT/ND  : Dubois with +heamturia - blood appear old/dark            LABS:                        9.3    14.34 )-----------( 313      ( 26 Oct 2022 06:50 )             31.1     10-26    137  |  103  |  34<H>  ----------------------------<  88  4.1   |  28  |  0.82    Ca    8.6      26 Oct 2022 06:50    TPro  6.5  /  Alb  2.6<L>  /  TBili  0.3  /  DBili  x   /  AST  52<H>  /  ALT  83<H>  /  AlkPhos  157<H>  10-26

## 2022-10-26 NOTE — PROGRESS NOTE ADULT - SUBJECTIVE AND OBJECTIVE BOX
YUNIEL ALBERTO  [id number]      Chief Complaint: Acute on chronic systolic heart failure exacerbation/Atrial flutter/UTI/Bilateral DVT    Interval History: The patient reports feeling ok. Denies shortness of breath or dizziness.     Tele: atrial flutter 90s BPM      Current meds:   aMIOdarone    Tablet 200 milliGRAM(s) Oral daily  aspirin  chewable 81 milliGRAM(s) Oral daily  bisacodyl Suppository 10 milliGRAM(s) Rectal daily PRN  cefepime   IVPB      cefepime   IVPB 1000 milliGRAM(s) IV Intermittent every 12 hours  famotidine    Tablet 20 milliGRAM(s) Oral daily  lactulose Syrup 20 Gram(s) Oral daily PRN  levothyroxine 150 MICROGram(s) Oral daily  lidocaine 2% Gel 1 Application(s) Topical three times a day PRN  metoprolol succinate ER 50 milliGRAM(s) Oral daily  mexiletine 150 milliGRAM(s) Oral every 12 hours  Preservision Areds2 2 Capsule(s) 2 Capsule(s) Oral daily  senna 1 Tablet(s) Oral at bedtime  sertraline 150 milliGRAM(s) Oral daily  tamsulosin 0.4 milliGRAM(s) Oral at bedtime  traZODone 25 milliGRAM(s) Oral at bedtime      Objective:     Vital Signs:   T(C): 36.4 (10-26-22 @ 05:40), Max: 36.4 (10-26-22 @ 05:40)  HR: 100 (10-26-22 @ 05:40) (63 - 100)  BP: 118/77 (10-26-22 @ 07:08) (109/52 - 118/77)  RR: 17 (10-26-22 @ 05:40) (17 - 17)  SpO2: 99% (10-26-22 @ 05:40) (98% - 99%)  Wt(kg): --    Physical Exam:   General: elderly man, frail, no acute distress  Neck: supple  CVS: JVP ~ 7 cm H20, irregularly irregular, s1, s2  Pulm: unlabored respirations, decreased breath sounds  Abd: non-distended  Ext: no lower extremity edema b/l  Neuro: awake, alert   Psych: Normal affect      Labs:   26 Oct 2022 06:50    137    |  103    |  34     ----------------------------<  88     4.1     |  28     |  0.82     Ca    8.6        26 Oct 2022 06:50    TPro  6.5    /  Alb  2.6    /  TBili  0.3    /  DBili  x      /  AST  52     /  ALT  83     /  AlkPhos  157    26 Oct 2022 06:50                          9.3    14.34 )-----------( 313      ( 26 Oct 2022 06:50 )             31.1                 ECG (10/17/22): atrial fibrillation, old septal infarct, prolonged QT (no prior ECG for comparison)    CT Chest (10/17/22):  VESSELS: Ascending thoracic aorta measures 4.3 cm in diameter. Aberrant right subclavian artery. Atherosclerotic calcifications of the aorta. Status post CABG.  IMPRESSION:  Pulmonary edema, likely secondary to congestive heart failure.  Small right and small to moderate left pleural effusions.    TTE (10/18/22):   1. Severely decreased global left ventricular systolic function.   2. Left ventricular ejection fraction, by visual estimation, is 20 to 25%.   3. Severe global left ventricle hypokinesis with regional variation: the   inferolateral wall and inferior wall appear akinetic.   4. Normal left ventricular internal cavity size.   5. The mitral in-flow pattern reveals no discernable A-wave, therefore no comment on diastolic function can be made.   6. There is moderate septal left ventricular hypertrophy.   7. Moderately reduced RV systolic function.   8. Severely enlarged left atrium.   9. Right atrial enlargement.  10. Mild mitral annular calcification.  11. Mild thickening and calcification of the anterior and posterior   mitral valve leaflets.  12. Moderate mitral valve regurgitation.  13. Mild tricuspid regurgitation.  14. Mild aortic valve leaflet thickening and calcification.  15. Sclerotic aortic valve with normal opening.  16. Moderate aortic regurgitation.  17. Dilated proximal ascending aorta (4.4 cm).  18. Mildly dilated pulmonary artery.  19. Large pleural effusion in the left lateral region.  20. There is no evidence of pericardial effusion.      [facility] records obtained:    TTE (9/12/22):  LVEF 20%, minor regional variation  Normal RV size and severely reduced RV systolic function  Moderate MR, Mild AR   No pericardial effusion     Coronary angiogram (10/3/22):  LIMA-LAD: patent  SVG-LCx: occluded  SVG-RCA: occluded  Recommendations: Medical therapy, EP consult

## 2022-10-26 NOTE — PROGRESS NOTE ADULT - SUBJECTIVE AND OBJECTIVE BOX
Patient is a 82y old  Male who presents with a chief complaint of dyspnea, hypoxia - acute on chronic systolic CHF (26 Oct 2022 10:21)      Patient seen and examined at bedside. No overnight events reported.   Pt states "I am hungry", denies pain.    ALLERGIES:  PC Pen VK (Other)    MEDICATIONS  (STANDING):  aMIOdarone    Tablet 200 milliGRAM(s) Oral daily  aspirin  chewable 81 milliGRAM(s) Oral daily  cefepime   IVPB      cefepime   IVPB 1000 milliGRAM(s) IV Intermittent every 12 hours  famotidine    Tablet 20 milliGRAM(s) Oral daily  levothyroxine 150 MICROGram(s) Oral daily  metoprolol succinate ER 50 milliGRAM(s) Oral daily  mexiletine 150 milliGRAM(s) Oral every 12 hours  Preservision Areds2 2 Capsule(s) 2 Capsule(s) Oral daily  senna 1 Tablet(s) Oral at bedtime  sertraline 150 milliGRAM(s) Oral daily  tamsulosin 0.4 milliGRAM(s) Oral at bedtime  traZODone 25 milliGRAM(s) Oral at bedtime    MEDICATIONS  (PRN):  bisacodyl Suppository 10 milliGRAM(s) Rectal daily PRN Constipation  lactulose Syrup 20 Gram(s) Oral daily PRN constipation  lidocaine 2% Gel 1 Application(s) Topical three times a day PRN pain at dubois    Vital Signs Last 24 Hrs  T(F): 97.6 (26 Oct 2022 05:40), Max: 97.6 (26 Oct 2022 05:40)  HR: 100 (26 Oct 2022 05:40) (63 - 100)  BP: 118/77 (26 Oct 2022 07:08) (109/52 - 118/77)  RR: 17 (26 Oct 2022 05:40) (17 - 17)  SpO2: 99% (26 Oct 2022 05:40) (98% - 99%)  I&O's Summary    25 Oct 2022 07:01  -  26 Oct 2022 07:00  --------------------------------------------------------  IN: 300 mL / OUT: 1300 mL / NET: -1000 mL      PHYSICAL EXAM:  General: NAD, A/O x 2, gets confused.  ENT: No gross hearing impairment, Moist mucous membranes, no thrush  Neck: Supple, No JVD  Lungs: good air entry, non-labored breathing, decreased BS   Cardio: RRR, S1/S2, No murmur  Abdomen: Soft, Nontender, Nondistended; Bowel sounds present  G/U:  +DUBOIS WITH HEMATURIA  Extremities: No calf tenderness, No cyanosis, No pitting edema  Neuro:  speech fluent, nonfocal    LABS:                        9.3    14.34 )-----------( 313      ( 26 Oct 2022 06:50 )             31.1     10-26    137  |  103  |  34  ----------------------------<  88  4.1   |  28  |  0.82    Ca    8.6      26 Oct 2022 06:50    TPro  6.5  /  Alb  2.6  /  TBili  0.3  /  DBili  x   /  AST  52  /  ALT  83  /  AlkPhos  157  10-26                                            RADIOLOGY & ADDITIONAL TESTS:    Care Discussed with Consultants/Other Providers:

## 2022-10-26 NOTE — PROGRESS NOTE ADULT - ASSESSMENT
Assessment:  Grover De La Cruz is an 82 year old man with past medical history of Coronary artery disease (s/p CABG), HFrEF (LVEF 20% per chart notes), Hypertension, Atrial fibrillation (not on anticoagulation due to hematuria), CVA and BPH with recent hospitalization at [facility] for urosepsis and right upper extremity DVT was brought in by EMS from living facility due to lethargy and hypoxia, found to have acute on chronic systolic heart failure exacerbation, urinary tract infection, also with bilateral DVTs.    ECG consistent with atrial fibrillation, old septal infarct, no prior ECG for comparison. Troponin mildly elevated and has peaked likely demand ischemia from CHF. CT chest consistent with thoracic ascending aortic aneurysm (4.3 m) and pulmonary edema.     Records obtained confirms that patient has a history of reduced LVEF 20% and history of occluded vein bypass grafts based on recent coronary angiogram above that was medically managed.     Recommendations:  [] Acute on chronic systolic heart failure exacerbation: LVEF 20% on recent [facility] echo. Echo here consistent with LVEF 20-25% with wall motion abnormalities, reduced RV function, large left pleural effusion. Due to hypotension, diuretics and Entresto were held, BP improved and patient restarted on low dose Metoprolol succinate 50 mg daily with hold parameters. Can try Entresto 24/26 mg BID today, but likely cannot add additional CHF medical therapy due to episodes of hypotension. Would try and add low dose Lasix 20 mg daily prior to discharge, likely in 1-2 days. Plan for EP evaluation for ICD if within goals of care, patient reports he is not interested at this time, can further follow up with his primary cardiologist   [] Atrial fibrillation: Currently rate controlled, was not on anticoagulation due to hematuria in past, had recurrent hematuria and so Apixaban held, can plan to resume when safe with Urology   [] CAD s/p CABG: Appears stable at this time, continue Aspirin 81 mg daily  [] Ascending aortic aneurysm: Does not appear to be acute issues at this time, would benefit from Vascular evaluation     Discussed with primary team. Will sign out to cardiologist to follow along tomorrow.     Vignesh Wallace MD  Cardiology

## 2022-10-26 NOTE — CONSULT NOTE ADULT - ASSESSMENT
UTI / Starks. Recurrent hematuria on anticoag therapy. On abx. Multiple comorbidities    - cont Starks  - recommend a kidney and bladder ultrasound  - will follow with you

## 2022-10-26 NOTE — PROGRESS NOTE ADULT - ASSESSMENT
82 year old M [facility] HTN, CAD s/p CABG, severe cardiomyopathy (EF 20%), afib (not on AC due to hematuria), BPH, LUE DVT, history of LLE DVT, recently hospitalized at [place] for uorsepsis and urinary retention (now with dubois), brought in from [facility] for hypoxia, admitted for acute on chronic systolic CHF exacerbation.    Acute Hypoxic Respiratory Failure  Acute on Chronic Systolic CHF Exacerbation  Severe Cardiomyopathy (EF 20%)  Hypotension; improved  -ECHO with EF 20-25% c/w previous echos  -off supplemental oxygen therapy; sats on RA 95%  -Restarting Entresto today, it was held for hypotension-- will see if pt can tolerate  -No Aldactone due to hypotension and no Farxiga due to hypoglycemia-- unable to further optimize medical therapy for CHF guidelines  -continue ASA and amiodarone  -Restarted BB with parameters today, it was held due to hypotension  -follow up with primary cardiologist to discuss prophylactic ICD    Bilateral DVTs  -US revealed b/l DVTs involving RT deep femoral vein, LT common femoral and deep veins  -pt with hx of DVTs found last month during hospitalization at [facility]  -stopped AC due to hematuria    Hematuria; persistent  -Urology consult pending; Dr. Anderson notified  -stopped Eliquis    CAD s/p CABG  -pt with hx of remote CABG and recent angiogram with occluded grafts and medical therapy recommended  -elevated Tn likely demand ischemia  -appears stable at this time, continue ASA, hold statin due to elevated LFTs    Ascending Aortic Aneurysm  -CT chest showed ascending thoracic aorta 4.3 cm in diameter  -aberrant right subclavian artery  -atherosclerotic calcifications of the aorta s/p CABG  -consider vascular evaluation outpatient    Transaminitis  -Continue holding statin for now, LFTs slowly down trending      Atrial Fibrillation; chronic  -Eliquis started 10/22, now again w hematuria so stopped again   -continue mexiletine and amiodarone, and metoprolol     Leukocytosis  UTI   -culture + for Pseudomonas Aeruginosa, stop Cefepime after today's dose  -blood cx negative to date    h/o Hypothyroid  -continue synthroid 150 mcg daily    Hypokalemia resolved  -most likely due to diuretics  -continue to monitor     BPH  -continue flomax  -dubois was placed at [facility] about four weeks ago, continue for now     Severe Protein Calorie Malnutrition  -nutrition evaluation appreciated  -continue ensure plus high protein cans BID    DVT ppx - with hematuria so AC on hold    Code status:  DNR     Dispo:  updated spouse Kirsten De La Cruz on plan of care.  [phone].  When pt is medically stable will go back to [facility], anticipate in 48-72 hrs.

## 2022-10-27 DIAGNOSIS — I82.409 ACUTE EMBOLISM AND THROMBOSIS OF UNSPECIFIED DEEP VEINS OF UNSPECIFIED LOWER EXTREMITY: ICD-10-CM

## 2022-10-27 LAB
ALBUMIN SERPL ELPH-MCNC: 2.5 G/DL — LOW (ref 3.3–5)
ALP SERPL-CCNC: 153 U/L — HIGH (ref 40–120)
ALT FLD-CCNC: 81 U/L — HIGH (ref 10–45)
ANION GAP SERPL CALC-SCNC: 5 MMOL/L — SIGNIFICANT CHANGE UP (ref 5–17)
AST SERPL-CCNC: 48 U/L — HIGH (ref 10–40)
BILIRUB SERPL-MCNC: 0.3 MG/DL — SIGNIFICANT CHANGE UP (ref 0.2–1.2)
BUN SERPL-MCNC: 32 MG/DL — HIGH (ref 7–23)
CALCIUM SERPL-MCNC: 8.4 MG/DL — SIGNIFICANT CHANGE UP (ref 8.4–10.5)
CHLORIDE SERPL-SCNC: 102 MMOL/L — SIGNIFICANT CHANGE UP (ref 96–108)
CO2 SERPL-SCNC: 27 MMOL/L — SIGNIFICANT CHANGE UP (ref 22–31)
CREAT SERPL-MCNC: 0.96 MG/DL — SIGNIFICANT CHANGE UP (ref 0.5–1.3)
EGFR: 79 ML/MIN/1.73M2 — SIGNIFICANT CHANGE UP
GLUCOSE SERPL-MCNC: 82 MG/DL — SIGNIFICANT CHANGE UP (ref 70–99)
HCT VFR BLD CALC: 29.6 % — LOW (ref 39–50)
HGB BLD-MCNC: 9.1 G/DL — LOW (ref 13–17)
MAGNESIUM SERPL-MCNC: 2 MG/DL — SIGNIFICANT CHANGE UP (ref 1.6–2.6)
MCHC RBC-ENTMCNC: 28.8 PG — SIGNIFICANT CHANGE UP (ref 27–34)
MCHC RBC-ENTMCNC: 30.7 GM/DL — LOW (ref 32–36)
MCV RBC AUTO: 93.7 FL — SIGNIFICANT CHANGE UP (ref 80–100)
NRBC # BLD: 0 /100 WBCS — SIGNIFICANT CHANGE UP (ref 0–0)
PLATELET # BLD AUTO: 265 K/UL — SIGNIFICANT CHANGE UP (ref 150–400)
POTASSIUM SERPL-MCNC: 4.4 MMOL/L — SIGNIFICANT CHANGE UP (ref 3.5–5.3)
POTASSIUM SERPL-SCNC: 4.4 MMOL/L — SIGNIFICANT CHANGE UP (ref 3.5–5.3)
PROT SERPL-MCNC: 6.3 G/DL — SIGNIFICANT CHANGE UP (ref 6–8.3)
RBC # BLD: 3.16 M/UL — LOW (ref 4.2–5.8)
RBC # FLD: 16.2 % — HIGH (ref 10.3–14.5)
SODIUM SERPL-SCNC: 134 MMOL/L — LOW (ref 135–145)
WBC # BLD: 11.89 K/UL — HIGH (ref 3.8–10.5)
WBC # FLD AUTO: 11.89 K/UL — HIGH (ref 3.8–10.5)

## 2022-10-27 PROCEDURE — 99232 SBSQ HOSP IP/OBS MODERATE 35: CPT

## 2022-10-27 PROCEDURE — 99233 SBSQ HOSP IP/OBS HIGH 50: CPT | Mod: FS

## 2022-10-27 PROCEDURE — 99222 1ST HOSP IP/OBS MODERATE 55: CPT

## 2022-10-27 RX ORDER — LACTOBACILLUS ACIDOPHILUS 100MM CELL
1 CAPSULE ORAL DAILY
Refills: 0 | Status: ACTIVE | OUTPATIENT
Start: 2022-10-27 | End: 2023-09-25

## 2022-10-27 RX ADMIN — Medication 150 MICROGRAM(S): at 05:29

## 2022-10-27 RX ADMIN — Medication 25 MILLIGRAM(S): at 22:54

## 2022-10-27 RX ADMIN — FAMOTIDINE 20 MILLIGRAM(S): 10 INJECTION INTRAVENOUS at 11:53

## 2022-10-27 RX ADMIN — AMIODARONE HYDROCHLORIDE 200 MILLIGRAM(S): 400 TABLET ORAL at 05:30

## 2022-10-27 RX ADMIN — SERTRALINE 150 MILLIGRAM(S): 25 TABLET, FILM COATED ORAL at 11:53

## 2022-10-27 RX ADMIN — TAMSULOSIN HYDROCHLORIDE 0.4 MILLIGRAM(S): 0.4 CAPSULE ORAL at 22:54

## 2022-10-27 RX ADMIN — SENNA PLUS 1 TABLET(S): 8.6 TABLET ORAL at 22:55

## 2022-10-27 RX ADMIN — MEXILETINE HYDROCHLORIDE 150 MILLIGRAM(S): 150 CAPSULE ORAL at 17:23

## 2022-10-27 RX ADMIN — Medication 1 TABLET(S): at 12:12

## 2022-10-27 RX ADMIN — Medication 81 MILLIGRAM(S): at 11:53

## 2022-10-27 RX ADMIN — Medication 50 MILLIGRAM(S): at 05:30

## 2022-10-27 RX ADMIN — MEXILETINE HYDROCHLORIDE 150 MILLIGRAM(S): 150 CAPSULE ORAL at 05:30

## 2022-10-27 NOTE — PROGRESS NOTE ADULT - ASSESSMENT
Hematuria / retention / UTI. Multiple comorbidities. Medical team planning to restart A/C for DVT's.    Hct stable. Starks draining well.    - cont Starks  - if remains stable, OK to start Heparin tomorrow  - Follow labs and monitor urine

## 2022-10-27 NOTE — CONSULT NOTE ADULT - SUBJECTIVE AND OBJECTIVE BOX
82 yom [facility] HTN, CAD s/p CABG, severe cardiomyopathy (EF 20%), afib (not on AC due to hematuria), BPH, LUE DVT, history of LLE DVT, recently hospitalized at [place] for uorsepsis and urinary retention (now with dubois), brought in from [facility] for low saturations, admitted for acute on chronic systolic CHF exacerbation. Vascular surgery consulted for possible IVC. Pt seen in bed comfortable, Dubois intact with hematuria. denies chest pain, sob.n.v    PAST MEDICAL & SURGICAL HISTORY:  Chronic atrial fibrillation      History of cardiomyopathy      CAD (coronary artery disease)      BPH with obstruction/lower urinary tract symptoms      Hyperlipidemia      History of CVA (cerebrovascular accident)  left sided weakness      S/P CABG (coronary artery bypass graft)      MEDICATIONS  (STANDING):  aMIOdarone    Tablet 200 milliGRAM(s) Oral daily  aspirin  chewable 81 milliGRAM(s) Oral daily  famotidine    Tablet 20 milliGRAM(s) Oral daily  levothyroxine 150 MICROGram(s) Oral daily  metoprolol succinate ER 50 milliGRAM(s) Oral daily  mexiletine 150 milliGRAM(s) Oral every 12 hours  Preservision Areds2 2 Capsule(s) 2 Capsule(s) Oral daily  senna 1 Tablet(s) Oral at bedtime  sertraline 150 milliGRAM(s) Oral daily  tamsulosin 0.4 milliGRAM(s) Oral at bedtime  traZODone 25 milliGRAM(s) Oral at bedtime    MEDICATIONS  (PRN):  bisacodyl Suppository 10 milliGRAM(s) Rectal daily PRN Constipation  lactulose Syrup 20 Gram(s) Oral daily PRN constipation  lidocaine 2% Gel 1 Application(s) Topical three times a day PRN pain at dubois

## 2022-10-27 NOTE — PROGRESS NOTE ADULT - SUBJECTIVE AND OBJECTIVE BOX
No complaints this AM.    Dubois draining. Clearer in dubois tubing.    Kidney / bladder sono showed likely clots in bladder.      ROS  General: no fever or chills      VITAL SIGNS  Vital Signs Last 24 Hrs  T(C): 36.4 (27 Oct 2022 13:04), Max: 36.7 (26 Oct 2022 21:08)  T(F): 97.5 (27 Oct 2022 13:04), Max: 98 (26 Oct 2022 21:08)  HR: 84 (27 Oct 2022 13:04) (71 - 98)  BP: 103/68      PHYSICAL EXAM:  General: appears comfortable  : bladder ND. Dark urine - cola colored. Clearing in tubing.      LABS:                        9.1    11.89 )-----------( 265      ( 27 Oct 2022 08:20 )             29.6     10-27    134<L>  |  102  |  32<H>  ----------------------------<  82  4.4   |  27  |  0.96        Prior notes/chart reviewed.

## 2022-10-27 NOTE — CONSULT NOTE ADULT - PROBLEM SELECTOR RECOMMENDATION 9
Heparin drip can be started in am 10/28 if cleared by urology.   If pt fails Heparin drip will consider IVC filter  Medically optimize   discussed case with Dr.De Chandana Lee

## 2022-10-27 NOTE — PROGRESS NOTE ADULT - ASSESSMENT
A/P 82 year old M [facility] HTN, CAD s/p CABG, severe cardiomyopathy (EF 20%), afib (not on AC due to hematuria), BPH, LUE DVT, history of LLE DVT, recently hospitalized at [place] for uorsepsis and urinary retention (now with dubois), brought in from [facility] for hypoxia, admitted for acute on chronic systolic CHF exacerbation. Course c/b hematuria     again w/ Hematuria overnight 10/26    Assesment/plan per med team:    Acute Hypoxic Respiratory Failure  Acute on Chronic Systolic CHF Exacerbation  Severe Cardiomyopathy     -ECHO with EF 20-25% c/w previous echos-unable to medical optimize patient with guideline directed therapy due to hypotension  -off supplemental oxygen therapy; sats on RA 95%  -No Farxiga due to hypoglycemia-- unable to further optimize medical therapy for CHF guidelines  -continue ASA and amiodarone  -Restarted BB 10/25 with parameters -- held due to hypotension  -follow up with primary cardiologist to discuss prophylactic ICD    Bilateral DVTs  -US revealed b/l DVTs involving RT deep femoral vein, LT common femoral and deep veins  -pt with hx of DVTs found last month during hospitalization at [facility]  -stopped AC due to hematuria  - Vascular consult  - Dr Lance matos candidate for IVC filter    Hematuria;   -Urology consult   - Follow up KUB--bladder debris and diffuse wall thickening   - Hgb stable   -stopped Eliquis    CAD s/p CABG  -pt with hx of remote CABG and recent angiogram with occluded grafts and medical therapy recommended  -elevated Tn likely demand ischemia  -appears stable at this time, continue ASA, hold statin - elevated LFTs    Ascending Aortic Aneurysm  -CT chest showed ascending thoracic aorta 4.3 cm in diameter- cards follows   - consider vascular evaluation outpatient    Atrial Fibrillation; chronic  -Eliquis started 10/22, now again w hematuria  stopped eliquis   -continue mexiletine and amiodarone, and metoprolol     Leukocytosis  UTI   -culture + for Pseudomonas Aeruginosa,  --completed course of cefepime  -blood cx negative to date    Severe Protein Calorie Malnutrition  -nutrition evaluation appreciated  -continue ensure plus high protein cans BID      Palliative :  as f/u case reviewed w/ med team today, pt now has bi lat dvt's, and hematuria, and cannot tolerate eliquis.   urology input noted , cardiology noted - say may benefit from outpt vascular follow up   surgery - rec hep  drip for dvt's , , and if fails may need ivc filter if deemed candidate   Family/wife at bedside , she said she was updated by med team today , and is  considering pts options, she may consider ivc filter if told pt is a candidate.  She will speak with her son as well .  Will cont to follow w/ med team

## 2022-10-27 NOTE — CONSULT NOTE ADULT - REASON FOR ADMISSION
dyspnea, hypoxia - acute on chronic systolic CHF

## 2022-10-27 NOTE — PROGRESS NOTE ADULT - SUBJECTIVE AND OBJECTIVE BOX
Progress: awake, alert, w/ confusion, able to make needs known, appetite cont to be good       Review of Systems:  Unable to obtain due to poor mentation]    MEDICATIONS  (STANDING):  aMIOdarone    Tablet 200 milliGRAM(s) Oral daily  aspirin  chewable 81 milliGRAM(s) Oral daily  famotidine    Tablet 20 milliGRAM(s) Oral daily  lactobacillus acidophilus 1 Tablet(s) Oral daily  levothyroxine 150 MICROGram(s) Oral daily  metoprolol succinate ER 50 milliGRAM(s) Oral daily  mexiletine 150 milliGRAM(s) Oral every 12 hours  Preservision Areds2 2 Capsule(s) 2 Capsule(s) Oral daily  senna 1 Tablet(s) Oral at bedtime  sertraline 150 milliGRAM(s) Oral daily  tamsulosin 0.4 milliGRAM(s) Oral at bedtime  traZODone 25 milliGRAM(s) Oral at bedtime    MEDICATIONS  (PRN):  bisacodyl Suppository 10 milliGRAM(s) Rectal daily PRN Constipation  lactulose Syrup 20 Gram(s) Oral daily PRN constipation  lidocaine 2% Gel 1 Application(s) Topical three times a day PRN pain at dubois      PHYSICAL EXAM:  Vital Signs Last 24 Hrs  T(C): 36.4 (27 Oct 2022 13:04), Max: 36.7 (26 Oct 2022 21:08)  T(F): 97.5 (27 Oct 2022 13:04), Max: 98 (26 Oct 2022 21:08)  HR: 84 (27 Oct 2022 13:04) (71 - 98)  BP: 103/68 (27 Oct 2022 13:04) (87/55 - 126/78)  BP(mean): --  RR: 16 (27 Oct 2022 13:04) (16 - 18)  SpO2: 96% (27 Oct 2022 13:04) (96% - 98%)    Parameters below as of 27 Oct 2022 13:04  Patient On (Oxygen Delivery Method): room air      General: pale, ill appearing, alert  oriented x 1-2      HEENT: n/c, a/t     Lungs: clear, dim to bases    CV: normal  rate   GI: abd soft, n/t     : hematuria, dubois     Musculoskeletal: acosta's,  weakened    Skin: pale, w/d     Neuro:  deficits , alert, oriented to self, family , not situation   Oral intake ability:  very good, oral feeding w/ set up   Diet: soft  as emile    LABS:                          9.1    11.89 )-----------( 265      ( 27 Oct 2022 08:20 )             29.6     10-27    134<L>  |  102  |  32<H>  ----------------------------<  82  4.4   |  27  |  0.96    Ca    8.4      27 Oct 2022 08:20  Mg     2.0     10-27    TPro  6.3  /  Alb  2.5<L>  /  TBili  0.3  /  DBili  x   /  AST  48<H>  /  ALT  81<H>  /  AlkPhos  153<H>  10-27        RADIOLOGY & ADDITIONAL STUDIES: < from: US Duplex Venous Lower Ext Complete, Bilateral (10.19.22 @ 16:41) >    ACC: [id number] EXAM:  US DPLX LWR EXT VEINS COMPL BI                          PROCEDURE DATE:  10/19/2022          INTERPRETATION:  CLINICAL INFORMATION: Hypoxia. Leg swelling    COMPARISON: None available.    TECHNIQUE: Duplex sonography of the BILATERAL LOWER extremity veins with   color and spectral Doppler, with and without compression.    FINDINGS:    RIGHT:  Normal compressibility of the RIGHT common femoral, femoral and popliteal   veins.  Doppler examination shows normal spontaneous and phasic flow.  No RIGHT calf vein thrombosis is detected.  There is isolated DVT in the deep femoral vein. The vein demonstrates   echogenic material and is noncompressible.    LEFT:  Normal compressibility of the LEFT femoral and popliteal veins  Doppler examination shows normal spontaneous and phasic flow.  No LEFT calf vein thrombosis is detected.  There is DVT involving the common femoral vein deep femoral vein. There   is superficial thrombus also in the greater saphenous vein.    IMPRESSION: Bilateral DVT involving the right deep femoral vein, left   common femoral and deep femoral veins.  Superficial thrombus is also seen the left greater saphenous vein.    < from: US Kidney and Bladder (10.26.22 @ 15:12) >    ACC: [id number] EXAM:  US KIDNEYS AND BLADDER                          PROCEDURE DATE:  10/26/2022          INTERPRETATION:  CLINICAL INFORMATION: Hematuria    COMPARISON: None available.    TECHNIQUE: Sonography of the kidneys and bladder.    FINDINGS:  Right kidney: 11.1 cm. No solid renal mass, hydronephrosis or calculi. A   1.8 cm cortical cyst    Left kidney: 10.3 cm. No renal mass, hydronephrosis or calculi.    Urinary bladder: Limited evaluation, largely decompressed with Dubois.   Complex intraluminal debris may reflect  infection and/or blood clot.   Diffuse bladder wall thickening even accounting for contracted state.   Underlying neoplasm not excluded. Consider cystoscopic correlation.   Prostate not well visualized    IMPRESSION:  No hydronephrosis bilaterally.  Limited bladder evaluation. Bladder debris and diffuse wall thickening.   Correlate clinically and with cystoscopy as warranted            ADVANCE DIRECTIVES:  Molst Dnr/Dni no feeding tubes   Advanced Care Planning discussion total time spent:

## 2022-10-27 NOTE — CONSULT NOTE ADULT - ADDITIONAL PE
US Bilateral DVT involving the right deep femoral vein, left   common femoral and deep femoral veins

## 2022-10-27 NOTE — CONSULT NOTE ADULT - ASSESSMENT
82 yom [facility] HTN, CAD s/p CABG, severe cardiomyopathy (EF 20%), afib (not on AC due to hematuria), BPH, LUE DVT, history of LLE DVT, vascular surgery following for bilateral femoral DVT. Eliquis held due to hematuria.

## 2022-10-27 NOTE — PROGRESS NOTE ADULT - SUBJECTIVE AND OBJECTIVE BOX
Patient is a 82y old  Male who presents with a chief complaint of dyspnea, hypoxia - acute on chronic systolic CHF (27 Oct 2022 11:13)    Hematuria overnight   Patient seen and examined at bedside.    ALLERGIES:  PC Pen VK (Other)    MEDICATIONS  (STANDING):  aMIOdarone    Tablet 200 milliGRAM(s) Oral daily  aspirin  chewable 81 milliGRAM(s) Oral daily  famotidine    Tablet 20 milliGRAM(s) Oral daily  levothyroxine 150 MICROGram(s) Oral daily  metoprolol succinate ER 50 milliGRAM(s) Oral daily  mexiletine 150 milliGRAM(s) Oral every 12 hours  Preservision Areds2 2 Capsule(s) 2 Capsule(s) Oral daily  senna 1 Tablet(s) Oral at bedtime  sertraline 150 milliGRAM(s) Oral daily  tamsulosin 0.4 milliGRAM(s) Oral at bedtime  traZODone 25 milliGRAM(s) Oral at bedtime    MEDICATIONS  (PRN):  bisacodyl Suppository 10 milliGRAM(s) Rectal daily PRN Constipation  lactulose Syrup 20 Gram(s) Oral daily PRN constipation  lidocaine 2% Gel 1 Application(s) Topical three times a day PRN pain at dubois    Vital Signs Last 24 Hrs  T(F): 97.6 (27 Oct 2022 05:23), Max: 98 (26 Oct 2022 21:08)  HR: 76 (27 Oct 2022 10:34) (71 - 98)  BP: 87/55 (27 Oct 2022 10:34) (87/55 - 126/78)  RR: 18 (27 Oct 2022 05:23) (17 - 18)  SpO2: 98% (27 Oct 2022 05:23) (96% - 98%)  I&O's Summary    26 Oct 2022 07:01  -  27 Oct 2022 07:00  --------------------------------------------------------  IN: 0 mL / OUT: 675 mL / NET: -675 mL        PHYSICAL EXAM:  General: NAD, A/O x 2  ENT: MMM, no thrush  Neck: Supple, No JVD  Lungs: Non labored breathing,  Clear to auscultation bilaterally,   Cardio: RRR, S1/S2, , no pitting edema bilaterally  Abdomen: Soft, Nontender, Nondistended; Bowel sounds present  : dubois with blood   Extremities: No calf tenderness, moves all extremities    LABS:                        9.1    11.89 )-----------( 265      ( 27 Oct 2022 08:20 )             29.6       10-27    134  |  102  |  32  ----------------------------<  82  4.4   |  27  |  0.96    Ca    8.4      27 Oct 2022 08:20  Mg     2.0     10-27    TPro  6.3  /  Alb  2.5  /  TBili  0.3  /  DBili  x   /  AST  48  /  ALT  81  /  AlkPhos  153  10-27                                          RADIOLOGY & ADDITIONAL TESTS:    Care Discussed with Consultants/Other Providers:

## 2022-10-28 LAB
ANION GAP SERPL CALC-SCNC: 7 MMOL/L — SIGNIFICANT CHANGE UP (ref 5–17)
APTT BLD: 33.2 SEC — SIGNIFICANT CHANGE UP (ref 27.5–35.5)
APTT BLD: 55.2 SEC — HIGH (ref 27.5–35.5)
BUN SERPL-MCNC: 29 MG/DL — HIGH (ref 7–23)
CALCIUM SERPL-MCNC: 9.1 MG/DL — SIGNIFICANT CHANGE UP (ref 8.4–10.5)
CHLORIDE SERPL-SCNC: 105 MMOL/L — SIGNIFICANT CHANGE UP (ref 96–108)
CO2 SERPL-SCNC: 28 MMOL/L — SIGNIFICANT CHANGE UP (ref 22–31)
CREAT SERPL-MCNC: 0.8 MG/DL — SIGNIFICANT CHANGE UP (ref 0.5–1.3)
EGFR: 88 ML/MIN/1.73M2 — SIGNIFICANT CHANGE UP
GLUCOSE SERPL-MCNC: 84 MG/DL — SIGNIFICANT CHANGE UP (ref 70–99)
HCT VFR BLD CALC: 26.1 % — LOW (ref 39–50)
HCT VFR BLD CALC: 29.5 % — LOW (ref 39–50)
HGB BLD-MCNC: 8.1 G/DL — LOW (ref 13–17)
HGB BLD-MCNC: 9.2 G/DL — LOW (ref 13–17)
MCHC RBC-ENTMCNC: 29 PG — SIGNIFICANT CHANGE UP (ref 27–34)
MCHC RBC-ENTMCNC: 29.1 PG — SIGNIFICANT CHANGE UP (ref 27–34)
MCHC RBC-ENTMCNC: 31 GM/DL — LOW (ref 32–36)
MCHC RBC-ENTMCNC: 31.2 GM/DL — LOW (ref 32–36)
MCV RBC AUTO: 93.4 FL — SIGNIFICANT CHANGE UP (ref 80–100)
MCV RBC AUTO: 93.5 FL — SIGNIFICANT CHANGE UP (ref 80–100)
NRBC # BLD: 0 /100 WBCS — SIGNIFICANT CHANGE UP (ref 0–0)
NRBC # BLD: 0 /100 WBCS — SIGNIFICANT CHANGE UP (ref 0–0)
PLATELET # BLD AUTO: 273 K/UL — SIGNIFICANT CHANGE UP (ref 150–400)
PLATELET # BLD AUTO: 276 K/UL — SIGNIFICANT CHANGE UP (ref 150–400)
POTASSIUM SERPL-MCNC: 5.2 MMOL/L — SIGNIFICANT CHANGE UP (ref 3.5–5.3)
POTASSIUM SERPL-SCNC: 5.2 MMOL/L — SIGNIFICANT CHANGE UP (ref 3.5–5.3)
RBC # BLD: 2.79 M/UL — LOW (ref 4.2–5.8)
RBC # BLD: 3.16 M/UL — LOW (ref 4.2–5.8)
RBC # FLD: 16.2 % — HIGH (ref 10.3–14.5)
RBC # FLD: 16.2 % — HIGH (ref 10.3–14.5)
SODIUM SERPL-SCNC: 140 MMOL/L — SIGNIFICANT CHANGE UP (ref 135–145)
WBC # BLD: 13.58 K/UL — HIGH (ref 3.8–10.5)
WBC # BLD: 13.77 K/UL — HIGH (ref 3.8–10.5)
WBC # FLD AUTO: 13.58 K/UL — HIGH (ref 3.8–10.5)
WBC # FLD AUTO: 13.77 K/UL — HIGH (ref 3.8–10.5)

## 2022-10-28 PROCEDURE — 99232 SBSQ HOSP IP/OBS MODERATE 35: CPT

## 2022-10-28 PROCEDURE — 99233 SBSQ HOSP IP/OBS HIGH 50: CPT | Mod: FS

## 2022-10-28 RX ORDER — HEPARIN SODIUM 5000 [USP'U]/ML
INJECTION INTRAVENOUS; SUBCUTANEOUS
Qty: 25000 | Refills: 0 | Status: DISCONTINUED | OUTPATIENT
Start: 2022-10-28 | End: 2022-10-29

## 2022-10-28 RX ADMIN — TAMSULOSIN HYDROCHLORIDE 0.4 MILLIGRAM(S): 0.4 CAPSULE ORAL at 21:47

## 2022-10-28 RX ADMIN — AMIODARONE HYDROCHLORIDE 200 MILLIGRAM(S): 400 TABLET ORAL at 06:06

## 2022-10-28 RX ADMIN — Medication 50 MILLIGRAM(S): at 06:06

## 2022-10-28 RX ADMIN — HEPARIN SODIUM 950 UNIT(S)/HR: 5000 INJECTION INTRAVENOUS; SUBCUTANEOUS at 21:13

## 2022-10-28 RX ADMIN — SENNA PLUS 1 TABLET(S): 8.6 TABLET ORAL at 21:47

## 2022-10-28 RX ADMIN — HEPARIN SODIUM 950 UNIT(S)/HR: 5000 INJECTION INTRAVENOUS; SUBCUTANEOUS at 13:49

## 2022-10-28 RX ADMIN — HEPARIN SODIUM 950 UNIT(S)/HR: 5000 INJECTION INTRAVENOUS; SUBCUTANEOUS at 19:44

## 2022-10-28 RX ADMIN — Medication 25 MILLIGRAM(S): at 21:47

## 2022-10-28 RX ADMIN — MEXILETINE HYDROCHLORIDE 150 MILLIGRAM(S): 150 CAPSULE ORAL at 06:06

## 2022-10-28 RX ADMIN — MEXILETINE HYDROCHLORIDE 150 MILLIGRAM(S): 150 CAPSULE ORAL at 17:44

## 2022-10-28 RX ADMIN — Medication 150 MICROGRAM(S): at 06:06

## 2022-10-28 NOTE — CHART NOTE - NSCHARTNOTEFT_GEN_A_CORE
Nutrition Follow Up Note  Hospital Course (Per Electronic Medical Record):   Source: Medical Record [X] Patient [X]  Nursing Staff [X]     Diet: DASH/TLC . Ensure Plus BID     Patient's po i    Current Weight:    Pertinent Medications: MEDICATIONS  (STANDING):  aMIOdarone    Tablet 200 milliGRAM(s) Oral daily  aspirin  chewable 81 milliGRAM(s) Oral daily  famotidine    Tablet 20 milliGRAM(s) Oral daily  heparin  Infusion.  Unit(s)/Hr (9.5 mL/Hr) IV Continuous <Continuous>  lactobacillus acidophilus 1 Tablet(s) Oral daily  levothyroxine 150 MICROGram(s) Oral daily  metoprolol succinate ER 50 milliGRAM(s) Oral daily  mexiletine 150 milliGRAM(s) Oral every 12 hours  Preservision Areds2 2 Capsule(s) 2 Capsule(s) Oral daily  senna 1 Tablet(s) Oral at bedtime  sertraline 150 milliGRAM(s) Oral daily  tamsulosin 0.4 milliGRAM(s) Oral at bedtime  traZODone 25 milliGRAM(s) Oral at bedtime    MEDICATIONS  (PRN):  bisacodyl Suppository 10 milliGRAM(s) Rectal daily PRN Constipation  lactulose Syrup 20 Gram(s) Oral daily PRN constipation  lidocaine 2% Gel 1 Application(s) Topical three times a day PRN pain at dubois      Pertinent Labs:  10-28 Na140 mmol/L Glu 84 mg/dL K+ 5.2 mmol/L Cr  0.80 mg/dL BUN 29 mg/dL<H> 10-27 Alb 2.5 g/dL<L>        Skin: ecchymotic     Edema: nonpitting edema     Last BM: (10/25)     Estimated Needs:   [X] No Change since Previous Assessment      Previous Nutrition Diagnosis: Acute Severe Protein Calorie Malnutrition     Nutrition Diagnosis is [X] Ongoing         New Nutrition Diagnosis: [X] Not Applicable      Interventions:   1.continue current diet       Monitoring & Evaluation: will monitor:  [X] Weights   [X] PO Intake   [X] Follow Up (Per Protocol)  [X] Tolerance to Diet Prescription       RD to follow as per Nutrition protocol  Rylee Plunkett RDN Nutrition Follow Up Note  Hospital Course (Per Electronic Medical Record):   Source: Medical Record [X] Patient [X]  Nursing Staff [X]     Diet: DASH/TLC Ensure Plus BID     Patient's po noted good consuming % of meals , patient reports consuming po Ensure supplement , patient noted alert  periods of confusion . No issue chewing /swallowing noted .     Current Weight: (10/28) 154.3/70kg                          (10/27) 154.9/70.3kg                          (10/26) 144.6/65.6kg                          Pertinent Medications: MEDICATIONS  (STANDING):  aMIOdarone    Tablet 200 milliGRAM(s) Oral daily  aspirin  chewable 81 milliGRAM(s) Oral daily  famotidine    Tablet 20 milliGRAM(s) Oral daily  heparin  Infusion.  Unit(s)/Hr (9.5 mL/Hr) IV Continuous <Continuous>  lactobacillus acidophilus 1 Tablet(s) Oral daily  levothyroxine 150 MICROGram(s) Oral daily  metoprolol succinate ER 50 milliGRAM(s) Oral daily  mexiletine 150 milliGRAM(s) Oral every 12 hours  Preservision Areds2 2 Capsule(s) 2 Capsule(s) Oral daily  senna 1 Tablet(s) Oral at bedtime  sertraline 150 milliGRAM(s) Oral daily  tamsulosin 0.4 milliGRAM(s) Oral at bedtime  traZODone 25 milliGRAM(s) Oral at bedtime    MEDICATIONS  (PRN):  bisacodyl Suppository 10 milliGRAM(s) Rectal daily PRN Constipation  lactulose Syrup 20 Gram(s) Oral daily PRN constipation  lidocaine 2% Gel 1 Application(s) Topical three times a day PRN pain at dubois      Pertinent Labs:  10-28 Na140 mmol/L Glu 84 mg/dL K+ 5.2 mmol/L Cr  0.80 mg/dL BUN 29 mg/dL<H> 10-27 Alb 2.5 g/dL<L> (10/27) Mg 2.0g/dl         Skin: ecchymotic     Edema: (+1) (L) arm     Last BM: (10/25)     Estimated Needs:   [X] No Change since Previous Assessment      Previous Nutrition Diagnosis: Acute Severe Protein Calorie Malnutrition     Nutrition Diagnosis is [X] Ongoing         New Nutrition Diagnosis: [X] Not Applicable      Interventions:   1.continue current diet       Monitoring & Evaluation: will monitor:  [X] Weights   [X] PO Intake   [X] Follow Up (Per Protocol)  [X] Tolerance to Diet Prescription       RD to follow as per Nutrition protocol  Rylee Plunkett RDN

## 2022-10-28 NOTE — CHART NOTE - NSCHARTNOTEFT_GEN_A_CORE
82 yom [facility] HTN, CAD s/p CABG, severe cardiomyopathy (EF 20%), afib (not on AC due to hematuria), BPH, LUE DVT, history of LLE DVT, vascular surgery following for bilateral femoral DVT. Eliquis held due to hematuria.   this morning pt seen in bed with urine in dubois bag--- less hematuric than yesterday.  Start AC  discussed with Vascular surgeon.

## 2022-10-28 NOTE — PROGRESS NOTE ADULT - SUBJECTIVE AND OBJECTIVE BOX
Patient is a 82y old  Male who presents with a chief complaint of dyspnea, hypoxia - acute on chronic systolic CHF (27 Oct 2022 14:40)      Patient seen and examined at bedside.  Per nurse report pt had clogging of dubois due to clots overnight, but this am is cleared but still with hematuria.   He states "I am hungry".      ALLERGIES:  PC Pen VK (Other)    MEDICATIONS  (STANDING):  aMIOdarone    Tablet 200 milliGRAM(s) Oral daily  aspirin  chewable 81 milliGRAM(s) Oral daily  famotidine    Tablet 20 milliGRAM(s) Oral daily  heparin  Infusion.  Unit(s)/Hr (9.5 mL/Hr) IV Continuous <Continuous>  lactobacillus acidophilus 1 Tablet(s) Oral daily  levothyroxine 150 MICROGram(s) Oral daily  metoprolol succinate ER 50 milliGRAM(s) Oral daily  mexiletine 150 milliGRAM(s) Oral every 12 hours  Preservision Areds2 2 Capsule(s) 2 Capsule(s) Oral daily  senna 1 Tablet(s) Oral at bedtime  sertraline 150 milliGRAM(s) Oral daily  tamsulosin 0.4 milliGRAM(s) Oral at bedtime  traZODone 25 milliGRAM(s) Oral at bedtime    MEDICATIONS  (PRN):  bisacodyl Suppository 10 milliGRAM(s) Rectal daily PRN Constipation  lactulose Syrup 20 Gram(s) Oral daily PRN constipation  lidocaine 2% Gel 1 Application(s) Topical three times a day PRN pain at dubois    Vital Signs Last 24 Hrs  T(F): 97.4 (28 Oct 2022 05:33), Max: 97.5 (27 Oct 2022 13:04)  HR: 93 (28 Oct 2022 05:33) (83 - 93)  BP: 121/79 (28 Oct 2022 05:33) (103/68 - 121/79)  RR: 16 (28 Oct 2022 05:33) (16 - 16)  SpO2: 95% (28 Oct 2022 05:33) (95% - 99%)  I&O's Summary    27 Oct 2022 07:01  -  28 Oct 2022 07:00  --------------------------------------------------------  IN: 0 mL / OUT: 1400 mL / NET: -1400 mL      PHYSICAL EXAM:  General: NAD, A/O x 2, gets confused.  ENT: No gross hearing impairment, Moist mucous membranes, no thrush  Neck: Supple, No JVD  Lungs: good air entry, non-labored breathing, decreased BS at bases  Cardio: RRR, S1/S2, No murmur  Abdomen: Soft, Nontender, Nondistended; Bowel sounds present  G/U:  +DUBOIS w hematuria  Extremities: No calf tenderness, No cyanosis, No pitting edema  Neuro:  alert, gets confused    LABS:                        9.2    13.58 )-----------( 276      ( 28 Oct 2022 07:00 )             29.5     10-28    140  |  105  |  29  ----------------------------<  84  5.2   |  28  |  0.80    Ca    9.1      28 Oct 2022 07:00  Mg     2.0     10-27    TPro  6.3  /  Alb  2.5  /  TBili  0.3  /  DBili  x   /  AST  48  /  ALT  81  /  AlkPhos  153  10-27      RADIOLOGY & ADDITIONAL TESTS:  < from: US Kidney and Bladder (10.26.22 @ 15:12) >    IMPRESSION:  No hydronephrosis bilaterally.  Limited bladder evaluation. Bladder debris and diffuse wall thickening.   Correlate clinically and with cystoscopy as warranted      < end of copied text >    Care Discussed with Consultants/Other Providers:

## 2022-10-28 NOTE — PROGRESS NOTE ADULT - ASSESSMENT
82 year old M [facility] HTN, CAD s/p CABG, severe cardiomyopathy (EF 20%), afib (not on AC due to hematuria), BPH, LUE DVT, history of LLE DVT, recently hospitalized at [place] for uorsepsis and urinary retention (now with dubois), brought in from [facility] for hypoxia, admitted for acute on chronic systolic CHF exacerbation. Course c/b hematuria     Acute Hypoxic Respiratory Failure  Acute on Chronic Systolic CHF Exacerbation  Severe Cardiomyopathy (EF 20%)  Hypotension; improved  -ECHO with EF 20-25% c/w previous echos-unable to medical optimize patient with guideline directed therapy due to hypotension  -Entresto was discontinued due to hypotension  -off supplemental oxygen therapy; sats on RA 95%  -No Farxiga due to hypoglycemia-- unable to further optimize medical therapy for CHF guidelines  -continue ASA and amiodarone  -Restarted BB 10/25 with parameters  -follow up with primary cardiologist to discuss prophylactic ICD    Bilateral DVTs  -US revealed b/l DVTs involving RT deep femoral vein, LT common femoral and deep veins  -pt with hx of DVTs found last month during hospitalization at [facility]  -stopped AC due to hematuria  -Vascular following Dr Lucas; will start Hep drip today, pt was cleared by Urology  -if pt tolerates Hep gtt over the next 2-3 days will not need IVC filter    Hematuria; persistent  -Urology following; cont silvino, OK to start Hep, follow H/H very closely  -H/H 9.2/29  -KUB--bladder debris and diffuse wall thickening, no Hydro  -stopped Eliquis    CAD s/p CABG  -pt with hx of remote CABG and recent angiogram with occluded grafts and medical therapy recommended  -elevated Tn likely demand ischemia  -appears stable at this time, continue ASA, hold statin due to elevated LFTs    Ascending Aortic Aneurysm  -CT chest showed ascending thoracic aorta 4.3 cm in diameter  -aberrant right subclavian artery  -atherosclerotic calcifications of the aorta s/p CABG  -consider vascular evaluation outpatient    Transaminitis  -Continue holding statin for now, LFTs slowly down trending      Atrial Fibrillation; chronic  -Eliquis started 10/22, now again w hematuria so stopped again   -continue mexiletine and amiodarone, and metoprolol     Leukocytosis  UTI   -culture + for Pseudomonas Aeruginosa,--completed course of cefepime  -blood cx negative to date    h/o Hypothyroid  -continue synthroid 150 mcg daily    BPH with retention  -continue flomax and dubois    Severe Protein Calorie Malnutrition  -nutrition evaluation appreciated  -continue ensure plus high protein cans BID    DVT ppx - with hematuria so AC on hold    Code status:  DNR     Dispo:  updated spouse Kirsten De La Cruz on plan of care.  [phone].  When pt is medically stable will go back to [facility].  No discharge planning yet

## 2022-10-28 NOTE — PROGRESS NOTE ADULT - SUBJECTIVE AND OBJECTIVE BOX
Follow up for Cardiomyopathy  SUBJ:    receiving a.m. care    [facility]  Chronic atrial fibrillation    History of cardiomyopathy    CAD (coronary artery disease)    BPH with obstruction/lower urinary tract symptoms    Hyperlipidemia    History of CVA (cerebrovascular accident)        MEDICATIONS  (STANDING):  aMIOdarone    Tablet 200 milliGRAM(s) Oral daily  aspirin  chewable 81 milliGRAM(s) Oral daily  famotidine    Tablet 20 milliGRAM(s) Oral daily  heparin  Infusion.  Unit(s)/Hr (9.5 mL/Hr) IV Continuous <Continuous>  lactobacillus acidophilus 1 Tablet(s) Oral daily  levothyroxine 150 MICROGram(s) Oral daily  metoprolol succinate ER 50 milliGRAM(s) Oral daily  mexiletine 150 milliGRAM(s) Oral every 12 hours  Preservision Areds2 2 Capsule(s) 2 Capsule(s) Oral daily  senna 1 Tablet(s) Oral at bedtime  sertraline 150 milliGRAM(s) Oral daily  tamsulosin 0.4 milliGRAM(s) Oral at bedtime  traZODone 25 milliGRAM(s) Oral at bedtime    MEDICATIONS  (PRN):  bisacodyl Suppository 10 milliGRAM(s) Rectal daily PRN Constipation  lactulose Syrup 20 Gram(s) Oral daily PRN constipation  lidocaine 2% Gel 1 Application(s) Topical three times a day PRN pain at [place]        PHYSICAL EXAM:  Vital Signs Last 24 Hrs  T(C): 36.5 (28 Oct 2022 20:57), Max: 36.5 (28 Oct 2022 20:57)  T(F): 97.7 (28 Oct 2022 20:57), Max: 97.7 (28 Oct 2022 20:57)  HR: 84 (28 Oct 2022 20:57) (84 - 93)  BP: 109/68 (28 Oct 2022 20:57) (109/68 - 121/79)  BP(mean): --  RR: 16 (28 Oct 2022 20:57) (16 - 16)  SpO2: 97% (28 Oct 2022 20:57) (95% - 97%)    Parameters below as of 28 Oct 2022 20:57  Patient On (Oxygen Delivery Method): room air        unable to examine patient on rounds due to ongoing care    TELEMETRY:    atrial fibrillation    ECG:    < from: 12 Lead ECG (10.21.22 @ 08:07) >    Diagnosis Line Atrial fibrillation  Left axis deviation  Septal infarct (cited on or before 17-OCT-2022)  Prolonged QT  Abnormal ECG  When compared with ECG of 17-OCT-2022 22:32,  there does not appear to be a signficant interval change  Confirmed by MARVA LYNN, THERESA LOPES ([id number]) on 10/21/2022 9:32:13 AM    < end of copied text >    ECHO:    < from: TTE Echo Complete w/o Contrast w/ Doppler (10.18.22 @ 07:55) >    Summary:   1. Severely decreased global left ventricular systolic function.   2. Left ventricular ejection fraction, by visual estimation, is 20 to   25%.   3. Severe global left ventricle hypokinesis with regional variation: the   inferolateral wall and inferior wall appear akinetic.   4. Normal left ventricular internal cavity size.   5. The mitral in-flow pattern reveals no discernable A-wave, therefore   no comment on diastolic function can be made.   6. There is moderate septal left ventricular hypertrophy.   7. Moderately reduced RV systolic function.   8. Severely enlarged left atrium.   9. Right atrial enlargement.  10. Mild mitral annular calcification.  11. Mild thickening and calcification of the anterior and posterior   mitral valve leaflets.  12. Moderate mitral valve regurgitation.  13. Mild tricuspid regurgitation.  14. Mild aortic valve leaflet thickening and calcification.  15. Sclerotic aortic valve with normal opening.  16. Moderate aortic regurgitation.  17. Dilated proximal ascending aorta (4.4 cm).  18. Mildly dilated pulmonary artery.  19. Large pleural effusion in the left lateral region.  20. There is no evidence of pericardial effusion.    [id number] Vignesh Wallace MD Electronically signed on   10/18/2022 at 1:31:28 PM        *** Final ***    < end of copied text >      LABS:                        8.1    13.77 )-----------( 273      ( 28 Oct 2022 20:35 )             26.1     10-28    140  |  105  |  29<H>  ----------------------------<  84  5.2   |  28  |  0.80    Ca    9.1      28 Oct 2022 07:00  Mg     2.0     10-27    TPro  6.3  /  Alb  2.5<L>  /  TBili  0.3  /  DBili  x   /  AST  48<H>  /  ALT  81<H>  /  AlkPhos  153<H>  10-27        PTT - ( 28 Oct 2022 20:35 )  PTT:55.2 sec    I&O's Summary    27 Oct 2022 07:01  -  28 Oct 2022 07:00  --------------------------------------------------------  IN: 0 mL / OUT: 1400 mL / NET: -1400 mL      BNP    RADIOLOGY & ADDITIONAL STUDIES:    < from: Xray Chest 1 View- PORTABLE-Urgent (10.17.22 @ 20:09) >  GS:  CATHETERS AND TUBES: None    PULMONARY: Moderate bilateral effusions and/or posterior basilar airspace   consolidations.. Bilateral perihilar diffuse airspace disease.     No pneumothorax.    HEART/VASCULAR: The  heart is enlarged in transverse diameter.    BONES: Visualized osseous structures are intact.    IMPRESSION:   /Constellation of Findings concerning for congestive heart   failure..    --- End of Report ---        CHARO HOLLIDAY MD; Attending Radiologist  This document has been electronically signed. Oct 20 2022 10:01AM    < end of copied text >      ECHO:    impression cardio myopathy heart failure with reduced ejection fraction  consider guideline directed medical therapy of the patient may not be a candidate given borderline blood pressure and hypotension    age of fibrillation  currently on rate control and anticoagulation strategy         Follow up for Cardiomyopathy  SUBJ:    receiving a.m. care    [facility]  Chronic atrial fibrillation    History of cardiomyopathy    CAD (coronary artery disease)    BPH with obstruction/lower urinary tract symptoms    Hyperlipidemia    History of CVA (cerebrovascular accident)        MEDICATIONS  (STANDING):  aMIOdarone    Tablet 200 milliGRAM(s) Oral daily  aspirin  chewable 81 milliGRAM(s) Oral daily  famotidine    Tablet 20 milliGRAM(s) Oral daily  heparin  Infusion.  Unit(s)/Hr (9.5 mL/Hr) IV Continuous <Continuous>  lactobacillus acidophilus 1 Tablet(s) Oral daily  levothyroxine 150 MICROGram(s) Oral daily  metoprolol succinate ER 50 milliGRAM(s) Oral daily  mexiletine 150 milliGRAM(s) Oral every 12 hours  Preservision Areds2 2 Capsule(s) 2 Capsule(s) Oral daily  senna 1 Tablet(s) Oral at bedtime  sertraline 150 milliGRAM(s) Oral daily  tamsulosin 0.4 milliGRAM(s) Oral at bedtime  traZODone 25 milliGRAM(s) Oral at bedtime    MEDICATIONS  (PRN):  bisacodyl Suppository 10 milliGRAM(s) Rectal daily PRN Constipation  lactulose Syrup 20 Gram(s) Oral daily PRN constipation  lidocaine 2% Gel 1 Application(s) Topical three times a day PRN pain at [place]        PHYSICAL EXAM:  Vital Signs Last 24 Hrs  T(C): 36.5 (28 Oct 2022 20:57), Max: 36.5 (28 Oct 2022 20:57)  T(F): 97.7 (28 Oct 2022 20:57), Max: 97.7 (28 Oct 2022 20:57)  HR: 84 (28 Oct 2022 20:57) (84 - 93)  BP: 109/68 (28 Oct 2022 20:57) (109/68 - 121/79)  BP(mean): --  RR: 16 (28 Oct 2022 20:57) (16 - 16)  SpO2: 97% (28 Oct 2022 20:57) (95% - 97%)    Parameters below as of 28 Oct 2022 20:57  Patient On (Oxygen Delivery Method): room air        unable to examine patient on rounds due to ongoing care    TELEMETRY:    atrial fibrillation    ECG:    < from: 12 Lead ECG (10.21.22 @ 08:07) >    Diagnosis Line Atrial fibrillation  Left axis deviation  Septal infarct (cited on or before 17-OCT-2022)  Prolonged QT  Abnormal ECG  When compared with ECG of 17-OCT-2022 22:32,  there does not appear to be a signficant interval change  Confirmed by MARVA LYNN, THERESA LOPES ([id number]) on 10/21/2022 9:32:13 AM    < end of copied text >    ECHO:    < from: TTE Echo Complete w/o Contrast w/ Doppler (10.18.22 @ 07:55) >    Summary:   1. Severely decreased global left ventricular systolic function.   2. Left ventricular ejection fraction, by visual estimation, is 20 to   25%.   3. Severe global left ventricle hypokinesis with regional variation: the   inferolateral wall and inferior wall appear akinetic.   4. Normal left ventricular internal cavity size.   5. The mitral in-flow pattern reveals no discernable A-wave, therefore   no comment on diastolic function can be made.   6. There is moderate septal left ventricular hypertrophy.   7. Moderately reduced RV systolic function.   8. Severely enlarged left atrium.   9. Right atrial enlargement.  10. Mild mitral annular calcification.  11. Mild thickening and calcification of the anterior and posterior   mitral valve leaflets.  12. Moderate mitral valve regurgitation.  13. Mild tricuspid regurgitation.  14. Mild aortic valve leaflet thickening and calcification.  15. Sclerotic aortic valve with normal opening.  16. Moderate aortic regurgitation.  17. Dilated proximal ascending aorta (4.4 cm).  18. Mildly dilated pulmonary artery.  19. Large pleural effusion in the left lateral region.  20. There is no evidence of pericardial effusion.    [id number] Vignesh Wallace MD Electronically signed on   10/18/2022 at 1:31:28 PM        *** Final ***    < end of copied text >      LABS:                        8.1    13.77 )-----------( 273      ( 28 Oct 2022 20:35 )             26.1     10-28    140  |  105  |  29<H>  ----------------------------<  84  5.2   |  28  |  0.80    Ca    9.1      28 Oct 2022 07:00  Mg     2.0     10-27    TPro  6.3  /  Alb  2.5<L>  /  TBili  0.3  /  DBili  x   /  AST  48<H>  /  ALT  81<H>  /  AlkPhos  153<H>  10-27        PTT - ( 28 Oct 2022 20:35 )  PTT:55.2 sec    I&O's Summary    27 Oct 2022 07:01  -  28 Oct 2022 07:00  --------------------------------------------------------  IN: 0 mL / OUT: 1400 mL / NET: -1400 mL      BNP    RADIOLOGY & ADDITIONAL STUDIES:    < from: Xray Chest 1 View- PORTABLE-Urgent (10.17.22 @ 20:09) >  GS:  CATHETERS AND TUBES: None    PULMONARY: Moderate bilateral effusions and/or posterior basilar airspace   consolidations.. Bilateral perihilar diffuse airspace disease.     No pneumothorax.    HEART/VASCULAR: The  heart is enlarged in transverse diameter.    BONES: Visualized osseous structures are intact.    IMPRESSION:   /Constellation of Findings concerning for congestive heart   failure..    --- End of Report ---        CHARO HOLLIDAY MD; Attending Radiologist  This document has been electronically signed. Oct 20 2022 10:01AM    < end of copied text >      ECHO:    impression cardio myopathy heart failure with reduced ejection fraction  consider guideline directed medical therapy however patient may not be a candidate given borderline blood pressure and hypotension? Candidate for ICD given ejection fraction of less than 30%    atrial  fibrillation and bilateral DVT  currently on rate control and anticoagulation strategy however now with DVTs and unable to tolerate anticoagulation being considered for IVC filter family currently deciding

## 2022-10-29 LAB
ANION GAP SERPL CALC-SCNC: 13 MMOL/L — SIGNIFICANT CHANGE UP (ref 5–17)
ANION GAP SERPL CALC-SCNC: 5 MMOL/L — SIGNIFICANT CHANGE UP (ref 5–17)
APTT BLD: 36.1 SEC — HIGH (ref 27.5–35.5)
APTT BLD: 64.1 SEC — HIGH (ref 27.5–35.5)
APTT BLD: >200 SEC — CRITICAL HIGH (ref 27.5–35.5)
BUN SERPL-MCNC: 29 MG/DL — HIGH (ref 7–23)
BUN SERPL-MCNC: 39 MG/DL — HIGH (ref 7–23)
CALCIUM SERPL-MCNC: 9 MG/DL — SIGNIFICANT CHANGE UP (ref 8.4–10.5)
CALCIUM SERPL-MCNC: 9.3 MG/DL — SIGNIFICANT CHANGE UP (ref 8.4–10.5)
CHLORIDE SERPL-SCNC: 102 MMOL/L — SIGNIFICANT CHANGE UP (ref 96–108)
CHLORIDE SERPL-SCNC: 105 MMOL/L — SIGNIFICANT CHANGE UP (ref 96–108)
CO2 SERPL-SCNC: 27 MMOL/L — SIGNIFICANT CHANGE UP (ref 22–31)
CO2 SERPL-SCNC: 29 MMOL/L — SIGNIFICANT CHANGE UP (ref 22–31)
CREAT SERPL-MCNC: 0.79 MG/DL — SIGNIFICANT CHANGE UP (ref 0.5–1.3)
CREAT SERPL-MCNC: 2.77 MG/DL — HIGH (ref 0.5–1.3)
EGFR: 22 ML/MIN/1.73M2 — LOW
EGFR: 88 ML/MIN/1.73M2 — SIGNIFICANT CHANGE UP
GLUCOSE SERPL-MCNC: 172 MG/DL — HIGH (ref 70–99)
GLUCOSE SERPL-MCNC: 89 MG/DL — SIGNIFICANT CHANGE UP (ref 70–99)
HCT VFR BLD CALC: 26.2 % — LOW (ref 39–50)
HCT VFR BLD CALC: 27.9 % — LOW (ref 39–50)
HGB BLD-MCNC: 8.2 G/DL — LOW (ref 13–17)
HGB BLD-MCNC: 8.3 G/DL — LOW (ref 13–17)
MCHC RBC-ENTMCNC: 29.2 PG — SIGNIFICANT CHANGE UP (ref 27–34)
MCHC RBC-ENTMCNC: 29.6 PG — SIGNIFICANT CHANGE UP (ref 27–34)
MCHC RBC-ENTMCNC: 29.7 GM/DL — LOW (ref 32–36)
MCHC RBC-ENTMCNC: 31.3 GM/DL — LOW (ref 32–36)
MCV RBC AUTO: 93.2 FL — SIGNIFICANT CHANGE UP (ref 80–100)
MCV RBC AUTO: 99.6 FL — SIGNIFICANT CHANGE UP (ref 80–100)
NRBC # BLD: 0 /100 WBCS — SIGNIFICANT CHANGE UP (ref 0–0)
NRBC # BLD: 0 /100 WBCS — SIGNIFICANT CHANGE UP (ref 0–0)
PLATELET # BLD AUTO: 266 K/UL — SIGNIFICANT CHANGE UP (ref 150–400)
PLATELET # BLD AUTO: 266 K/UL — SIGNIFICANT CHANGE UP (ref 150–400)
POTASSIUM SERPL-MCNC: 4.2 MMOL/L — SIGNIFICANT CHANGE UP (ref 3.5–5.3)
POTASSIUM SERPL-MCNC: 4.7 MMOL/L — SIGNIFICANT CHANGE UP (ref 3.5–5.3)
POTASSIUM SERPL-SCNC: 4.2 MMOL/L — SIGNIFICANT CHANGE UP (ref 3.5–5.3)
POTASSIUM SERPL-SCNC: 4.7 MMOL/L — SIGNIFICANT CHANGE UP (ref 3.5–5.3)
RBC # BLD: 2.8 M/UL — LOW (ref 4.2–5.8)
RBC # BLD: 2.81 M/UL — LOW (ref 4.2–5.8)
RBC # FLD: 16.4 % — HIGH (ref 10.3–14.5)
RBC # FLD: 17.4 % — HIGH (ref 10.3–14.5)
SODIUM SERPL-SCNC: 139 MMOL/L — SIGNIFICANT CHANGE UP (ref 135–145)
SODIUM SERPL-SCNC: 142 MMOL/L — SIGNIFICANT CHANGE UP (ref 135–145)
WBC # BLD: 12 K/UL — HIGH (ref 3.8–10.5)
WBC # BLD: 12.27 K/UL — HIGH (ref 3.8–10.5)
WBC # FLD AUTO: 12 K/UL — HIGH (ref 3.8–10.5)
WBC # FLD AUTO: 12.27 K/UL — HIGH (ref 3.8–10.5)

## 2022-10-29 PROCEDURE — 99233 SBSQ HOSP IP/OBS HIGH 50: CPT | Mod: FS

## 2022-10-29 PROCEDURE — 99232 SBSQ HOSP IP/OBS MODERATE 35: CPT

## 2022-10-29 RX ORDER — HEPARIN SODIUM 5000 [USP'U]/ML
6500 INJECTION INTRAVENOUS; SUBCUTANEOUS ONCE
Refills: 0 | Status: COMPLETED | OUTPATIENT
Start: 2022-10-29 | End: 2022-10-29

## 2022-10-29 RX ORDER — LOSARTAN POTASSIUM 100 MG/1
25 TABLET, FILM COATED ORAL DAILY
Refills: 0 | Status: ACTIVE | OUTPATIENT
Start: 2022-10-29 | End: 2023-09-27

## 2022-10-29 RX ORDER — HEPARIN SODIUM 5000 [USP'U]/ML
6500 INJECTION INTRAVENOUS; SUBCUTANEOUS EVERY 6 HOURS
Refills: 0 | Status: DISCONTINUED | OUTPATIENT
Start: 2022-10-29 | End: 2022-10-30

## 2022-10-29 RX ORDER — HEPARIN SODIUM 5000 [USP'U]/ML
3000 INJECTION INTRAVENOUS; SUBCUTANEOUS EVERY 6 HOURS
Refills: 0 | Status: DISCONTINUED | OUTPATIENT
Start: 2022-10-29 | End: 2022-10-30

## 2022-10-29 RX ORDER — HEPARIN SODIUM 5000 [USP'U]/ML
1200 INJECTION INTRAVENOUS; SUBCUTANEOUS
Qty: 25000 | Refills: 0 | Status: DISCONTINUED | OUTPATIENT
Start: 2022-10-29 | End: 2022-10-30

## 2022-10-29 RX ADMIN — HEPARIN SODIUM 950 UNIT(S)/HR: 5000 INJECTION INTRAVENOUS; SUBCUTANEOUS at 07:23

## 2022-10-29 RX ADMIN — SENNA PLUS 1 TABLET(S): 8.6 TABLET ORAL at 21:25

## 2022-10-29 RX ADMIN — HEPARIN SODIUM 950 UNIT(S)/HR: 5000 INJECTION INTRAVENOUS; SUBCUTANEOUS at 04:20

## 2022-10-29 RX ADMIN — HEPARIN SODIUM 1200 UNIT(S)/HR: 5000 INJECTION INTRAVENOUS; SUBCUTANEOUS at 14:07

## 2022-10-29 RX ADMIN — Medication 1 TABLET(S): at 12:29

## 2022-10-29 RX ADMIN — Medication 50 MILLIGRAM(S): at 06:15

## 2022-10-29 RX ADMIN — Medication 25 MILLIGRAM(S): at 21:25

## 2022-10-29 RX ADMIN — Medication 150 MICROGRAM(S): at 06:16

## 2022-10-29 RX ADMIN — AMIODARONE HYDROCHLORIDE 200 MILLIGRAM(S): 400 TABLET ORAL at 06:16

## 2022-10-29 RX ADMIN — HEPARIN SODIUM 1200 UNIT(S)/HR: 5000 INJECTION INTRAVENOUS; SUBCUTANEOUS at 19:53

## 2022-10-29 RX ADMIN — MEXILETINE HYDROCHLORIDE 150 MILLIGRAM(S): 150 CAPSULE ORAL at 18:41

## 2022-10-29 RX ADMIN — Medication 81 MILLIGRAM(S): at 12:29

## 2022-10-29 RX ADMIN — MEXILETINE HYDROCHLORIDE 150 MILLIGRAM(S): 150 CAPSULE ORAL at 06:15

## 2022-10-29 RX ADMIN — SERTRALINE 150 MILLIGRAM(S): 25 TABLET, FILM COATED ORAL at 12:29

## 2022-10-29 RX ADMIN — TAMSULOSIN HYDROCHLORIDE 0.4 MILLIGRAM(S): 0.4 CAPSULE ORAL at 21:25

## 2022-10-29 RX ADMIN — FAMOTIDINE 20 MILLIGRAM(S): 10 INJECTION INTRAVENOUS at 12:29

## 2022-10-29 RX ADMIN — HEPARIN SODIUM 6500 UNIT(S): 5000 INJECTION INTRAVENOUS; SUBCUTANEOUS at 14:00

## 2022-10-29 NOTE — PROVIDER CONTACT NOTE (CRITICAL VALUE NOTIFICATION) - BACKGROUND
FOLLOW UP OFFICE VISIT    Patient ID: Ronnie is a 71 year old male.    1. Obstructive sleep apnea (adult) (pediatric)        Chief Complaint   Patient presents with   • Follow-up       HPI:    71-year-old male, patient of Dr. Arango, here for one-year reevaluation of obstructive sleep apnea.  He is in need of a replacement CPAP.  His machine is taking at least 1 hour to ramp on a nightly basis.  I do not have a download available for review today.      HISTORY:    Past Medical History:   Diagnosis Date   • Anemia    • Bronchitis    • Bronchitis, chronic (CMS/HCC)    • Dependent edema    • Depressed bipolar disorder (CMS/HCC)    • DVT (deep venous thrombosis) (CMS/HCC)    • GERD (gastroesophageal reflux disease)    • Hypercholesterolemia    • Malignant neoplasm of prostate (CMS/HCC)    • Neuropathy    • FELIPE (obstructive sleep apnea)    • Osteoporosis        Past Surgical History:   Procedure Laterality Date   • Tonsillectomy         Family History   Problem Relation Age of Onset   • Hypertension Father        Social History     Tobacco Use   • Smoking status: Former Smoker     Packs/day: 0.33     Years: 11.00     Pack years: 3.63     Start date: 1965     Last attempt to quit: 1976     Years since quittin.0   • Smokeless tobacco: Never Used   Substance Use Topics   • Alcohol use: No     Frequency: Never   • Drug use: No       MEDS:    Current Outpatient Medications   Medication Sig Dispense Refill   • omeprazole (PRILOSEC) 20 MG capsule      • divalproex (DEPAKOTE) 250 MG delayed release EC tablet      • sertraline (ZOLOFT) 100 MG tablet Take 100 mg by mouth daily.     • carbidopa-levodopa (SINEMET)  MG per tablet 1 tablet three times per day.  Take 1 hour before meals or 90 min after meals. 90 tablet 5   • gabapentin (NEURONTIN) 100 MG capsule 1 capsule three times per day 90 capsule 5   • fluticasone (FLONASE) 50 MCG/ACT nasal spray      • potassium chloride (KLOR-CON M) 10 MEQ abbey ER tablet   
10   • furosemide (LASIX) 20 MG tablet Take 20 mg by mouth daily.     • Multiple Vitamins-Minerals (DAILY MULTI) Tab      • divalproex (DEPAKOTE) 500 MG delayed release EC tablet 1/2 tab in am and 1 tab in pm     • apixaban (ELIQUIS) 5 MG Tab 1/2 tablet BID     • OLANZapine (ZYPREXA) 5 MG tablet TAKE 1 TABLET AT BEDTIME.     • omeprazole 20 MG tablet TAKE 1 TABLET BY MOUTH DAILY     • simvastatin (ZOCOR) 40 MG tablet      • cholecalciferol (VITAMIN D3) 1000 UNITS tablet        No current facility-administered medications for this visit.        ALLERGY:    ALLERGIES:  No Known Allergies    ROS:    Review of Systems   Constitutional: Negative for chills, fatigue and fever.   Respiratory: Negative for cough, shortness of breath and wheezing.    Cardiovascular: Negative for chest pain, palpitations and leg swelling.   Neurological: Negative for dizziness and headaches.   Psychiatric/Behavioral: Negative for dysphoric mood. The patient is not nervous/anxious.        VITALS:    Visit Vitals  BP 94/60 (BP Location: LUE - Left upper extremity, Cuff Size: Regular)   Pulse 77   Resp 16   Ht 6' 3\" (1.905 m)   Wt 101.6 kg (224 lb)   SpO2 96%   BMI 28.00 kg/m²       Physical Exam   Constitutional: He is oriented to person, place, and time. He appears well-developed and well-nourished. No distress.   HENT:   Head: Normocephalic and atraumatic.   Mouth/Throat: Oropharynx is clear and moist.   Eyes: Pupils are equal, round, and reactive to light. Conjunctivae are normal.   Neck: Neck supple. No tracheal deviation present.   Cardiovascular: Normal rate, regular rhythm and normal heart sounds.   Pulmonary/Chest: Effort normal and breath sounds normal. No respiratory distress. He has no wheezes. He has no rales.   Abdominal: Soft. Bowel sounds are normal. He exhibits no distension. Musculoskeletal: Normal range of motion.         General: Edema (Right lower extremity) present.     Neurological: He is alert and oriented to person, 
place, and time. He displays tremor.   Skin: Skin is warm and dry.   Psychiatric: He has a normal mood and affect.       Orders Placed This Encounter   • SERVICE TO HOME CARE DME   • omeprazole (PRILOSEC) 20 MG capsule   • divalproex (DEPAKOTE) 250 MG delayed release EC tablet       Return in about 3 months (around 4/9/2020) for obstructive sleep apnea.    Assessment/Plan:    Severe obstructive sleep apnea -patient will obtain a download from [facility], his DME.  Once download has been reviewed and any pressure modifications are made, I will order a replacement CPAP.  He will follow-up in 3 months for a compliance check.      1/10/2020 Addendum: Rec'd download of CPAP.  Patient has 100% overall compliance on straight pressure 17 cm with AHI of 1.8.  Average nightly usage 7 hours 30 minutes.  Will order replacement CPAP for him at current pressure setting with repeat download in 2 weeks after reinitiation of new device.  Will decrease ramp time to 5 minutes on his new device, however as patient has long ramp of 30+ minutes on his current device which he is using 1-3 times nightly.     Electronically signed by: Lily Decker CNP    Disclaimer Note: Your provider prepared this document using voice recognition technology.  In that case, if a word or phrase is confusing, or does not make sense, this is likely due to a recognition error within the program which was not discovered during the provider's review.  If you believe an error has occurred, please notify your provider's office at your earliest convenience, so we can correct any mistakes.  
SOB
CHF  hematuria  DVT

## 2022-10-29 NOTE — PROGRESS NOTE ADULT - SUBJECTIVE AND OBJECTIVE BOX
SUBJ:  Patient is a 82y old  Male who presents with a chief complaint of dyspnea, hypoxia - acute on chronic systolic CHF (29 Oct 2022 12:08)  patient seen and examined  chart is reviewed  case discussed with Dr. Clifton       PAST MEDICAL & SURGICAL HISTORY:  Chronic atrial fibrillation      History of cardiomyopathy      CAD (coronary artery disease)      BPH with obstruction/lower urinary tract symptoms      Hyperlipidemia      History of CVA (cerebrovascular accident)  left sided weakness      S/P CABG (coronary artery bypass graft)      Home Medications:  amiodarone 200 mg oral tablet: 1 tab(s) orally once a day (18 Oct 2022 00:14)  aspirin 81 mg oral tablet, chewable: 1 tab(s) orally once a day (18 Oct 2022 00:14)  Dulcolax Laxative 10 mg rectal suppository: 1 suppository(ies) rectal once a day, As Needed (18 Oct 2022 00:14)  famotidine 20 mg oral tablet: 1 tab(s) orally once a day (18 Oct 2022 00:14)  Flomax 0.4 mg oral capsule: 1 cap(s) orally once a day (at bedtime) (18 Oct 2022 00:14)  lactulose 10 g/15 mL oral syrup: 30 milliliter(s) orally once a day, As Needed (18 Oct 2022 00:14)  levothyroxine 150 mcg (0.15 mg) oral tablet: 1 tab(s) orally once a day (18 Oct 2022 00:14)  metoprolol succinate 100 mg oral tablet, extended release: 1 tab(s) orally once a day (18 Oct 2022 00:14)  mexiletine 150 mg oral capsule: 1 cap(s) orally 2 times a day (18 Oct 2022 00:14)  Milk of Magnesia 8% oral suspension: 30 milliliter(s) orally once a day, As Needed (18 Oct 2022 00:14)  rosuvastatin 40 mg oral tablet: 1 tab(s) orally once a day (at bedtime) (18 Oct 2022 00:14)  Senna 8.6 mg oral tablet: 2 tab(s) orally once a day (at bedtime), As Needed (18 Oct 2022 00:14)  sertraline 150 mg oral capsule: 1 cap(s) orally once a day (18 Oct 2022 00:14)  traZODone 50 mg oral tablet: 0.5 milligram(s) orally once a day (at bedtime) (18 Oct 2022 00:14)  Tylenol 325 mg oral tablet: 2 tab(s) orally every 6 hours, As Needed (18 Oct 2022 00:14)      MEDICATIONS  (STANDING):  aMIOdarone    Tablet 200 milliGRAM(s) Oral daily  aspirin  chewable 81 milliGRAM(s) Oral daily  famotidine    Tablet 20 milliGRAM(s) Oral daily  heparin  Infusion.  Unit(s)/Hr (9.5 mL/Hr) IV Continuous <Continuous>  lactobacillus acidophilus 1 Tablet(s) Oral daily  levothyroxine 150 MICROGram(s) Oral daily  metoprolol succinate ER 50 milliGRAM(s) Oral daily  mexiletine 150 milliGRAM(s) Oral every 12 hours  Preservision Areds2 2 Capsule(s) 2 Capsule(s) Oral daily  senna 1 Tablet(s) Oral at bedtime  sertraline 150 milliGRAM(s) Oral daily  tamsulosin 0.4 milliGRAM(s) Oral at bedtime  traZODone 25 milliGRAM(s) Oral at bedtime    MEDICATIONS  (PRN):  bisacodyl Suppository 10 milliGRAM(s) Rectal daily PRN Constipation  lactulose Syrup 20 Gram(s) Oral daily PRN constipation  lidocaine 2% Gel 1 Application(s) Topical three times a day PRN pain at [place]          Vital Signs Last 24 Hrs  T(C): 37 (29 Oct 2022 05:55), Max: 37 (29 Oct 2022 05:55)  T(F): 98.6 (29 Oct 2022 05:55), Max: 98.6 (29 Oct 2022 05:55)  HR: 77 (29 Oct 2022 05:55) (77 - 85)  BP: 123/76 (29 Oct 2022 05:55) (109/68 - 123/76)  BP(mean): --  RR: 16 (29 Oct 2022 05:55) (16 - 16)  SpO2: 99% (29 Oct 2022 05:55) (95% - 99%)    Parameters below as of 29 Oct 2022 05:55  Patient On (Oxygen Delivery Method): room air        REVIEW OF SYSTEMS:  CONSTITUTIONAL:  fatigue   RESPIRATORY: No cough, wheezing, chills or hemoptysis; No shortness of breath  CARDIOVASCULAR: No chest pain or chest pressure.  No shortness of breath or dyspnea on exertion.  No palpitations, dizziness, light headedness, syncope or near syncope.  No edema, no orthopnea.       PHYSICAL EXAM  Constitutional: frail elderly man   HEENT: normocephalic, atraumatic.  PERRLA. EOMI  Neck : No JVD. no carotid bruits  Lungs: decreased breath sounds bilaterally   Heart:  S1 and S2. No S3, S4. II/VI systolic murmur.  Abdomen:  soft, non tender.  Extremities: No clubbing, cyanoisis or edema  Skin:  no rashes        LABS:                        8.3    12.00 )-----------( 266      ( 29 Oct 2022 06:33 )             27.9     10-29    139  |  105  |  29<H>  ----------------------------<  89  4.2   |  29  |  0.79    Ca    9.0      29 Oct 2022 09:01    I&O's Summary    29 Oct 2022 07:01  -  29 Oct 2022 12:34  --------------------------------------------------------  IN: 0 mL / OUT: 0 mL / NET: 0 mL    < from: 12 Lead ECG (10.21.22 @ 08:07) >  Diagnosis Line Atrial fibrillation  Left axis deviation  Septal infarct (cited on or before 17-OCT-2022)  Prolonged QT  Abnormal ECG  When compared with ECG of 17-OCT-2022 22:32,  there does not appear to be a signficant interval change    < end of copied text >  < from: TTE Echo Complete w/o Contrast w/ Doppler (10.18.22 @ 07:55) >   1. Severely decreased global left ventricular systolic function.   2. Left ventricular ejection fraction, by visual estimation, is 20 to   25%.   3. Severe global left ventricle hypokinesis with regional variation: the   inferolateral wall and inferior wall appear akinetic.   4. Normal left ventricular internal cavity size.   5. The mitral in-flow pattern reveals no discernable A-wave, therefore   no comment on diastolic function can be made.   6. There is moderate septal left ventricular hypertrophy.   7. Moderately reduced RV systolic function.   8. Severely enlarged left atrium.   9. Right atrial enlargement.  10. Mild mitral annular calcification.  11. Mild thickening and calcification of the anterior and posterior   mitral valve leaflets.  12. Moderate mitral valve regurgitation.  13. Mild tricuspid regurgitation.  14. Mild aortic valve leaflet thickening and calcification.  15. Sclerotic aortic valve with normal opening.  16. Moderate aortic regurgitation.  17. Dilated proximal ascending aorta (4.4 cm).  18. Mildly dilated pulmonary artery.  19. Large pleural effusion in the left lateral region.  20. There is no evidence of pericardial effusion.    [id number] Vignesh Wallace MD Electronically signed on   10/18/2022 at 1:31:28 PM          < end of copied text >

## 2022-10-29 NOTE — PROGRESS NOTE ADULT - SUBJECTIVE AND OBJECTIVE BOX
Patient is a 82y old  Male who presents with a chief complaint of dyspnea, hypoxia - acute on chronic systolic CHF (28 Oct 2022 22:24)    Dubois with some hematuria   Started on hep gtt yesterday   Patient seen and examined at bedside.    ALLERGIES:  PC Pen VK (Other)    MEDICATIONS  (STANDING):  aMIOdarone    Tablet 200 milliGRAM(s) Oral daily  aspirin  chewable 81 milliGRAM(s) Oral daily  famotidine    Tablet 20 milliGRAM(s) Oral daily  heparin  Infusion.  Unit(s)/Hr (9.5 mL/Hr) IV Continuous <Continuous>  lactobacillus acidophilus 1 Tablet(s) Oral daily  levothyroxine 150 MICROGram(s) Oral daily  metoprolol succinate ER 50 milliGRAM(s) Oral daily  mexiletine 150 milliGRAM(s) Oral every 12 hours  Preservision Areds2 2 Capsule(s) 2 Capsule(s) Oral daily  senna 1 Tablet(s) Oral at bedtime  sertraline 150 milliGRAM(s) Oral daily  tamsulosin 0.4 milliGRAM(s) Oral at bedtime  traZODone 25 milliGRAM(s) Oral at bedtime    MEDICATIONS  (PRN):  bisacodyl Suppository 10 milliGRAM(s) Rectal daily PRN Constipation  lactulose Syrup 20 Gram(s) Oral daily PRN constipation  lidocaine 2% Gel 1 Application(s) Topical three times a day PRN pain at dubois    Vital Signs Last 24 Hrs  T(F): 98.6 (29 Oct 2022 05:55), Max: 98.6 (29 Oct 2022 05:55)  HR: 77 (29 Oct 2022 05:55) (77 - 85)  BP: 123/76 (29 Oct 2022 05:55) (109/68 - 123/76)  RR: 16 (29 Oct 2022 05:55) (16 - 16)  SpO2: 99% (29 Oct 2022 05:55) (95% - 99%)  I&O's Summary    29 Oct 2022 07:01  -  29 Oct 2022 12:10  --------------------------------------------------------  IN: 0 mL / OUT: 0 mL / NET: 0 mL        PHYSICAL EXAM:  General: NAD, A/O x 1-2, agitated   ENT: MMM, no thrush  Neck: Supple, No JVD  Lungs: Non labored breathing,  decreased breath sounds bilaterally   Cardio: RRR, S1/S2, no pitting edema bilaterally  Abdomen: Soft, Nontender, Nondistended; Bowel sounds present  : Dubois w hematuria   Extremities: No calf tenderness, moves all extremities    LABS:                        8.3    12.00 )-----------( 266      ( 29 Oct 2022 06:33 )             27.9       10-29    139  |  105  |  29  ----------------------------<  89  4.2   |  29  |  0.79    Ca    9.0      29 Oct 2022 09:01  Mg     2.0     10-27    TPro  6.3  /  Alb  2.5  /  TBili  0.3  /  DBili  x   /  AST  48  /  ALT  81  /  AlkPhos  153  10-27       PTT - ( 29 Oct 2022 06:33 )  PTT:36.1 sec                                   RADIOLOGY & ADDITIONAL TESTS:    Care Discussed with Consultants/Other Providers:

## 2022-10-29 NOTE — PROGRESS NOTE ADULT - ASSESSMENT
82 year old M [facility] HTN, CAD s/p CABG, severe cardiomyopathy (EF 20%), afib (not on AC due to hematuria), BPH, LUE DVT, history of LLE DVT, recently hospitalized at [place] for uorsepsis and urinary retention (now with dubois), brought in from [facility] for hypoxia, admitted for acute on chronic systolic CHF exacerbation. Course c/b hematuria     Acute Hypoxic Respiratory Failure  Acute on Chronic Systolic CHF Exacerbation  Severe Cardiomyopathy (EF 20%)  Hypotension; improved  -ECHO with EF 20-25% c/w previous echos-unable to medical optimize patient with guideline directed therapy due to hypotension  -Entresto was discontinued due to hypotension  -off supplemental oxygen therapy; sats on RA 95%  -No Farxiga due to hypoglycemia-- unable to further optimize medical therapy for CHF guidelines  -continue ASA and amiodarone  -Restarted BB 10/25 with parameters  -follow up with primary cardiologist to discuss prophylactic ICD    Bilateral DVTs  -US revealed b/l DVTs involving RT deep femoral vein, LT common femoral and deep veins  -pt with hx of DVTs found last month during hospitalization at [facility]  -stopped AC due to hematuria  -Vascular following Dr Lucas; started hep gtt 10/28-- pt was cleared by Urology  -if pt tolerates Hep gtt over the next 2-3 days will not need IVC filter--scheduled for Tuesday     Hematuria; persistent  -Urology following; cont dubois, OK to start Hep, follow H/H very closely  -Hgb 8.3  -KUB--bladder debris and diffuse wall thickening, no Hydro  -stopped Eliquis    CAD s/p CABG  -pt with hx of remote CABG and recent angiogram with occluded grafts and medical therapy recommended  -elevated Tn likely demand ischemia  -appears stable at this time, continue ASA, hold statin due to elevated LFTs    Ascending Aortic Aneurysm  -CT chest showed ascending thoracic aorta 4.3 cm in diameter  -aberrant right subclavian artery  -atherosclerotic calcifications of the aorta s/p CABG  -consider vascular evaluation outpatient    Transaminitis  -Continue holding statin for now, LFTs slowly down trending      Atrial Fibrillation; chronic  -Eliquis started 10/22, now again w hematuria so stopped again   -continue mexiletine and amiodarone, and metoprolol     Leukocytosis  UTI   -culture + for Pseudomonas Aeruginosa,--completed course of cefepime  -blood cx negative to date    h/o Hypothyroid  -continue synthroid 150 mcg daily    BPH with retention  -continue flomax and dubois    Severe Protein Calorie Malnutrition  -nutrition evaluation appreciated  -continue ensure plus high protein cans BID    DVT ppx - with hematuria so AC on hold    Code status:  DNR     Dispo:  updated spouse Kirsten De La Cruz on plan of care.  [phone].  When pt is medically stable will go back to [facility].  No discharge planning yet     *case d.w  Surgical PA, Berna          82 year old M [facility] HTN, CAD s/p CABG, severe cardiomyopathy (EF 20%), afib (not on AC due to hematuria), BPH, LUE DVT, history of LLE DVT, recently hospitalized at [place] for uorsepsis and urinary retention (now with dubois), brought in from [facility] for hypoxia, admitted for acute on chronic systolic CHF exacerbation. Course c/b hematuria     Acute Hypoxic Respiratory Failure  Acute on Chronic Systolic CHF Exacerbation  Severe Cardiomyopathy (EF 20%)  Hypotension; improved  -ECHO with EF 20-25% c/w previous echos--Entresto was discontinued due to hypotension  -off supplemental oxygen therapy; sats on RA 95%  -No Farxiga due to hypoglycemia-- unable to further optimize medical therapy for CHF guidelines  -continue ASA and amiodarone  -Restarted BB 10/25 with parameters  -Started losartan 10/29 with parameters   -follow up with primary cardiologist to discuss prophylactic ICD    Bilateral DVTs  -US revealed b/l DVTs involving RT deep femoral vein, LT common femoral and deep veins  -pt with hx of DVTs found last month during hospitalization at [facility]  -stopped AC due to hematuria  -Vascular following Dr Lucas; started hep gtt 10/28-- pt was cleared by Urology  -if pt tolerates Hep gtt over the next 2-3 days will not need IVC filter--scheduled for Tuesday     Hematuria; persistent  -Urology following; cont silvino, OK to start Hep, follow H/H very closely  -Hgb 8.3  -KUB--bladder debris and diffuse wall thickening, no Hydro  -stopped Eliquis    CAD s/p CABG  -pt with hx of remote CABG and recent angiogram with occluded grafts and medical therapy recommended  -elevated Tn likely demand ischemia  -appears stable at this time, continue ASA, hold statin due to elevated LFTs    Ascending Aortic Aneurysm  -CT chest showed ascending thoracic aorta 4.3 cm in diameter  -aberrant right subclavian artery  -atherosclerotic calcifications of the aorta s/p CABG  -consider vascular evaluation outpatient    Transaminitis  -Continue holding statin for now, LFTs slowly down trending      Atrial Fibrillation; chronic  -Eliquis started 10/22, now again w hematuria so stopped again   -continue mexiletine and amiodarone, and metoprolol     Leukocytosis  UTI   -culture + for Pseudomonas Aeruginosa,--completed course of cefepime  -blood cx negative to date    h/o Hypothyroid  -continue synthroid 150 mcg daily    BPH with retention  -continue flomax and dubois    Severe Protein Calorie Malnutrition  -nutrition evaluation appreciated  -continue ensure plus high protein cans BID    DVT ppx - with hematuria so AC on hold    Code status:  DNR     Dispo:  updated spouse Kirsten De La Cruz on plan of care.  [phone].  When pt is medically stable will go back to [facility].  No discharge planning yet     *case d.w  Surgical PA, Berna

## 2022-10-29 NOTE — PROGRESS NOTE ADULT - ASSESSMENT
82 year old man admitted from SNF due to acute on chronic systolic CHF.  Elevated troponin due to demand ischemia  Cardiac history includes CAD s/p CABG, severe cardiomyopathy, AF ( not on a/c due to hematuria)   Medical history of recent sepsis, urinary retention with Starks catheter   +DVT     Plan  - continue amiodarone and metoprolol  - continue mexiletine   - consider ACE-l/ARB if BP remains stable   - cautiously continue IV heparin   - IVC filter scheduled for next week     discussed with Medicine team

## 2022-10-30 DIAGNOSIS — N40.1 BENIGN PROSTATIC HYPERPLASIA WITH LOWER URINARY TRACT SYMPTOMS: ICD-10-CM

## 2022-10-30 DIAGNOSIS — R31.0 GROSS HEMATURIA: ICD-10-CM

## 2022-10-30 LAB
APTT BLD: 55.7 SEC — HIGH (ref 27.5–35.5)
BLD GP AB SCN SERPL QL: SIGNIFICANT CHANGE UP
HCT VFR BLD CALC: 23.1 % — LOW (ref 39–50)
HCT VFR BLD CALC: 26.3 % — LOW (ref 39–50)
HCT VFR BLD CALC: 26.8 % — LOW (ref 39–50)
HGB BLD-MCNC: 7.2 G/DL — LOW (ref 13–17)
HGB BLD-MCNC: 7.9 G/DL — LOW (ref 13–17)
HGB BLD-MCNC: 8.4 G/DL — LOW (ref 13–17)
MCHC RBC-ENTMCNC: 28.5 PG — SIGNIFICANT CHANGE UP (ref 27–34)
MCHC RBC-ENTMCNC: 28.8 PG — SIGNIFICANT CHANGE UP (ref 27–34)
MCHC RBC-ENTMCNC: 29.1 PG — SIGNIFICANT CHANGE UP (ref 27–34)
MCHC RBC-ENTMCNC: 30 GM/DL — LOW (ref 32–36)
MCHC RBC-ENTMCNC: 31.2 GM/DL — LOW (ref 32–36)
MCHC RBC-ENTMCNC: 31.3 GM/DL — LOW (ref 32–36)
MCV RBC AUTO: 91.8 FL — SIGNIFICANT CHANGE UP (ref 80–100)
MCV RBC AUTO: 93.5 FL — SIGNIFICANT CHANGE UP (ref 80–100)
MCV RBC AUTO: 94.9 FL — SIGNIFICANT CHANGE UP (ref 80–100)
NRBC # BLD: 0 /100 WBCS — SIGNIFICANT CHANGE UP (ref 0–0)
PLATELET # BLD AUTO: 253 K/UL — SIGNIFICANT CHANGE UP (ref 150–400)
PLATELET # BLD AUTO: 272 K/UL — SIGNIFICANT CHANGE UP (ref 150–400)
PLATELET # BLD AUTO: 274 K/UL — SIGNIFICANT CHANGE UP (ref 150–400)
RBC # BLD: 2.47 M/UL — LOW (ref 4.2–5.8)
RBC # BLD: 2.77 M/UL — LOW (ref 4.2–5.8)
RBC # BLD: 2.92 M/UL — LOW (ref 4.2–5.8)
RBC # FLD: 16 % — HIGH (ref 10.3–14.5)
RBC # FLD: 16.6 % — HIGH (ref 10.3–14.5)
RBC # FLD: 16.7 % — HIGH (ref 10.3–14.5)
WBC # BLD: 10.97 K/UL — HIGH (ref 3.8–10.5)
WBC # BLD: 12.47 K/UL — HIGH (ref 3.8–10.5)
WBC # BLD: 12.61 K/UL — HIGH (ref 3.8–10.5)
WBC # FLD AUTO: 10.97 K/UL — HIGH (ref 3.8–10.5)
WBC # FLD AUTO: 12.47 K/UL — HIGH (ref 3.8–10.5)
WBC # FLD AUTO: 12.61 K/UL — HIGH (ref 3.8–10.5)

## 2022-10-30 PROCEDURE — 99233 SBSQ HOSP IP/OBS HIGH 50: CPT | Mod: FS

## 2022-10-30 RX ORDER — FINASTERIDE 5 MG/1
5 TABLET, FILM COATED ORAL DAILY
Refills: 0 | Status: ACTIVE | OUTPATIENT
Start: 2022-10-30 | End: 2023-09-28

## 2022-10-30 RX ADMIN — MEXILETINE HYDROCHLORIDE 150 MILLIGRAM(S): 150 CAPSULE ORAL at 17:56

## 2022-10-30 RX ADMIN — HEPARIN SODIUM 0 UNIT(S)/HR: 5000 INJECTION INTRAVENOUS; SUBCUTANEOUS at 00:14

## 2022-10-30 RX ADMIN — Medication 25 MILLIGRAM(S): at 21:46

## 2022-10-30 RX ADMIN — FAMOTIDINE 20 MILLIGRAM(S): 10 INJECTION INTRAVENOUS at 11:45

## 2022-10-30 RX ADMIN — Medication 150 MICROGRAM(S): at 05:52

## 2022-10-30 RX ADMIN — Medication 50 MILLIGRAM(S): at 05:52

## 2022-10-30 RX ADMIN — FINASTERIDE 5 MILLIGRAM(S): 5 TABLET, FILM COATED ORAL at 11:46

## 2022-10-30 RX ADMIN — TAMSULOSIN HYDROCHLORIDE 0.4 MILLIGRAM(S): 0.4 CAPSULE ORAL at 21:46

## 2022-10-30 RX ADMIN — HEPARIN SODIUM 0 UNIT(S)/HR: 5000 INJECTION INTRAVENOUS; SUBCUTANEOUS at 07:13

## 2022-10-30 RX ADMIN — SERTRALINE 150 MILLIGRAM(S): 25 TABLET, FILM COATED ORAL at 11:46

## 2022-10-30 RX ADMIN — AMIODARONE HYDROCHLORIDE 200 MILLIGRAM(S): 400 TABLET ORAL at 05:52

## 2022-10-30 RX ADMIN — Medication 1 TABLET(S): at 11:45

## 2022-10-30 RX ADMIN — SENNA PLUS 1 TABLET(S): 8.6 TABLET ORAL at 21:46

## 2022-10-30 RX ADMIN — MEXILETINE HYDROCHLORIDE 150 MILLIGRAM(S): 150 CAPSULE ORAL at 05:52

## 2022-10-30 NOTE — PROGRESS NOTE ADULT - ASSESSMENT
82 year old M [facility] HTN, CAD s/p CABG, severe cardiomyopathy (EF 20%), afib (not on AC due to hematuria), BPH, LUE DVT, history of LLE DVT, recently hospitalized at [place] for uorsepsis and urinary retention (now with dubois), brought in from [facility] for hypoxia, admitted for acute on chronic systolic CHF exacerbation. Course c/b hematuria     Acute Hypoxic Respiratory Failure  Acute on Chronic Systolic CHF Exacerbation  Severe Cardiomyopathy (EF 20%)  Hypotension; improved  -ECHO with EF 20-25% c/w previous echos--Entresto was discontinued due to hypotension  -off supplemental oxygen therapy; sats on RA 95%  -No Farxiga due to hypoglycemia-- unable to further optimize medical therapy for CHF guidelines  -continue ASA and amiodarone  -Restarted BB 10/25 with parameters  -Started losartan 10/29 with parameters   -follow up with primary cardiologist to discuss prophylactic ICD    Bilateral DVTs  -US revealed b/l DVTs involving RT deep femoral vein, LT common femoral and deep veins  -pt with hx of DVTs found last month during hospitalization at [facility]  -stopped AC due to hematuria  -Vascular following Dr Lucas; started hep gtt 10/28; however stopped 10/30 due to hematuria  -Plan for IVC filter w vascular Tuesday     Hematuria; persistent  -Urology following; cont dubois, stop hep gtt  - Hgb 7.9  - Obtain repeat CBC at 12 PM  -KUB--bladder debris and diffuse wall thickening, no Hydro  -stopped Eliquis and stopped hep gtt  - Start proscar    CAD s/p CABG  -pt with hx of remote CABG and recent angiogram with occluded grafts and medical therapy recommended  -elevated Tn likely demand ischemia  -appears stable at this time, continue ASA, hold statin due to elevated LFTs    Ascending Aortic Aneurysm  -CT chest showed ascending thoracic aorta 4.3 cm in diameter  -aberrant right subclavian artery  -atherosclerotic calcifications of the aorta s/p CABG  -consider vascular evaluation outpatient    Transaminitis  -Continue holding statin for now, LFTs slowly down trending      Atrial Fibrillation; chronic  -Pff AC due to hematuria on 10/30  -continue mexiletine and amiodarone, and metoprolol     Leukocytosis  UTI   -culture + for Pseudomonas Aeruginosa,--completed course of cefepime  -blood cx negative to date    h/o Hypothyroid  -continue synthroid 150 mcg daily    BPH with retention  -continue flomax and dubois    Severe Protein Calorie Malnutrition  -nutrition evaluation appreciated  -continue ensure plus high protein cans BID    DVT ppx - with hematuria so AC on hold    Code status:  DNR     Dispo:  updated spouse Kirsten De La Cruz on plan of care.  [phone].  When pt is medically stable will go back to [facility].  No discharge planning yet     *case d.w Preet Aparicio and Dr Anderson          82 year old M [facility] HTN, CAD s/p CABG, severe cardiomyopathy (EF 20%), afib (not on AC due to hematuria), BPH, LUE DVT, history of LLE DVT, recently hospitalized at [place] for uorsepsis and urinary retention (now with dubois), brought in from [facility] for hypoxia, admitted for acute on chronic systolic CHF exacerbation. Course c/b hematuria     Acute Hypoxic Respiratory Failure  Acute on Chronic Systolic CHF Exacerbation  Severe Cardiomyopathy (EF 20%)  Hypotension; improved  -ECHO with EF 20-25% c/w previous echos--Entresto was discontinued due to hypotension  -off supplemental oxygen therapy; sats on RA 95%  -No Farxiga due to hypoglycemia-- unable to further optimize medical therapy for CHF guidelines  -continue amiodarone  -Restarted BB 10/25 with parameters  -Started losartan 10/29 with parameters   -follow up with primary cardiologist to discuss prophylactic ICD    Bilateral DVTs  -US revealed b/l DVTs involving RT deep femoral vein, LT common femoral and deep veins  -pt with hx of DVTs found last month during hospitalization at [facility]  -stopped AC due to hematuria  -Vascular following Dr Lucas; started hep gtt 10/28; however stopped 10/30 due to hematuria  -Plan for IVC filter w vascular Tuesday     Hematuria; persistent  -Urology following; cont dubois, stop hep gtt  - Hgb 7.9  - Obtain repeat CBC at 12 PM  -KUB--bladder debris and diffuse wall thickening, no Hydro  -stopped Eliquis and stopped hep gtt  - Start proscar    CAD s/p CABG  -pt with hx of remote CABG and recent angiogram with occluded grafts and medical therapy recommended  -elevated Tn likely demand ischemia  -appears stable at this time, continue ASA, hold statin due to elevated LFTs    Ascending Aortic Aneurysm  -CT chest showed ascending thoracic aorta 4.3 cm in diameter  -aberrant right subclavian artery  -atherosclerotic calcifications of the aorta s/p CABG  -consider vascular evaluation outpatient    Transaminitis  -Continue holding statin for now, LFTs slowly down trending      Atrial Fibrillation; chronic  -Pff AC due to hematuria on 10/30  -continue mexiletine and amiodarone, and metoprolol     Leukocytosis  UTI   -culture + for Pseudomonas Aeruginosa,--completed course of cefepime  -blood cx negative to date    h/o Hypothyroid  -continue synthroid 150 mcg daily    BPH with retention  -continue flomax and dubois    Severe Protein Calorie Malnutrition  -nutrition evaluation appreciated  -continue ensure plus high protein cans BID    DVT ppx - with hematuria so AC on hold    Code status:  DNR     Dispo:  updated spouse Kirsten De La Cruz on plan of care.  [phone].  When pt is medically stable will go back to [facility].  No discharge planning yet     *case d.w Preet Aparicio and Dr Anderson

## 2022-10-30 NOTE — PROGRESS NOTE ADULT - SUBJECTIVE AND OBJECTIVE BOX
Patient is a 82y old  Male who presents with a chief complaint of dyspnea, hypoxia - acute on chronic systolic CHF (30 Oct 2022 10:02)    Increased hematuria  Hep gtt stopped this AM   Patient seen and examined at bedside.    ALLERGIES:  PC Pen VK (Other)    MEDICATIONS  (STANDING):  aMIOdarone    Tablet 200 milliGRAM(s) Oral daily  famotidine    Tablet 20 milliGRAM(s) Oral daily  finasteride 5 milliGRAM(s) Oral daily  lactobacillus acidophilus 1 Tablet(s) Oral daily  levothyroxine 150 MICROGram(s) Oral daily  losartan 25 milliGRAM(s) Oral daily  metoprolol succinate ER 50 milliGRAM(s) Oral daily  mexiletine 150 milliGRAM(s) Oral every 12 hours  Preservision Areds2 2 Capsule(s) 2 Capsule(s) Oral daily  senna 1 Tablet(s) Oral at bedtime  sertraline 150 milliGRAM(s) Oral daily  tamsulosin 0.4 milliGRAM(s) Oral at bedtime  traZODone 25 milliGRAM(s) Oral at bedtime    MEDICATIONS  (PRN):  bisacodyl Suppository 10 milliGRAM(s) Rectal daily PRN Constipation  lactulose Syrup 20 Gram(s) Oral daily PRN constipation  lidocaine 2% Gel 1 Application(s) Topical three times a day PRN pain at dubois    Vital Signs Last 24 Hrs  T(F): 98 (30 Oct 2022 05:44), Max: 98 (30 Oct 2022 05:44)  HR: 98 (30 Oct 2022 05:44) (64 - 98)  BP: 108/71 (30 Oct 2022 05:44) (97/64 - 124/71)  RR: 18 (30 Oct 2022 05:44) (17 - 19)  SpO2: 97% (30 Oct 2022 05:44) (95% - 98%)  I&O's Summary    29 Oct 2022 07:01  -  30 Oct 2022 07:00  --------------------------------------------------------  IN: 0 mL / OUT: 1000 mL / NET: -1000 mL        PHYSICAL EXAM:  General: A+O x 1, easily agitated   ENT: MMM, no thrush  Neck: Supple, No JVD  Lungs: Non labored breathing,  Clear to auscultation bilaterally,   Cardio: irregularly irregualr , S1/S2,  no pitting edema bilaterally  Abdomen: Soft, Nontender, Nondistended; Bowel sounds present  : dubois bloody   Extremities: No calf tenderness, moves all extremities    LABS:                        7.9    10.97 )-----------( 274      ( 30 Oct 2022 07:53 )             26.3       10-29    139  |  105  |  29  ----------------------------<  89  4.2   |  29  |  0.79    Ca    9.0      29 Oct 2022 09:01         PTT - ( 30 Oct 2022 07:53 )  PTT:55.7 sec                                   RADIOLOGY & ADDITIONAL TESTS:    Care Discussed with Consultants/Other Providers:

## 2022-10-30 NOTE — PROGRESS NOTE ADULT - SUBJECTIVE AND OBJECTIVE BOX
Patient is a 82y old  Male who presents with a chief complaint of dyspnea, hypoxia - acute on chronic systolic CHF (29 Oct 2022 12:34)    HPI:  81 y/o male with HTN, CAD, hx of CABG, severe cardiomyopathy (EF 20%), Afib (not on AC due to hematuria), BPH, hx of LUE DVT, recently hospitalized at [facility] for urinary retention, urosepsis, also has RUE DVT, BIB EMS from [facility] for lethargy and  low O2 sat 83%, even on O2 2LNC.  O2 increased to 4LNC and O2 went up to 97%. Cxray with bilat increased markings, pleural eff c/w CHF.  Pt has dubois cath.  He denies chest pain, fever, chills, cough, nausea, vomiting, abd pain.  Pt admits to intermittent dyspnea, he also c/o achiness all over.    Pt received Lasix 40 mg IVPx 1, with good diuresis.   Pt is not a good historian.       dubois red, some clots    Interval Events:  Patient seen and examined at bedside.    MEDICATIONS:  MEDICATIONS  (STANDING):  aMIOdarone    Tablet 200 milliGRAM(s) Oral daily  famotidine    Tablet 20 milliGRAM(s) Oral daily  finasteride 5 milliGRAM(s) Oral daily  lactobacillus acidophilus 1 Tablet(s) Oral daily  levothyroxine 150 MICROGram(s) Oral daily  losartan 25 milliGRAM(s) Oral daily  metoprolol succinate ER 50 milliGRAM(s) Oral daily  mexiletine 150 milliGRAM(s) Oral every 12 hours  Preservision Areds2 2 Capsule(s) 2 Capsule(s) Oral daily  senna 1 Tablet(s) Oral at bedtime  sertraline 150 milliGRAM(s) Oral daily  tamsulosin 0.4 milliGRAM(s) Oral at bedtime  traZODone 25 milliGRAM(s) Oral at bedtime    MEDICATIONS  (PRN):  bisacodyl Suppository 10 milliGRAM(s) Rectal daily PRN Constipation  lactulose Syrup 20 Gram(s) Oral daily PRN constipation  lidocaine 2% Gel 1 Application(s) Topical three times a day PRN pain at dubois      Allergies    PC Pen VK (Other)    Intolerances        T(C): 36.7 (10-30-22 @ 05:44), Max: 37 (10-29-22 @ 05:55)  T(F): 98 (10-30-22 @ 05:44), Max: 98.6 (10-29-22 @ 05:55)  HR: 98 (10-30-22 @ 05:44) (64 - 98)  BP: 108/71 (10-30-22 @ 05:44) (97/64 - 124/71)  RR: 18 (10-30-22 @ 05:44) (16 - 19)  SpO2: 97% (10-30-22 @ 05:44) (95% - 99%)          10-29-22 @ 07:01  -  10-30-22 @ 07:00  --------------------------------------------------------  IN:  Total IN: 0 mL    OUT:    Indwelling Catheter - Urethral (mL): 1000 mL  Total OUT: 1000 mL    Total NET: -1000 mL          LABS:      CBC Full  -  ( 30 Oct 2022 07:53 )  WBC Count : 10.97 K/uL  RBC Count : 2.77 M/uL  Hemoglobin : 7.9 g/dL  Hematocrit : 26.3 %  Platelet Count - Automated : 274 K/uL  Mean Cell Volume : 94.9 fl  Mean Cell Hemoglobin : 28.5 pg  Mean Cell Hemoglobin Concentration : 30.0 gm/dL  Auto Neutrophil # : x  Auto Lymphocyte # : x  Auto Monocyte # : x  Auto Eosinophil # : x  Auto Basophil # : x  Auto Neutrophil % : x  Auto Lymphocyte % : x  Auto Monocyte % : x  Auto Eosinophil % : x  Auto Basophil % : x    10-29    139  |  105  |  29<H>  ----------------------------<  89  4.2   |  29  |  0.79    Ca    9.0      29 Oct 2022 09:01      PTT - ( 30 Oct 2022 07:53 )  PTT:55.7 sec              Physical Exam    Constitutional: alert, no acute distress, uncooperative    Abdomen: soft, nontender, nondistended, no HSM    Genitourinary: no bladder distention, dubois red with clots

## 2022-10-31 LAB
ALBUMIN SERPL ELPH-MCNC: 2.7 G/DL — LOW (ref 3.3–5)
ALP SERPL-CCNC: 126 U/L — HIGH (ref 40–120)
ALT FLD-CCNC: 68 U/L — HIGH (ref 10–45)
ANION GAP SERPL CALC-SCNC: 6 MMOL/L — SIGNIFICANT CHANGE UP (ref 5–17)
AST SERPL-CCNC: 34 U/L — SIGNIFICANT CHANGE UP (ref 10–40)
BILIRUB SERPL-MCNC: 0.3 MG/DL — SIGNIFICANT CHANGE UP (ref 0.2–1.2)
BUN SERPL-MCNC: 28 MG/DL — HIGH (ref 7–23)
CALCIUM SERPL-MCNC: 9.2 MG/DL — SIGNIFICANT CHANGE UP (ref 8.4–10.5)
CHLORIDE SERPL-SCNC: 107 MMOL/L — SIGNIFICANT CHANGE UP (ref 96–108)
CO2 SERPL-SCNC: 29 MMOL/L — SIGNIFICANT CHANGE UP (ref 22–31)
CREAT SERPL-MCNC: 0.89 MG/DL — SIGNIFICANT CHANGE UP (ref 0.5–1.3)
EGFR: 85 ML/MIN/1.73M2 — SIGNIFICANT CHANGE UP
GLUCOSE SERPL-MCNC: 107 MG/DL — HIGH (ref 70–99)
HCT VFR BLD CALC: 26.3 % — LOW (ref 39–50)
HGB BLD-MCNC: 8.3 G/DL — LOW (ref 13–17)
MCHC RBC-ENTMCNC: 29.4 PG — SIGNIFICANT CHANGE UP (ref 27–34)
MCHC RBC-ENTMCNC: 31.6 GM/DL — LOW (ref 32–36)
MCV RBC AUTO: 93.3 FL — SIGNIFICANT CHANGE UP (ref 80–100)
POTASSIUM SERPL-MCNC: 4.4 MMOL/L — SIGNIFICANT CHANGE UP (ref 3.5–5.3)
POTASSIUM SERPL-SCNC: 4.4 MMOL/L — SIGNIFICANT CHANGE UP (ref 3.5–5.3)
PROT SERPL-MCNC: 6.2 G/DL — SIGNIFICANT CHANGE UP (ref 6–8.3)
SARS-COV-2 RNA SPEC QL NAA+PROBE: SIGNIFICANT CHANGE UP
SODIUM SERPL-SCNC: 142 MMOL/L — SIGNIFICANT CHANGE UP (ref 135–145)
WBC # BLD: 10.72 K/UL — HIGH (ref 3.8–10.5)
WBC # FLD AUTO: 10.72 K/UL — HIGH (ref 3.8–10.5)

## 2022-10-31 PROCEDURE — 99233 SBSQ HOSP IP/OBS HIGH 50: CPT | Mod: FS

## 2022-10-31 PROCEDURE — 99233 SBSQ HOSP IP/OBS HIGH 50: CPT

## 2022-10-31 RX ADMIN — LOSARTAN POTASSIUM 25 MILLIGRAM(S): 100 TABLET, FILM COATED ORAL at 05:22

## 2022-10-31 RX ADMIN — AMIODARONE HYDROCHLORIDE 200 MILLIGRAM(S): 400 TABLET ORAL at 05:22

## 2022-10-31 RX ADMIN — Medication 50 MILLIGRAM(S): at 05:22

## 2022-10-31 RX ADMIN — Medication 1 TABLET(S): at 12:02

## 2022-10-31 RX ADMIN — Medication 25 MILLIGRAM(S): at 22:28

## 2022-10-31 RX ADMIN — TAMSULOSIN HYDROCHLORIDE 0.4 MILLIGRAM(S): 0.4 CAPSULE ORAL at 22:28

## 2022-10-31 RX ADMIN — FINASTERIDE 5 MILLIGRAM(S): 5 TABLET, FILM COATED ORAL at 12:02

## 2022-10-31 RX ADMIN — MEXILETINE HYDROCHLORIDE 150 MILLIGRAM(S): 150 CAPSULE ORAL at 17:43

## 2022-10-31 RX ADMIN — MEXILETINE HYDROCHLORIDE 150 MILLIGRAM(S): 150 CAPSULE ORAL at 05:21

## 2022-10-31 RX ADMIN — FAMOTIDINE 20 MILLIGRAM(S): 10 INJECTION INTRAVENOUS at 12:02

## 2022-10-31 RX ADMIN — Medication 150 MICROGRAM(S): at 05:22

## 2022-10-31 RX ADMIN — SENNA PLUS 1 TABLET(S): 8.6 TABLET ORAL at 22:28

## 2022-10-31 RX ADMIN — SERTRALINE 150 MILLIGRAM(S): 25 TABLET, FILM COATED ORAL at 12:02

## 2022-10-31 NOTE — PROGRESS NOTE ADULT - SUBJECTIVE AND OBJECTIVE BOX
Patient is a 82y old  Male who presents with a chief complaint of dyspnea, hypoxia - acute on chronic systolic CHF (30 Oct 2022 11:32)    HPI:  81 y/o male with HTN, CAD, hx of CABG, severe cardiomyopathy (EF 20%), Afib (not on AC due to hematuria), BPH, hx of LUE DVT, recently hospitalized at [facility] for urinary retention, urosepsis, also has RUE DVT, BIB EMS from [facility] for lethargy and  low O2 sat 83%, even on O2 2LNC.  O2 increased to 4LNC and O2 went up to 97%. Cxray with bilat increased markings, pleural eff c/w CHF.  Pt has dubois cath.  He denies chest pain, fever, chills, cough, nausea, vomiting, abd pain.  Pt admits to intermittent dyspnea, he also c/o achiness all over.    Pt received Lasix 40 mg IVPx 1, with good diuresis.   Pt is not a good historian.     dubois less bloody, heparin stopped    Interval Events:  Patient seen and examined at bedside.    MEDICATIONS:  MEDICATIONS  (STANDING):  aMIOdarone    Tablet 200 milliGRAM(s) Oral daily  famotidine    Tablet 20 milliGRAM(s) Oral daily  finasteride 5 milliGRAM(s) Oral daily  lactobacillus acidophilus 1 Tablet(s) Oral daily  levothyroxine 150 MICROGram(s) Oral daily  losartan 25 milliGRAM(s) Oral daily  metoprolol succinate ER 50 milliGRAM(s) Oral daily  mexiletine 150 milliGRAM(s) Oral every 12 hours  Preservision Areds2 2 Capsule(s) 2 Capsule(s) Oral daily  senna 1 Tablet(s) Oral at bedtime  sertraline 150 milliGRAM(s) Oral daily  tamsulosin 0.4 milliGRAM(s) Oral at bedtime  traZODone 25 milliGRAM(s) Oral at bedtime    MEDICATIONS  (PRN):  bisacodyl Suppository 10 milliGRAM(s) Rectal daily PRN Constipation  lactulose Syrup 20 Gram(s) Oral daily PRN constipation  lidocaine 2% Gel 1 Application(s) Topical three times a day PRN pain at dubois      Allergies    PC Pen VK (Other)    Intolerances        T(C): 36.3 (10-31-22 @ 05:46), Max: 36.9 (10-30-22 @ 13:00)  T(F): 97.4 (10-31-22 @ 05:46), Max: 98.4 (10-30-22 @ 13:00)  HR: 78 (10-31-22 @ 05:46) (64 - 121)  BP: 116/78 (10-31-22 @ 05:46) (96/60 - 124/71)  RR: 16 (10-31-22 @ 05:46) (16 - 19)  SpO2: 95% (10-31-22 @ 05:46) (95% - 99%)          10-29-22 @ 07:01  -  10-30-22 @ 07:00  --------------------------------------------------------  IN:  Total IN: 0 mL    OUT:    Indwelling Catheter - Urethral (mL): 1000 mL  Total OUT: 1000 mL    Total NET: -1000 mL      10-30-22 @ 07:01  -  10-31-22 @ 07:00  --------------------------------------------------------  IN:  Total IN: 0 mL    OUT:    Indwelling Catheter - Urethral (mL): 1150 mL  Total OUT: 1150 mL    Total NET: -1150 mL          LABS:      CBC Full  -  ( 31 Oct 2022 06:50 )  WBC Count : 10.72 K/uL  RBC Count : 2.82 M/uL  Hemoglobin : 8.3 g/dL  Hematocrit : 26.3 %  Platelet Count - Automated : 252 K/uL  Mean Cell Volume : 93.3 fl  Mean Cell Hemoglobin : 29.4 pg  Mean Cell Hemoglobin Concentration : 31.6 gm/dL  Auto Neutrophil # : x  Auto Lymphocyte # : x  Auto Monocyte # : x  Auto Eosinophil # : x  Auto Basophil # : x  Auto Neutrophil % : x  Auto Lymphocyte % : x  Auto Monocyte % : x  Auto Eosinophil % : x  Auto Basophil % : x    10-29    139  |  105  |  29<H>  ----------------------------<  89  4.2   |  29  |  0.79    Ca    9.0      29 Oct 2022 09:01      PTT - ( 30 Oct 2022 07:53 )  PTT:55.7 sec              Physical Exam    Constitutional: alert, no acute distress    Abdomen: soft, nontender, nondistended, no HSM    Genitourinary: no bladder distention, dubois draining

## 2022-10-31 NOTE — PROGRESS NOTE ADULT - ASSESSMENT
82 year old man admitted from SNF due to acute on chronic systolic CHF exacerbation now improved.  Elevated troponin due to demand ischemia  Cardiac history includes CAD s/p CABG, severe cardiomyopathy, AF ( not on a/c due to hematuria)   Medical history of recent sepsis, urinary retention with Starks catheter   +DVT and is being considered for IVC filter tomorrow .   He is not able to be anticoagulated due to recurrent hematuria when he is challenged with a/c   Recent cardiac cath demonstrated occluded grafts and medical therapy was recommended   Echo with severe LV dysfunction   The current cardiac status appears to be at its baseline    Plan  - He is at high risk for branden operative cardiac events due to known cardiomyopathy and recent CHF exacerbation.   However, his current cardiac status appears to have returned to it's baseline and planned IVC filter placement that is medically necessary can proceed at an increased risk.   - Post op telemetry monitoring is indicated  - Monitor for post op fluid overload   - continue amiodarone and metoprolol  - continue mexiletine   - continue Losartan     discussed with Medicine team

## 2022-10-31 NOTE — PROGRESS NOTE ADULT - SUBJECTIVE AND OBJECTIVE BOX
Patient is a 82y old  Male who presents with a chief complaint of dyspnea, hypoxia - acute on chronic systolic CHF (31 Oct 2022 08:38)      Patient seen and examined at bedside. No overnight events reported.     ALLERGIES:  PC Pen VK (Other)    MEDICATIONS  (STANDING):  aMIOdarone    Tablet 200 milliGRAM(s) Oral daily  famotidine    Tablet 20 milliGRAM(s) Oral daily  finasteride 5 milliGRAM(s) Oral daily  lactobacillus acidophilus 1 Tablet(s) Oral daily  levothyroxine 150 MICROGram(s) Oral daily  losartan 25 milliGRAM(s) Oral daily  metoprolol succinate ER 50 milliGRAM(s) Oral daily  mexiletine 150 milliGRAM(s) Oral every 12 hours  Preservision Areds2 2 Capsule(s) 2 Capsule(s) Oral daily  senna 1 Tablet(s) Oral at bedtime  sertraline 150 milliGRAM(s) Oral daily  tamsulosin 0.4 milliGRAM(s) Oral at bedtime  traZODone 25 milliGRAM(s) Oral at bedtime    MEDICATIONS  (PRN):  bisacodyl Suppository 10 milliGRAM(s) Rectal daily PRN Constipation  lactulose Syrup 20 Gram(s) Oral daily PRN constipation  lidocaine 2% Gel 1 Application(s) Topical three times a day PRN pain at dubois    Vital Signs Last 24 Hrs  T(F): 97.4 (31 Oct 2022 05:46), Max: 98.4 (30 Oct 2022 13:00)  HR: 78 (31 Oct 2022 05:46) (74 - 121)  BP: 116/78 (31 Oct 2022 05:46) (96/60 - 116/78)  RR: 16 (31 Oct 2022 05:46) (16 - 19)  SpO2: 95% (31 Oct 2022 05:46) (95% - 99%)  I&O's Summary    30 Oct 2022 07:01  -  31 Oct 2022 07:00  --------------------------------------------------------  IN: 0 mL / OUT: 1150 mL / NET: -1150 mL    31 Oct 2022 07:01  -  31 Oct 2022 11:39  --------------------------------------------------------  IN: 200 mL / OUT: 0 mL / NET: 200 mL      PHYSICAL EXAM:  General: NAD, A/O x 1/2, easily agitated   ENT: No gross hearing impairment, Moist mucous membranes, no thrush  Neck: Supple, No JVD  Lungs: Clear to auscultation bilaterally, good air entry, non-labored breathing  Cardio: Irregular RR, S1/S2, No murmur  Abdomen: Soft, Nontender, Nondistended; Bowel sounds present  : Dubois with blood tinged urine  Extremities: No calf tenderness, No cyanosis, No pitting edema  Psych: Appropriate mood and affect    LABS:                        8.3    10.72 )-----------( 252      ( 31 Oct 2022 06:50 )             26.3     10-31    142  |  107  |  28  ----------------------------<  107  4.4   |  29  |  0.89    Ca    9.2      31 Oct 2022 06:50    TPro  6.2  /  Alb  2.7  /  TBili  0.3  /  DBili  x   /  AST  34  /  ALT  68  /  AlkPhos  126  10-31          PTT - ( 30 Oct 2022 07:53 )  PTT:55.7 sec                                  RADIOLOGY & ADDITIONAL TESTS:    Care Discussed with Consultants/Other Providers:

## 2022-10-31 NOTE — PROGRESS NOTE ADULT - SUBJECTIVE AND OBJECTIVE BOX
SUBJ:  Patient is a 82y old  Male who presents with a chief complaint of dyspnea, hypoxia - acute on chronic systolic CHF (31 Oct 2022 11:38)  patient seen and examined  he is in bed.. .no distress  no dyspnea or chest pain       PAST MEDICAL & SURGICAL HISTORY:  Chronic atrial fibrillation      History of cardiomyopathy      CAD (coronary artery disease)      BPH with obstruction/lower urinary tract symptoms      Hyperlipidemia      History of CVA (cerebrovascular accident)  left sided weakness      S/P CABG (coronary artery bypass graft)          MEDICATIONS  (STANDING):  aMIOdarone    Tablet 200 milliGRAM(s) Oral daily  famotidine    Tablet 20 milliGRAM(s) Oral daily  finasteride 5 milliGRAM(s) Oral daily  lactobacillus acidophilus 1 Tablet(s) Oral daily  levothyroxine 150 MICROGram(s) Oral daily  losartan 25 milliGRAM(s) Oral daily  metoprolol succinate ER 50 milliGRAM(s) Oral daily  mexiletine 150 milliGRAM(s) Oral every 12 hours  Preservision Areds2 2 Capsule(s) 2 Capsule(s) Oral daily  senna 1 Tablet(s) Oral at bedtime  sertraline 150 milliGRAM(s) Oral daily  tamsulosin 0.4 milliGRAM(s) Oral at bedtime  traZODone 25 milliGRAM(s) Oral at bedtime    MEDICATIONS  (PRN):  bisacodyl Suppository 10 milliGRAM(s) Rectal daily PRN Constipation  lactulose Syrup 20 Gram(s) Oral daily PRN constipation  lidocaine 2% Gel 1 Application(s) Topical three times a day PRN pain at dubois          Vital Signs Last 24 Hrs  T(C): 36.3 (31 Oct 2022 05:46), Max: 36.7 (30 Oct 2022 21:19)  T(F): 97.4 (31 Oct 2022 05:46), Max: 98 (30 Oct 2022 21:19)  HR: 78 (31 Oct 2022 05:46) (74 - 88)  BP: 116/78 (31 Oct 2022 05:46) (96/60 - 116/78)  BP(mean): 72 (30 Oct 2022 21:19) (72 - 72)  RR: 16 (31 Oct 2022 05:46) (16 - 19)  SpO2: 95% (31 Oct 2022 05:46) (95% - 99%)    Parameters below as of 31 Oct 2022 05:46  Patient On (Oxygen Delivery Method): room air        REVIEW OF SYSTEMS:  CONSTITUTIONAL: weakness   RESPIRATORY: No cough, wheezing, chills or hemoptysis; No shortness of breath  CARDIOVASCULAR: No chest pain or chest pressure.  No shortness of breath or dyspnea on exertion.  No palpitations, dizziness, light headedness, syncope or near syncope.  No edema, no orthopnea.   NEUROLOGICAL: No headaches, memory loss, loss of strength, numbness, or tremors      PHYSICAL EXAM  Constitutional:  elderly frail man in NAD   HEENT: normocephalic, atraumatic.  PERRLA. EOMI  Neck : No JVD. no carotid bruits  Lungs:  decreased breath sounds bilateral bases   Heart:  S1 and S2. No S3, S4. I/VI systolic murmur.  Abdomen:  soft, non tender.  Skin:  no rashes        LABS:                        8.3    10.72 )-----------( 252      ( 31 Oct 2022 06:50 )             26.3     10-31    142  |  107  |  28<H>  ----------------------------<  107<H>  4.4   |  29  |  0.89    Ca    9.2      31 Oct 2022 06:50    TPro  6.2  /  Alb  2.7<L>  /  TBili  0.3  /  DBili  x   /  AST  34  /  ALT  68<H>  /  AlkPhos  126<H>  10-31    I&O's Summary    30 Oct 2022 07:01  -  31 Oct 2022 07:00  --------------------------------------------------------  IN: 0 mL / OUT: 1150 mL / NET: -1150 mL    31 Oct 2022 07:01  -  31 Oct 2022 13:36  --------------------------------------------------------  IN: 200 mL / OUT: 0 mL / NET: 200 mL    < from: 12 Lead ECG (10.21.22 @ 08:07) >  Diagnosis Line Atrial fibrillation  Left axis deviation  Septal infarct (cited on or before 17-OCT-2022)  Prolonged QT  Abnormal ECG  When compared with ECG of 17-OCT-2022 22:32,  there does not appear to be a signficant interval change    < end of copied text >    tele AF     < from: TTE Echo Complete w/o Contrast w/ Doppler (10.18.22 @ 07:55) >  1. Severely decreased global left ventricular systolic function.   2. Left ventricular ejection fraction, by visual estimation, is 20 to   25%.   3. Severe global left ventricle hypokinesis with regional variation: the   inferolateral wall and inferior wall appear akinetic.   4. Normal left ventricular internal cavity size.   5. The mitral in-flow pattern reveals no discernable A-wave, therefore   no comment on diastolic function can be made.   6. There is moderate septal left ventricular hypertrophy.   7. Moderately reduced RV systolic function.   8. Severely enlarged left atrium.   9. Right atrial enlargement.  10. Mild mitral annular calcification.  11. Mild thickening and calcification of the anterior and posterior   mitral valve leaflets.  12. Moderate mitral valve regurgitation.  13. Mild tricuspid regurgitation.  14. Mild aortic valve leaflet thickening and calcification.  15. Sclerotic aortic valve with normal opening.  16. Moderate aortic regurgitation.  17. Dilated proximal ascending aorta (4.4 cm).  18. Mildly dilated pulmonary artery.  19. Large pleural effusion in the left lateral region.  20. There is no evidence of pericardial effusion.    < end of copied text >

## 2022-10-31 NOTE — PROGRESS NOTE ADULT - ASSESSMENT
82 year old M [facility] HTN, CAD s/p CABG, severe cardiomyopathy (EF 20%), afib (not on AC due to hematuria), BPH, LUE DVT, history of LLE DVT, recently hospitalized at [facility] for uorsepsis and urinary retention (now with dubois), brought in from [facility] for hypoxia, admitted for acute on chronic systolic CHF exacerbation. Course c/b hematuria     Acute Hypoxic Respiratory Failure  Acute on Chronic Systolic CHF Exacerbation  Severe Cardiomyopathy (EF 20%)  Hypotension; improved  -ECHO with EF 20-25% c/w previous echos--Entresto was discontinued due to hypotension  -off supplemental oxygen therapy; sats on RA 95%  -No Farxiga due to hypoglycemia-- unable to further optimize medical therapy for CHF guidelines  -continue amiodarone  -Restarted BB 10/25 with parameters  -Started losartan 10/29 with parameters   -follow up with primary cardiologist to discuss prophylactic ICD    Bilateral DVTs  -US revealed b/l DVTs involving RT deep femoral vein, LT common femoral and deep veins  -pt with hx of DVTs found last month during hospitalization at [facility]  -stopped AC due to hematuria  -Vascular following Dr Lucas; started hep gtt 10/28; however stopped 10/30 due to hematuria  -Plan for IVC filter w vascular Tuesday     Hematuria; persistent  -Urology following; cont dubois, stop hep gtt  - Hgb 8.3 holding steady from last night, pt received 1 unit PRBCs yesterday  -KUB--bladder debris and diffuse wall thickening, no Hydro  -stopped Eliquis and stopped hep gtt  - Start proscar    CAD s/p CABG  -pt with hx of remote CABG and recent angiogram with occluded grafts and medical therapy recommended  -elevated Tn likely demand ischemia  -appears stable at this time, continue ASA, hold statin due to elevated LFTs    Ascending Aortic Aneurysm  -CT chest showed ascending thoracic aorta 4.3 cm in diameter  -aberrant right subclavian artery  -atherosclerotic calcifications of the aorta s/p CABG  -consider vascular evaluation outpatient    Transaminitis  -Continue holding statin for now, LFTs slowly down trending      Atrial Fibrillation; chronic  -off AC due to hematuria on 10/30  -continue mexiletine and amiodarone, and metoprolol     Leukocytosis  UTI   -culture + for Pseudomonas Aeruginosa,--completed course of cefepime  -blood cx negative to date    h/o Hypothyroid  -continue synthroid 150 mcg daily    BPH with retention  -continue flomax and dubois    Severe Protein Calorie Malnutrition  -nutrition evaluation appreciated  -continue ensure plus high protein cans BID    DVT ppx - with hematuria so AC on hold    Code status:  DNR     Dispo:  updated spouse Kirsten De La Cruz on plan of care.  [phone].  When pt is medically stable will go back to [facility].  No discharge planning yet            82 year old M [facility] HTN, CAD s/p CABG, severe cardiomyopathy (EF 20%), afib (not on AC due to hematuria), BPH, LUE DVT, history of LLE DVT, recently hospitalized at [place] for uorsepsis and urinary retention (now with dubois), brought in from [facility] for hypoxia, admitted for acute on chronic systolic CHF exacerbation. Course c/b hematuria     Acute Hypoxic Respiratory Failure  Acute on Chronic Systolic CHF Exacerbation  Severe Cardiomyopathy (EF 20%)  Hypotension; improved  -ECHO with EF 20-25% c/w previous echos--Entresto was discontinued due to hypotension  -off supplemental oxygen therapy; sats on RA 95%  -No Farxiga due to hypoglycemia-- unable to further optimize medical therapy for CHF guidelines  -continue amiodarone  -Restarted BB 10/25 with parameters  -Started losartan 10/29 with parameters   -follow up with primary cardiologist to discuss prophylactic ICD    Bilateral DVTs  -US revealed b/l DVTs involving RT deep femoral vein, LT common femoral and deep veins  -pt with hx of DVTs found last month during hospitalization at [facility]  -stopped AC due to hematuria  -Vascular following Dr Lucas; started hep gtt 10/28; however stopped 10/30 due to hematuria  -Plan for IVC filter w vascular Tuesday     Hematuria; persistent  -Urology following; cont dubosi, stop hep gtt  - Hgb 8.3 holding steady from last night, pt received 1 unit PRBCs yesterday  -KUB--bladder debris and diffuse wall thickening, no Hydro  -stopped Eliquis and stopped hep gtt  - Start proscar    CAD s/p CABG  -pt with hx of remote CABG and recent angiogram with occluded grafts and medical therapy recommended  -elevated Tn likely demand ischemia  -appears stable at this time, continue ASA, hold statin due to elevated LFTs    Ascending Aortic Aneurysm  -CT chest showed ascending thoracic aorta 4.3 cm in diameter  -aberrant right subclavian artery  -atherosclerotic calcifications of the aorta s/p CABG  -consider vascular evaluation outpatient    Transaminitis  -Continue holding statin for now, LFTs slowly down trending      Atrial Fibrillation; chronic  -off AC due to hematuria on 10/30  -continue mexiletine and amiodarone, and metoprolol   -Cardiac clearance requested for prior to procedure    Leukocytosis  UTI   -culture + for Pseudomonas Aeruginosa,--completed course of cefepime  -blood cx negative to date    h/o Hypothyroid  -continue synthroid 150 mcg daily    BPH with retention  -continue flomax and dubois    Severe Protein Calorie Malnutrition  -nutrition evaluation appreciated  -continue ensure plus high protein cans BID    DVT ppx - with hematuria so AC on hold    Code status:  DNR     Dispo:  updated spouse Kirsten De La Cruz on plan of care.  Called [phone] and left voicemail with callback number.  When pt is medically stable will go back to [facility].  No discharge planning yet

## 2022-11-01 LAB
ANION GAP SERPL CALC-SCNC: 6 MMOL/L — SIGNIFICANT CHANGE UP (ref 5–17)
BUN SERPL-MCNC: 30 MG/DL — HIGH (ref 7–23)
CALCIUM SERPL-MCNC: 8.8 MG/DL — SIGNIFICANT CHANGE UP (ref 8.4–10.5)
CHLORIDE SERPL-SCNC: 108 MMOL/L — SIGNIFICANT CHANGE UP (ref 96–108)
CO2 SERPL-SCNC: 28 MMOL/L — SIGNIFICANT CHANGE UP (ref 22–31)
CREAT SERPL-MCNC: 0.84 MG/DL — SIGNIFICANT CHANGE UP (ref 0.5–1.3)
EGFR: 87 ML/MIN/1.73M2 — SIGNIFICANT CHANGE UP
GLUCOSE SERPL-MCNC: 87 MG/DL — SIGNIFICANT CHANGE UP (ref 70–99)
HCT VFR BLD CALC: 25 % — LOW (ref 39–50)
HCT VFR BLD CALC: 26.3 % — LOW (ref 39–50)
HGB BLD-MCNC: 7.9 G/DL — LOW (ref 13–17)
HGB BLD-MCNC: 8.3 G/DL — LOW (ref 13–17)
MCHC RBC-ENTMCNC: 29 PG — SIGNIFICANT CHANGE UP (ref 27–34)
MCHC RBC-ENTMCNC: 29.1 PG — SIGNIFICANT CHANGE UP (ref 27–34)
MCHC RBC-ENTMCNC: 31.6 GM/DL — LOW (ref 32–36)
MCHC RBC-ENTMCNC: 31.6 GM/DL — LOW (ref 32–36)
MCV RBC AUTO: 91.9 FL — SIGNIFICANT CHANGE UP (ref 80–100)
MCV RBC AUTO: 92.3 FL — SIGNIFICANT CHANGE UP (ref 80–100)
NRBC # BLD: 0 /100 WBCS — SIGNIFICANT CHANGE UP (ref 0–0)
NRBC # BLD: 0 /100 WBCS — SIGNIFICANT CHANGE UP (ref 0–0)
PLATELET # BLD AUTO: 251 K/UL — SIGNIFICANT CHANGE UP (ref 150–400)
PLATELET # BLD AUTO: 253 K/UL — SIGNIFICANT CHANGE UP (ref 150–400)
POTASSIUM SERPL-MCNC: 4.5 MMOL/L — SIGNIFICANT CHANGE UP (ref 3.5–5.3)
POTASSIUM SERPL-SCNC: 4.5 MMOL/L — SIGNIFICANT CHANGE UP (ref 3.5–5.3)
RBC # BLD: 2.72 M/UL — LOW (ref 4.2–5.8)
RBC # BLD: 2.85 M/UL — LOW (ref 4.2–5.8)
RBC # FLD: 16.2 % — HIGH (ref 10.3–14.5)
RBC # FLD: 16.2 % — HIGH (ref 10.3–14.5)
SODIUM SERPL-SCNC: 142 MMOL/L — SIGNIFICANT CHANGE UP (ref 135–145)
WBC # BLD: 10.88 K/UL — HIGH (ref 3.8–10.5)
WBC # BLD: 14.14 K/UL — HIGH (ref 3.8–10.5)
WBC # FLD AUTO: 10.88 K/UL — HIGH (ref 3.8–10.5)
WBC # FLD AUTO: 14.14 K/UL — HIGH (ref 3.8–10.5)

## 2022-11-01 PROCEDURE — 99233 SBSQ HOSP IP/OBS HIGH 50: CPT | Mod: FS

## 2022-11-01 RX ADMIN — Medication 25 MILLIGRAM(S): at 21:58

## 2022-11-01 RX ADMIN — Medication 50 MILLIGRAM(S): at 06:06

## 2022-11-01 RX ADMIN — SENNA PLUS 1 TABLET(S): 8.6 TABLET ORAL at 21:58

## 2022-11-01 RX ADMIN — MEXILETINE HYDROCHLORIDE 150 MILLIGRAM(S): 150 CAPSULE ORAL at 17:41

## 2022-11-01 RX ADMIN — Medication 150 MICROGRAM(S): at 06:06

## 2022-11-01 RX ADMIN — TAMSULOSIN HYDROCHLORIDE 0.4 MILLIGRAM(S): 0.4 CAPSULE ORAL at 21:57

## 2022-11-01 NOTE — PROGRESS NOTE ADULT - SUBJECTIVE AND OBJECTIVE BOX
Patient is a 82y old  Male who presents with a chief complaint of dyspnea, hypoxia - acute on chronic systolic CHF (01 Nov 2022 08:21)      Patient seen and examined at bedside. No overnight events reported.     ALLERGIES:  PC Pen VK (Other)    MEDICATIONS  (STANDING):  aMIOdarone    Tablet 200 milliGRAM(s) Oral daily  famotidine    Tablet 20 milliGRAM(s) Oral daily  finasteride 5 milliGRAM(s) Oral daily  lactobacillus acidophilus 1 Tablet(s) Oral daily  levothyroxine 150 MICROGram(s) Oral daily  losartan 25 milliGRAM(s) Oral daily  metoprolol succinate ER 50 milliGRAM(s) Oral daily  mexiletine 150 milliGRAM(s) Oral every 12 hours  Preservision Areds2 2 Capsule(s) 2 Capsule(s) Oral daily  senna 1 Tablet(s) Oral at bedtime  sertraline 150 milliGRAM(s) Oral daily  tamsulosin 0.4 milliGRAM(s) Oral at bedtime  traZODone 25 milliGRAM(s) Oral at bedtime    MEDICATIONS  (PRN):  bisacodyl Suppository 10 milliGRAM(s) Rectal daily PRN Constipation  lactulose Syrup 20 Gram(s) Oral daily PRN constipation  lidocaine 2% Gel 1 Application(s) Topical three times a day PRN pain at dubois    Vital Signs Last 24 Hrs  T(F): 98.3 (01 Nov 2022 05:34), Max: 98.3 (01 Nov 2022 05:34)  HR: 82 (01 Nov 2022 05:34) (80 - 82)  BP: 126/74 (01 Nov 2022 05:34) (100/67 - 126/74)  RR: 15 (01 Nov 2022 05:34) (15 - 18)  SpO2: 100% (01 Nov 2022 05:34) (98% - 100%)  I&O's Summary    31 Oct 2022 07:01  -  01 Nov 2022 07:00  --------------------------------------------------------  IN: 500 mL / OUT: 800 mL / NET: -300 mL      PHYSICAL EXAM:  General: Pale/ ill appearing, NAD, Awake, alert, forgetful, easily agitated   ENT: No gross hearing impairment, Moist mucous membranes, no thrush  Neck: Supple, No JVD  Lungs: Clear to auscultation bilaterally, good air entry, non-labored breathing  Cardio: irregular RR, S1/S2, No murmur  Abdomen: Soft, Nontender, Nondistended; Bowel sounds present  Extremities: No calf tenderness, No cyanosis, No pitting edema  Psych: Appropriate mood and affect    LABS:                        7.9    10.88 )-----------( 253      ( 01 Nov 2022 09:25 )             25.0     11-01    142  |  108  |  30  ----------------------------<  87  4.5   |  28  |  0.84    Ca    8.8      01 Nov 2022 09:25    TPro  6.2  /  Alb  2.7  /  TBili  0.3  /  DBili  x   /  AST  34  /  ALT  68  /  AlkPhos  126  10-31          PTT - ( 30 Oct 2022 07:53 )  PTT:55.7 sec                                COVID-19 PCR: NotDetec (10-31-22 @ 19:50)    RADIOLOGY & ADDITIONAL TESTS:    Care Discussed with Consultants/Other Providers: discussed plan with surgical HARI hidalgo

## 2022-11-01 NOTE — CHART NOTE - NSCHARTNOTEFT_GEN_A_CORE
Nutrition Follow Up Note  Hospital Course (Per Electronic Medical Record):   Source: Medical Record [X] Patient [X] Family [X] Nursing Staff [X]     Diet: NPO    Patient NPO due to patient scheduled for IVC filter, patient was tolerating his diet with good % , & noted consuming Po Ensure supplements due to dx of severe protein calorie malnutrition .     Current Weight: (10/31) 153.6/69.7kg - weight noted stable since admission                         (10/30) 157.4/71.4kg                         (10/28) 154.3/70kg     Pertinent Medications: MEDICATIONS  (STANDING):  aMIOdarone    Tablet 200 milliGRAM(s) Oral daily  famotidine    Tablet 20 milliGRAM(s) Oral daily  finasteride 5 milliGRAM(s) Oral daily  lactobacillus acidophilus 1 Tablet(s) Oral daily  levothyroxine 150 MICROGram(s) Oral daily  losartan 25 milliGRAM(s) Oral daily  metoprolol succinate ER 50 milliGRAM(s) Oral daily  mexiletine 150 milliGRAM(s) Oral every 12 hours  Preservision Areds2 2 Capsule(s) 2 Capsule(s) Oral daily  senna 1 Tablet(s) Oral at bedtime  sertraline 150 milliGRAM(s) Oral daily  tamsulosin 0.4 milliGRAM(s) Oral at bedtime  traZODone 25 milliGRAM(s) Oral at bedtime    MEDICATIONS  (PRN):  bisacodyl Suppository 10 milliGRAM(s) Rectal daily PRN Constipation  lactulose Syrup 20 Gram(s) Oral daily PRN constipation  lidocaine 2% Gel 1 Application(s) Topical three times a day PRN pain at dubois      Pertinent Labs:  11-01 Na142 mmol/L Glu 87 mg/dL K+ 4.5 mmol/L Cr  0.84 mg/dL BUN 30 mg/dL<H> 10-31 Alb 2.7 g/dL<L>  H/H 7.9/25.0(L)      Skin: (R) buttock skin tear    Edema: (+1) (L) Arm     Last BM: fecal incontinence noted     Estimated Needs:   [X] No Change since Previous Assessment      Previous Nutrition Diagnosis: Acute Severe Protein Calorie Malnutrition     Nutrition Diagnosis is [X] Ongoing, advance to po DASH/TLC with Ensure Plus BID as medically indicated          New Nutrition Diagnosis: [X] Not Applicable    Interventions:   1. advance diet to DASH/TLC with Ensure Plus BID       Monitoring & Evaluation: will monitor:  [X] Weights   [X] PO Intake   [X] Follow Up (Per Protocol)  [X] Tolerance to Diet Prescription       RD to follow as per Nutrition protocol  Rylee Plunkett RDN Nutrition Follow Up Note  Hospital Course (Per Electronic Medical Record):   Source: Medical Record [X] Patient [X] Family [X] Nursing Staff [X]     Diet: NPO    Patient NPO due to patient scheduled for IVC filter, patient was tolerating his diet with good % , & noted consuming Po Ensure supplements due to dx of severe protein calorie malnutrition .     Current Weight: (10/31) 153.6/69.7kg - weight noted stable since admission                         (10/30) 157.4/71.4kg                         (10/28) 154.3/70kg     Pertinent Medications: MEDICATIONS  (STANDING):  aMIOdarone    Tablet 200 milliGRAM(s) Oral daily  famotidine    Tablet 20 milliGRAM(s) Oral daily  finasteride 5 milliGRAM(s) Oral daily  lactobacillus acidophilus 1 Tablet(s) Oral daily  levothyroxine 150 MICROGram(s) Oral daily  losartan 25 milliGRAM(s) Oral daily  metoprolol succinate ER 50 milliGRAM(s) Oral daily  mexiletine 150 milliGRAM(s) Oral every 12 hours  Preservision Areds2 2 Capsule(s) 2 Capsule(s) Oral daily  senna 1 Tablet(s) Oral at bedtime  sertraline 150 milliGRAM(s) Oral daily  tamsulosin 0.4 milliGRAM(s) Oral at bedtime  traZODone 25 milliGRAM(s) Oral at bedtime    MEDICATIONS  (PRN):  bisacodyl Suppository 10 milliGRAM(s) Rectal daily PRN Constipation  lactulose Syrup 20 Gram(s) Oral daily PRN constipation  lidocaine 2% Gel 1 Application(s) Topical three times a day PRN pain at dubois      Pertinent Labs:  11-01 Na142 mmol/L Glu 87 mg/dL K+ 4.5 mmol/L Cr  0.84 mg/dL BUN 30 mg/dL<H> 10-31 Alb 2.7 g/dL<L>  H/H 7.9/25.0(L)      Skin: (R) buttock skin tear    Edema: (+1) (L) Arm     Last BM: fecal incontinence noted, Senna , Lactulose noted      Estimated Needs:   [X] No Change since Previous Assessment      Previous Nutrition Diagnosis: Acute Severe Protein Calorie Malnutrition     Nutrition Diagnosis is [X] Ongoing, advance to po DASH/TLC with Ensure Plus BID as medically indicated          New Nutrition Diagnosis: [X] Not Applicable    Interventions:   1. advance diet to DASH/TLC with Ensure Plus BID       Monitoring & Evaluation: will monitor:  [X] Weights   [X] PO Intake   [X] Follow Up (Per Protocol)  [X] Tolerance to Diet Prescription       RD to follow as per Nutrition protocol  Rylee Plunkett RDN

## 2022-11-01 NOTE — PROGRESS NOTE ADULT - ASSESSMENT
82 year old M [facility] HTN, CAD s/p CABG, severe cardiomyopathy (EF 20%), afib (not on AC due to hematuria), BPH, LUE DVT, history of LLE DVT, recently hospitalized at [place] for uorsepsis and urinary retention (now with dubois), brought in from [facility] for hypoxia, admitted for acute on chronic systolic CHF exacerbation. Course c/b hematuria     Acute Hypoxic Respiratory Failure  Acute on Chronic Systolic CHF Exacerbation  Severe Cardiomyopathy (EF 20%)  Hypotension; improved  -ECHO with EF 20-25% c/w previous echos--Entresto was discontinued due to hypotension  -off supplemental oxygen therapy; sats on RA 95%  -No Farxiga due to hypoglycemia-- unable to further optimize medical therapy for CHF guidelines  -continue amiodarone  -Restarted BB 10/25 with parameters  -Started losartan 10/29 with parameters   -follow up with primary cardiologist to discuss prophylactic ICD    Bilateral DVTs  -US revealed b/l DVTs involving RT deep femoral vein, LT common femoral and deep veins  -pt with hx of DVTs found last month during hospitalization at [facility]  -stopped AC due to hematuria  -Vascular following Dr Lucas; started hep gtt 10/28; however stopped 10/30 due to hematuria  -Plan for IVC filter w vascular Today     Hematuria; persistent  -Urology following; cont dubois, stop hep gtt  - Hgb 7.9 this morning, will hold off on transfusion currently as patient may be at risk of becoming overloaded prior to procedure   -KUB--bladder debris and diffuse wall thickening, no Hydro  -stopped Eliquis and stopped hep gtt  - Start proscar    CAD s/p CABG  -pt with hx of remote CABG and recent angiogram with occluded grafts and medical therapy recommended  -elevated Tn likely demand ischemia  -appears stable at this time, continue ASA, hold statin due to elevated LFTs    Ascending Aortic Aneurysm  -CT chest showed ascending thoracic aorta 4.3 cm in diameter  -aberrant right subclavian artery  -atherosclerotic calcifications of the aorta s/p CABG  -consider vascular evaluation outpatient    Transaminitis  -Continue holding statin for now, LFTs slowly down trending      Atrial Fibrillation; chronic  -off AC due to hematuria on 10/30  -continue mexiletine and amiodarone, and metoprolol   -Cardiac clearance requested for prior to procedure-high risk, procedure medically necessary    Leukocytosis  UTI   -culture + for Pseudomonas Aeruginosa,--completed course of cefepime  -blood cx negative     h/o Hypothyroid  -continue synthroid 150 mcg daily    BPH with retention  -continue flomax and dubois    Severe Protein Calorie Malnutrition  -nutrition evaluation appreciated  -continue ensure plus high protein cans BID    DVT ppx - with hematuria so AC on hold    Code status:  DNR     Dispo:  updated spouse Kirsten De La Cruz on plan of care,  [phone]. When pt is medically stable will go back to [facility].  No discharge planning yet            82 year old M [facility] HTN, CAD s/p CABG, severe cardiomyopathy (EF 20%), afib (not on AC due to hematuria), BPH, LUE DVT, history of LLE DVT, recently hospitalized at [place] for uorsepsis and urinary retention (now with dubois), brought in from [facility] for hypoxia, admitted for acute on chronic systolic CHF exacerbation. Course c/b hematuria     Acute Hypoxic Respiratory Failure  Acute on Chronic Systolic CHF Exacerbation  Severe Cardiomyopathy (EF 20%)  Hypotension; improved  -ECHO with EF 20-25% c/w previous echos--Entresto was discontinued due to hypotension  -off supplemental oxygen therapy; sats on RA 95%  -No Farxiga due to hypoglycemia-- unable to further optimize medical therapy for CHF guidelines  -continue amiodarone  -Restarted BB 10/25 with parameters  -Started losartan 10/29 with parameters   -follow up with primary cardiologist to discuss prophylactic ICD    Bilateral DVTs  -US revealed b/l DVTs involving RT deep femoral vein, LT common femoral and deep veins  -pt with hx of DVTs found last month during hospitalization at [facility]  -stopped AC due to hematuria  -Vascular following Dr Lucas; started hep gtt 10/28; however stopped 10/30 due to hematuria  -Plan for IVC filter w vascular Today     Hematuria; persistent  -Urology following; cont dubois, still blood tinged but per urology it's improving  - Hgb 7.9 this morning, will hold off on transfusion currently as patient may be at risk of becoming overloaded prior to procedure   -KUB--bladder debris and diffuse wall thickening, no Hydro  -stopped Eliquis and stopped hep gtt  - Start proscar    CAD s/p CABG  -pt with hx of remote CABG and recent angiogram with occluded grafts and medical therapy recommended  -elevated Tn likely demand ischemia  -appears stable at this time, hold ASA due to bleeding, hold statin due to elevated LFTs    Ascending Aortic Aneurysm  -CT chest showed ascending thoracic aorta 4.3 cm in diameter  -aberrant right subclavian artery  -atherosclerotic calcifications of the aorta s/p CABG  -consider vascular evaluation outpatient    Transaminitis  -Continue holding statin for now, LFTs slowly down trending      Atrial Fibrillation; chronic  -off AC due to hematuria on 10/30  -continue mexiletine and amiodarone, and metoprolol   -Cardiac clearance requested prior to procedure-high risk, procedure medically necessary    Leukocytosis  UTI   -culture + for Pseudomonas Aeruginosa,--completed course of cefepime  -blood cx negative     h/o Hypothyroid  -continue synthroid 150 mcg daily    BPH with retention  -continue flomax and dubois    Severe Protein Calorie Malnutrition  -nutrition evaluation appreciated  -continue ensure plus high protein cans BID    DVT ppx - with hematuria so AC on hold    Code status:  DNR     Dispo:  updated spouse Kirsten De La Cruz on plan of care,  [phone]. When pt is medically stable will go back to [facility].  No discharge planning yet            82 year old M [facility] HTN, CAD s/p CABG, severe cardiomyopathy (EF 20%), afib (not on AC due to hematuria), BPH, LUE DVT, history of LLE DVT, recently hospitalized at [place] for uorsepsis and urinary retention (now with dubois), brought in from [facility] for hypoxia, admitted for acute on chronic systolic CHF exacerbation. Course c/b hematuria     Acute Hypoxic Respiratory Failure  Acute on Chronic Systolic CHF Exacerbation  Severe Cardiomyopathy (EF 20%)  Hypotension; improved  -ECHO with EF 20-25% c/w previous echos--Entresto was discontinued due to hypotension  -off supplemental oxygen therapy; sats on RA 95%  -No Farxiga due to hypoglycemia-- unable to further optimize medical therapy for CHF guidelines  -continue amiodarone  -Restarted BB 10/25 with parameters  -Started losartan 10/29 with parameters   -follow up with primary cardiologist to discuss prophylactic ICD    Bilateral DVTs  -US revealed b/l DVTs involving RT deep femoral vein, LT common femoral and deep veins  -pt with hx of DVTs found last month during hospitalization at [facility]  -stopped AC due to hematuria  -Vascular following Dr Lucas; started hep gtt 10/28; however stopped 10/30 due to hematuria  -Plan for IVC filter w vascular originally today, but cancelled and rescheduled to Thursday    Hematuria; persistent  -Urology following; cont dubois, still blood tinged but per urology it's improving  - Hgb 7.9 this morning, will hold off on transfusion currently as patient may be at risk of becoming overloaded prior to procedure   -KUB--bladder debris and diffuse wall thickening, no Hydro  -stopped Eliquis and stopped hep gtt  - Start proscar    CAD s/p CABG  -pt with hx of remote CABG and recent angiogram with occluded grafts and medical therapy recommended  -elevated Tn likely demand ischemia  -appears stable at this time, hold ASA due to bleeding, hold statin due to elevated LFTs    Ascending Aortic Aneurysm  -CT chest showed ascending thoracic aorta 4.3 cm in diameter  -aberrant right subclavian artery  -atherosclerotic calcifications of the aorta s/p CABG  -consider vascular evaluation outpatient    Transaminitis  -Continue holding statin for now, LFTs slowly down trending      Atrial Fibrillation; chronic  -off AC due to hematuria on 10/30  -continue mexiletine and amiodarone, and metoprolol   -Cardiac clearance requested prior to procedure-high risk, procedure medically necessary    Leukocytosis  UTI   -culture + for Pseudomonas Aeruginosa,--completed course of cefepime  -blood cx negative     h/o Hypothyroid  -continue synthroid 150 mcg daily    BPH with retention  -continue flomax and dubois    Severe Protein Calorie Malnutrition  -nutrition evaluation appreciated  -continue ensure plus high protein cans BID    DVT ppx - with hematuria so AC on hold    Code status:  DNR     Dispo:  updated spouse Kirsten De La Cruz on plan of care,  [phone]. When pt is medically stable will go back to [facility].  No discharge planning yet            82 year old M [facility] HTN, CAD s/p CABG, severe cardiomyopathy (EF 20%), afib (not on AC due to hematuria), BPH, LUE DVT, history of LLE DVT, recently hospitalized at [place] for uorsepsis and urinary retention (now with dubois), brought in from [facility] for hypoxia, admitted for acute on chronic systolic CHF exacerbation. Course c/b hematuria     Acute Hypoxic Respiratory Failure  Acute on Chronic Systolic CHF Exacerbation  Severe Cardiomyopathy (EF 20%)  Hypotension; improved  -ECHO with EF 20-25% c/w previous echos--Entresto was discontinued due to hypotension  -off supplemental oxygen therapy; sats on RA 95%  -No Farxiga due to hypoglycemia-- unable to further optimize medical therapy for CHF guidelines  -continue amiodarone  -Restarted BB 10/25 with parameters  -Started losartan 10/29 with parameters   -follow up with primary cardiologist to discuss prophylactic ICD    Bilateral DVTs  -US revealed b/l DVTs involving RT deep femoral vein, LT common femoral and deep veins  -pt with hx of DVTs found last month during hospitalization at [facility]  -stopped AC due to hematuria  -Vascular following Dr Lucas; started hep gtt 10/28; however stopped 10/30 due to hematuria  -Plan for IVC filter w vascular originally today, but now cancelled and rescheduled to Thursday    Hematuria; persistent  -Urology following; cont dubois, still blood tinged but per urology it's improving  - Hgb 7.9 this morning, will hold off on transfusion currently as patient may be at risk of becoming overloaded prior to procedure  -Repeat CBC ordered for 8pm, if h/h decrease will sign out to night team to transfuse   -KUB--bladder debris and diffuse wall thickening, no Hydro  -stopped Eliquis and stopped hep gtt  - Start proscar    CAD s/p CABG  -pt with hx of remote CABG and recent angiogram with occluded grafts and medical therapy recommended  -elevated Tn likely demand ischemia  -appears stable at this time, hold ASA due to bleeding, hold statin due to elevated LFTs    Ascending Aortic Aneurysm  -CT chest showed ascending thoracic aorta 4.3 cm in diameter  -aberrant right subclavian artery  -atherosclerotic calcifications of the aorta s/p CABG  -consider vascular evaluation outpatient    Transaminitis  -Continue holding statin for now, LFTs slowly down trending      Atrial Fibrillation; chronic  -off AC due to hematuria on 10/30  -continue mexiletine and amiodarone, and metoprolol   -Cardiac clearance requested prior to procedure-high risk, procedure medically necessary    Leukocytosis  UTI   -culture + for Pseudomonas Aeruginosa,--completed course of cefepime  -blood cx negative     h/o Hypothyroid  -continue synthroid 150 mcg daily    BPH with retention  -continue flomax and dubois    Severe Protein Calorie Malnutrition  -nutrition evaluation appreciated  -continue ensure plus high protein cans BID    DVT ppx - with hematuria so AC on hold    Code status:  DNR     Dispo:  updated spouse Kirsten De La Cruz on plan of care,  [phone]. When pt is medically stable will go back to [facility].  No discharge planning yet

## 2022-11-01 NOTE — PROGRESS NOTE ADULT - SUBJECTIVE AND OBJECTIVE BOX
Patient is a 82y old  Male who presents with a chief complaint of dyspnea, hypoxia - acute on chronic systolic CHF (31 Oct 2022 13:36)    HPI:  83 y/o male with HTN, CAD, hx of CABG, severe cardiomyopathy (EF 20%), Afib (not on AC due to hematuria), BPH, hx of LUE DVT, recently hospitalized at [facility] for urinary retention, urosepsis, also has RUE DVT, BIB EMS from [facility] for lethargy and  low O2 sat 83%, even on O2 2LNC.  O2 increased to 4LNC and O2 went up to 97%. Cxray with bilat increased markings, pleural eff c/w CHF.  Pt has dubois cath.  He denies chest pain, fever, chills, cough, nausea, vomiting, abd pain.  Pt admits to intermittent dyspnea, he also c/o achiness all over.    Pt received Lasix 40 mg IVPx 1, with good diuresis.   Pt is not a good historian.       dubois draining, less bloody  no clots    Interval Events:  Patient seen and examined at bedside.    MEDICATIONS:  MEDICATIONS  (STANDING):  aMIOdarone    Tablet 200 milliGRAM(s) Oral daily  famotidine    Tablet 20 milliGRAM(s) Oral daily  finasteride 5 milliGRAM(s) Oral daily  lactobacillus acidophilus 1 Tablet(s) Oral daily  levothyroxine 150 MICROGram(s) Oral daily  losartan 25 milliGRAM(s) Oral daily  metoprolol succinate ER 50 milliGRAM(s) Oral daily  mexiletine 150 milliGRAM(s) Oral every 12 hours  Preservision Areds2 2 Capsule(s) 2 Capsule(s) Oral daily  senna 1 Tablet(s) Oral at bedtime  sertraline 150 milliGRAM(s) Oral daily  tamsulosin 0.4 milliGRAM(s) Oral at bedtime  traZODone 25 milliGRAM(s) Oral at bedtime    MEDICATIONS  (PRN):  bisacodyl Suppository 10 milliGRAM(s) Rectal daily PRN Constipation  lactulose Syrup 20 Gram(s) Oral daily PRN constipation  lidocaine 2% Gel 1 Application(s) Topical three times a day PRN pain at dubois      Allergies    PC Pen VK (Other)    Intolerances        T(C): 36.8 (11-01-22 @ 05:34), Max: 36.9 (10-30-22 @ 13:00)  T(F): 98.3 (11-01-22 @ 05:34), Max: 98.4 (10-30-22 @ 13:00)  HR: 82 (11-01-22 @ 05:34) (74 - 121)  BP: 126/74 (11-01-22 @ 05:34) (96/60 - 126/74)  RR: 15 (11-01-22 @ 05:34) (15 - 19)  SpO2: 100% (11-01-22 @ 05:34) (95% - 100%)          10-30-22 @ 07:01  -  10-31-22 @ 07:00  --------------------------------------------------------  IN:  Total IN: 0 mL    OUT:    Indwelling Catheter - Urethral (mL): 1150 mL  Total OUT: 1150 mL    Total NET: -1150 mL      10-31-22 @ 07:01  -  11-01-22 @ 07:00  --------------------------------------------------------  IN:  Total IN: 0 mL    OUT:    Indwelling Catheter - Urethral (mL): 800 mL  Total OUT: 800 mL    Total NET: -800 mL          LABS:      CBC Full  -  ( 31 Oct 2022 06:50 )  WBC Count : 10.72 K/uL  RBC Count : 2.82 M/uL  Hemoglobin : 8.3 g/dL  Hematocrit : 26.3 %  Platelet Count - Automated : 252 K/uL  Mean Cell Volume : 93.3 fl  Mean Cell Hemoglobin : 29.4 pg  Mean Cell Hemoglobin Concentration : 31.6 gm/dL  Auto Neutrophil # : x  Auto Lymphocyte # : x  Auto Monocyte # : x  Auto Eosinophil # : x  Auto Basophil # : x  Auto Neutrophil % : x  Auto Lymphocyte % : x  Auto Monocyte % : x  Auto Eosinophil % : x  Auto Basophil % : x    10-31    142  |  107  |  28<H>  ----------------------------<  107<H>  4.4   |  29  |  0.89    Ca    9.2      31 Oct 2022 06:50    TPro  6.2  /  Alb  2.7<L>  /  TBili  0.3  /  DBili  x   /  AST  34  /  ALT  68<H>  /  AlkPhos  126<H>  10-31                  Physical Exam    Constitutional: alert, no acute distress    Abdomen: soft, nontender, nondistended, no HSM    Genitourinary: no bladder distention, dubois light red

## 2022-11-01 NOTE — CHART NOTE - NSCHARTNOTEFT_GEN_A_CORE
Case cancelled due to inability to obtain phone consent and patient refusing  procedure. I was able to explain plan to him in detail. I spoke to patients wife and son over the phone and they both agree on plan. He is on the schedule for Thursday at 11:30 AM

## 2022-11-02 ENCOUNTER — TRANSCRIPTION ENCOUNTER (OUTPATIENT)
Age: 82
End: 2022-11-02

## 2022-11-02 LAB
ALBUMIN SERPL ELPH-MCNC: 2.7 G/DL — LOW (ref 3.3–5)
ALP SERPL-CCNC: 130 U/L — HIGH (ref 40–120)
ALT FLD-CCNC: 58 U/L — HIGH (ref 10–45)
ANION GAP SERPL CALC-SCNC: 6 MMOL/L — SIGNIFICANT CHANGE UP (ref 5–17)
AST SERPL-CCNC: 33 U/L — SIGNIFICANT CHANGE UP (ref 10–40)
BILIRUB SERPL-MCNC: 0.3 MG/DL — SIGNIFICANT CHANGE UP (ref 0.2–1.2)
BLD GP AB SCN SERPL QL: SIGNIFICANT CHANGE UP
BUN SERPL-MCNC: 31 MG/DL — HIGH (ref 7–23)
CALCIUM SERPL-MCNC: 8.8 MG/DL — SIGNIFICANT CHANGE UP (ref 8.4–10.5)
CHLORIDE SERPL-SCNC: 105 MMOL/L — SIGNIFICANT CHANGE UP (ref 96–108)
CO2 SERPL-SCNC: 27 MMOL/L — SIGNIFICANT CHANGE UP (ref 22–31)
CREAT SERPL-MCNC: 0.86 MG/DL — SIGNIFICANT CHANGE UP (ref 0.5–1.3)
EGFR: 86 ML/MIN/1.73M2 — SIGNIFICANT CHANGE UP
GLUCOSE SERPL-MCNC: 95 MG/DL — SIGNIFICANT CHANGE UP (ref 70–99)
HCT VFR BLD CALC: 26.7 % — LOW (ref 39–50)
HGB BLD-MCNC: 8.3 G/DL — LOW (ref 13–17)
MCHC RBC-ENTMCNC: 28.6 PG — SIGNIFICANT CHANGE UP (ref 27–34)
MCHC RBC-ENTMCNC: 31.1 GM/DL — LOW (ref 32–36)
MCV RBC AUTO: 92.1 FL — SIGNIFICANT CHANGE UP (ref 80–100)
NRBC # BLD: 0 /100 WBCS — SIGNIFICANT CHANGE UP (ref 0–0)
PLATELET # BLD AUTO: 286 K/UL — SIGNIFICANT CHANGE UP (ref 150–400)
POTASSIUM SERPL-MCNC: 4.1 MMOL/L — SIGNIFICANT CHANGE UP (ref 3.5–5.3)
POTASSIUM SERPL-SCNC: 4.1 MMOL/L — SIGNIFICANT CHANGE UP (ref 3.5–5.3)
PROT SERPL-MCNC: 6.1 G/DL — SIGNIFICANT CHANGE UP (ref 6–8.3)
RBC # BLD: 2.9 M/UL — LOW (ref 4.2–5.8)
RBC # FLD: 16.1 % — HIGH (ref 10.3–14.5)
SODIUM SERPL-SCNC: 138 MMOL/L — SIGNIFICANT CHANGE UP (ref 135–145)
WBC # BLD: 12.22 K/UL — HIGH (ref 3.8–10.5)
WBC # FLD AUTO: 12.22 K/UL — HIGH (ref 3.8–10.5)

## 2022-11-02 PROCEDURE — 99233 SBSQ HOSP IP/OBS HIGH 50: CPT | Mod: FS

## 2022-11-02 RX ADMIN — AMIODARONE HYDROCHLORIDE 200 MILLIGRAM(S): 400 TABLET ORAL at 06:11

## 2022-11-02 RX ADMIN — Medication 25 MILLIGRAM(S): at 21:09

## 2022-11-02 RX ADMIN — MEXILETINE HYDROCHLORIDE 150 MILLIGRAM(S): 150 CAPSULE ORAL at 06:10

## 2022-11-02 RX ADMIN — TAMSULOSIN HYDROCHLORIDE 0.4 MILLIGRAM(S): 0.4 CAPSULE ORAL at 21:09

## 2022-11-02 RX ADMIN — FINASTERIDE 5 MILLIGRAM(S): 5 TABLET, FILM COATED ORAL at 11:55

## 2022-11-02 RX ADMIN — LOSARTAN POTASSIUM 25 MILLIGRAM(S): 100 TABLET, FILM COATED ORAL at 06:13

## 2022-11-02 RX ADMIN — MEXILETINE HYDROCHLORIDE 150 MILLIGRAM(S): 150 CAPSULE ORAL at 17:29

## 2022-11-02 RX ADMIN — FAMOTIDINE 20 MILLIGRAM(S): 10 INJECTION INTRAVENOUS at 11:55

## 2022-11-02 RX ADMIN — Medication 150 MICROGRAM(S): at 06:10

## 2022-11-02 RX ADMIN — Medication 50 MILLIGRAM(S): at 06:14

## 2022-11-02 RX ADMIN — SERTRALINE 150 MILLIGRAM(S): 25 TABLET, FILM COATED ORAL at 11:55

## 2022-11-02 RX ADMIN — Medication 1 TABLET(S): at 11:55

## 2022-11-02 NOTE — PROGRESS NOTE ADULT - ASSESSMENT
82 year old M [facility] HTN, CAD s/p CABG, severe cardiomyopathy (EF 20%), afib (not on AC due to hematuria), BPH, LUE DVT, history of LLE DVT, recently hospitalized at [place] for uorsepsis and urinary retention (now with dubois), brought in from [facility] for hypoxia, admitted for acute on chronic systolic CHF exacerbation. Course c/b hematuria     Acute Hypoxic Respiratory Failure  Acute on Chronic Systolic CHF Exacerbation  Severe Cardiomyopathy (EF 20%)  Hypotension; improved  -ECHO with EF 20-25% c/w previous echos--Entresto was discontinued due to hypotension  -off supplemental oxygen therapy; sats on RA 95%  -No Farxiga due to hypoglycemia-- unable to further optimize medical therapy for CHF guidelines  -continue amiodarone  -Restarted BB 10/25 with parameters  -Started losartan 10/29 with parameters   -follow up with primary cardiologist to discuss prophylactic ICD    Bilateral DVTs  -US revealed b/l DVTs involving RT deep femoral vein, LT common femoral and deep veins  -pt with hx of DVTs found last month during hospitalization at [facility]  -stopped AC due to hematuria  -Vascular following Dr Lucas; started hep gtt 10/28; however stopped 10/30 due to hematuria  -Plan for IVC filter w vascular Thursday 11:30 am   -NPO after MN tonight    Hematuria; persistent  -Urology following; cont dubois, still blood tinged but per urology it's improving  - Hgb 7.9 this morning, will hold off on transfusion currently as patient may be at risk of becoming overloaded prior to procedure  -Repeat CBC ordered for 8pm, if h/h decrease will sign out to night team to transfuse   -KUB--bladder debris and diffuse wall thickening, no Hydro  -stopped Eliquis and stopped hep gtt  -Start proscar    CAD s/p CABG  -pt with hx of remote CABG and recent angiogram with occluded grafts and medical therapy recommended  -elevated Tn likely demand ischemia  -appears stable at this time, hold ASA due to bleeding, hold statin due to elevated LFTs    Ascending Aortic Aneurysm  -CT chest showed ascending thoracic aorta 4.3 cm in diameter  -aberrant right subclavian artery  -atherosclerotic calcifications of the aorta s/p CABG  -consider vascular evaluation outpatient    Transaminitis  -Continue holding statin for now, LFTs slowly down trending      Atrial Fibrillation; chronic  -off AC due to hematuria on 10/30  -continue mexiletine and amiodarone, and metoprolol   -Cardiac clearance requested prior to procedure-high risk, procedure medically necessary    Leukocytosis  UTI   -culture + for Pseudomonas Aeruginosa,- completed course of cefepime  -blood cx negative     h/o Hypothyroid  -continue synthroid 150 mcg daily    BPH with retention  -continue flomax and dubois    Severe Protein Calorie Malnutrition  -nutrition evaluation appreciated  -continue ensure plus high protein cans BID    DVT ppx - with hematuria so AC on hold    Code status:  DNR     Dispo:  11/2 updated spouse Kirsten De La Cruz on plan of care,  [phone]. When pt is medically stable will go back to [facility].  No discharge planning yet            82 year old M [facility] HTN, CAD s/p CABG, severe cardiomyopathy (EF 20%), afib (not on AC due to hematuria), BPH, LUE DVT, history of LLE DVT, recently hospitalized at [place] for uorsepsis and urinary retention (now with dubois), brought in from [facility] for hypoxia, admitted for acute on chronic systolic CHF exacerbation. Course c/b hematuria     Acute Hypoxic Respiratory Failure  Acute on Chronic Systolic CHF Exacerbation  Severe Cardiomyopathy (EF 20%)  Hypotension; improved  -ECHO with EF 20-25% c/w previous echos--Entresto was discontinued due to hypotension  -off supplemental oxygen therapy; sats on RA 95%  -No Farxiga due to hypoglycemia-- unable to further optimize medical therapy for CHF guidelines  -continue amiodarone, Losartan  -Restarted BB 10/25 with parameters  -Started losartan 10/29 with parameters   -follow up with primary cardiologist to discuss prophylactic ICD    Bilateral DVTs  -US revealed b/l DVTs involving RT deep femoral vein, LT common femoral and deep veins  -pt with hx of DVTs found last month during hospitalization at [facility]  -Vascular following Dr Lucas; started hep gtt 10/28; however stopped 10/30 due to hematuria  -Plan for IVC filter w vascular Thursday 11:30 am   -NPO after MN tonight    Hematuria; persistent  -Urology following; cont dubois, gross hematuria today   - Hgb 8.3 this morning, will hold off on transfusion as patient is at risk of becoming overloaded prior to procedure  -KUB--bladder debris and diffuse wall thickening, no Hydro  -off all anticoagulation due to hematuria  -Start proscar 10/29      CAD s/p CABG  -pt with hx of remote CABG and recent angiogram with occluded grafts and medical therapy recommended  -elevated Tn likely demand ischemia  -appears stable at this time, hold ASA due to bleeding, hold statin due to elevated LFTs    Ascending Aortic Aneurysm  -CT chest showed ascending thoracic aorta 4.3 cm in diameter  -aberrant right subclavian artery  -atherosclerotic calcifications of the aorta s/p CABG  -f/u  vascular evaluation outpatient    Transaminitis  -Continue holding statin for now, LFTs slowly down trending      Atrial Fibrillation; chronic  -off AC due to hematuria on 10/30  -continue mexiletine and amiodarone, and metoprolol   -Cardiac clearance requested prior to procedure-high risk, procedure medically necessary- clearance  obtained     Leukocytosis  UTI - treated   -culture + for Pseudomonas Aeruginosa,- completed course of cefepime  -blood cx negative     h/o Hypothyroid  -stable   -continue synthroid 150 mcg daily    BPH with retention  -continue flomax, proscar  and dubois  -urology following     Severe Protein Calorie Malnutrition  -nutrition evaluation appreciated  -continue ensure plus high protein cans BID    DVT ppx - with hematuria so AC on hold    Code status:  DNR     Dispo:  11/2 updated spouse Kirsten De La Cruz on plan of care,  [phone]. When pt is medically stable will go back to [facility].  No discharge planning yet

## 2022-11-02 NOTE — PROGRESS NOTE ADULT - SUBJECTIVE AND OBJECTIVE BOX
Patient is a 82y old  Male who presents with a chief complaint of dyspnea, hypoxia - acute on chronic systolic CHF (01 Nov 2022 11:49)      Patient seen and examined at bedside.    ALLERGIES:  PC Pen VK (Other)    MEDICATIONS  (STANDING):  aMIOdarone    Tablet 200 milliGRAM(s) Oral daily  famotidine    Tablet 20 milliGRAM(s) Oral daily  finasteride 5 milliGRAM(s) Oral daily  lactobacillus acidophilus 1 Tablet(s) Oral daily  levothyroxine 150 MICROGram(s) Oral daily  losartan 25 milliGRAM(s) Oral daily  metoprolol succinate ER 50 milliGRAM(s) Oral daily  mexiletine 150 milliGRAM(s) Oral every 12 hours  Preservision Areds2 2 Capsule(s) 2 Capsule(s) Oral daily  senna 1 Tablet(s) Oral at bedtime  sertraline 150 milliGRAM(s) Oral daily  tamsulosin 0.4 milliGRAM(s) Oral at bedtime  traZODone 25 milliGRAM(s) Oral at bedtime    MEDICATIONS  (PRN):  bisacodyl Suppository 10 milliGRAM(s) Rectal daily PRN Constipation  lactulose Syrup 20 Gram(s) Oral daily PRN constipation  lidocaine 2% Gel 1 Application(s) Topical three times a day PRN pain at dubois    Vital Signs Last 24 Hrs  T(F): 97.4 (02 Nov 2022 05:50), Max: 97.8 (01 Nov 2022 20:22)  HR: 96 (02 Nov 2022 05:50) (77 - 96)  BP: 132/80 (02 Nov 2022 05:50) (101/61 - 132/80)  RR: 16 (02 Nov 2022 05:50) (15 - 16)  SpO2: 95% (02 Nov 2022 05:50) (95% - 98%)  I&O's Summary    01 Nov 2022 07:01  -  02 Nov 2022 07:00  --------------------------------------------------------  IN: 0 mL / OUT: 1100 mL / NET: -1100 mL      PHYSICAL EXAM:  General: NAD, A/O x 3  ENT: MMM  Neck: Supple, No JVD  Lungs: Clear to auscultation bilaterally, Non labored breathing   Cardio: RRR, S1/S2, No murmurs  Abdomen: Soft, Nontender, Nondistended; Bowel sounds present  Extremities: No calf tenderness, No pitting edema    LABS:                        8.3    12.22 )-----------( 286      ( 02 Nov 2022 06:00 )             26.7     11-02    138  |  105  |  31  ----------------------------<  95  4.1   |  27  |  0.86    Ca    8.8      02 Nov 2022 06:00    TPro  6.1  /  Alb  2.7  /  TBili  0.3  /  DBili  x   /  AST  33  /  ALT  58  /  AlkPhos  130  11-02      PTT - ( 30 Oct 2022 07:53 )  PTT:55.7 sec                              COVID-19 PCR: NotDetec (10-31-22 @ 19:50)    RADIOLOGY & ADDITIONAL TESTS:    Care Discussed with Consultants/Other Providers:    Patient is a 82y old  Male who presents with a chief complaint of dyspnea, hypoxia - acute on chronic systolic CHF (01 Nov 2022 11:49)      Patient seen and examined at bedside.   Pt with feces all over him.  Denies any pain.  Requesting  a wash cloth to clean up.  Pt still with gross hematuria .    ALLERGIES:  PC Pen VK (Other)    MEDICATIONS  (STANDING):  aMIOdarone    Tablet 200 milliGRAM(s) Oral daily  famotidine    Tablet 20 milliGRAM(s) Oral daily  finasteride 5 milliGRAM(s) Oral daily  lactobacillus acidophilus 1 Tablet(s) Oral daily  levothyroxine 150 MICROGram(s) Oral daily  losartan 25 milliGRAM(s) Oral daily  metoprolol succinate ER 50 milliGRAM(s) Oral daily  mexiletine 150 milliGRAM(s) Oral every 12 hours  Preservision Areds2 2 Capsule(s) 2 Capsule(s) Oral daily  senna 1 Tablet(s) Oral at bedtime  sertraline 150 milliGRAM(s) Oral daily  tamsulosin 0.4 milliGRAM(s) Oral at bedtime  traZODone 25 milliGRAM(s) Oral at bedtime    MEDICATIONS  (PRN):  bisacodyl Suppository 10 milliGRAM(s) Rectal daily PRN Constipation  lactulose Syrup 20 Gram(s) Oral daily PRN constipation  lidocaine 2% Gel 1 Application(s) Topical three times a day PRN pain at dubois    Vital Signs Last 24 Hrs  T(F): 97.4 (02 Nov 2022 05:50), Max: 97.8 (01 Nov 2022 20:22)  HR: 96 (02 Nov 2022 05:50) (77 - 96)  BP: 132/80 (02 Nov 2022 05:50) (101/61 - 132/80)  RR: 16 (02 Nov 2022 05:50) (15 - 16)  SpO2: 95% (02 Nov 2022 05:50) (95% - 98%)  I&O's Summary    01 Nov 2022 07:01  -  02 Nov 2022 07:00  --------------------------------------------------------  IN: 0 mL / OUT: 1100 mL / NET: -1100 mL      PHYSICAL EXAM:  General: 83 y/o male in  NAD, A/O x 1-2  ENT: MMM  Neck: Supple, No JVD  Lungs: Clear to auscultation bilaterally, Non labored breathing   Cardio: RRR, S1/S2, No murmurs  Abdomen: Soft, Nontender, Nondistended; Bowel sounds present, still with gross hematuria in dubois cath   Extremities: No calf tenderness, No pitting edema    LABS:                        8.3    12.22 )-----------( 286      ( 02 Nov 2022 06:00 )             26.7     11-02    138  |  105  |  31  ----------------------------<  95  4.1   |  27  |  0.86    Ca    8.8      02 Nov 2022 06:00    TPro  6.1  /  Alb  2.7  /  TBili  0.3  /  DBili  x   /  AST  33  /  ALT  58  /  AlkPhos  130  11-02      PTT - ( 30 Oct 2022 07:53 )  PTT:55.7 sec                              COVID-19 PCR: NotDetec (10-31-22 @ 19:50)    RADIOLOGY & ADDITIONAL TESTS:    Care Discussed with Consultants/Other Providers:

## 2022-11-03 ENCOUNTER — TRANSCRIPTION ENCOUNTER (OUTPATIENT)
Age: 82
End: 2022-11-03

## 2022-11-03 DIAGNOSIS — I82.409 ACUTE EMBOLISM AND THROMBOSIS OF UNSPECIFIED DEEP VEINS OF UNSPECIFIED LOWER EXTREMITY: ICD-10-CM

## 2022-11-03 LAB
ANION GAP SERPL CALC-SCNC: 8 MMOL/L — SIGNIFICANT CHANGE UP (ref 5–17)
APTT BLD: 29.6 SEC — SIGNIFICANT CHANGE UP (ref 27.5–35.5)
BUN SERPL-MCNC: 29 MG/DL — HIGH (ref 7–23)
CALCIUM SERPL-MCNC: 9.2 MG/DL — SIGNIFICANT CHANGE UP (ref 8.4–10.5)
CHLORIDE SERPL-SCNC: 111 MMOL/L — HIGH (ref 96–108)
CO2 SERPL-SCNC: 26 MMOL/L — SIGNIFICANT CHANGE UP (ref 22–31)
CREAT SERPL-MCNC: 1.08 MG/DL — SIGNIFICANT CHANGE UP (ref 0.5–1.3)
EGFR: 68 ML/MIN/1.73M2 — SIGNIFICANT CHANGE UP
GLUCOSE SERPL-MCNC: 98 MG/DL — SIGNIFICANT CHANGE UP (ref 70–99)
HCT VFR BLD CALC: 27.2 % — LOW (ref 39–50)
HGB BLD-MCNC: 8.4 G/DL — LOW (ref 13–17)
INR BLD: 1.2 RATIO — HIGH (ref 0.88–1.16)
MCHC RBC-ENTMCNC: 29 PG — SIGNIFICANT CHANGE UP (ref 27–34)
MCHC RBC-ENTMCNC: 30.9 GM/DL — LOW (ref 32–36)
MCV RBC AUTO: 93.8 FL — SIGNIFICANT CHANGE UP (ref 80–100)
NRBC # BLD: 0 /100 WBCS — SIGNIFICANT CHANGE UP (ref 0–0)
PLATELET # BLD AUTO: 273 K/UL — SIGNIFICANT CHANGE UP (ref 150–400)
POTASSIUM SERPL-MCNC: 4.1 MMOL/L — SIGNIFICANT CHANGE UP (ref 3.5–5.3)
POTASSIUM SERPL-MCNC: 5.7 MMOL/L — HIGH (ref 3.5–5.3)
POTASSIUM SERPL-SCNC: 4.1 MMOL/L — SIGNIFICANT CHANGE UP (ref 3.5–5.3)
POTASSIUM SERPL-SCNC: 5.7 MMOL/L — HIGH (ref 3.5–5.3)
PROTHROM AB SERPL-ACNC: 13.9 SEC — HIGH (ref 10.5–13.4)
RBC # BLD: 2.9 M/UL — LOW (ref 4.2–5.8)
RBC # FLD: 16.1 % — HIGH (ref 10.3–14.5)
SODIUM SERPL-SCNC: 145 MMOL/L — SIGNIFICANT CHANGE UP (ref 135–145)
WBC # BLD: 11.33 K/UL — HIGH (ref 3.8–10.5)
WBC # FLD AUTO: 11.33 K/UL — HIGH (ref 3.8–10.5)

## 2022-11-03 PROCEDURE — 37191 INS ENDOVAS VENA CAVA FILTR: CPT

## 2022-11-03 PROCEDURE — 99233 SBSQ HOSP IP/OBS HIGH 50: CPT

## 2022-11-03 DEVICE — IMPLANTABLE DEVICE: Type: IMPLANTABLE DEVICE | Status: FUNCTIONAL

## 2022-11-03 DEVICE — GWIRE STRT 0.038INX145CM: Type: IMPLANTABLE DEVICE | Status: FUNCTIONAL

## 2022-11-03 DEVICE — INTRO MICROPUNC 4FRX10CM SS: Type: IMPLANTABLE DEVICE | Status: FUNCTIONAL

## 2022-11-03 RX ORDER — SERTRALINE 25 MG/1
150 TABLET, FILM COATED ORAL DAILY
Refills: 0 | Status: DISCONTINUED | OUTPATIENT
Start: 2022-11-03 | End: 2022-11-07

## 2022-11-03 RX ORDER — MEXILETINE HYDROCHLORIDE 150 MG/1
150 CAPSULE ORAL EVERY 12 HOURS
Refills: 0 | Status: DISCONTINUED | OUTPATIENT
Start: 2022-11-03 | End: 2022-11-07

## 2022-11-03 RX ORDER — ACETAMINOPHEN 500 MG
650 TABLET ORAL EVERY 6 HOURS
Refills: 0 | Status: DISCONTINUED | OUTPATIENT
Start: 2022-11-03 | End: 2022-11-07

## 2022-11-03 RX ORDER — AMIODARONE HYDROCHLORIDE 400 MG/1
200 TABLET ORAL DAILY
Refills: 0 | Status: DISCONTINUED | OUTPATIENT
Start: 2022-11-03 | End: 2022-11-07

## 2022-11-03 RX ORDER — LIDOCAINE 4 G/100G
1 CREAM TOPICAL THREE TIMES A DAY
Refills: 0 | Status: DISCONTINUED | OUTPATIENT
Start: 2022-11-03 | End: 2022-11-07

## 2022-11-03 RX ORDER — FAMOTIDINE 10 MG/ML
20 INJECTION INTRAVENOUS DAILY
Refills: 0 | Status: DISCONTINUED | OUTPATIENT
Start: 2022-11-03 | End: 2022-11-07

## 2022-11-03 RX ORDER — FINASTERIDE 5 MG/1
5 TABLET, FILM COATED ORAL DAILY
Refills: 0 | Status: DISCONTINUED | OUTPATIENT
Start: 2022-11-03 | End: 2022-11-07

## 2022-11-03 RX ORDER — TAMSULOSIN HYDROCHLORIDE 0.4 MG/1
0.4 CAPSULE ORAL AT BEDTIME
Refills: 0 | Status: DISCONTINUED | OUTPATIENT
Start: 2022-11-03 | End: 2022-11-07

## 2022-11-03 RX ORDER — LEVOTHYROXINE SODIUM 125 MCG
150 TABLET ORAL DAILY
Refills: 0 | Status: DISCONTINUED | OUTPATIENT
Start: 2022-11-03 | End: 2022-11-07

## 2022-11-03 RX ORDER — SODIUM CHLORIDE 9 MG/ML
1000 INJECTION, SOLUTION INTRAVENOUS
Refills: 0 | Status: DISCONTINUED | OUTPATIENT
Start: 2022-11-03 | End: 2022-11-03

## 2022-11-03 RX ORDER — TRAZODONE HCL 50 MG
25 TABLET ORAL AT BEDTIME
Refills: 0 | Status: DISCONTINUED | OUTPATIENT
Start: 2022-11-03 | End: 2022-11-07

## 2022-11-03 RX ORDER — LACTULOSE 10 G/15ML
20 SOLUTION ORAL DAILY
Refills: 0 | Status: DISCONTINUED | OUTPATIENT
Start: 2022-11-03 | End: 2022-11-07

## 2022-11-03 RX ORDER — METOPROLOL TARTRATE 50 MG
50 TABLET ORAL DAILY
Refills: 0 | Status: DISCONTINUED | OUTPATIENT
Start: 2022-11-03 | End: 2022-11-07

## 2022-11-03 RX ORDER — LACTOBACILLUS ACIDOPHILUS 100MM CELL
1 CAPSULE ORAL DAILY
Refills: 0 | Status: DISCONTINUED | OUTPATIENT
Start: 2022-11-03 | End: 2022-11-07

## 2022-11-03 RX ORDER — LOSARTAN POTASSIUM 100 MG/1
25 TABLET, FILM COATED ORAL DAILY
Refills: 0 | Status: DISCONTINUED | OUTPATIENT
Start: 2022-11-03 | End: 2022-11-07

## 2022-11-03 RX ORDER — SENNA PLUS 8.6 MG/1
1 TABLET ORAL AT BEDTIME
Refills: 0 | Status: DISCONTINUED | OUTPATIENT
Start: 2022-11-03 | End: 2022-11-07

## 2022-11-03 RX ADMIN — SERTRALINE 150 MILLIGRAM(S): 25 TABLET, FILM COATED ORAL at 18:13

## 2022-11-03 RX ADMIN — Medication 25 MILLIGRAM(S): at 22:16

## 2022-11-03 RX ADMIN — TAMSULOSIN HYDROCHLORIDE 0.4 MILLIGRAM(S): 0.4 CAPSULE ORAL at 22:16

## 2022-11-03 RX ADMIN — Medication 150 MICROGRAM(S): at 05:10

## 2022-11-03 RX ADMIN — AMIODARONE HYDROCHLORIDE 200 MILLIGRAM(S): 400 TABLET ORAL at 05:10

## 2022-11-03 RX ADMIN — LOSARTAN POTASSIUM 25 MILLIGRAM(S): 100 TABLET, FILM COATED ORAL at 05:10

## 2022-11-03 RX ADMIN — FINASTERIDE 5 MILLIGRAM(S): 5 TABLET, FILM COATED ORAL at 18:13

## 2022-11-03 RX ADMIN — FAMOTIDINE 20 MILLIGRAM(S): 10 INJECTION INTRAVENOUS at 18:13

## 2022-11-03 RX ADMIN — SENNA PLUS 1 TABLET(S): 8.6 TABLET ORAL at 22:16

## 2022-11-03 RX ADMIN — Medication 50 MILLIGRAM(S): at 05:10

## 2022-11-03 RX ADMIN — MEXILETINE HYDROCHLORIDE 150 MILLIGRAM(S): 150 CAPSULE ORAL at 05:10

## 2022-11-03 RX ADMIN — MEXILETINE HYDROCHLORIDE 150 MILLIGRAM(S): 150 CAPSULE ORAL at 18:14

## 2022-11-03 NOTE — PROGRESS NOTE ADULT - SUBJECTIVE AND OBJECTIVE BOX
Patient is a 82y old  Male who presents with a chief complaint of dyspnea, hypoxia - acute on chronic systolic CHF (02 Nov 2022 07:38)    HPI:  81 y/o male with HTN, CAD, hx of CABG, severe cardiomyopathy (EF 20%), Afib (not on AC due to hematuria), BPH, hx of LUE DVT, recently hospitalized at [facility] for urinary retention, urosepsis, also has RUE DVT, BIB EMS from [facility] for lethargy and  low O2 sat 83%, even on O2 2LNC.  O2 increased to 4LNC and O2 went up to 97%. Cxray with bilat increased markings, pleural eff c/w CHF.  Pt has dubois cath.  He denies chest pain, fever, chills, cough, nausea, vomiting, abd pain.  Pt admits to intermittent dyspnea, he also c/o achiness all over.    Pt received Lasix 40 mg IVPx 1, with good diuresis.   Pt is not a good historian.       "I want coffee"  dubois clearing, now otis to light red    Interval Events:  Patient seen and examined at bedside.    MEDICATIONS:  MEDICATIONS  (STANDING):  aMIOdarone    Tablet 200 milliGRAM(s) Oral daily  famotidine    Tablet 20 milliGRAM(s) Oral daily  finasteride 5 milliGRAM(s) Oral daily  lactobacillus acidophilus 1 Tablet(s) Oral daily  levothyroxine 150 MICROGram(s) Oral daily  losartan 25 milliGRAM(s) Oral daily  metoprolol succinate ER 50 milliGRAM(s) Oral daily  mexiletine 150 milliGRAM(s) Oral every 12 hours  Preservision Areds2 2 Capsule(s) 2 Capsule(s) Oral daily  senna 1 Tablet(s) Oral at bedtime  sertraline 150 milliGRAM(s) Oral daily  tamsulosin 0.4 milliGRAM(s) Oral at bedtime  traZODone 25 milliGRAM(s) Oral at bedtime    MEDICATIONS  (PRN):  bisacodyl Suppository 10 milliGRAM(s) Rectal daily PRN Constipation  lactulose Syrup 20 Gram(s) Oral daily PRN constipation  lidocaine 2% Gel 1 Application(s) Topical three times a day PRN pain at dubois      Allergies    PC Pen VK (Other)    Intolerances        T(C): 35.8 (11-03-22 @ 05:07), Max: 36.6 (11-01-22 @ 20:22)  T(F): 96.4 (11-03-22 @ 05:07), Max: 97.8 (11-01-22 @ 20:22)  HR: 79 (11-03-22 @ 05:07) (66 - 96)  BP: 123/80 (11-03-22 @ 05:07) (98/59 - 132/80)  RR: 16 (11-03-22 @ 05:07) (15 - 16)  SpO2: 92% (11-03-22 @ 05:07) (92% - 98%)          11-01-22 @ 07:01  -  11-02-22 @ 07:00  --------------------------------------------------------  IN:  Total IN: 0 mL    OUT:    Indwelling Catheter - Urethral (mL): 1100 mL  Total OUT: 1100 mL    Total NET: -1100 mL      11-02-22 @ 07:01  -  11-03-22 @ 07:00  --------------------------------------------------------  IN:  Total IN: 0 mL    OUT:    Indwelling Catheter - Urethral (mL): 1700 mL  Total OUT: 1700 mL    Total NET: -1700 mL          LABS:      CBC Full  -  ( 03 Nov 2022 07:53 )  WBC Count : 11.33 K/uL  RBC Count : 2.90 M/uL  Hemoglobin : 8.4 g/dL  Hematocrit : 27.2 %  Platelet Count - Automated : 273 K/uL  Mean Cell Volume : 93.8 fl  Mean Cell Hemoglobin : 29.0 pg  Mean Cell Hemoglobin Concentration : 30.9 gm/dL  Auto Neutrophil # : x  Auto Lymphocyte # : x  Auto Monocyte # : x  Auto Eosinophil # : x  Auto Basophil # : x  Auto Neutrophil % : x  Auto Lymphocyte % : x  Auto Monocyte % : x  Auto Eosinophil % : x  Auto Basophil % : x    11-02    138  |  105  |  31<H>  ----------------------------<  95  4.1   |  27  |  0.86    Ca    8.8      02 Nov 2022 06:00    TPro  6.1  /  Alb  2.7<L>  /  TBili  0.3  /  DBili  x   /  AST  33  /  ALT  58<H>  /  AlkPhos  130<H>  11-02                  Physical Exam    Constitutional: agitated,     Abdomen: soft, nontender, nondistended, no HSM    Genitourinary: no bladder distention. dubois

## 2022-11-03 NOTE — PRE-OP CHECKLIST - 1.
Patient confused. Telephone consents obtained from patient's spouse, Kirsten [phone]. Patient alert and confused. Telephone consents obtained from patient's spouse, Kirsten [phone].

## 2022-11-03 NOTE — PROGRESS NOTE ADULT - SUBJECTIVE AND OBJECTIVE BOX
Patient is a 82y old  Male who presents with a chief complaint of dyspnea, hypoxia - acute on chronic systolic CHF (03 Nov 2022 08:09)    Patient seen and examined at bedside.  no acute events overnight    ALLERGIES:  PC Pen VK (Other)        Vital Signs Last 24 Hrs  T(F): 97 (03 Nov 2022 12:45), Max: 97.8 (02 Nov 2022 20:28)  HR: 77 (03 Nov 2022 12:45) (60 - 79)  BP: 102/67 (03 Nov 2022 12:45) (92/66 - 123/80)  RR: 15 (03 Nov 2022 12:45) (15 - 18)  SpO2: 100% (03 Nov 2022 12:45) (92% - 100%)  I&O's Summary    02 Nov 2022 07:01  -  03 Nov 2022 07:00  --------------------------------------------------------  IN: 0 mL / OUT: 1700 mL / NET: -1700 mL    03 Nov 2022 07:01  -  03 Nov 2022 14:51  --------------------------------------------------------  IN: 0 mL / OUT: 25 mL / NET: -25 mL      MEDICATIONS:  acetaminophen     Tablet .. 650 milliGRAM(s) Oral every 6 hours PRN  aMIOdarone    Tablet 200 milliGRAM(s) Oral daily  bisacodyl Suppository 10 milliGRAM(s) Rectal daily PRN  famotidine    Tablet 20 milliGRAM(s) Oral daily  finasteride 5 milliGRAM(s) Oral daily  lactobacillus acidophilus 1 Tablet(s) Oral daily  lactulose Syrup 20 Gram(s) Oral daily PRN  levothyroxine 150 MICROGram(s) Oral daily  lidocaine 2% Gel 1 Application(s) Topical three times a day PRN  losartan 25 milliGRAM(s) Oral daily  metoprolol succinate ER 50 milliGRAM(s) Oral daily  mexiletine 150 milliGRAM(s) Oral every 12 hours  senna 1 Tablet(s) Oral at bedtime  sertraline 150 milliGRAM(s) Oral daily  tamsulosin 0.4 milliGRAM(s) Oral at bedtime  traZODone 25 milliGRAM(s) Oral at bedtime      PHYSICAL EXAM:  General: NAD, A/O x 1-2  ENT: MMM, no oral thrush  Neck: Supple, No JVD  Lungs: Clear to auscultation bilaterally, non labored breathing, bilateral air entry  Cardio: RRR, S1/S2, No murmurs  Abdomen: Soft, Nontender, Nondistended; Bowel sounds present  Extremities: No cyanosis, No edema    LABS:                        8.4    11.33 )-----------( 273      ( 03 Nov 2022 07:53 )             27.2     11-03    x   |  x   |  x   ----------------------------<  x   4.1   |  x   |  x     Ca    9.2      03 Nov 2022 07:53    TPro  6.1  /  Alb  2.7  /  TBili  0.3  /  DBili  x   /  AST  33  /  ALT  58  /  AlkPhos  130  11-02      PT/INR - ( 03 Nov 2022 07:53 )   PT: 13.9 sec;   INR: 1.20 ratio         PTT - ( 03 Nov 2022 07:53 )  PTT:29.6 sec                              COVID-19 PCR: NotDetec (10-31-22 @ 19:50)      RADIOLOGY & ADDITIONAL TESTS:    Care Discussed with Consultants/Other Providers:    Patient is a 82y old  Male who presents with a chief complaint of dyspnea, hypoxia - acute on chronic systolic CHF (03 Nov 2022 08:09)    Patient seen and examined at bedside.  no acute events overnight    11/3 Tele monitor trends were reviewed, showed A fib ranging 55-70    ALLERGIES:  PC Pen VK (Other)        Vital Signs Last 24 Hrs  T(F): 97 (03 Nov 2022 12:45), Max: 97.8 (02 Nov 2022 20:28)  HR: 77 (03 Nov 2022 12:45) (60 - 79)  BP: 102/67 (03 Nov 2022 12:45) (92/66 - 123/80)  RR: 15 (03 Nov 2022 12:45) (15 - 18)  SpO2: 100% (03 Nov 2022 12:45) (92% - 100%)  I&O's Summary    02 Nov 2022 07:01  -  03 Nov 2022 07:00  --------------------------------------------------------  IN: 0 mL / OUT: 1700 mL / NET: -1700 mL    03 Nov 2022 07:01  -  03 Nov 2022 14:51  --------------------------------------------------------  IN: 0 mL / OUT: 25 mL / NET: -25 mL      MEDICATIONS:  acetaminophen     Tablet .. 650 milliGRAM(s) Oral every 6 hours PRN  aMIOdarone    Tablet 200 milliGRAM(s) Oral daily  bisacodyl Suppository 10 milliGRAM(s) Rectal daily PRN  famotidine    Tablet 20 milliGRAM(s) Oral daily  finasteride 5 milliGRAM(s) Oral daily  lactobacillus acidophilus 1 Tablet(s) Oral daily  lactulose Syrup 20 Gram(s) Oral daily PRN  levothyroxine 150 MICROGram(s) Oral daily  lidocaine 2% Gel 1 Application(s) Topical three times a day PRN  losartan 25 milliGRAM(s) Oral daily  metoprolol succinate ER 50 milliGRAM(s) Oral daily  mexiletine 150 milliGRAM(s) Oral every 12 hours  senna 1 Tablet(s) Oral at bedtime  sertraline 150 milliGRAM(s) Oral daily  tamsulosin 0.4 milliGRAM(s) Oral at bedtime  traZODone 25 milliGRAM(s) Oral at bedtime      PHYSICAL EXAM:  General: NAD, A/O x 1-2  ENT: MMM, no oral thrush  Neck: Supple, No JVD  Lungs: Clear to auscultation bilaterally, non labored breathing, bilateral air entry  Cardio: RRR, S1/S2, No murmurs  Abdomen: Soft, Nontender, Nondistended; Bowel sounds present  Extremities: No cyanosis, No edema    LABS:                        8.4    11.33 )-----------( 273      ( 03 Nov 2022 07:53 )             27.2     11-03    x   |  x   |  x   ----------------------------<  x   4.1   |  x   |  x     Ca    9.2      03 Nov 2022 07:53    TPro  6.1  /  Alb  2.7  /  TBili  0.3  /  DBili  x   /  AST  33  /  ALT  58  /  AlkPhos  130  11-02      PT/INR - ( 03 Nov 2022 07:53 )   PT: 13.9 sec;   INR: 1.20 ratio         PTT - ( 03 Nov 2022 07:53 )  PTT:29.6 sec                              COVID-19 PCR: NotDetec (10-31-22 @ 19:50)      RADIOLOGY & ADDITIONAL TESTS:    Care Discussed with Consultants/Other Providers:

## 2022-11-03 NOTE — PROGRESS NOTE ADULT - ASSESSMENT
83 YO M PMH HTN, CAD s/p CABG, severe cardiomyopathy (EF 20%), afib (not on AC due to hematuria), BPH admitted for acute on chronic CHF. Found to have BLE DVTs now s/p IVC filter placement 11/3 with Dr. Lucas. Pt recovering well post-op, has no acute complaints, physical exam wnl, surgical site dressing dry.    PLAN:   [ ] Vascular to remove R groin suture POD1   [ ] Tylenol ordered PRN for post-op pain  [ ] AC per primary team, holding heparin d/t continued hematuria  [ ] Rest of care per primary team    Discussed w/ surgical team and Dr. Lucas post-op

## 2022-11-03 NOTE — PROGRESS NOTE ADULT - SUBJECTIVE AND OBJECTIVE BOX
VASCULAR SURGERY PROGRESS NOTE  Pt now s/p IVC filter placement today 11/3, access site R groin. Pt seen and examined at bedside resting comfortably, no acute complaints. Pain well controlled.  Denies fever/chills, chest pain, dyspnea, N/V, pain.     Vital Signs Last 24 Hrs  T(C): 36.1 (03 Nov 2022 12:45), Max: 36.6 (02 Nov 2022 20:28)  T(F): 97 (03 Nov 2022 12:45), Max: 97.8 (02 Nov 2022 20:28)  HR: 77 (03 Nov 2022 12:45) (60 - 79)  BP: 102/67 (03 Nov 2022 12:45) (92/66 - 123/80)  BP(mean): --  RR: 15 (03 Nov 2022 12:45) (15 - 18)  SpO2: 100% (03 Nov 2022 12:45) (92% - 100%)    Parameters below as of 03 Nov 2022 12:30  Patient On (Oxygen Delivery Method): room air    PHYSICAL EXAM:    GENERAL: NAD  CHEST/LUNG: Unlabored breathing  SURGICAL SITE: R groin surgical incision dressing c/d/i, no erythema/ edema, no ttp, surrounding area soft w/ no palpable hematoma noted at this time     VASCULAR SURGERY POST OP CHECK NOTE  Pt now s/p IVC filter placement today 11/3, access site R groin. Pt seen and examined at bedside resting comfortably, no acute complaints. Pain well controlled.  Denies fever/chills, chest pain, dyspnea, N/V, pain.     Vital Signs Last 24 Hrs  T(C): 36.1 (03 Nov 2022 12:45), Max: 36.6 (02 Nov 2022 20:28)  T(F): 97 (03 Nov 2022 12:45), Max: 97.8 (02 Nov 2022 20:28)  HR: 77 (03 Nov 2022 12:45) (60 - 79)  BP: 102/67 (03 Nov 2022 12:45) (92/66 - 123/80)  BP(mean): --  RR: 15 (03 Nov 2022 12:45) (15 - 18)  SpO2: 100% (03 Nov 2022 12:45) (92% - 100%)    Parameters below as of 03 Nov 2022 12:30  Patient On (Oxygen Delivery Method): room air    PHYSICAL EXAM:    GENERAL: NAD  CHEST/LUNG: Unlabored breathing  SURGICAL SITE: R groin surgical incision dressing c/d/i, no erythema/ edema, no ttp, surrounding area soft w/ no palpable hematoma noted at this time

## 2022-11-03 NOTE — PROGRESS NOTE ADULT - ASSESSMENT
82 year old M [facility] HTN, CAD s/p CABG, severe cardiomyopathy (EF 20%), afib (not on AC due to hematuria), BPH, LUE DVT, history of LLE DVT, recently hospitalized at [facility] for uorsepsis and urinary retention (now with dubois), brought in from [facility] for hypoxia, admitted for acute on chronic systolic CHF exacerbation. Course c/b hematuria     Acute Hypoxic Respiratory Failure  Acute on Chronic Systolic CHF Exacerbation  Severe Cardiomyopathy (EF 20%)  Hypotension; improved  -ECHO with EF 20-25% c/w previous echos--Entresto was discontinued due to hypotension  -off supplemental oxygen therapy; sats on RA 95%  -No Farxiga due to hypoglycemia-- unable to further optimize medical therapy for CHF guidelines  -continue amiodarone, Losartan  -Restarted BB 10/25 with parameters  -Started losartan 10/29 with parameters   -follow up with primary cardiologist to discuss prophylactic ICD    Bilateral DVTs  -US revealed b/l DVTs involving RT deep femoral vein, LT common femoral and deep veins  -pt with hx of DVTs found last month during hospitalization at [facility]  -Vascular following Dr Lucas; started hep gtt 10/28; however stopped 10/30 due to hematuria  -Plan for IVC filter w vascular Today 11:30 am   -Restart diet after IVF filter    Resolving Hematuria  -Urology following; cont dubois, gross hematuria much improved  - Continue to monitor CBC  -KUB--bladder debris and diffuse wall thickening, no Hydro  -off all anticoagulation due to hematuria, status post IVC filter 11/3/2022  -Continue proscar and flomax  -consider delayed void trial when clinically stable    CAD s/p CABG  -pt with hx of remote CABG and recent angiogram with occluded grafts and medical therapy recommended  -elevated Tn likely demand ischemia  -appears stable at this time, hold ASA due to bleeding, hold statin due to elevated LFTs    Ascending Aortic Aneurysm  -CT chest showed ascending thoracic aorta 4.3 cm in diameter  -aberrant right subclavian artery  -atherosclerotic calcifications of the aorta s/p CABG  -f/u  vascular evaluation outpatient    Transaminitis  -Continue holding statin for now, LFTs slowly down trending      Atrial Fibrillation; chronic  -off AC due to hematuria on 10/30  -continue mexiletine and amiodarone, and metoprolol   -Cardiac clearance requested prior to procedure-high risk, procedure medically necessary- clearance  obtained     Leukocytosis  UTI - treated   -culture + for Pseudomonas Aeruginosa,- completed course of cefepime  -blood cx negative     h/o Hypothyroid  -stable   -continue synthroid 150 mcg daily    BPH with retention  -continue flomax, proscar  and dubois  -urology following     Severe Protein Calorie Malnutrition  -nutrition evaluation appreciated  -continue ensure plus high protein cans BID    DVT ppx - with hematuria so AC on hold    Code status:  DNR     Dispo:  11/3 updated spouse Kirsten De La Cruz on plan of care,  [phone].

## 2022-11-04 LAB
ANION GAP SERPL CALC-SCNC: 5 MMOL/L — SIGNIFICANT CHANGE UP (ref 5–17)
BUN SERPL-MCNC: 27 MG/DL — HIGH (ref 7–23)
CALCIUM SERPL-MCNC: 9 MG/DL — SIGNIFICANT CHANGE UP (ref 8.4–10.5)
CHLORIDE SERPL-SCNC: 108 MMOL/L — SIGNIFICANT CHANGE UP (ref 96–108)
CO2 SERPL-SCNC: 29 MMOL/L — SIGNIFICANT CHANGE UP (ref 22–31)
CREAT SERPL-MCNC: 0.99 MG/DL — SIGNIFICANT CHANGE UP (ref 0.5–1.3)
EGFR: 75 ML/MIN/1.73M2 — SIGNIFICANT CHANGE UP
GLUCOSE SERPL-MCNC: 90 MG/DL — SIGNIFICANT CHANGE UP (ref 70–99)
HCT VFR BLD CALC: 27.1 % — LOW (ref 39–50)
HGB BLD-MCNC: 8.5 G/DL — LOW (ref 13–17)
MCHC RBC-ENTMCNC: 28.9 PG — SIGNIFICANT CHANGE UP (ref 27–34)
MCHC RBC-ENTMCNC: 31.4 GM/DL — LOW (ref 32–36)
MCV RBC AUTO: 92.2 FL — SIGNIFICANT CHANGE UP (ref 80–100)
NRBC # BLD: 0 /100 WBCS — SIGNIFICANT CHANGE UP (ref 0–0)
PLATELET # BLD AUTO: 258 K/UL — SIGNIFICANT CHANGE UP (ref 150–400)
POTASSIUM SERPL-MCNC: 4.2 MMOL/L — SIGNIFICANT CHANGE UP (ref 3.5–5.3)
POTASSIUM SERPL-SCNC: 4.2 MMOL/L — SIGNIFICANT CHANGE UP (ref 3.5–5.3)
RBC # BLD: 2.94 M/UL — LOW (ref 4.2–5.8)
RBC # FLD: 16.2 % — HIGH (ref 10.3–14.5)
SODIUM SERPL-SCNC: 142 MMOL/L — SIGNIFICANT CHANGE UP (ref 135–145)
WBC # BLD: 10.45 K/UL — SIGNIFICANT CHANGE UP (ref 3.8–10.5)
WBC # FLD AUTO: 10.45 K/UL — SIGNIFICANT CHANGE UP (ref 3.8–10.5)

## 2022-11-04 PROCEDURE — 99233 SBSQ HOSP IP/OBS HIGH 50: CPT

## 2022-11-04 PROCEDURE — 99231 SBSQ HOSP IP/OBS SF/LOW 25: CPT

## 2022-11-04 RX ORDER — POLYETHYLENE GLYCOL 3350 17 G/17G
17 POWDER, FOR SOLUTION ORAL DAILY
Refills: 0 | Status: DISCONTINUED | OUTPATIENT
Start: 2022-11-04 | End: 2022-11-07

## 2022-11-04 RX ADMIN — Medication 150 MICROGRAM(S): at 06:10

## 2022-11-04 RX ADMIN — Medication 25 MILLIGRAM(S): at 21:40

## 2022-11-04 RX ADMIN — SENNA PLUS 1 TABLET(S): 8.6 TABLET ORAL at 21:42

## 2022-11-04 RX ADMIN — LOSARTAN POTASSIUM 25 MILLIGRAM(S): 100 TABLET, FILM COATED ORAL at 06:09

## 2022-11-04 RX ADMIN — MEXILETINE HYDROCHLORIDE 150 MILLIGRAM(S): 150 CAPSULE ORAL at 18:07

## 2022-11-04 RX ADMIN — SERTRALINE 150 MILLIGRAM(S): 25 TABLET, FILM COATED ORAL at 12:46

## 2022-11-04 RX ADMIN — FINASTERIDE 5 MILLIGRAM(S): 5 TABLET, FILM COATED ORAL at 12:47

## 2022-11-04 RX ADMIN — FAMOTIDINE 20 MILLIGRAM(S): 10 INJECTION INTRAVENOUS at 12:46

## 2022-11-04 RX ADMIN — MEXILETINE HYDROCHLORIDE 150 MILLIGRAM(S): 150 CAPSULE ORAL at 06:09

## 2022-11-04 RX ADMIN — Medication 50 MILLIGRAM(S): at 06:10

## 2022-11-04 RX ADMIN — AMIODARONE HYDROCHLORIDE 200 MILLIGRAM(S): 400 TABLET ORAL at 06:09

## 2022-11-04 RX ADMIN — TAMSULOSIN HYDROCHLORIDE 0.4 MILLIGRAM(S): 0.4 CAPSULE ORAL at 21:45

## 2022-11-04 RX ADMIN — Medication 1 TABLET(S): at 12:46

## 2022-11-04 NOTE — PROVIDER CONTACT NOTE (OTHER) - ASSESSMENT
BP 96/64, EF 20%, HR mostly in the 110's
HR 53
vss. pt resting comfortably in bed and when woken up yells to "get out" or "leave me alone"
vss. pt resting comfortably in bed. arousable to voice
vss. pt resting comfortably in bed. and arousable to voice

## 2022-11-04 NOTE — PROVIDER CONTACT NOTE (OTHER) - SITUATION
HR in 40's non sustaining
Tele tech reported pt HR non sustaining in the 40's
2.47 pause. HARI garcia made aware
HR sustaining 110's with bursts into the 120-130's
tele tech reported 2.1 second pause on monitor

## 2022-11-04 NOTE — PROGRESS NOTE ADULT - SUBJECTIVE AND OBJECTIVE BOX
Patient is a 82y old  Male who presents with a chief complaint of dyspnea, hypoxia - acute on chronic systolic CHF (03 Nov 2022 17:45)    HPI:  81 y/o male with HTN, CAD, hx of CABG, severe cardiomyopathy (EF 20%), Afib (not on AC due to hematuria), BPH, hx of LUE DVT, recently hospitalized at [facility] for urinary retention, urosepsis, also has RUE DVT, BIB EMS from [facility] for lethargy and  low O2 sat 83%, even on O2 2LNC.  O2 increased to 4LNC and O2 went up to 97%. Cxray with bilat increased markings, pleural eff c/w CHF.  Pt has dubois cath.  He denies chest pain, fever, chills, cough, nausea, vomiting, abd pain.  Pt admits to intermittent dyspnea, he also c/o achiness all over.    Pt received Lasix 40 mg IVPx 1, with good diuresis.   Pt is not a good historian.     s/p filter, urine otis      Interval Events:  Patient seen and examined at bedside.    MEDICATIONS:  MEDICATIONS  (STANDING):  aMIOdarone    Tablet 200 milliGRAM(s) Oral daily  famotidine    Tablet 20 milliGRAM(s) Oral daily  finasteride 5 milliGRAM(s) Oral daily  lactobacillus acidophilus 1 Tablet(s) Oral daily  levothyroxine 150 MICROGram(s) Oral daily  losartan 25 milliGRAM(s) Oral daily  metoprolol succinate ER 50 milliGRAM(s) Oral daily  mexiletine 150 milliGRAM(s) Oral every 12 hours  senna 1 Tablet(s) Oral at bedtime  sertraline 150 milliGRAM(s) Oral daily  tamsulosin 0.4 milliGRAM(s) Oral at bedtime  traZODone 25 milliGRAM(s) Oral at bedtime    MEDICATIONS  (PRN):  acetaminophen     Tablet .. 650 milliGRAM(s) Oral every 6 hours PRN Temp greater or equal to 38C (100.4F), Mild Pain (1 - 3)  bisacodyl Suppository 10 milliGRAM(s) Rectal daily PRN Constipation  lactulose Syrup 20 Gram(s) Oral daily PRN constipation  lidocaine 2% Gel 1 Application(s) Topical three times a day PRN pain at dubois      Allergies    PC Pen VK (Other)    Intolerances        T(C): 36.3 (11-04-22 @ 05:29), Max: 36.6 (11-02-22 @ 20:28)  T(F): 97.4 (11-04-22 @ 05:29), Max: 97.8 (11-02-22 @ 20:28)  HR: 65 (11-04-22 @ 05:29) (60 - 79)  BP: 113/66 (11-04-22 @ 05:29) (92/66 - 123/80)  RR: 17 (11-04-22 @ 05:29) (15 - 18)  SpO2: 98% (11-04-22 @ 05:29) (92% - 100%)    Height (cm): 182.9 (11-03-22 @ 08:41)  Weight (kg): 77.3 (11-03-22 @ 08:41)  BMI (kg/m2): 23.1 (11-03-22 @ 08:41)  BSA (m2): 1.99 (11-03-22 @ 08:41)      11-02-22 @ 07:01  -  11-03-22 @ 07:00  --------------------------------------------------------  IN:  Total IN: 0 mL    OUT:    Indwelling Catheter - Urethral (mL): 1700 mL  Total OUT: 1700 mL    Total NET: -1700 mL      11-03-22 @ 07:01  -  11-04-22 @ 07:00  --------------------------------------------------------  IN:  Total IN: 0 mL    OUT:    Indwelling Catheter - Urethral (mL): 1025 mL  Total OUT: 1025 mL    Total NET: -1025 mL          LABS:      CBC Full  -  ( 04 Nov 2022 06:55 )  WBC Count : 10.45 K/uL  RBC Count : 2.94 M/uL  Hemoglobin : 8.5 g/dL  Hematocrit : 27.1 %  Platelet Count - Automated : 258 K/uL  Mean Cell Volume : 92.2 fl  Mean Cell Hemoglobin : 28.9 pg  Mean Cell Hemoglobin Concentration : 31.4 gm/dL  Auto Neutrophil # : x  Auto Lymphocyte # : x  Auto Monocyte # : x  Auto Eosinophil # : x  Auto Basophil # : x  Auto Neutrophil % : x  Auto Lymphocyte % : x  Auto Monocyte % : x  Auto Eosinophil % : x  Auto Basophil % : x    11-03    x   |  x   |  x   ----------------------------<  x   4.1   |  x   |  x     Ca    9.2      03 Nov 2022 07:53      PT/INR - ( 03 Nov 2022 07:53 )   PT: 13.9 sec;   INR: 1.20 ratio         PTT - ( 03 Nov 2022 07:53 )  PTT:29.6 sec              Physical Exam    Constitutional: not alert, no acute distress    Abdomen: soft, nontender, nondistended, no HSM    Genitourinary: no bladder distention, dubois otis   much improved

## 2022-11-04 NOTE — PROVIDER CONTACT NOTE (OTHER) - BACKGROUND
DVT, CHF
pt admitted for chf exacerbation
pt admitted for chf ex, +DVT's- s/p IVC filter yesterday
pt s/p ivc placement. pt with chronic afib
pt s/p IVC filter

## 2022-11-04 NOTE — CHART NOTE - NSCHARTNOTESELECT_GEN_ALL_CORE
Event Note
Event Note
Nutrition Services
Event Note
Nutrition Services

## 2022-11-04 NOTE — PROGRESS NOTE ADULT - SUBJECTIVE AND OBJECTIVE BOX
s/p IVC filter placement 11/3, access site R groin. POD 1, in bed asleep, comfortable no pain to right groin or abdomen.    PAST MEDICAL & SURGICAL HISTORY:  Chronic atrial fibrillation      History of cardiomyopathy      CAD (coronary artery disease)      BPH with obstruction/lower urinary tract symptoms      Hyperlipidemia      History of CVA (cerebrovascular accident)  left sided weakness      S/P CABG (coronary artery bypass graft)      MEDICATIONS  (STANDING):  aMIOdarone    Tablet 200 milliGRAM(s) Oral daily  famotidine    Tablet 20 milliGRAM(s) Oral daily  finasteride 5 milliGRAM(s) Oral daily  lactobacillus acidophilus 1 Tablet(s) Oral daily  levothyroxine 150 MICROGram(s) Oral daily  losartan 25 milliGRAM(s) Oral daily  metoprolol succinate ER 50 milliGRAM(s) Oral daily  mexiletine 150 milliGRAM(s) Oral every 12 hours  senna 1 Tablet(s) Oral at bedtime  sertraline 150 milliGRAM(s) Oral daily  tamsulosin 0.4 milliGRAM(s) Oral at bedtime  traZODone 25 milliGRAM(s) Oral at bedtime    MEDICATIONS  (PRN):  acetaminophen     Tablet .. 650 milliGRAM(s) Oral every 6 hours PRN Temp greater or equal to 38C (100.4F), Mild Pain (1 - 3)  bisacodyl Suppository 10 milliGRAM(s) Rectal daily PRN Constipation  lactulose Syrup 20 Gram(s) Oral daily PRN constipation  lidocaine 2% Gel 1 Application(s) Topical three times a day PRN pain at dubois      PE: Vital Signs Last 24 Hrs  T(C): 36.3 (04 Nov 2022 05:29), Max: 36.4 (03 Nov 2022 08:32)  T(F): 97.4 (04 Nov 2022 05:29), Max: 97.6 (03 Nov 2022 20:39)  HR: 65 (04 Nov 2022 05:29) (60 - 78)  BP: 113/66 (04 Nov 2022 05:29) (92/66 - 123/80)  BP(mean): --  RR: 17 (04 Nov 2022 05:29) (15 - 18)  SpO2: 98% (04 Nov 2022 05:29) (98% - 100%)    Parameters below as of 04 Nov 2022 05:29  Patient On (Oxygen Delivery Method): room air    Gen: Awake in NAD  abd: soft, nt, nd, ng  right groin site c/d/i, no hematoma or bruising noted  : silvino  LE: calfs soft, nt, no swelling                             8.5    10.45 )-----------( 258      ( 04 Nov 2022 06:55 )             27.1   11-04    142  |  108  |  27<H>  ----------------------------<  90  4.2   |  29  |  0.99    Ca    9.0      04 Nov 2022 06:55

## 2022-11-04 NOTE — PROVIDER CONTACT NOTE (OTHER) - ACTION/TREATMENT ORDERED:
no new orders at this time. np con aware. will continue to monitor
no new orders at this time. will continue to monitor. np aware.
no action required per pa. will continue to monitor.
no new orders at this time. pa consulting cardio- awaiting cardio recs. will continue to monitor. pa aware
no new orders at this time. will continue to monitor. np aware.

## 2022-11-04 NOTE — PROVIDER CONTACT NOTE (OTHER) - REASON
HR non sustaining in 40's
2.1 second pause
2.47 pause
HR sustaining 110's with bursts into the 120-130's
HR in 40's non sustaining

## 2022-11-04 NOTE — PROGRESS NOTE ADULT - PROBLEM SELECTOR PLAN 1
heparin stopped    dubois slightly less bloody, irrigate catheter prn    finasteride started
keep dubois catheter and can irrigate q 1hour prn with 50ml NS. Discussed possible elimination of heparin ? candidate for filter    finasteride ordered
much improved    to have filter    proscar/flomax   consider delayed void trial when clinically stable
s/p IVC filter placement 11/3  Manage as per primary team   monitor groin, groin suture removed, dressing placed to be removed this afternoon  OOB/PT
improved, continue finasteride
mostly resolved with cessation of anticoagulants

## 2022-11-04 NOTE — PROGRESS NOTE ADULT - ASSESSMENT
82 year old M [facility] HTN, CAD s/p CABG, severe cardiomyopathy (EF 20%), afib (not on AC due to hematuria), BPH, LUE DVT, history of LLE DVT, recently hospitalized at [facility] for uorsepsis and urinary retention (now with dubois), brought in from [facility] for hypoxia, admitted for acute on chronic systolic CHF exacerbation. Course c/b hematuria     Acute Hypoxic Respiratory Failure  Acute on Chronic Systolic CHF Exacerbation  Severe Cardiomyopathy (EF 20%)  Hypotension; improved  -ECHO with EF 20-25% c/w previous echos--Entresto was discontinued due to hypotension (and lasix)  -off supplemental oxygen therapy; sats on RA 95%  -No Farxiga due to hypoglycemia-- unable to further optimize medical therapy for CHF guidelines  -Restarted BB 10/25 with parameters  -Started losartan 10/29 with parameters, continue amiodarone  -follow up with primary cardiologist to discuss prophylactic ICD    Bilateral DVTs  -US revealed b/l DVTs involving RT deep femoral vein, LT common femoral and deep veins  -pt with hx of DVTs found last month during hospitalization at [facility]  -Vascular following Dr Lucas; started hep gtt 10/28; however stopped 10/30 due to hematuria  -Pt had IVC filter placed with vascular 11/3  -surgery appreciated, monitor groin, groin sutures removed, dressing placed    Resolving Hematuria  -Urology following; cont dubois, gross hematuria much improved  - Continue to monitor CBC  -KUB--bladder debris and diffuse wall thickening, no Hydro  -off all anticoagulation due to hematuria, status post IVC filter 11/3/2022  -Continue proscar and flomax  -consider delayed void trial when clinically stable    CAD s/p CABG  -pt with hx of remote CABG and recent angiogram with occluded grafts and medical therapy recommended  -elevated Tn likely demand ischemia  -appears stable at this time, hold ASA/AC due to bleeding, hold statin due to elevated LFTs    Ascending Aortic Aneurysm  -CT chest showed ascending thoracic aorta 4.3 cm in diameter  -aberrant right subclavian artery  -atherosclerotic calcifications of the aorta s/p CABG  -f/u  vascular evaluation outpatient    Transaminitis  -Continue holding statin for now, LFTs slowly down trending      Atrial Fibrillation; chronic  -off AC due to hematuria on 10/30  -continue mexiletine, amiodarone, and metoprolol   -Cardiac clearance requested prior to procedure-high risk, procedure medically necessary- clearance  obtained     Leukocytosis  UTI - treated   -culture + for Pseudomonas Aeruginosa,- completed course of cefepime  -blood cx negative     h/o Hypothyroid  -stable   -continue synthroid 150 mcg daily    BPH with retention  -continue flomax, proscar  and dubois  -urology following     Severe Protein Calorie Malnutrition  -nutrition evaluation appreciated  -continue ensure plus high protein cans BID    DVT ppx - with hematuria so AC on hold    Code status:  DNR     Dispo:  11/4 updated spouse Kirsten De La Cruz on plan of care,  [phone].

## 2022-11-04 NOTE — PROGRESS NOTE ADULT - PROBLEM SELECTOR PLAN 2
silvino, flomax, consider delayed void trial when clinically improved
dubois drainage on flomax    started finasteride
flomax/finasteride  ? chronic dubois vs. delayed void trial
dubois, flomax, proscar    consider delayed void trial when clinically stable and mobilizing OOB

## 2022-11-04 NOTE — PROGRESS NOTE ADULT - PROBLEM SELECTOR PROBLEM 1
Gross hematuria
Deep vein thrombosis (DVT)
Gross hematuria

## 2022-11-04 NOTE — PROGRESS NOTE ADULT - PROBLEM/PLAN-1
Compliant with med  Check levels  Adjust meds if needed  Follow dependent on results  Encourage trying again to schedule DEXA  
DISPLAY PLAN FREE TEXT

## 2022-11-04 NOTE — PROGRESS NOTE ADULT - SUBJECTIVE AND OBJECTIVE BOX
Patient is a 82y old  Male who presents with a chief complaint of dyspnea, hypoxia - acute on chronic systolic CHF (04 Nov 2022 08:23)    Patient seen and examined at bedside.  no acute events overnight    ALLERGIES:  PC Pen VK (Other)        Vital Signs Last 24 Hrs  T(F): 97.4 (04 Nov 2022 05:29), Max: 97.6 (03 Nov 2022 20:39)  HR: 65 (04 Nov 2022 05:29) (60 - 78)  BP: 113/66 (04 Nov 2022 05:29) (92/66 - 113/66)  RR: 17 (04 Nov 2022 05:29) (15 - 18)  SpO2: 98% (04 Nov 2022 05:29) (98% - 100%)  I&O's Summary    03 Nov 2022 07:01  -  04 Nov 2022 07:00  --------------------------------------------------------  IN: 0 mL / OUT: 1025 mL / NET: -1025 mL      MEDICATIONS:  acetaminophen     Tablet .. 650 milliGRAM(s) Oral every 6 hours PRN  aMIOdarone    Tablet 200 milliGRAM(s) Oral daily  bisacodyl Suppository 10 milliGRAM(s) Rectal daily PRN  famotidine    Tablet 20 milliGRAM(s) Oral daily  finasteride 5 milliGRAM(s) Oral daily  lactobacillus acidophilus 1 Tablet(s) Oral daily  lactulose Syrup 20 Gram(s) Oral daily PRN  levothyroxine 150 MICROGram(s) Oral daily  lidocaine 2% Gel 1 Application(s) Topical three times a day PRN  losartan 25 milliGRAM(s) Oral daily  metoprolol succinate ER 50 milliGRAM(s) Oral daily  mexiletine 150 milliGRAM(s) Oral every 12 hours  polyethylene glycol 3350 17 Gram(s) Oral daily PRN  senna 1 Tablet(s) Oral at bedtime  sertraline 150 milliGRAM(s) Oral daily  tamsulosin 0.4 milliGRAM(s) Oral at bedtime  traZODone 25 milliGRAM(s) Oral at bedtime      PHYSICAL EXAM:  General: NAD, A/O x 1-2  ENT: MMM, no oral thrush  Neck: Supple, No JVD  Lungs: Clear to auscultation bilaterally, non labored, bilateral air entry  Cardio: RRR, S1/S2, No murmurs  Abdomen: Soft, Nontender, Nondistended; Bowel sounds present  Extremities: No cyanosis, No edema    LABS:                        8.5    10.45 )-----------( 258      ( 04 Nov 2022 06:55 )             27.1     11-04    142  |  108  |  27  ----------------------------<  90  4.2   |  29  |  0.99    Ca    9.0      04 Nov 2022 06:55    TPro  6.1  /  Alb  2.7  /  TBili  0.3  /  DBili  x   /  AST  33  /  ALT  58  /  AlkPhos  130  11-02      PT/INR - ( 03 Nov 2022 07:53 )   PT: 13.9 sec;   INR: 1.20 ratio         PTT - ( 03 Nov 2022 07:53 )  PTT:29.6 sec                              COVID-19 PCR: NotDetec (10-31-22 @ 19:50)      RADIOLOGY & ADDITIONAL TESTS:    Care Discussed with Consultants/Other Providers:

## 2022-11-04 NOTE — CHART NOTE - NSCHARTNOTEFT_GEN_A_CORE
NUTRITION FOLLOW UP    SOURCE: Patient [X)   Family [ ]    Medical Record (X)    Diet, DASH/TLC:   Sodium & Cholesterol Restricted  Supplement Feeding Modality:  Oral  Ensure Plus High Protein Cans or Servings Per Day:  1       Frequency:  Two Times a day (11-03-22 @ 12:25) [Active]    Pt status post IVC filter 11/3/2022. Resumed on oral diet, intake typically >75% at most meals with good acceptance of Ensure supplements BID.     CURRENT WEIGHT: (11/4) 149.6lbs                            (10/31) 153.6/69.7kg                         (10/30) 157.4/71.4kg                         (10/28) 154.3/70kg     +edema possibly contributing to weight change  PERTINENT MEDS:   Pertinent Medications: MEDICATIONS  (STANDING):  aMIOdarone    Tablet 200 milliGRAM(s) Oral daily  famotidine    Tablet 20 milliGRAM(s) Oral daily  finasteride 5 milliGRAM(s) Oral daily  lactobacillus acidophilus 1 Tablet(s) Oral daily  levothyroxine 150 MICROGram(s) Oral daily  losartan 25 milliGRAM(s) Oral daily  metoprolol succinate ER 50 milliGRAM(s) Oral daily  mexiletine 150 milliGRAM(s) Oral every 12 hours  senna 1 Tablet(s) Oral at bedtime  sertraline 150 milliGRAM(s) Oral daily  tamsulosin 0.4 milliGRAM(s) Oral at bedtime  traZODone 25 milliGRAM(s) Oral at bedtime    MEDICATIONS  (PRN):  acetaminophen     Tablet .. 650 milliGRAM(s) Oral every 6 hours PRN Temp greater or equal to 38C (100.4F), Mild Pain (1 - 3)  bisacodyl Suppository 10 milliGRAM(s) Rectal daily PRN Constipation  lactulose Syrup 20 Gram(s) Oral daily PRN constipation  lidocaine 2% Gel 1 Application(s) Topical three times a day PRN pain at dubois  polyethylene glycol 3350 17 Gram(s) Oral daily PRN Constipation      PERTINENT LABS:  11-04 Na142 mmol/L Glu 90 mg/dL K+ 4.2 mmol/L Cr  0.99 mg/dL BUN 27 mg/dL<H> 11-02 Alb 2.7 g/dL<L>    SKIN:  ecchymotic, right buttock skin tear   EDEMA: + 1 left arm   LAST BM: 11/2    ESTIMATED NEEDS:   [X] no change since previous assessment  [ ] recalculated:     PREVIOUS NUTRITION DIAGNOSIS:    1. Severe malnutrition     NUTRITION DIAGNOSIS is :  (X)  Ongoing, addressed with Ensure High Protein BID    NEW NUTRITION DIAGNOSIS: N/A    NUTRITION RECOMMENDATIONS:   1. Continue current nutrition plan of care    MONITORING AND EVALUATION:   1. Tolerance to diet prescription   2. PO intake  3. Weights  4. Labs  5. Follow Up per protocol     RD to remain available   Raya Vergara RDN

## 2022-11-05 LAB
ANION GAP SERPL CALC-SCNC: 8 MMOL/L — SIGNIFICANT CHANGE UP (ref 5–17)
BUN SERPL-MCNC: 28 MG/DL — HIGH (ref 7–23)
CALCIUM SERPL-MCNC: 8.7 MG/DL — SIGNIFICANT CHANGE UP (ref 8.4–10.5)
CHLORIDE SERPL-SCNC: 106 MMOL/L — SIGNIFICANT CHANGE UP (ref 96–108)
CO2 SERPL-SCNC: 26 MMOL/L — SIGNIFICANT CHANGE UP (ref 22–31)
CREAT SERPL-MCNC: 0.96 MG/DL — SIGNIFICANT CHANGE UP (ref 0.5–1.3)
EGFR: 79 ML/MIN/1.73M2 — SIGNIFICANT CHANGE UP
GLUCOSE SERPL-MCNC: 110 MG/DL — HIGH (ref 70–99)
HCT VFR BLD CALC: 26.3 % — LOW (ref 39–50)
HGB BLD-MCNC: 8 G/DL — LOW (ref 13–17)
MCHC RBC-ENTMCNC: 28.3 PG — SIGNIFICANT CHANGE UP (ref 27–34)
MCHC RBC-ENTMCNC: 30.4 GM/DL — LOW (ref 32–36)
MCV RBC AUTO: 92.9 FL — SIGNIFICANT CHANGE UP (ref 80–100)
NRBC # BLD: 0 /100 WBCS — SIGNIFICANT CHANGE UP (ref 0–0)
PLATELET # BLD AUTO: 265 K/UL — SIGNIFICANT CHANGE UP (ref 150–400)
POTASSIUM SERPL-MCNC: 4.2 MMOL/L — SIGNIFICANT CHANGE UP (ref 3.5–5.3)
POTASSIUM SERPL-SCNC: 4.2 MMOL/L — SIGNIFICANT CHANGE UP (ref 3.5–5.3)
RBC # BLD: 2.83 M/UL — LOW (ref 4.2–5.8)
RBC # FLD: 15.9 % — HIGH (ref 10.3–14.5)
SODIUM SERPL-SCNC: 140 MMOL/L — SIGNIFICANT CHANGE UP (ref 135–145)
WBC # BLD: 9.35 K/UL — SIGNIFICANT CHANGE UP (ref 3.8–10.5)
WBC # FLD AUTO: 9.35 K/UL — SIGNIFICANT CHANGE UP (ref 3.8–10.5)

## 2022-11-05 PROCEDURE — 99232 SBSQ HOSP IP/OBS MODERATE 35: CPT

## 2022-11-05 RX ADMIN — TAMSULOSIN HYDROCHLORIDE 0.4 MILLIGRAM(S): 0.4 CAPSULE ORAL at 21:29

## 2022-11-05 RX ADMIN — SERTRALINE 150 MILLIGRAM(S): 25 TABLET, FILM COATED ORAL at 12:15

## 2022-11-05 RX ADMIN — FAMOTIDINE 20 MILLIGRAM(S): 10 INJECTION INTRAVENOUS at 12:15

## 2022-11-05 RX ADMIN — Medication 150 MICROGRAM(S): at 06:10

## 2022-11-05 RX ADMIN — Medication 25 MILLIGRAM(S): at 21:29

## 2022-11-05 RX ADMIN — AMIODARONE HYDROCHLORIDE 200 MILLIGRAM(S): 400 TABLET ORAL at 06:11

## 2022-11-05 RX ADMIN — LOSARTAN POTASSIUM 25 MILLIGRAM(S): 100 TABLET, FILM COATED ORAL at 06:11

## 2022-11-05 RX ADMIN — SENNA PLUS 1 TABLET(S): 8.6 TABLET ORAL at 21:29

## 2022-11-05 RX ADMIN — Medication 50 MILLIGRAM(S): at 06:11

## 2022-11-05 RX ADMIN — Medication 1 TABLET(S): at 12:15

## 2022-11-05 RX ADMIN — POLYETHYLENE GLYCOL 3350 17 GRAM(S): 17 POWDER, FOR SOLUTION ORAL at 21:30

## 2022-11-05 RX ADMIN — MEXILETINE HYDROCHLORIDE 150 MILLIGRAM(S): 150 CAPSULE ORAL at 06:11

## 2022-11-05 RX ADMIN — FINASTERIDE 5 MILLIGRAM(S): 5 TABLET, FILM COATED ORAL at 12:15

## 2022-11-05 NOTE — PROGRESS NOTE ADULT - SUBJECTIVE AND OBJECTIVE BOX
Patient is a 82y old  Male who presents with a chief complaint of dyspnea, hypoxia - acute on chronic systolic CHF       Patient seen and examined at bedside. States he feels ok, no overnight reported events. Denies complaints.     ALLERGIES:  PC Pen VK (Other)    MEDICATIONS  (STANDING):  aMIOdarone    Tablet 200 milliGRAM(s) Oral daily  famotidine    Tablet 20 milliGRAM(s) Oral daily  finasteride 5 milliGRAM(s) Oral daily  lactobacillus acidophilus 1 Tablet(s) Oral daily  levothyroxine 150 MICROGram(s) Oral daily  losartan 25 milliGRAM(s) Oral daily  metoprolol succinate ER 50 milliGRAM(s) Oral daily  mexiletine 150 milliGRAM(s) Oral every 12 hours  senna 1 Tablet(s) Oral at bedtime  sertraline 150 milliGRAM(s) Oral daily  tamsulosin 0.4 milliGRAM(s) Oral at bedtime  traZODone 25 milliGRAM(s) Oral at bedtime    MEDICATIONS  (PRN):  acetaminophen     Tablet .. 650 milliGRAM(s) Oral every 6 hours PRN Temp greater or equal to 38C (100.4F), Mild Pain (1 - 3)  bisacodyl Suppository 10 milliGRAM(s) Rectal daily PRN Constipation  lactulose Syrup 20 Gram(s) Oral daily PRN constipation  lidocaine 2% Gel 1 Application(s) Topical three times a day PRN pain at dubois  polyethylene glycol 3350 17 Gram(s) Oral daily PRN Constipation    Vital Signs Last 24 Hrs  T(F): 98 (05 Nov 2022 05:26), Max: 98 (05 Nov 2022 05:26)  HR: 81 (05 Nov 2022 05:26) (71 - 83)  BP: 104/62 (05 Nov 2022 05:26) (91/54 - 104/62)  RR: 17 (05 Nov 2022 05:26) (17 - 18)  SpO2: 97% (05 Nov 2022 05:26) (95% - 98%)  I&O's Summary    04 Nov 2022 07:01  -  05 Nov 2022 07:00  --------------------------------------------------------  IN: 0 mL / OUT: 1100 mL / NET: -1100 mL      PHYSICAL EXAM:  General: NAD, weak, cachetic, A/O x 3  ENT: MMM, no oral thrush   Neck: Supple, No JVD  Lungs: Clear to auscultation bilaterally, non labored breathing  Cardio: RRR, S1/S2, No murmurs  Abdomen: Soft, Nontender, Nondistended; Bowel sounds present  Extremities: No calf tenderness, No pitting edema  : + dubois with clearing urine     LABS:                        8.0    9.35  )-----------( 265      ( 05 Nov 2022 07:00 )             26.3     11-05    140  |  106  |  28  ----------------------------<  110  4.2   |  26  |  0.96    Ca    8.7      05 Nov 2022 07:00        PT/INR - ( 03 Nov 2022 07:53 )   PT: 13.9 sec;   INR: 1.20 ratio         PTT - ( 03 Nov 2022 07:53 )  PTT:29.6 sec                  COVID-19 PCR: NotDetec (10-31-22 @ 19:50)      RADIOLOGY & ADDITIONAL TESTS:    Care Discussed with Consultants/Other Providers:

## 2022-11-05 NOTE — PROGRESS NOTE ADULT - SUBJECTIVE AND OBJECTIVE BOX
YUNIEL ALBERTO  [id number]      Chief Complaint: Acute on chronic systolic heart failure exacerbation/Atrial flutter/UTI/Bilateral DVT s/p IVC filter    Interval History: The patient reports feeling ok, denies palpitations, chest pain or shortness of breath.     Tele: atrial fibrillation 70s BPM      Current meds:   acetaminophen     Tablet .. 650 milliGRAM(s) Oral every 6 hours PRN  aMIOdarone    Tablet 200 milliGRAM(s) Oral daily  bisacodyl Suppository 10 milliGRAM(s) Rectal daily PRN  famotidine    Tablet 20 milliGRAM(s) Oral daily  finasteride 5 milliGRAM(s) Oral daily  lactobacillus acidophilus 1 Tablet(s) Oral daily  lactulose Syrup 20 Gram(s) Oral daily PRN  levothyroxine 150 MICROGram(s) Oral daily  lidocaine 2% Gel 1 Application(s) Topical three times a day PRN  losartan 25 milliGRAM(s) Oral daily  metoprolol succinate ER 50 milliGRAM(s) Oral daily  mexiletine 150 milliGRAM(s) Oral every 12 hours  polyethylene glycol 3350 17 Gram(s) Oral daily PRN  senna 1 Tablet(s) Oral at bedtime  sertraline 150 milliGRAM(s) Oral daily  tamsulosin 0.4 milliGRAM(s) Oral at bedtime  traZODone 25 milliGRAM(s) Oral at bedtime      Objective:     Vital Signs:   T(C): 36.7 (11-05-22 @ 05:26), Max: 36.7 (11-05-22 @ 05:26)  HR: 81 (11-05-22 @ 05:26) (71 - 83)  BP: 104/62 (11-05-22 @ 05:26) (91/54 - 104/62)  RR: 17 (11-05-22 @ 05:26) (17 - 18)  SpO2: 97% (11-05-22 @ 05:26) (95% - 98%)  Wt(kg): --    Physical Exam:   General: elderly man, frail, no acute distress  Neck: supple  CVS: JVP ~ 7 cm H20, irregularly irregular, s1, s2  Pulm: unlabored respirations, decreased breath sounds  Abd: non-distended  Ext: no lower extremity edema b/l  Neuro: awake, alert   Psych: Normal affect      Labs:   05 Nov 2022 07:00    140    |  106    |  28     ----------------------------<  110    4.2     |  26     |  0.96     Ca    8.7        05 Nov 2022 07:00                            8.0    9.35  )-----------( 265      ( 05 Nov 2022 07:00 )             26.3             ECG (10/17/22): atrial fibrillation, old septal infarct, prolonged QT (no prior ECG for comparison)    CT Chest (10/17/22):  VESSELS: Ascending thoracic aorta measures 4.3 cm in diameter. Aberrant right subclavian artery. Atherosclerotic calcifications of the aorta. Status post CABG.  IMPRESSION:  Pulmonary edema, likely secondary to congestive heart failure.  Small right and small to moderate left pleural effusions.    TTE (10/18/22):   1. Severely decreased global left ventricular systolic function.   2. Left ventricular ejection fraction, by visual estimation, is 20 to 25%.   3. Severe global left ventricle hypokinesis with regional variation: the   inferolateral wall and inferior wall appear akinetic.   4. Normal left ventricular internal cavity size.   5. The mitral in-flow pattern reveals no discernable A-wave, therefore no comment on diastolic function can be made.   6. There is moderate septal left ventricular hypertrophy.   7. Moderately reduced RV systolic function.   8. Severely enlarged left atrium.   9. Right atrial enlargement.  10. Mild mitral annular calcification.  11. Mild thickening and calcification of the anterior and posterior   mitral valve leaflets.  12. Moderate mitral valve regurgitation.  13. Mild tricuspid regurgitation.  14. Mild aortic valve leaflet thickening and calcification.  15. Sclerotic aortic valve with normal opening.  16. Moderate aortic regurgitation.  17. Dilated proximal ascending aorta (4.4 cm).  18. Mildly dilated pulmonary artery.  19. Large pleural effusion in the left lateral region.  20. There is no evidence of pericardial effusion.      [facility] records obtained:    TTE (9/12/22):  LVEF 20%, minor regional variation  Normal RV size and severely reduced RV systolic function  Moderate MR, Mild AR   No pericardial effusion     Coronary angiogram (10/3/22):  LIMA-LAD: patent  SVG-LCx: occluded  SVG-RCA: occluded  Recommendations: Medical therapy, EP consult

## 2022-11-05 NOTE — PROGRESS NOTE ADULT - ASSESSMENT
Assessment:  Grover De La Cruz is an 82 year old man with past medical history of Coronary artery disease (s/p CABG), HFrEF (LVEF 20% per chart notes), Hypertension, Atrial fibrillation (not on anticoagulation due to hematuria), CVA and BPH with recent hospitalization at [facility] for urosepsis and right upper extremity DVT was brought in by EMS from living facility due to lethargy and hypoxia, found to have acute on chronic systolic heart failure exacerbation, urinary tract infection, also with bilateral DVTs.    ECG consistent with atrial fibrillation, old septal infarct, no prior ECG for comparison. Troponin mildly elevated and has peaked likely demand ischemia from CHF. CT chest consistent with thoracic ascending aortic aneurysm (4.3 m) and pulmonary edema.     Records obtained confirms that patient has a history of reduced LVEF 20% and history of occluded vein bypass grafts based on recent coronary angiogram that was medically managed.     Patient is now s/p IVC filter placement and tolerated procedure well.      Recommendations:  [] Acute on chronic systolic heart failure exacerbation: Euvolemic. LVEF 20% on recent [facility] echo. Echo here consistent with LVEF 20-25% with wall motion abnormalities, reduced RV function, large left pleural effusion. Due to hypotension optimization of guideline directed medical therapy has been limited; continue Metoprolol succinate 50 mg daily with hold parameters, also on Losartan 25 mg daily (would consider Entresto instead if BP allows). Plan for EP evaluation for ICD if within goals of care, patient reports he is not interested at this time, can further follow up with his primary cardiologist   [] Atrial fibrillation: Currently rate controlled, not on anticoagulation due to recurrent hematuria   [] CAD s/p CABG: Appears stable at this time, consideration of Aspirin in future if safe with Urology  [] Ascending aortic aneurysm: Follow up Vascular surgery     We will sign off, please re-consult if needed. The patient should follow up with his cardiologist when discharged for optimization of CHF.     Vignesh Wallace MD  Cardiology

## 2022-11-06 LAB
ALBUMIN SERPL ELPH-MCNC: 2.7 G/DL — LOW (ref 3.3–5)
ALP SERPL-CCNC: 116 U/L — SIGNIFICANT CHANGE UP (ref 40–120)
ALT FLD-CCNC: 49 U/L — HIGH (ref 10–45)
ANION GAP SERPL CALC-SCNC: 5 MMOL/L — SIGNIFICANT CHANGE UP (ref 5–17)
AST SERPL-CCNC: 29 U/L — SIGNIFICANT CHANGE UP (ref 10–40)
BILIRUB SERPL-MCNC: 0.3 MG/DL — SIGNIFICANT CHANGE UP (ref 0.2–1.2)
BUN SERPL-MCNC: 25 MG/DL — HIGH (ref 7–23)
CALCIUM SERPL-MCNC: 8.6 MG/DL — SIGNIFICANT CHANGE UP (ref 8.4–10.5)
CHLORIDE SERPL-SCNC: 106 MMOL/L — SIGNIFICANT CHANGE UP (ref 96–108)
CHOLEST SERPL-MCNC: 99 MG/DL — SIGNIFICANT CHANGE UP
CO2 SERPL-SCNC: 28 MMOL/L — SIGNIFICANT CHANGE UP (ref 22–31)
CREAT SERPL-MCNC: 0.8 MG/DL — SIGNIFICANT CHANGE UP (ref 0.5–1.3)
EGFR: 88 ML/MIN/1.73M2 — SIGNIFICANT CHANGE UP
GLUCOSE SERPL-MCNC: 85 MG/DL — SIGNIFICANT CHANGE UP (ref 70–99)
HCT VFR BLD CALC: 24.9 % — LOW (ref 39–50)
HDLC SERPL-MCNC: 31 MG/DL — LOW
HGB BLD-MCNC: 7.8 G/DL — LOW (ref 13–17)
LIPID PNL WITH DIRECT LDL SERPL: 52 MG/DL — SIGNIFICANT CHANGE UP
MCHC RBC-ENTMCNC: 28.7 PG — SIGNIFICANT CHANGE UP (ref 27–34)
MCHC RBC-ENTMCNC: 31.3 GM/DL — LOW (ref 32–36)
MCV RBC AUTO: 91.5 FL — SIGNIFICANT CHANGE UP (ref 80–100)
NON HDL CHOLESTEROL: 68 MG/DL — SIGNIFICANT CHANGE UP
NRBC # BLD: 0 /100 WBCS — SIGNIFICANT CHANGE UP (ref 0–0)
PLATELET # BLD AUTO: 259 K/UL — SIGNIFICANT CHANGE UP (ref 150–400)
POTASSIUM SERPL-MCNC: 4.2 MMOL/L — SIGNIFICANT CHANGE UP (ref 3.5–5.3)
POTASSIUM SERPL-SCNC: 4.2 MMOL/L — SIGNIFICANT CHANGE UP (ref 3.5–5.3)
PROT SERPL-MCNC: 6 G/DL — SIGNIFICANT CHANGE UP (ref 6–8.3)
RBC # BLD: 2.72 M/UL — LOW (ref 4.2–5.8)
RBC # FLD: 15.8 % — HIGH (ref 10.3–14.5)
SODIUM SERPL-SCNC: 139 MMOL/L — SIGNIFICANT CHANGE UP (ref 135–145)
TRIGL SERPL-MCNC: 81 MG/DL — SIGNIFICANT CHANGE UP
WBC # BLD: 9.9 K/UL — SIGNIFICANT CHANGE UP (ref 3.8–10.5)
WBC # FLD AUTO: 9.9 K/UL — SIGNIFICANT CHANGE UP (ref 3.8–10.5)

## 2022-11-06 PROCEDURE — 99232 SBSQ HOSP IP/OBS MODERATE 35: CPT

## 2022-11-06 RX ORDER — ASPIRIN/CALCIUM CARB/MAGNESIUM 324 MG
81 TABLET ORAL DAILY
Refills: 0 | Status: DISCONTINUED | OUTPATIENT
Start: 2022-11-07 | End: 2022-11-07

## 2022-11-06 RX ORDER — ASPIRIN/CALCIUM CARB/MAGNESIUM 324 MG
81 TABLET ORAL DAILY
Refills: 0 | Status: DISCONTINUED | OUTPATIENT
Start: 2022-11-06 | End: 2022-11-06

## 2022-11-06 RX ORDER — ASPIRIN/CALCIUM CARB/MAGNESIUM 324 MG
81 TABLET ORAL ONCE
Refills: 0 | Status: COMPLETED | OUTPATIENT
Start: 2022-11-06 | End: 2022-11-06

## 2022-11-06 RX ADMIN — FAMOTIDINE 20 MILLIGRAM(S): 10 INJECTION INTRAVENOUS at 12:09

## 2022-11-06 RX ADMIN — Medication 1 TABLET(S): at 12:09

## 2022-11-06 RX ADMIN — Medication 81 MILLIGRAM(S): at 09:07

## 2022-11-06 RX ADMIN — Medication 25 MILLIGRAM(S): at 22:49

## 2022-11-06 RX ADMIN — AMIODARONE HYDROCHLORIDE 200 MILLIGRAM(S): 400 TABLET ORAL at 06:05

## 2022-11-06 RX ADMIN — MEXILETINE HYDROCHLORIDE 150 MILLIGRAM(S): 150 CAPSULE ORAL at 17:56

## 2022-11-06 RX ADMIN — MEXILETINE HYDROCHLORIDE 150 MILLIGRAM(S): 150 CAPSULE ORAL at 06:05

## 2022-11-06 RX ADMIN — TAMSULOSIN HYDROCHLORIDE 0.4 MILLIGRAM(S): 0.4 CAPSULE ORAL at 22:50

## 2022-11-06 RX ADMIN — LOSARTAN POTASSIUM 25 MILLIGRAM(S): 100 TABLET, FILM COATED ORAL at 06:05

## 2022-11-06 RX ADMIN — SERTRALINE 150 MILLIGRAM(S): 25 TABLET, FILM COATED ORAL at 12:09

## 2022-11-06 RX ADMIN — Medication 150 MICROGRAM(S): at 06:05

## 2022-11-06 RX ADMIN — FINASTERIDE 5 MILLIGRAM(S): 5 TABLET, FILM COATED ORAL at 12:09

## 2022-11-06 RX ADMIN — Medication 50 MILLIGRAM(S): at 06:06

## 2022-11-06 RX ADMIN — SENNA PLUS 1 TABLET(S): 8.6 TABLET ORAL at 22:49

## 2022-11-06 RX ADMIN — LACTULOSE 20 GRAM(S): 10 SOLUTION ORAL at 06:04

## 2022-11-06 NOTE — PROGRESS NOTE ADULT - NS ATTEND OPT1A GEN_ALL_CORE
History/Exam/Medical decision making
Medical decision making
History/Exam/Medical decision making
History/Exam/Medical decision making
Medical decision making
History/Exam/Medical decision making
Medical decision making
History/Exam/Medical decision making
History/Exam/Medical decision making
Medical decision making

## 2022-11-06 NOTE — PROGRESS NOTE ADULT - ASSESSMENT
82 year old M [facility] HTN, CAD s/p CABG, severe cardiomyopathy (EF 20%), afib (not on AC due to hematuria), BPH, LUE DVT, history of LLE DVT, recently hospitalized at [place] for uorsepsis and urinary retention (now with dubois), brought in from [facility] for hypoxia, admitted for acute on chronic systolic CHF exacerbation. Course c/b hematuria     Acute Hypoxic Respiratory Failure  Acute on Chronic Systolic CHF Exacerbation  Severe Cardiomyopathy (EF 20%)  Hypotension; improved  -ECHO with EF 20-25% c/w previous echos--Entresto was discontinued due to hypotension (and lasix)  -off supplemental oxygen therapy; sats on RA 95%  -No Farxiga due to hypoglycemia-- unable to further optimize medical therapy for CHF guidelines  -Restarted BB 10/25 with parameters  -Started losartan 10/29 with parameters, continue amiodarone  -follow up with primary cardiologist to discuss prophylactic ICD    Bilateral DVTs  -US revealed b/l DVTs involving RT deep femoral vein, LT common femoral and deep veins  -pt with hx of DVTs found last month during hospitalization at [facility]  -Vascular following Dr Lucas; started hep gtt 10/28; however stopped 10/30 due to hematuria  -Pt had IVC filter placed with vascular 11/3  -surgery appreciated, monitor groin    Resolving Hematuria  -Urology following; cont dubois, gross hematuria much improved  -Delayed void trial outpatient in office. FU urology recs  -restart aspirin monitor for bleeding  -Continue to monitor CBC  -KUB--bladder debris and diffuse wall thickening, no Hydro  -Continue proscar and flomax    CAD s/p CABG  -pt with hx of remote CABG and recent angiogram with occluded grafts and medical therapy recommended  -elevated Tn likely demand ischemia  -appears stable at this time  - consider restarting Statin. FU LFT's    Ascending Aortic Aneurysm  -CT chest showed ascending thoracic aorta 4.3 cm in diameter  -aberrant right subclavian artery  -atherosclerotic calcifications of the aorta s/p CABG  -f/u  vascular evaluation outpatient    Transaminitis  -downtrending  -monitor     Atrial Fibrillation; chronic  -off AC due to hematuria on 10/30  -continue mexiletine, amiodarone, and metoprolol   -Cardiac clearance requested prior to procedure-high risk, procedure medically necessary- clearance  obtained     Leukocytosis  UTI - treated   -culture + for Pseudomonas Aeruginosa,- completed course of cefepime  -blood cx negative     h/o Hypothyroid  -stable   -continue synthroid 150 mcg daily    BPH with retention  -continue flomax, proscar and dubois  -urology following     Severe Protein Calorie Malnutrition  -nutrition evaluation appreciated  -continue ensure plus high protein cans BID    DVT ppx - with hematuria so AC on hold    Code status:  DNR     Dispo:  11/6 updated spouse Kirsten De La Cruz on plan of care,  [phone].  DC to [facility] tomorrow pending no increased hematuria

## 2022-11-06 NOTE — PROGRESS NOTE ADULT - REASON FOR ADMISSION
dyspnea, hypoxia - acute on chronic systolic CHF

## 2022-11-06 NOTE — PROGRESS NOTE ADULT - NS ATTEND OPT1 GEN_ALL_CORE
I attest my time as attending is greater than 50% of the total combined time spent on qualifying patient care activities by the PA/NP and attending.
I independently performed the documented:
Quality 110: Preventive Care And Screening: Influenza Immunization: Influenza Immunization Administered during Influenza season
Detail Level: Detailed

## 2022-11-06 NOTE — PROGRESS NOTE ADULT - NS ATTEND AMEND GEN_ALL_CORE FT
Acute hypoxic respiratory failure secondary to acute on chronic systolic heart failure  - off O2  - hypotensive on new meds  - will need to adjust  - discuss with cardiology
Patient s/p IVC filter, with hx of DVT, immobility, hematuria on AC.  Urinary retention - delayed TOV per urology when more mobile. Restarted ASA 81 as discussed by NP with covering urologist Dr. Damon. Will monitor another day for any bleeding and hematuria. Urine is yellow today.  Per prior [facility] discussions, patient is full code, with his wife’s involvement in medical decision making.
failed trail of AC due to hematuria requiring PRBC  IVC filter planned for tomorrow  patient is high branden-procedural risk for complications but his risk factors are chronic and unmodifiable in short-term, and IVC filter should help with PE prevention from DVT. overall benefit>risk  defer further optimization to cardiology  prognosis poor  family declined hospice/comfort care last week.
failed trial of AC  give PRBC with lasix  monitor CBC  transfuse if hb<7 or symptomatic.   IVC filter 11/1  poor overall prognosis
wife Kirsten aware of plan for IVC filter  she is agreeable to procedure  she understands that he is a high-risk candidate   she understands that without IVC filter and AC, he is at risk for death from PE  prognosis is poor overall  DNR DNI
Acute on chronic systolic heart failure  - near euvolemia  - off O2  - more alert  suspect d/c in 1-2 days
Acute on chronic systolic heart failure  - watch BP, was hypotensive on new meds    Gross Hematuria  - urology consulted  - hold eliquis for now    Bilateral LE DVTs  - eliquis on hold
IVC filter in AM  NPO after midnight  dementia  family consented to filter  Is/Os  transfuse if hb<7 or symptomatic.
gross hematuria  hb stable  f/up urology recs ?trial of heparin drip in AM  ?candidate for IVC filter  borderline BP  frail  continue GOC discussion as overall prognosis is poor
patient medically stable  likely discharge tomorrow.
Patient seen and examined at bedside on 11/4/22.    s/p IVC filter, with hx of DVT, immobility, hematuria on AC.  Urinary retention - delayed TOV per urology when more mobile.  Monitoring for resolution of hematuria  Per prior [facility] discussions, patient is full code, with his wife’s involvement in medical decision making.  Cardio following for med management
Patient to have IVC filter today, with hx of DVT, immobility, hematuria on AC  Per prior [facility] discussions, patient is full code, with his wife’s involvement in medical decision making
blood tinged urine  careful titration of heparin drip  PTT goal 60-80  daily CBC
hb stable  blood tinged urine  no gross hematuria  heparin infusion VTE protocol  monitor CBC q12
slowly improving  discussed with wife bushra and son Gabriel  continue diuresis    Gross hematuria in dubois  - monitor hemoglobin  - hold AC for now
Patient s/p IVC filter, with hx of DVT, immobility, hematuria on AC.  Urinary retention - delayed TOV per urology when more mobile.  Per prior [facility] discussions, patient is full code, with his wife’s involvement in medical decision making.  DC planning
gross hematuria  f/up urology recs  kidney / bladder sono  monitor CBC  transfuse if hb<7 or symptomatic.   AC on hold for a.fib  consider hospice eval if c/w GOC
Acute on chronic systolic heart failure  - cont diuresis  - patient now off O2  - suspect will need NILSON

## 2022-11-06 NOTE — PROGRESS NOTE ADULT - PROVIDER SPECIALTY LIST ADULT
Cardiology
Hospitalist
Surgery
Hospitalist
Hospitalist
Surgery
Cardiology
Hospitalist
Palliative Care
Urology
Cardiology
Hospitalist
Urology

## 2022-11-06 NOTE — PROGRESS NOTE ADULT - NUTRITIONAL ASSESSMENT
This patient has been assessed with a concern for Malnutrition and has been determined to have a diagnosis/diagnoses of Severe protein-calorie malnutrition.    This patient is being managed with:   Diet DASH/TLC-  Sodium & Cholesterol Restricted  Supplement Feeding Modality:  Oral  Ensure Plus High Protein Cans or Servings Per Day:  1       Frequency:  Two Times a day  Entered: Nov  3 2022  1:14PM    
This patient has been assessed with a concern for Malnutrition and has been determined to have a diagnosis/diagnoses of Severe protein-calorie malnutrition.    This patient is being managed with:   Diet DASH/TLC-  Sodium & Cholesterol Restricted  Supplement Feeding Modality:  Oral  Ensure Plus High Protein Cans or Servings Per Day:  1       Frequency:  Two Times a day  Entered: Oct 19 2022  2:24PM    
This patient has been assessed with a concern for Malnutrition and has been determined to have a diagnosis/diagnoses of Severe protein-calorie malnutrition.    This patient is being managed with:   Diet NPO after Midnight-     NPO Start Date: 02-Nov-2022   NPO Start Time: 23:59  Entered: Nov 2 2022  7:42AM    Diet DASH/TLC-  Sodium & Cholesterol Restricted  Supplement Feeding Modality:  Oral  Ensure Plus High Protein Cans or Servings Per Day:  1       Frequency:  Two Times a day  Entered: Nov 1 2022  4:05PM    
This patient has been assessed with a concern for Malnutrition and has been determined to have a diagnosis/diagnoses of Severe protein-calorie malnutrition.    This patient is being managed with:   Diet NPO after Midnight-     NPO Start Date: 31-Oct-2022   NPO Start Time: 23:59  Entered: Oct 31 2022  8:52AM    
This patient has been assessed with a concern for Malnutrition and has been determined to have a diagnosis/diagnoses of Severe protein-calorie malnutrition.    This patient is being managed with:   Diet DASH/TLC-  Sodium & Cholesterol Restricted  Supplement Feeding Modality:  Oral  Ensure Plus High Protein Cans or Servings Per Day:  1       Frequency:  Two Times a day  Entered: Nov  3 2022  1:14PM    
This patient has been assessed with a concern for Malnutrition and has been determined to have a diagnosis/diagnoses of Severe protein-calorie malnutrition.    This patient is being managed with:   Diet DASH/TLC-  Sodium & Cholesterol Restricted  Supplement Feeding Modality:  Oral  Ensure Plus High Protein Cans or Servings Per Day:  1       Frequency:  Two Times a day  Entered: Nov  3 2022  1:14PM    
This patient has been assessed with a concern for Malnutrition and has been determined to have a diagnosis/diagnoses of Severe protein-calorie malnutrition.    This patient is being managed with:   Diet DASH/TLC-  Sodium & Cholesterol Restricted  Supplement Feeding Modality:  Oral  Ensure Plus High Protein Cans or Servings Per Day:  1       Frequency:  Two Times a day  Entered: Oct 19 2022  2:24PM    
This patient has been assessed with a concern for Malnutrition and has been determined to have a diagnosis/diagnoses of Severe protein-calorie malnutrition.    This patient is being managed with:   Diet DASH/TLC-  Sodium & Cholesterol Restricted  Supplement Feeding Modality:  Oral  Ensure Plus High Protein Cans or Servings Per Day:  1       Frequency:  Two Times a day  Entered: Nov  3 2022  1:14PM    
This patient has been assessed with a concern for Malnutrition and has been determined to have a diagnosis/diagnoses of Severe protein-calorie malnutrition.    This patient is being managed with:   Diet DASH/TLC-  Sodium & Cholesterol Restricted  Supplement Feeding Modality:  Oral  Ensure Plus High Protein Cans or Servings Per Day:  1       Frequency:  Two Times a day  Entered: Nov  3 2022  1:14PM    
This patient has been assessed with a concern for Malnutrition and has been determined to have a diagnosis/diagnoses of Severe protein-calorie malnutrition.    This patient is being managed with:   Diet DASH/TLC-  Sodium & Cholesterol Restricted  Supplement Feeding Modality:  Oral  Ensure Plus High Protein Cans or Servings Per Day:  1       Frequency:  Two Times a day  Entered: Nov 1 2022  4:05PM    
This patient has been assessed with a concern for Malnutrition and has been determined to have a diagnosis/diagnoses of Severe protein-calorie malnutrition.    This patient is being managed with:   Diet DASH/TLC-  Sodium & Cholesterol Restricted  Supplement Feeding Modality:  Oral  Ensure Plus High Protein Cans or Servings Per Day:  1       Frequency:  Two Times a day  Entered: Oct 19 2022  2:24PM    
This patient has been assessed with a concern for Malnutrition and has been determined to have a diagnosis/diagnoses of Severe protein-calorie malnutrition.    This patient is being managed with:   Diet NPO after Midnight-     NPO Start Date: 31-Oct-2022   NPO Start Time: 23:59  Entered: Oct 31 2022  8:52AM    Diet DASH/TLC-  Sodium & Cholesterol Restricted  Supplement Feeding Modality:  Oral  Ensure Plus High Protein Cans or Servings Per Day:  1       Frequency:  Two Times a day  Entered: Oct 19 2022  2:24PM    
This patient has been assessed with a concern for Malnutrition and has been determined to have a diagnosis/diagnoses of Severe protein-calorie malnutrition.    This patient is being managed with:   Diet NPO after Midnight-     NPO Start Date: 31-Oct-2022   NPO Start Time: 23:59  Entered: Oct 31 2022  8:52AM    Diet DASH/TLC-  Sodium & Cholesterol Restricted  Supplement Feeding Modality:  Oral  Ensure Plus High Protein Cans or Servings Per Day:  1       Frequency:  Two Times a day  Entered: Oct 19 2022  2:24PM    
This patient has been assessed with a concern for Malnutrition and has been determined to have a diagnosis/diagnoses of Severe protein-calorie malnutrition.    This patient is being managed with:   Diet DASH/TLC-  Sodium & Cholesterol Restricted  Supplement Feeding Modality:  Oral  Ensure Plus High Protein Cans or Servings Per Day:  1       Frequency:  Two Times a day  Entered: Oct 19 2022  2:24PM    
This patient has been assessed with a concern for Malnutrition and has been determined to have a diagnosis/diagnoses of Severe protein-calorie malnutrition.    This patient is being managed with:   Diet DASH/TLC-  Sodium & Cholesterol Restricted  Supplement Feeding Modality:  Oral  Ensure Plus High Protein Cans or Servings Per Day:  1       Frequency:  Two Times a day  Entered: Nov  3 2022  1:14PM    
This patient has been assessed with a concern for Malnutrition and has been determined to have a diagnosis/diagnoses of Severe protein-calorie malnutrition.    This patient is being managed with:   Diet DASH/TLC-  Sodium & Cholesterol Restricted  Supplement Feeding Modality:  Oral  Ensure Plus High Protein Cans or Servings Per Day:  1       Frequency:  Two Times a day  Entered: Oct 19 2022  2:24PM

## 2022-11-06 NOTE — PROGRESS NOTE ADULT - SUBJECTIVE AND OBJECTIVE BOX
Patient is a 82y old  Male who presents with a chief complaint of dyspnea, hypoxia - acute on chronic systolic CHF       Patient seen and examined at bedside. Pt states he feels well, denies overnight events or current complaints including chest pain, shortness of breath, dizziness, nausea, vomiting, diarrhea, fever or chills.      ALLERGIES:  PC Pen VK (Other)    MEDICATIONS  (STANDING):  aMIOdarone    Tablet 200 milliGRAM(s) Oral daily  famotidine    Tablet 20 milliGRAM(s) Oral daily  finasteride 5 milliGRAM(s) Oral daily  lactobacillus acidophilus 1 Tablet(s) Oral daily  levothyroxine 150 MICROGram(s) Oral daily  losartan 25 milliGRAM(s) Oral daily  metoprolol succinate ER 50 milliGRAM(s) Oral daily  mexiletine 150 milliGRAM(s) Oral every 12 hours  Preservision Areds2 2 Capsule(s) 2 Capsule(s) Oral daily  senna 1 Tablet(s) Oral at bedtime  sertraline 150 milliGRAM(s) Oral daily  tamsulosin 0.4 milliGRAM(s) Oral at bedtime  traZODone 25 milliGRAM(s) Oral at bedtime    MEDICATIONS  (PRN):  acetaminophen     Tablet .. 650 milliGRAM(s) Oral every 6 hours PRN Temp greater or equal to 38C (100.4F), Mild Pain (1 - 3)  bisacodyl Suppository 10 milliGRAM(s) Rectal daily PRN Constipation  lactulose Syrup 20 Gram(s) Oral daily PRN constipation  lidocaine 2% Gel 1 Application(s) Topical three times a day PRN pain at dubois  polyethylene glycol 3350 17 Gram(s) Oral daily PRN Constipation    Vital Signs Last 24 Hrs  T(F): 97.6 (06 Nov 2022 05:43), Max: 98.7 (05 Nov 2022 12:33)  HR: 73 (06 Nov 2022 05:43) (66 - 81)  BP: 125/74 (06 Nov 2022 05:43) (102/63 - 125/74)  RR: 18 (06 Nov 2022 05:43) (18 - 18)  SpO2: 100% (06 Nov 2022 05:43) (97% - 100%)  I&O's Summary    05 Nov 2022 08:01  -  06 Nov 2022 07:00  --------------------------------------------------------  IN: 0 mL / OUT: 1080 mL / NET: -1080 mL        PHYSICAL EXAM:  General: NAD, weak, cachetic, A/O x 3  ENT: MMM, no oral thrush   Neck: Supple, No JVD  Lungs: Clear to auscultation bilaterally, non labored breathing  Cardio: RRR, S1/S2, No murmurs  Abdomen: Soft, Nontender, Nondistended; Bowel sounds present  Extremities: No calf tenderness, No pitting edema  : + dubois with clear/yellow urine     LABS:                        7.8    9.90  )-----------( 259      ( 06 Nov 2022 06:30 )             24.9     11-06    139  |  106  |  25  ----------------------------<  85  4.2   |  28  |  0.80    Ca    8.6      06 Nov 2022 06:30    TPro  6.0  /  Alb  2.7  /  TBili  0.3  /  DBili  x   /  AST  29  /  ALT  49  /  AlkPhos  116  11-06 11-06 Chol 99 mg/dL LDL -- HDL 31 mg/dL Trig 81 mg/dL                      COVID-19 PCR: [facility] (10-31-22 @ 19:50)      RADIOLOGY & ADDITIONAL TESTS:    Care Discussed with Consultants/Other Providers:

## 2022-11-07 ENCOUNTER — TRANSCRIPTION ENCOUNTER (OUTPATIENT)
Age: 82
End: 2022-11-07

## 2022-11-07 VITALS
HEART RATE: 86 BPM | RESPIRATION RATE: 18 BRPM | TEMPERATURE: 98 F | DIASTOLIC BLOOD PRESSURE: 65 MMHG | SYSTOLIC BLOOD PRESSURE: 104 MMHG | OXYGEN SATURATION: 95 %

## 2022-11-07 LAB
HCT VFR BLD CALC: 26.2 % — LOW (ref 39–50)
HGB BLD-MCNC: 8.1 G/DL — LOW (ref 13–17)
MCHC RBC-ENTMCNC: 28.3 PG — SIGNIFICANT CHANGE UP (ref 27–34)
MCHC RBC-ENTMCNC: 30.9 GM/DL — LOW (ref 32–36)
MCV RBC AUTO: 91.6 FL — SIGNIFICANT CHANGE UP (ref 80–100)
NRBC # BLD: 0 /100 WBCS — SIGNIFICANT CHANGE UP (ref 0–0)
PLATELET # BLD AUTO: 311 K/UL — SIGNIFICANT CHANGE UP (ref 150–400)
RBC # BLD: 2.86 M/UL — LOW (ref 4.2–5.8)
RBC # FLD: 15.7 % — HIGH (ref 10.3–14.5)
SARS-COV-2 RNA SPEC QL NAA+PROBE: SIGNIFICANT CHANGE UP
WBC # BLD: 12.01 K/UL — HIGH (ref 3.8–10.5)
WBC # FLD AUTO: 12.01 K/UL — HIGH (ref 3.8–10.5)

## 2022-11-07 PROCEDURE — U0003: CPT

## 2022-11-07 PROCEDURE — 83735 ASSAY OF MAGNESIUM: CPT

## 2022-11-07 PROCEDURE — U0005: CPT

## 2022-11-07 PROCEDURE — 80053 COMPREHEN METABOLIC PANEL: CPT

## 2022-11-07 PROCEDURE — 85027 COMPLETE CBC AUTOMATED: CPT

## 2022-11-07 PROCEDURE — 71045 X-RAY EXAM CHEST 1 VIEW: CPT

## 2022-11-07 PROCEDURE — 84132 ASSAY OF SERUM POTASSIUM: CPT

## 2022-11-07 PROCEDURE — 86901 BLOOD TYPING SEROLOGIC RH(D): CPT

## 2022-11-07 PROCEDURE — 99239 HOSP IP/OBS DSCHRG MGMT >30: CPT

## 2022-11-07 PROCEDURE — 83880 ASSAY OF NATRIURETIC PEPTIDE: CPT

## 2022-11-07 PROCEDURE — 76770 US EXAM ABDO BACK WALL COMP: CPT

## 2022-11-07 PROCEDURE — 76000 FLUOROSCOPY <1 HR PHYS/QHP: CPT

## 2022-11-07 PROCEDURE — 93005 ELECTROCARDIOGRAM TRACING: CPT

## 2022-11-07 PROCEDURE — 87186 SC STD MICRODIL/AGAR DIL: CPT

## 2022-11-07 PROCEDURE — C1769: CPT

## 2022-11-07 PROCEDURE — 83605 ASSAY OF LACTIC ACID: CPT

## 2022-11-07 PROCEDURE — 71250 CT THORAX DX C-: CPT | Mod: MA

## 2022-11-07 PROCEDURE — C1887: CPT

## 2022-11-07 PROCEDURE — 36430 TRANSFUSION BLD/BLD COMPNT: CPT

## 2022-11-07 PROCEDURE — 36415 COLL VENOUS BLD VENIPUNCTURE: CPT

## 2022-11-07 PROCEDURE — 96374 THER/PROPH/DIAG INJ IV PUSH: CPT

## 2022-11-07 PROCEDURE — P9016: CPT

## 2022-11-07 PROCEDURE — 87635 SARS-COV-2 COVID-19 AMP PRB: CPT

## 2022-11-07 PROCEDURE — 80048 BASIC METABOLIC PNL TOTAL CA: CPT

## 2022-11-07 PROCEDURE — 85730 THROMBOPLASTIN TIME PARTIAL: CPT

## 2022-11-07 PROCEDURE — C1894: CPT

## 2022-11-07 PROCEDURE — 84484 ASSAY OF TROPONIN QUANT: CPT

## 2022-11-07 PROCEDURE — 86923 COMPATIBILITY TEST ELECTRIC: CPT

## 2022-11-07 PROCEDURE — 93306 TTE W/DOPPLER COMPLETE: CPT

## 2022-11-07 PROCEDURE — 86900 BLOOD TYPING SEROLOGIC ABO: CPT

## 2022-11-07 PROCEDURE — 99285 EMERGENCY DEPT VISIT HI MDM: CPT | Mod: 25

## 2022-11-07 PROCEDURE — 84145 PROCALCITONIN (PCT): CPT

## 2022-11-07 PROCEDURE — 85025 COMPLETE CBC W/AUTO DIFF WBC: CPT

## 2022-11-07 PROCEDURE — 85610 PROTHROMBIN TIME: CPT

## 2022-11-07 PROCEDURE — 87077 CULTURE AEROBIC IDENTIFY: CPT

## 2022-11-07 PROCEDURE — 80061 LIPID PANEL: CPT

## 2022-11-07 PROCEDURE — C1880: CPT

## 2022-11-07 PROCEDURE — 81001 URINALYSIS AUTO W/SCOPE: CPT

## 2022-11-07 PROCEDURE — 97162 PT EVAL MOD COMPLEX 30 MIN: CPT

## 2022-11-07 PROCEDURE — 87086 URINE CULTURE/COLONY COUNT: CPT

## 2022-11-07 PROCEDURE — 86850 RBC ANTIBODY SCREEN: CPT

## 2022-11-07 PROCEDURE — 93970 EXTREMITY STUDY: CPT

## 2022-11-07 PROCEDURE — 87040 BLOOD CULTURE FOR BACTERIA: CPT

## 2022-11-07 RX ORDER — LOSARTAN POTASSIUM 100 MG/1
1 TABLET, FILM COATED ORAL
Qty: 0 | Refills: 0 | DISCHARGE
Start: 2022-11-07

## 2022-11-07 RX ORDER — METOPROLOL TARTRATE 50 MG
1 TABLET ORAL
Qty: 0 | Refills: 0 | DISCHARGE
Start: 2022-11-07

## 2022-11-07 RX ORDER — FINASTERIDE 5 MG/1
1 TABLET, FILM COATED ORAL
Qty: 0 | Refills: 0 | DISCHARGE
Start: 2022-11-07

## 2022-11-07 RX ORDER — METOPROLOL TARTRATE 50 MG
1 TABLET ORAL
Qty: 0 | Refills: 0 | DISCHARGE

## 2022-11-07 RX ORDER — INFLUENZA VIRUS VACCINE 15; 15; 15; 15 UG/.5ML; UG/.5ML; UG/.5ML; UG/.5ML
0.7 SUSPENSION INTRAMUSCULAR ONCE
Refills: 0 | Status: DISCONTINUED | OUTPATIENT
Start: 2022-11-07 | End: 2022-11-07

## 2022-11-07 RX ADMIN — Medication 81 MILLIGRAM(S): at 11:36

## 2022-11-07 RX ADMIN — AMIODARONE HYDROCHLORIDE 200 MILLIGRAM(S): 400 TABLET ORAL at 05:48

## 2022-11-07 RX ADMIN — MEXILETINE HYDROCHLORIDE 150 MILLIGRAM(S): 150 CAPSULE ORAL at 05:48

## 2022-11-07 RX ADMIN — Medication 1 TABLET(S): at 11:36

## 2022-11-07 RX ADMIN — FAMOTIDINE 20 MILLIGRAM(S): 10 INJECTION INTRAVENOUS at 11:36

## 2022-11-07 RX ADMIN — LOSARTAN POTASSIUM 25 MILLIGRAM(S): 100 TABLET, FILM COATED ORAL at 05:48

## 2022-11-07 RX ADMIN — Medication 50 MILLIGRAM(S): at 05:49

## 2022-11-07 RX ADMIN — FINASTERIDE 5 MILLIGRAM(S): 5 TABLET, FILM COATED ORAL at 11:37

## 2022-11-07 RX ADMIN — Medication 150 MICROGRAM(S): at 05:49

## 2022-11-07 RX ADMIN — SERTRALINE 150 MILLIGRAM(S): 25 TABLET, FILM COATED ORAL at 11:36

## 2022-11-07 NOTE — DISCHARGE NOTE PROVIDER - ATTENDING DISCHARGE PHYSICAL EXAMINATION:
General: NAD, weak, cachetic, A/O x 3  ENT: MMM, no oral thrush   Neck: Supple, No JVD  Lungs: Clear to auscultation bilaterally, non labored breathing  Cardio: RRR, S1/S2, No murmurs  Abdomen: Soft, Nontender, Nondistended; Bowel sounds present  Extremities: No calf tenderness, No pitting edema  : + [place] with clear/yellow urine

## 2022-11-07 NOTE — DISCHARGE NOTE PROVIDER - NSDCMRMEDTOKEN_GEN_ALL_CORE_FT
amiodarone 200 mg oral tablet: 1 tab(s) orally once a day  aspirin 81 mg oral tablet, chewable: 1 tab(s) orally once a day  Dulcolax Laxative 10 mg rectal suppository: 1 suppository(ies) rectal once a day, As Needed  famotidine 20 mg oral tablet: 1 tab(s) orally once a day  finasteride 5 mg oral tablet: 1 tab(s) orally once a day  Flomax 0.4 mg oral capsule: 1 cap(s) orally once a day (at bedtime)  freetext medication     -: 2 cap(s) orally once a day  lactulose 10 g/15 mL oral syrup: 30 milliliter(s) orally once a day, As Needed  levothyroxine 150 mcg (0.15 mg) oral tablet: 1 tab(s) orally once a day  losartan 25 mg oral tablet: 1 tab(s) orally once a day  metoprolol succinate 50 mg oral tablet, extended release: 1 tab(s) orally once a day  mexiletine 150 mg oral capsule: 1 cap(s) orally 2 times a day  Milk of Magnesia 8% oral suspension: 30 milliliter(s) orally once a day, As Needed  rosuvastatin 40 mg oral tablet: 1 tab(s) orally once a day (at bedtime)  Senna 8.6 mg oral tablet: 2 tab(s) orally once a day (at bedtime), As Needed  sertraline 150 mg oral capsule: 1 cap(s) orally once a day  traZODone 50 mg oral tablet: 0.5 milligram(s) orally once a day (at bedtime)  Tylenol 325 mg oral tablet: 2 tab(s) orally every 6 hours, As Needed

## 2022-11-07 NOTE — DISCHARGE NOTE PROVIDER - NSDCPNSUBOBJ_GEN_ALL_CORE
82 year old M [facility] HTN, CAD s/p CABG, severe cardiomyopathy (EF 20%), afib (not on AC due to hematuria), BPH, LUE DVT, history of LLE DVT, recently hospitalized at [place] for uorsepsis and urinary retention (now with dubois), brought in from [facility] for hypoxia, admitted for acute on chronic systolic CHF exacerbation. Course c/b hematuria     Acute Hypoxic Respiratory Failure  Acute on Chronic Systolic CHF Exacerbation  Severe Cardiomyopathy (EF 20%)  Hypotension; improved  -ECHO with EF 20-25% c/w previous echos--Entresto was discontinued due to hypotension (and lasix)  -off supplemental oxygen therapy; sats on RA 95%  -No Farxiga due to hypoglycemia-- unable to further optimize medical therapy for CHF guidelines  -Restarted BB 10/25 with parameters  -Started losartan 10/29 with parameters, continue amiodarone  -follow up with primary cardiologist to discuss prophylactic ICD    Bilateral DVTs  -US revealed b/l DVTs involving RT deep femoral vein, LT common femoral and deep veins  -pt with hx of DVTs found last month during hospitalization at [facility]  -Vascular following Dr Lucas; started hep gtt 10/28; however stopped 10/30 due to hematuria  -Pt had IVC filter placed with vascular 11/3  -surgery appreciated, monitor groin    Resolving Hematuria  -Urology following; cont dubois, gross hematuria much improved  -Delayed void trial outpatient in office. FU urology recs  -restarted aspirin monitor for bleeding  -Continue to monitor CBC  -KUB--bladder debris and diffuse wall thickening, no Hydro  -Continue proscar and flomax    CAD s/p CABG  -pt with hx of remote CABG and recent angiogram with occluded grafts and medical therapy recommended  -elevated Tn likely demand ischemia  -appears stable at this time  - consider restarting Statin. FU LFT's    Ascending Aortic Aneurysm  -CT chest showed ascending thoracic aorta 4.3 cm in diameter  -aberrant right subclavian artery  -atherosclerotic calcifications of the aorta s/p CABG  -f/u  vascular evaluation outpatient    Transaminitis  -downtrending  -monitor     Atrial Fibrillation; chronic  -off AC due to hematuria on 10/30  -continue mexiletine, amiodarone, and metoprolol   -Cardiac clearance requested prior to procedure-high risk, procedure medically necessary- clearance  obtained     Leukocytosis  UTI - treated   -culture + for Pseudomonas Aeruginosa,- completed course of cefepime  -blood cx negative     h/o Hypothyroid  -stable   -continue synthroid 150 mcg daily    BPH with retention  -continue flomax, proscar and dubois  -urology following     Severe Protein Calorie Malnutrition  -nutrition evaluation appreciated  -continue ensure plus high protein cans BID    DVT ppx - with hematuria so AC on hold, restarted ASA    Code status:  DNR

## 2022-11-07 NOTE — DISCHARGE NOTE PROVIDER - DETAILS OF MALNUTRITION DIAGNOSIS/DIAGNOSES
This patient has been assessed with a concern for Malnutrition and was treated during this hospitalization for the following Nutrition diagnosis/diagnoses:     -  10/19/2022: Severe protein-calorie malnutrition

## 2022-11-07 NOTE — DISCHARGE NOTE NURSING/CASE MANAGEMENT/SOCIAL WORK - PATIENT PORTAL LINK FT
You can access the FollowMyHealth Patient Portal offered by [facility] by registering at the following website: [email or web address]. By joining [employer]’s FollowMyHealth portal, you will also be able to view your health information using other applications (apps) compatible with our system.

## 2022-11-07 NOTE — DISCHARGE NOTE PROVIDER - HOSPITAL COURSE
82 year old M PMH HTN, CAD s/p CABG, severe cardiomyopathy (EF 20%), afib (not on AC due to hematuria), BPH, LUE DVT, history of LLE DVT, recently hospitalized at [place] for uorsepsis and urinary retention (now with dubois), brought in from [facility] for hypoxia, admitted for acute on chronic systolic CHF exacerbation.  US revealed b/l DVTs involving RT deep femoral vein, LT common femoral and deep veins. Hx of DVT's found last month at previous hospitalization at [facility]. Seen by Vascular Dr. Lucas. Started on heparin drip 10/28 later stopped 10/30 due to hematuria.  Heparin DC'd. Aspirin discontinued then later restarted 11/6 after hematuria resolved. Pt had IVC filter placed with vascular 11/3. Will have Delayed void trial outpatient with urology when more mobile. ECHO with EF 20-25% c/w previous echos--Entresto was discontinued due to hypotension (and lasix). No Farxiga due to hypoglycemia. Unable to further optimize medical therapy for CHF guidelines. Continue BB, Losartan, Amiodarone. Initially held statin due to elevated LFT's. Will restart. FU outpatient bloodwork. Of note CT chest showed ascending thoracic aorta 4.3 cm in diameter, aberrant right subclavian artery, atherosclerotic calcifications of the aorta s/p CABG. Will need follow up vascular evaluation outpatient. Pt treated for UTI . Urine culture + for Pseudomonas Aeruginosa,- completed course of cefepime. Pt safe for DC back to [facility].         Code Status: DNR    Discharging Provider:  Gil Gleason NP  Contact Info: [phone]. Please call with any questions or concerns.    Outpatient Provider: Dr. Weller   (notified)                  82 year old M PMH HTN, CAD s/p CABG, severe cardiomyopathy (EF 20%), afib (not on AC due to hematuria), BPH, LUE DVT, history of LLE DVT, recently hospitalized at [place] for uorsepsis and urinary retention (now with dubois), brought in from [facility] for hypoxia, admitted for acute on chronic systolic CHF exacerbation.  US revealed b/l DVTs involving RT deep femoral vein, LT common femoral and deep veins. Hx of DVT's found last month at previous hospitalization at [facility]. Seen by Vascular Dr. Lucas. Started on heparin drip 10/28 later stopped 10/30 due to hematuria.  Heparin DC'd. Aspirin discontinued then later restarted 11/6 after hematuria resolved. Pt had IVC filter placed with vascular 11/3. Will have Delayed void trial outpatient with urology when more mobile. ECHO with EF 20-25% c/w previous echos--Entresto was discontinued due to hypotension (and lasix). No Farxiga due to hypoglycemia. Unable to further optimize medical therapy for CHF guidelines. Continue BB, Losartan, Amiodarone. Initially held statin due to elevated LFT's. Will restart. FU outpatient bloodwork. Of note CT chest showed ascending thoracic aorta 4.3 cm in diameter, aberrant right subclavian artery, atherosclerotic calcifications of the aorta s/p CABG. Will need follow up vascular evaluation outpatient. Pt treated for UTI . Urine culture + for Pseudomonas Aeruginosa,- completed course of cefepime. Pt safe for DC back to [facility].     Cannot tolerate Entresto, Farxiga or spironolactone due to hypotension      Code Status: DNR    Discharging Provider:  Gil Gleason NP  Contact Info: [phone]. Please call with any questions or concerns.    Outpatient Provider: Dr. Weller   (notified)                  82 year old M PMH HTN, CAD s/p CABG, severe cardiomyopathy (EF 20%), afib (not on AC due to hematuria), BPH, LUE DVT, history of LLE DVT, recently hospitalized at [place] for uorsepsis and urinary retention (now with dubois), brought in from [facility] for hypoxia, admitted for acute on chronic systolic CHF exacerbation.  US revealed b/l DVTs involving RT deep femoral vein, LT common femoral and deep veins. Hx of DVT's found last month at previous hospitalization at [facility]. Seen by Vascular Dr. Lucas. Started on heparin drip 10/28 later stopped 10/30 due to hematuria.  Heparin DC'd. Aspirin discontinued then later restarted 11/6 after hematuria resolved. Pt had IVC filter placed with vascular 11/3. Will have Delayed void trial outpatient with urology when more mobile. ECHO with EF 20-25% c/w previous echos--Entresto was discontinued due to hypotension (and lasix). No Farxiga due to hypoglycemia. Unable to further optimize medical therapy for CHF guidelines. Continue BB, Losartan, Amiodarone. Initially held statin due to elevated LFT's. Will restart. FU outpatient bloodwork. Of note CT chest showed ascending thoracic aorta 4.3 cm in diameter, aberrant right subclavian artery, atherosclerotic calcifications of the aorta s/p CABG. Will need follow up vascular evaluation outpatient. Pt treated for UTI . Urine culture + for Pseudomonas Aeruginosa,- completed course of cefepime. Pt safe for DC back to [facility].     Cannot tolerate Entresto, Farxiga or spironolactone due to hypotension      Code Status: DNR    Discharging Provider:  Gil Gleason NP  Contact Info: [phone]. Please call with any questions or concerns.    Outpatient Provider: Dr. Weller (notified)   Wife Kirsten Updated     PHYSICAL EXAM:  General: NAD, weak, cachetic, A/O x 3  ENT: MMM, no oral thrush   Neck: Supple, No JVD  Lungs: Clear to auscultation bilaterally, non labored breathing  Cardio: RRR, S1/S2, No murmurs  Abdomen: Soft, Nontender, Nondistended; Bowel sounds present  Extremities: No calf tenderness, No pitting edema  : + Dubois with clear/yellow urine          82 year old M PMH HTN, CAD s/p CABG, severe cardiomyopathy (EF 20%), afib (not on AC due to hematuria), BPH, LUE DVT, history of LLE DVT, recently hospitalized at [place] for uorsepsis and urinary retention (now with dubois), brought in from [facility] for hypoxia, admitted for acute on chronic systolic CHF exacerbation. US revealed b/l DVTs involving RT deep femoral vein, LT common femoral and deep veins. Hx of DVT's found last month at previous hospitalization at [facility]. Seen by Vascular Dr. Lucas. Started on heparin drip 10/28 later stopped 10/30 due to hematuria.  Heparin DC'd. Aspirin discontinued then later restarted 11/6 after hematuria resolved with subsequent stable Hb and no recurrence of hematuria. Pt had IVC filter placed with vascular 11/3. Will have delayed void trial outpatient per urology Dr. Anderson when more mobile.     Echo with EF 20-25% c/w previous echos. Unable to further optimize medical therapy for CHF guidelines. CHF regimen was reviewed with cardiologist. Patient could not tolerate Entresto, Farxiga or spironolactone due to hypotension. Continue BB, Losartan, Amiodarone. Initially held statin due to elevated LFT's, to be restarted on discharge with outpatient repeat CMP. Of note CT chest showed ascending thoracic aorta 4.3 cm in diameter, aberrant right subclavian artery, atherosclerotic calcifications of the aorta s/p CABG. Will need follow up vascular evaluation outpatient. Pt treated for UTI . Urine culture + for Pseudomonas Aeruginosa,- completed course of cefepime. Pt safe for DC back to [facility].     Code Status: DNR    Discharging Provider:  Gil Gleason NP  Contact Info: [phone]. Please call with any questions or concerns.    Outpatient Provider: Dr. Weller (notified)   Wife Kirsten Updated     PHYSICAL EXAM:  General: NAD, weak, cachetic, A/O x 3  ENT: MMM, no oral thrush   Neck: Supple, No JVD  Lungs: Clear to auscultation bilaterally, non labored breathing  Cardio: RRR, S1/S2, No murmurs  Abdomen: Soft, Nontender, Nondistended; Bowel sounds present  Extremities: No calf tenderness, No pitting edema  : + Dubois with clear/yellow urine

## 2022-11-07 NOTE — DISCHARGE NOTE PROVIDER - NSDCCPCAREPLAN_GEN_ALL_CORE_FT
PRINCIPAL DISCHARGE DIAGNOSIS  Diagnosis: Acute on chronic HFrEF (heart failure with reduced ejection fraction)  Assessment and Plan of Treatment: Follow up with your cardiologist and primary care team outpatient in the next 1-2 weeks. Return to the ED for worsening symptoms such as chest pain or shortness of breath.      SECONDARY DISCHARGE DIAGNOSES  Diagnosis: Chronic atrial fibrillation  Assessment and Plan of Treatment:     Diagnosis: DVT of lower limb, acute  Assessment and Plan of Treatment:      PRINCIPAL DISCHARGE DIAGNOSIS  Diagnosis: Acute on chronic HFrEF (heart failure with reduced ejection fraction)  Assessment and Plan of Treatment: Follow up with your cardiologist and primary care team in the next 1-2 weeks. Return to the ED for worsening symptoms such as chest pain or shortness of breath.      SECONDARY DISCHARGE DIAGNOSES  Diagnosis: Urinary retention  Assessment and Plan of Treatment: Patient will need a delayed trial of void at [facility] when more mobile. Urine should be monitored for hematuria which occurred on heparin, aspirin was restarted on 11/6/22.    Diagnosis: Chronic atrial fibrillation  Assessment and Plan of Treatment:     Diagnosis: DVT of lower limb, acute  Assessment and Plan of Treatment:

## 2022-11-07 NOTE — DISCHARGE NOTE PROVIDER - CARE PROVIDER_API CALL
Bhupinder Weller (DO)  Internal Medicine  [address] [id number]  Phone: [phone]  Fax: [phone]  Follow Up Time:

## 2022-11-07 NOTE — DISCHARGE NOTE NURSING/CASE MANAGEMENT/SOCIAL WORK - NSDCPEFALRISK_GEN_ALL_CORE
For information on Fall & Injury Prevention, visit: [email or web address] OR  [email or web address] OR  https://www.cdc.gov/steadi/patient.html

## 2022-11-14 ENCOUNTER — INPATIENT (INPATIENT)
Facility: HOSPITAL | Age: 82
LOS: 16 days | Discharge: NURSING, REHAB OR LONG-TERM CARE | DRG: 871 | End: 2022-12-01
Attending: INTERNAL MEDICINE | Admitting: INTERNAL MEDICINE
Payer: MEDICARE

## 2022-11-14 VITALS
WEIGHT: 147.05 LBS | HEART RATE: 79 BPM | DIASTOLIC BLOOD PRESSURE: 58 MMHG | HEIGHT: 72 IN | RESPIRATION RATE: 28 BRPM | OXYGEN SATURATION: 81 % | SYSTOLIC BLOOD PRESSURE: 86 MMHG | TEMPERATURE: 97 F

## 2022-11-14 DIAGNOSIS — J96.01 ACUTE RESPIRATORY FAILURE WITH HYPOXIA: ICD-10-CM

## 2022-11-14 DIAGNOSIS — Z95.1 PRESENCE OF AORTOCORONARY BYPASS GRAFT: Chronic | ICD-10-CM

## 2022-11-14 LAB
ALBUMIN SERPL ELPH-MCNC: 3.2 G/DL — LOW (ref 3.3–5)
ALP SERPL-CCNC: 150 U/L — HIGH (ref 40–120)
ALT FLD-CCNC: 366 U/L — HIGH (ref 10–45)
ANION GAP SERPL CALC-SCNC: 16 MMOL/L — SIGNIFICANT CHANGE UP (ref 5–17)
APTT BLD: 29 SEC — SIGNIFICANT CHANGE UP (ref 27.5–35.5)
AST SERPL-CCNC: 448 U/L — HIGH (ref 10–40)
BASE EXCESS BLDV CALC-SCNC: -7.9 MMOL/L — LOW (ref -2–3)
BASOPHILS # BLD AUTO: 0.05 K/UL — SIGNIFICANT CHANGE UP (ref 0–0.2)
BASOPHILS NFR BLD AUTO: 0.2 % — SIGNIFICANT CHANGE UP (ref 0–2)
BILIRUB SERPL-MCNC: 0.9 MG/DL — SIGNIFICANT CHANGE UP (ref 0.2–1.2)
BUN SERPL-MCNC: 44 MG/DL — HIGH (ref 7–23)
CALCIUM SERPL-MCNC: 9.4 MG/DL — SIGNIFICANT CHANGE UP (ref 8.4–10.5)
CHLORIDE SERPL-SCNC: 103 MMOL/L — SIGNIFICANT CHANGE UP (ref 96–108)
CO2 BLDV-SCNC: 20 MMOL/L — LOW (ref 22–26)
CO2 SERPL-SCNC: 20 MMOL/L — LOW (ref 22–31)
CREAT SERPL-MCNC: 2.09 MG/DL — HIGH (ref 0.5–1.3)
EGFR: 31 ML/MIN/1.73M2 — LOW
EOSINOPHIL # BLD AUTO: 0 K/UL — SIGNIFICANT CHANGE UP (ref 0–0.5)
EOSINOPHIL NFR BLD AUTO: 0 % — SIGNIFICANT CHANGE UP (ref 0–6)
GAS PNL BLDV: SIGNIFICANT CHANGE UP
GLUCOSE SERPL-MCNC: 112 MG/DL — HIGH (ref 70–99)
HCO3 BLDV-SCNC: 19 MMOL/L — LOW (ref 22–29)
HCT VFR BLD CALC: 27.9 % — LOW (ref 39–50)
HGB BLD-MCNC: 8.5 G/DL — LOW (ref 13–17)
IMM GRANULOCYTES NFR BLD AUTO: 1.5 % — HIGH (ref 0–0.9)
INR BLD: 1.51 RATIO — HIGH (ref 0.88–1.16)
LACTATE SERPL-SCNC: 4.7 MMOL/L — CRITICAL HIGH (ref 0.7–2)
LACTATE SERPL-SCNC: 7.8 MMOL/L — CRITICAL HIGH (ref 0.7–2)
LYMPHOCYTES # BLD AUTO: 24.3 % — SIGNIFICANT CHANGE UP (ref 13–44)
LYMPHOCYTES # BLD AUTO: 5.35 K/UL — HIGH (ref 1–3.3)
MCHC RBC-ENTMCNC: 27.1 PG — SIGNIFICANT CHANGE UP (ref 27–34)
MCHC RBC-ENTMCNC: 30.5 GM/DL — LOW (ref 32–36)
MCV RBC AUTO: 88.9 FL — SIGNIFICANT CHANGE UP (ref 80–100)
MONOCYTES # BLD AUTO: 0.98 K/UL — HIGH (ref 0–0.9)
MONOCYTES NFR BLD AUTO: 4.5 % — SIGNIFICANT CHANGE UP (ref 2–14)
NEUTROPHILS # BLD AUTO: 15.29 K/UL — HIGH (ref 1.8–7.4)
NEUTROPHILS NFR BLD AUTO: 69.5 % — SIGNIFICANT CHANGE UP (ref 43–77)
NRBC # BLD: 0 /100 WBCS — SIGNIFICANT CHANGE UP (ref 0–0)
PCO2 BLDV: 40 MMHG — LOW (ref 42–55)
PH BLDV: 7.28 — LOW (ref 7.32–7.43)
PLATELET # BLD AUTO: 372 K/UL — SIGNIFICANT CHANGE UP (ref 150–400)
PO2 BLDV: <35 MMHG — LOW (ref 25–45)
POTASSIUM SERPL-MCNC: 5.3 MMOL/L — SIGNIFICANT CHANGE UP (ref 3.5–5.3)
POTASSIUM SERPL-SCNC: 5.3 MMOL/L — SIGNIFICANT CHANGE UP (ref 3.5–5.3)
PROT SERPL-MCNC: 7 G/DL — SIGNIFICANT CHANGE UP (ref 6–8.3)
PROTHROM AB SERPL-ACNC: 17.6 SEC — HIGH (ref 10.5–13.4)
RAPID RVP RESULT: SIGNIFICANT CHANGE UP
RBC # BLD: 3.14 M/UL — LOW (ref 4.2–5.8)
RBC # FLD: 16 % — HIGH (ref 10.3–14.5)
SAO2 % BLDV: 24 % — LOW (ref 67–88)
SARS-COV-2 RNA SPEC QL NAA+PROBE: SIGNIFICANT CHANGE UP
SODIUM SERPL-SCNC: 139 MMOL/L — SIGNIFICANT CHANGE UP (ref 135–145)
WBC # BLD: 22 K/UL — HIGH (ref 3.8–10.5)
WBC # FLD AUTO: 22 K/UL — HIGH (ref 3.8–10.5)

## 2022-11-14 PROCEDURE — 93010 ELECTROCARDIOGRAM REPORT: CPT

## 2022-11-14 PROCEDURE — 99285 EMERGENCY DEPT VISIT HI MDM: CPT | Mod: FS

## 2022-11-14 PROCEDURE — 71045 X-RAY EXAM CHEST 1 VIEW: CPT | Mod: 26

## 2022-11-14 RX ORDER — CEFEPIME 1 G/1
1000 INJECTION, POWDER, FOR SOLUTION INTRAMUSCULAR; INTRAVENOUS ONCE
Refills: 0 | Status: COMPLETED | OUTPATIENT
Start: 2022-11-14 | End: 2022-11-14

## 2022-11-14 RX ORDER — SODIUM CHLORIDE 9 MG/ML
2100 INJECTION, SOLUTION INTRAVENOUS ONCE
Refills: 0 | Status: DISCONTINUED | OUTPATIENT
Start: 2022-11-14 | End: 2022-11-14

## 2022-11-14 RX ADMIN — CEFEPIME 100 MILLIGRAM(S): 1 INJECTION, POWDER, FOR SOLUTION INTRAMUSCULAR; INTRAVENOUS at 21:16

## 2022-11-14 RX ADMIN — CEFEPIME 1000 MILLIGRAM(S): 1 INJECTION, POWDER, FOR SOLUTION INTRAMUSCULAR; INTRAVENOUS at 21:46

## 2022-11-14 NOTE — H&P ADULT - NSHPLABSRESULTS_GEN_ALL_CORE
8.5    22.00 )-----------( 372      ( 14 Nov 2022 19:45 )             27.9   11-15    140  |  104  |  47<H>  ----------------------------<  85  4.5   |  23  |  2.11<H>    Ca    8.7      15 Nov 2022 01:50    TPro  6.3  /  Alb  2.9<L>  /  TBili  0.9  /  DBili  x   /  AST  444<H>  /  ALT  371<H>  /  AlkPhos  142<H>  11-15      CXR: ordered results pending      Bedside POCUS: Heart: poor LV squeeze, RV not dilated but with reduced squeeze, no pericardial effusion, IVC appx 2.9cm

## 2022-11-14 NOTE — ED ADULT NURSE NOTE - NSIMPLEMENTINTERV_GEN_ALL_ED
Implemented All Fall with Harm Risk Interventions:  [place] to call system. Call bell, personal items and telephone within reach. Instruct patient to call for assistance. Room bathroom lighting operational. Non-slip footwear when patient is off stretcher. Physically safe environment: no spills, clutter or unnecessary equipment. Stretcher in lowest position, wheels locked, appropriate side rails in place. Provide visual cue, wrist band, yellow gown, etc. Monitor gait and stability. Monitor for mental status changes and reorient to person, place, and time. Review medications for side effects contributing to fall risk. Reinforce activity limits and safety measures with patient and family. Provide visual clues: red socks.

## 2022-11-14 NOTE — ED CLERICAL - NS ED CLERK NOTE PRE-ARRIVAL INFORMATION; ADDITIONAL PRE-ARRIVAL INFORMATION
This patient is enrolled in the [facility] readmission reduction program and has active care navigation. This patient can be followed up by the care navigation team within 24 hours. To arrange close follow-up or to obtain additional clinical information about this patient, please call the contact number above.

## 2022-11-14 NOTE — ED PROVIDER NOTE - CLINICAL SUMMARY MEDICAL DECISION MAKING FREE TEXT BOX
pt 82 year old M PMH HTN, CAD s/p CABG, severe cardiomyopathy (EF 20%), afib (not on AC due to hematuria), BPH, LUE DVT, history of LLE DVT, recently hospitalized at [place] for uorsepsis and urinary retention (now with dubois) and was discharged from [facility] admitted for acute on chronic systolic CHF exacerbation. US revealed b/l DVTs seen by Vascular Dr. Lucas. developed hematuria on AC and had IVC filter placed with vascular 11/3. Pt treated for UTI.  today pt presents hypoxic from [facility] 70% on RA. pt altered. discussed with DR. Pettit/ pt DNR/DNI but not comfort care.  pt septic on hi flow. lactate 7.8. wbc 22. abx given, admit to ICU

## 2022-11-14 NOTE — ED PROVIDER NOTE - OBJECTIVE STATEMENT
pt 82 year old M PMH HTN, CAD s/p CABG, severe cardiomyopathy (EF 20%), afib (not on AC due to hematuria), BPH, LUE DVT, history of LLE DVT, recently hospitalized at [place] for uorsepsis and urinary retention (now with dubois) and was discharged from [facility] admitted for acute on chronic systolic CHF exacerbation. US revealed b/l DVTs seen by Vascular Dr. Lucas. developed hematuria on AC and had IVC filter placed with vascular 11/3. Pt treated for UTI. pt 82 year old M PMH HTN, CAD s/p CABG, severe cardiomyopathy (EF 20%), afib (not on AC due to hematuria), BPH, LUE DVT, history of LLE DVT, recently hospitalized at [place] for uorsepsis and urinary retention (now with dubois) and was discharged from [facility] admitted for acute on chronic systolic CHF exacerbation. US revealed b/l DVTs seen by Vascular Dr. Lucas. developed hematuria on AC and had IVC filter placed with vascular 11/3. Pt treated for UTI.  today pt presents hypoxic from [facility] 70% on RA. pt altered. discussed with DR. Pettit/ pt DNR/DNI but not comfort care.

## 2022-11-14 NOTE — ED PROVIDER NOTE - PHYSICAL EXAMINATION
General:     ill appearing  Eyes: PERRL  Head:     NC/AT, dry oral mucosa  Neck:     trachea midline  Lungs:     b/l rales  CVS:     tachycardic  Abd:     +BS, s/nt/nd  Ext:   no deformities   Neuro: AAOx0, arousable to touch

## 2022-11-14 NOTE — H&P ADULT - NSHPPHYSICALEXAM_GEN_ALL_CORE
General: Comfortable in bed in NAD, agitated and screeming  Neuro: awake and alert but confused  Heart: +RRR  Lungs: good air entry b/l  Abd: BSx4, soft, nt/nd  Ext: +edema, BLLE warm to touch  Skin: warm/dry

## 2022-11-14 NOTE — ED PROVIDER NOTE - ATTENDING APP SHARED VISIT CONTRIBUTION OF CARE
I performed a face to face bedside interview with patient regarding history of present illness, review of symptoms and past medical history. I completed an independent physical exam.  I have discussed patient's plan of care with PA.     I agree with note as stated above, my independent findings below:      82M p/w AMS, hypoxia, hypotension.   As per EMS "actively dying, NH not comfortable"  DNR/DNI from nursing home. Unknown reason.  Hypotensive and altered.     Will call NH and assess reason for transfer. I performed a face to face bedside interview with patient regarding history of present illness, review of symptoms and past medical history. I completed an independent physical exam.  I have discussed patient's plan of care with PA.     I agree with note as stated above, my independent findings below:      82M p/w AMS, hypoxia, hypotension.   DNR/DNI from nursing home. Unknown reason.  Hypotensive and altered.     Sepsis workup, will start HFNC for hypoxia.   UA, BCx, UCx, CXR.  Admit for FTT and lieky sepsis.

## 2022-11-14 NOTE — ED ADULT NURSE NOTE - OBJECTIVE STATEMENT
Patient brought in by EMS for end stage cardiogenic shock. Patient is a DNR/DNI. Patient placed on a 15L nonrebreather, O2 90%. High flow oxygen placed by respiratory. Patient denies any pain, states "Leave me alone". Starks in place. Abrasion noted to R foot. Dressing placed.

## 2022-11-14 NOTE — ED ADULT NURSE NOTE - HIV OFFER

## 2022-11-14 NOTE — H&P ADULT - HISTORY OF PRESENT ILLNESS
HPI: 81 y/o M w/ pmh of htn, cad s/p cabg, severe cardiomyopathy (EF 20%), Afib (not on AC due to hematuria), bph, LUE DVT, b/l LLE DVT s/p IVC filter placement pt was discharged to SNF now tonight presents to [facility] on 11/14/2022 for hypoxia and AMS, pt was started on NC with improvement in o2 and then became hypoxic again and was started on HFNC at 40L/40%, hypotensive suspected to be 2/2 to septic, HAN, transaminitis, Lactic acidosis, UA concerning for UTI and possible PNA. Pt is reported to be DNR/DNI is reported to have wanted everything else done. Pt seen and examined unable to participate in HPI due to agitation as he just keeps screaming for orange juice and not participate during interview. HPI: 83 y/o M w/ pmh of htn, cad s/p cabg, severe cardiomyopathy (EF 20%), Afib (not on AC due to hematuria), bph, LUE DVT, b/l LLE DVT s/p IVC filter placement pt was discharged to SNF now tonight presents to [facility] on 11/14/2022 for hypoxia and AMS, pt was started on NC with improvement in o2 and then became hypoxic again and was started on HFNC at 40L/40%, hypotensive suspected to be 2/2 to septic, HAN, transaminitis, Lactic acidosis, UA concerning for UTI and possible PNA. Pt is reported to be DNR/DNI is reported to have wanted everything else done. Pt seen and examined in the ED unable to participate in HPI due to agitation as he just keeps screaming for orange juice and not participate during interview.

## 2022-11-14 NOTE — H&P ADULT - ASSESSMENT
81 y/o M w/ pmh of htn, cad s/p cabg, severe cardiomyopathy (EF 20%), Afib (not on AC due to hematuria), bph, LUE DVT, b/l LLE DVT s/p IVC filter placement pt was discharged to SNF now tonight presents to [facility] on 11/14/2022 for hypoxia and AMS, pt was started on NC with improvement in o2 and then became hypoxic again and was started on HFNC at 40L/40%, hypotensive suspected to be 2/2 to septic, HAN, transaminitis, Lactic acidosis, UA concerning for UTI and possible PNA. Pt is reported to be DNR/DNI is reported to have wanted everything else done. Pt seen and examined in the ED unable to participate in HPI due to agitation as he just keeps screaming for orange juice and not participate during interview.    -Neuro: no acute issues  -Cardiac: Septic +/- cardiogenic shock will start pt on low dose levophed to maintain MAP >65, +trops likely demand in the setting of sepsis and cardiogenic shock will trend trops and check TTE in the AM and consult cardiology  -Resp: Acute Hypoxic resp failure on HFNC at 40L/40% will titrate o2 therapy as needed to maintain o2 >90%  -GI: NPO, GI ppx w/ protonix, Transaminitis monitor LFT for now, avoid hepatoxic drugs  -Renal: HAN 2/2 to hypotension monitor renal function and lytes closely, monitor I&O 81 y/o M w/ pmh of htn, cad s/p cabg, severe cardiomyopathy (EF 20%), Afib (not on AC due to hematuria), bph, LUE DVT, b/l LLE DVT s/p IVC filter placement pt was discharged to SNF now tonight presents to [facility] on 11/14/2022 for hypoxia and AMS, pt was started on NC with improvement in o2 and then became hypoxic again and was started on HFNC at 40L/40%, hypotensive suspected to be 2/2 to septic, HAN, transaminitis, Lactic acidosis, UA concerning for UTI and possible PNA. Pt is reported to be DNR/DNI is reported to have wanted everything else done. Pt seen and examined in the ED unable to participate in HPI due to agitation as he just keeps screaming for orange juice and not participate during interview.    -Neuro: no acute issues  -Cardiac: Septic +/- cardiogenic shock will start pt on low dose levophed to maintain MAP >65, +trops likely demand in the setting of sepsis and cardiogenic shock will trend trops and check TTE in the AM and consult cardiology  -Resp: Acute Hypoxic resp failure on HFNC at 40L/40% will titrate o2 therapy as needed to maintain o2 >90%  -GI: NPO, GI ppx w/ protonix, Transaminitis monitor LFT for now, avoid hepatoxic drugs  -Renal: HAN 2/2 to hypotension monitor renal function and lytes closely, monitor I&O closely, lactic acidosis improving cont to trend  -ID: Sepsis 2/2 to UTI +/- pna start IV cefepime, f/u on final cx data, mrsa swap and procal levels  -Heme: DVT ppx w/ heparin subQ, unable to use full dose AC due to recurrent hx of bleeding, unable to use SCD 2/2 to b/l hx of DVT  -Endo: monitor FS q6h  -Dispo: Admit to ICU, pt is currently DNR/DNI, GOC discussed with wife Kirsten De La Cruz phone number  she understands pt is currently critically ill with an extensive cardiac hx, understands pt current condition, will notify wife for any acute changes in patient condition, case d/w eicu dr. pleitez         CRITICAL CARE TIME SPENT: 60 mins  (Assessing presenting problems of acute illness, which pose high probability of life threatening deterioration or end organ damage/dysfunction, as well as medical decision making including initiating plan of care, reviewing data, reviewing radiologic exams, discussing with multidisciplinary team,  discussing goals of care with patient/family, and writing this note.  Non-inclusive of procedures performed)

## 2022-11-15 PROBLEM — Z86.73 PERSONAL HISTORY OF TRANSIENT ISCHEMIC ATTACK (TIA), AND CEREBRAL INFARCTION WITHOUT RESIDUAL DEFICITS: Chronic | Status: ACTIVE | Noted: 2022-10-17

## 2022-11-15 PROBLEM — I48.20 CHRONIC ATRIAL FIBRILLATION, UNSPECIFIED: Chronic | Status: ACTIVE | Noted: 2022-10-17

## 2022-11-15 PROBLEM — Z86.79 PERSONAL HISTORY OF OTHER DISEASES OF THE CIRCULATORY SYSTEM: Chronic | Status: ACTIVE | Noted: 2022-10-17

## 2022-11-15 PROBLEM — N40.1 BENIGN PROSTATIC HYPERPLASIA WITH LOWER URINARY TRACT SYMPTOMS: Chronic | Status: ACTIVE | Noted: 2022-10-17

## 2022-11-15 PROBLEM — E78.5 HYPERLIPIDEMIA, UNSPECIFIED: Chronic | Status: ACTIVE | Noted: 2022-10-17

## 2022-11-15 PROBLEM — I25.10 ATHEROSCLEROTIC HEART DISEASE OF NATIVE CORONARY ARTERY WITHOUT ANGINA PECTORIS: Chronic | Status: ACTIVE | Noted: 2022-10-17

## 2022-11-15 LAB
ALBUMIN SERPL ELPH-MCNC: 2.7 G/DL — LOW (ref 3.3–5)
ALBUMIN SERPL ELPH-MCNC: 2.9 G/DL — LOW (ref 3.3–5)
ALP SERPL-CCNC: 134 U/L — HIGH (ref 40–120)
ALP SERPL-CCNC: 142 U/L — HIGH (ref 40–120)
ALT FLD-CCNC: 371 U/L — HIGH (ref 10–45)
ALT FLD-CCNC: 375 U/L — HIGH (ref 10–45)
ANION GAP SERPL CALC-SCNC: 13 MMOL/L — SIGNIFICANT CHANGE UP (ref 5–17)
ANION GAP SERPL CALC-SCNC: 9 MMOL/L — SIGNIFICANT CHANGE UP (ref 5–17)
APPEARANCE UR: ABNORMAL
AST SERPL-CCNC: 430 U/L — HIGH (ref 10–40)
AST SERPL-CCNC: 444 U/L — HIGH (ref 10–40)
BACTERIA # UR AUTO: ABNORMAL /HPF
BASOPHILS # BLD AUTO: 0.04 K/UL — SIGNIFICANT CHANGE UP (ref 0–0.2)
BASOPHILS NFR BLD AUTO: 0.2 % — SIGNIFICANT CHANGE UP (ref 0–2)
BILIRUB SERPL-MCNC: 0.9 MG/DL — SIGNIFICANT CHANGE UP (ref 0.2–1.2)
BILIRUB SERPL-MCNC: 0.9 MG/DL — SIGNIFICANT CHANGE UP (ref 0.2–1.2)
BILIRUB UR-MCNC: ABNORMAL
BUN SERPL-MCNC: 47 MG/DL — HIGH (ref 7–23)
BUN SERPL-MCNC: 54 MG/DL — HIGH (ref 7–23)
CALCIUM SERPL-MCNC: 8.7 MG/DL — SIGNIFICANT CHANGE UP (ref 8.4–10.5)
CALCIUM SERPL-MCNC: 8.7 MG/DL — SIGNIFICANT CHANGE UP (ref 8.4–10.5)
CHLORIDE SERPL-SCNC: 104 MMOL/L — SIGNIFICANT CHANGE UP (ref 96–108)
CHLORIDE SERPL-SCNC: 104 MMOL/L — SIGNIFICANT CHANGE UP (ref 96–108)
CO2 SERPL-SCNC: 23 MMOL/L — SIGNIFICANT CHANGE UP (ref 22–31)
CO2 SERPL-SCNC: 24 MMOL/L — SIGNIFICANT CHANGE UP (ref 22–31)
COLOR SPEC: YELLOW — SIGNIFICANT CHANGE UP
CREAT SERPL-MCNC: 1.86 MG/DL — HIGH (ref 0.5–1.3)
CREAT SERPL-MCNC: 2.11 MG/DL — HIGH (ref 0.5–1.3)
DIFF PNL FLD: ABNORMAL
EGFR: 30 ML/MIN/1.73M2 — LOW
EGFR: 36 ML/MIN/1.73M2 — LOW
EOSINOPHIL # BLD AUTO: 0 K/UL — SIGNIFICANT CHANGE UP (ref 0–0.5)
EOSINOPHIL NFR BLD AUTO: 0 % — SIGNIFICANT CHANGE UP (ref 0–6)
EPI CELLS # UR: SIGNIFICANT CHANGE UP
GLUCOSE BLDC GLUCOMTR-MCNC: 102 MG/DL — HIGH (ref 70–99)
GLUCOSE BLDC GLUCOMTR-MCNC: 128 MG/DL — HIGH (ref 70–99)
GLUCOSE BLDC GLUCOMTR-MCNC: 87 MG/DL — SIGNIFICANT CHANGE UP (ref 70–99)
GLUCOSE SERPL-MCNC: 85 MG/DL — SIGNIFICANT CHANGE UP (ref 70–99)
GLUCOSE SERPL-MCNC: 88 MG/DL — SIGNIFICANT CHANGE UP (ref 70–99)
GLUCOSE UR QL: NEGATIVE — SIGNIFICANT CHANGE UP
HCT VFR BLD CALC: 23.2 % — LOW (ref 39–50)
HGB BLD-MCNC: 7.4 G/DL — LOW (ref 13–17)
IMM GRANULOCYTES NFR BLD AUTO: 0.9 % — SIGNIFICANT CHANGE UP (ref 0–0.9)
KETONES UR-MCNC: NEGATIVE — SIGNIFICANT CHANGE UP
LACTATE SERPL-SCNC: 1.6 MMOL/L — SIGNIFICANT CHANGE UP (ref 0.7–2)
LACTATE SERPL-SCNC: 3.2 MMOL/L — HIGH (ref 0.7–2)
LEUKOCYTE ESTERASE UR-ACNC: ABNORMAL
LYMPHOCYTES # BLD AUTO: 24.3 % — SIGNIFICANT CHANGE UP (ref 13–44)
LYMPHOCYTES # BLD AUTO: 4.89 K/UL — HIGH (ref 1–3.3)
MAGNESIUM SERPL-MCNC: 1.9 MG/DL — SIGNIFICANT CHANGE UP (ref 1.6–2.6)
MCHC RBC-ENTMCNC: 27.4 PG — SIGNIFICANT CHANGE UP (ref 27–34)
MCHC RBC-ENTMCNC: 31.9 GM/DL — LOW (ref 32–36)
MCV RBC AUTO: 85.9 FL — SIGNIFICANT CHANGE UP (ref 80–100)
MONOCYTES # BLD AUTO: 1.26 K/UL — HIGH (ref 0–0.9)
MONOCYTES NFR BLD AUTO: 6.3 % — SIGNIFICANT CHANGE UP (ref 2–14)
MRSA PCR RESULT.: SIGNIFICANT CHANGE UP
NEUTROPHILS # BLD AUTO: 13.73 K/UL — HIGH (ref 1.8–7.4)
NEUTROPHILS NFR BLD AUTO: 68.3 % — SIGNIFICANT CHANGE UP (ref 43–77)
NITRITE UR-MCNC: NEGATIVE — SIGNIFICANT CHANGE UP
NRBC # BLD: 0 /100 WBCS — SIGNIFICANT CHANGE UP (ref 0–0)
NT-PROBNP SERPL-SCNC: HIGH PG/ML (ref 0–300)
NT-PROBNP SERPL-SCNC: HIGH PG/ML (ref 0–300)
PH UR: 6 — SIGNIFICANT CHANGE UP (ref 5–8)
PHOSPHATE SERPL-MCNC: 5.7 MG/DL — HIGH (ref 2.5–4.5)
PLATELET # BLD AUTO: 190 K/UL — SIGNIFICANT CHANGE UP (ref 150–400)
POTASSIUM SERPL-MCNC: 4.5 MMOL/L — SIGNIFICANT CHANGE UP (ref 3.5–5.3)
POTASSIUM SERPL-MCNC: 5 MMOL/L — SIGNIFICANT CHANGE UP (ref 3.5–5.3)
POTASSIUM SERPL-SCNC: 4.5 MMOL/L — SIGNIFICANT CHANGE UP (ref 3.5–5.3)
POTASSIUM SERPL-SCNC: 5 MMOL/L — SIGNIFICANT CHANGE UP (ref 3.5–5.3)
PROCALCITONIN SERPL-MCNC: 0.2 NG/ML — HIGH
PROT SERPL-MCNC: 6.2 G/DL — SIGNIFICANT CHANGE UP (ref 6–8.3)
PROT SERPL-MCNC: 6.3 G/DL — SIGNIFICANT CHANGE UP (ref 6–8.3)
PROT UR-MCNC: 100
RBC # BLD: 2.7 M/UL — LOW (ref 4.2–5.8)
RBC # FLD: 16 % — HIGH (ref 10.3–14.5)
RBC CASTS # UR COMP ASSIST: ABNORMAL /HPF (ref 0–4)
S AUREUS DNA NOSE QL NAA+PROBE: DETECTED
SODIUM SERPL-SCNC: 137 MMOL/L — SIGNIFICANT CHANGE UP (ref 135–145)
SODIUM SERPL-SCNC: 140 MMOL/L — SIGNIFICANT CHANGE UP (ref 135–145)
SP GR SPEC: 1.02 — SIGNIFICANT CHANGE UP (ref 1.01–1.02)
TROPONIN I, HIGH SENSITIVITY RESULT: 8739 NG/L — HIGH
TROPONIN I, HIGH SENSITIVITY RESULT: 9605.6 NG/L — HIGH
UROBILINOGEN FLD QL: 4
WBC # BLD: 20.11 K/UL — HIGH (ref 3.8–10.5)
WBC # FLD AUTO: 20.11 K/UL — HIGH (ref 3.8–10.5)
WBC UR QL: ABNORMAL /HPF (ref 0–5)

## 2022-11-15 PROCEDURE — 93306 TTE W/DOPPLER COMPLETE: CPT | Mod: 26

## 2022-11-15 PROCEDURE — 99222 1ST HOSP IP/OBS MODERATE 55: CPT

## 2022-11-15 RX ORDER — AMIODARONE HYDROCHLORIDE 400 MG/1
200 TABLET ORAL DAILY
Refills: 0 | Status: DISCONTINUED | OUTPATIENT
Start: 2022-11-15 | End: 2022-12-01

## 2022-11-15 RX ORDER — CEFEPIME 1 G/1
1000 INJECTION, POWDER, FOR SOLUTION INTRAMUSCULAR; INTRAVENOUS EVERY 24 HOURS
Refills: 0 | Status: DISCONTINUED | OUTPATIENT
Start: 2022-11-15 | End: 2022-11-15

## 2022-11-15 RX ORDER — PANTOPRAZOLE SODIUM 20 MG/1
40 TABLET, DELAYED RELEASE ORAL DAILY
Refills: 0 | Status: DISCONTINUED | OUTPATIENT
Start: 2022-11-15 | End: 2022-11-16

## 2022-11-15 RX ORDER — LEVOTHYROXINE SODIUM 125 MCG
150 TABLET ORAL DAILY
Refills: 0 | Status: DISCONTINUED | OUTPATIENT
Start: 2022-11-16 | End: 2022-12-01

## 2022-11-15 RX ORDER — CHLORHEXIDINE GLUCONATE 213 G/1000ML
1 SOLUTION TOPICAL
Refills: 0 | Status: DISCONTINUED | OUTPATIENT
Start: 2022-11-15 | End: 2022-11-17

## 2022-11-15 RX ORDER — ASPIRIN/CALCIUM CARB/MAGNESIUM 324 MG
81 TABLET ORAL DAILY
Refills: 0 | Status: DISCONTINUED | OUTPATIENT
Start: 2022-11-15 | End: 2022-12-01

## 2022-11-15 RX ORDER — CEFEPIME 1 G/1
1000 INJECTION, POWDER, FOR SOLUTION INTRAMUSCULAR; INTRAVENOUS
Refills: 0 | Status: DISCONTINUED | OUTPATIENT
Start: 2022-11-15 | End: 2022-11-21

## 2022-11-15 RX ORDER — MIDODRINE HYDROCHLORIDE 2.5 MG/1
5 TABLET ORAL EVERY 8 HOURS
Refills: 0 | Status: DISCONTINUED | OUTPATIENT
Start: 2022-11-15 | End: 2022-11-16

## 2022-11-15 RX ORDER — CHLORHEXIDINE GLUCONATE 213 G/1000ML
1 SOLUTION TOPICAL DAILY
Refills: 0 | Status: DISCONTINUED | OUTPATIENT
Start: 2022-11-15 | End: 2022-11-17

## 2022-11-15 RX ORDER — HEPARIN SODIUM 5000 [USP'U]/ML
5000 INJECTION INTRAVENOUS; SUBCUTANEOUS EVERY 12 HOURS
Refills: 0 | Status: DISCONTINUED | OUTPATIENT
Start: 2022-11-15 | End: 2022-12-01

## 2022-11-15 RX ORDER — NOREPINEPHRINE BITARTRATE/D5W 8 MG/250ML
0.05 PLASTIC BAG, INJECTION (ML) INTRAVENOUS
Qty: 8 | Refills: 0 | Status: DISCONTINUED | OUTPATIENT
Start: 2022-11-15 | End: 2022-11-16

## 2022-11-15 RX ORDER — FUROSEMIDE 40 MG
20 TABLET ORAL ONCE
Refills: 0 | Status: COMPLETED | OUTPATIENT
Start: 2022-11-15 | End: 2022-11-15

## 2022-11-15 RX ADMIN — HEPARIN SODIUM 5000 UNIT(S): 5000 INJECTION INTRAVENOUS; SUBCUTANEOUS at 18:22

## 2022-11-15 RX ADMIN — AMIODARONE HYDROCHLORIDE 200 MILLIGRAM(S): 400 TABLET ORAL at 12:08

## 2022-11-15 RX ADMIN — Medication 81 MILLIGRAM(S): at 12:07

## 2022-11-15 RX ADMIN — MIDODRINE HYDROCHLORIDE 5 MILLIGRAM(S): 2.5 TABLET ORAL at 22:11

## 2022-11-15 RX ADMIN — CEFEPIME 100 MILLIGRAM(S): 1 INJECTION, POWDER, FOR SOLUTION INTRAMUSCULAR; INTRAVENOUS at 11:35

## 2022-11-15 RX ADMIN — MIDODRINE HYDROCHLORIDE 5 MILLIGRAM(S): 2.5 TABLET ORAL at 12:10

## 2022-11-15 RX ADMIN — CEFEPIME 100 MILLIGRAM(S): 1 INJECTION, POWDER, FOR SOLUTION INTRAMUSCULAR; INTRAVENOUS at 22:11

## 2022-11-15 RX ADMIN — Medication 5.86 MICROGRAM(S)/KG/MIN: at 03:13

## 2022-11-15 RX ADMIN — CHLORHEXIDINE GLUCONATE 1 APPLICATION(S): 213 SOLUTION TOPICAL at 19:33

## 2022-11-15 RX ADMIN — Medication 5.86 MICROGRAM(S)/KG/MIN: at 14:02

## 2022-11-15 RX ADMIN — PANTOPRAZOLE SODIUM 40 MILLIGRAM(S): 20 TABLET, DELAYED RELEASE ORAL at 11:35

## 2022-11-15 RX ADMIN — Medication 20 MILLIGRAM(S): at 12:08

## 2022-11-15 RX ADMIN — HEPARIN SODIUM 5000 UNIT(S): 5000 INJECTION INTRAVENOUS; SUBCUTANEOUS at 05:51

## 2022-11-15 NOTE — PROGRESS NOTE ADULT - ASSESSMENT
Physical Examination:  GENERAL:               Awake, following commands, No acute distress.    HEENT:                    Pupils equal, reactive to light.  Symmetric. No JVD, Moist MM  PULM:                     Bilateral air entry, basilar Rales, No Rhonchi, No Wheezing  CVS:                         S1, S2,  No Murmur  ABD:                        Soft, nondistended, nontender, normoactive bowel sounds,   EXT:                         No edema, nontender, No Cyanosis or Clubbing   Vascular:                Warm Extremities, Normal Capillary refill, Normal Distal Pulses  SKIN:                       Warm and well perfused, no rashes noted.   NEURO:                  Alert, oriented, interactive, nonfocal, follows commands  PSYC:                      Calm, No Insight.    Assessment:  83 y/o M w/ pmh of htn, cad s/p cabg, severe cardiomyopathy (EF 20%), Afib (not on AC due to hematuria), bph, LUE DVT, b/l LLE DVT s/p IVC filter placement pt was discharged to SNF now tonight presents to [facility] on 11/14/2022 for hypoxia and AMS, pt was started on NC with improvement in o2 and then became hypoxic again and was started on HFNC at 40L/40%, hypotensive suspected to be 2/2 to septic, HAN, transaminitis, Lactic acidosis, UA concerning for UTI and possible PNA. Pt is reported to be DNR/DNI is reported to have wanted everything else done. Pt seen and examined in the ED unable to participate in HPI due to agitation as he just keeps screaming for orange juice and not participate during interview.    1. Acute hypoxic respiratory failure  2. Heart failure with reduced EF  3. Septic shock  4. Lactic acidosis  5. UTI  6. Chronic Afib   7. VTE not on AC due to bleeding previously   8. CAD  9. Chronic hypothyroidism    Plan:  - Monitor hemodyanmics   - Taper levophed as tolerated   - Low dose midodrine   - Diuresis to aim for negative fluid balance  - HFNC support, titrate down as tolerated  - Cont. amiodarone and aspirin for now  - Monitor for further bleeding  - Follow up cultures  - Broad spectrum antibiotics  - Cardiology follow up   - Lactate normalized   - Aspiration precautions  - Cont. levothyroxine  - Oral diet  - DNR/DNI code status  - Called wife Kirsten ([phone]) 11/15 updated about current clinical condition  - Cont. ICU care

## 2022-11-15 NOTE — CONSULT NOTE ADULT - SUBJECTIVE AND OBJECTIVE BOX
GABRIEL DE LA CRUZ  [id number]      HPI:    Gabriel De La Cruz is an 82 year old man with past medical history of Coronary artery disease (s/p CABG), HFrEF (LVEF 20% in 9/2022), Hypertension, Atrial fibrillation (not on anticoagulation due to hematuria), CVA and BPH with recent hospitalization her earlier this month for acute on chronic systolic heart failure, DVTs and IVC filter placement now sent in from living facility due to altered mental status and hypoxia.           ALLERGIES:  PC Pen VK (Other)      PAST MEDICAL & SURGICAL HISTORY:  Chronic atrial fibrillation  History of cardiomyopathy  CAD (coronary artery disease)  BPH with obstruction/lower urinary tract symptoms  Hyperlipidemia  History of CVA (cerebrovascular accident)  S/P CABG (coronary artery bypass graft)      CURRENT MEDICATIONS:  cefepime   IVPB 1000 milliGRAM(s) IV Intermittent <User Schedule>  chlorhexidine 4% Liquid 1 Application(s) Topical <User Schedule>  heparin   Injectable 5000 Unit(s) SubCutaneous every 12 hours  norepinephrine Infusion 0.05 MICROgram(s)/kG/Min IV Continuous <Continuous>  pantoprazole  Injectable 40 milliGRAM(s) IV Push daily      ROS:  All 10 systems reviewed and positives noted in HPI    OBJECTIVE:    VITAL SIGNS:  Vital Signs Last 24 Hrs  T(C): 37.2 (15 Nov 2022 06:00), Max: 37.3 (15 Nov 2022 01:37)  T(F): 98.9 (15 Nov 2022 06:00), Max: 99.1 (15 Nov 2022 01:37)  HR: 110 (15 Nov 2022 08:00) (68 - 116)  BP: 97/70 (15 Nov 2022 08:00) (82/51 - 105/65)  BP(mean): 63 (15 Nov 2022 03:00) (63 - 67)  RR: 12 (15 Nov 2022 07:00) (12 - 28)  SpO2: 96% (15 Nov 2022 07:00) (81% - 100%)    Parameters below as of 15 Nov 2022 08:00  Patient On (Oxygen Delivery Method): nasal cannula, high flow  O2 Flow (L/min): 40  O2 Concentration (%): 80    Physical Exam:   General: elderly man, frail, no acute distress  Neck: supple  CVS: JVP ~ 7 cm H20, irregularly irregular, s1, s2  Pulm: unlabored respirations, decreased breath sounds  Abd: non-distended  Ext: no lower extremity edema b/l  Neuro: awake, alert   Psych: Normal affect    LABS:                        7.4    20.11 )-----------( 190      ( 15 Nov 2022 06:00 )             23.2     11-15    137  |  104  |  54<H>  ----------------------------<  88  5.0   |  24  |  1.86<H>    Ca    8.7      15 Nov 2022 06:00  Phos  5.7     11-15  Mg     1.9     11-15    TPro  6.2  /  Alb  2.7<L>  /  TBili  0.9  /  DBili  x   /  AST  430<H>  /  ALT  375<H>  /  AlkPhos  134<H>  11-15        PT/INR - ( 14 Nov 2022 19:45 )   PT: 17.6 sec;   INR: 1.51 ratio         PTT - ( 14 Nov 2022 19:45 )  PTT:29.0 sec          ECG (10/17/22): atrial fibrillation, old septal infarct, prolonged QT (no prior ECG for comparison)    TTE (10/18/22):  LVEF 20-25%; the inferolateral wall and inferior wall appear akinetic  Moderate septal LVH  Moderately reduced RV systolic function  Severe LA enlargement  Moderate MR, Mild TR, Moderate AR  Dilated proximal ascending aorta (4.4 cm).  Large pleural effusion in the left lateral region.  No pericardial effusion       [facility] records obtained:    TTE (9/12/22):  LVEF 20%, minor regional variation  Normal RV size and severely reduced RV systolic function  Moderate MR, Mild AR   No pericardial effusion     Coronary angiogram (10/3/22):  LIMA-LAD: patent  SVG-LCx: occluded  SVG-RCA: occluded  Recommendations: Medical therapy, EP consult    GABRIEL DE LA CRUZ  [id number]      HPI:    Gabriel De La Cruz is an 82 year old man with past medical history of Coronary artery disease (s/p CABG, recent angiogram with 2/3 occluded vein grafts in 10/2022), HFrEF (LVEF 20% in 9/2022), Hypertension, Atrial fibrillation (not on anticoagulation due to hematuria), CVA and BPH with recent hospitalization her earlier this month for acute on chronic systolic heart failure, DVTs and IVC filter placement now sent in from living facility due to altered mental status and hypoxia.           ALLERGIES:  PC Pen VK (Other)      PAST MEDICAL & SURGICAL HISTORY:  Chronic atrial fibrillation  History of cardiomyopathy  CAD (coronary artery disease)  BPH with obstruction/lower urinary tract symptoms  Hyperlipidemia  History of CVA (cerebrovascular accident)  S/P CABG (coronary artery bypass graft)      CURRENT MEDICATIONS:  cefepime   IVPB 1000 milliGRAM(s) IV Intermittent <User Schedule>  chlorhexidine 4% Liquid 1 Application(s) Topical <User Schedule>  heparin   Injectable 5000 Unit(s) SubCutaneous every 12 hours  norepinephrine Infusion 0.05 MICROgram(s)/kG/Min IV Continuous <Continuous>  pantoprazole  Injectable 40 milliGRAM(s) IV Push daily      ROS:  All 10 systems reviewed and positives noted in HPI    OBJECTIVE:    VITAL SIGNS:  Vital Signs Last 24 Hrs  T(C): 37.2 (15 Nov 2022 06:00), Max: 37.3 (15 Nov 2022 01:37)  T(F): 98.9 (15 Nov 2022 06:00), Max: 99.1 (15 Nov 2022 01:37)  HR: 110 (15 Nov 2022 08:00) (68 - 116)  BP: 97/70 (15 Nov 2022 08:00) (82/51 - 105/65)  BP(mean): 63 (15 Nov 2022 03:00) (63 - 67)  RR: 12 (15 Nov 2022 07:00) (12 - 28)  SpO2: 96% (15 Nov 2022 07:00) (81% - 100%)    Parameters below as of 15 Nov 2022 08:00  Patient On (Oxygen Delivery Method): nasal cannula, high flow  O2 Flow (L/min): 40  O2 Concentration (%): 80    Physical Exam:   General: elderly man, frail, no acute distress  Neck: supple  CVS: JVP ~ 7 cm H20, irregularly irregular, s1, s2  Pulm: unlabored respirations, decreased breath sounds  Abd: non-distended  Ext: no lower extremity edema b/l  Neuro: awake, alert   Psych: Normal affect    LABS:                        7.4    20.11 )-----------( 190      ( 15 Nov 2022 06:00 )             23.2     11-15    137  |  104  |  54<H>  ----------------------------<  88  5.0   |  24  |  1.86<H>    Ca    8.7      15 Nov 2022 06:00  Phos  5.7     11-15  Mg     1.9     11-15    TPro  6.2  /  Alb  2.7<L>  /  TBili  0.9  /  DBili  x   /  AST  430<H>  /  ALT  375<H>  /  AlkPhos  134<H>  11-15        PT/INR - ( 14 Nov 2022 19:45 )   PT: 17.6 sec;   INR: 1.51 ratio         PTT - ( 14 Nov 2022 19:45 )  PTT:29.0 sec        TTE (10/18/22):  LVEF 20-25%; the inferolateral wall and inferior wall appear akinetic  Moderate septal LVH  Moderately reduced RV systolic function  Severe LA enlargement  Moderate MR, Mild TR, Moderate AR  Dilated proximal ascending aorta (4.4 cm).  Large pleural effusion in the left lateral region.  No pericardial effusion       [facility] records obtained:    TTE (9/12/22):  LVEF 20%, minor regional variation  Normal RV size and severely reduced RV systolic function  Moderate MR, Mild AR   No pericardial effusion     Coronary angiogram (10/3/22):  LIMA-LAD: patent  SVG-LCx: occluded  SVG-RCA: occluded  Recommendations: Medical therapy, EP consult     CXR (11/14/22):  Possible early CHF.    ECG (11/14/22): atrial fibrillation, anterior ST depressions (new)       GABRIEL DE LA CRUZ  [id number]      HPI:    Gabriel De La Cruz is an 82 year old man with past medical history of Coronary artery disease (s/p CABG, recent angiogram with 2/3 occluded vein grafts in 10/2022, medically managed), HFrEF (LVEF 20% in 9/2022), Hypertension, Atrial fibrillation (not on anticoagulation due to hematuria), CVA and BPH with recent hospitalization here earlier this month for acute on chronic systolic heart failure, DVTs and IVC filter placement now sent in from living facility due to altered mental status and hypoxia.     The patient was seen and examined at bedside but is altered and not able to provide history.       ALLERGIES:  PC Pen VK (Other)      PAST MEDICAL & SURGICAL HISTORY:  Chronic atrial fibrillation  History of cardiomyopathy  CAD (coronary artery disease)  BPH with obstruction/lower urinary tract symptoms  Hyperlipidemia  History of CVA (cerebrovascular accident)  S/P CABG (coronary artery bypass graft)      CURRENT MEDICATIONS:  cefepime   IVPB 1000 milliGRAM(s) IV Intermittent <User Schedule>  chlorhexidine 4% Liquid 1 Application(s) Topical <User Schedule>  heparin   Injectable 5000 Unit(s) SubCutaneous every 12 hours  norepinephrine Infusion 0.05 MICROgram(s)/kG/Min IV Continuous <Continuous>  pantoprazole  Injectable 40 milliGRAM(s) IV Push daily      ROS:  All 10 systems reviewed and positives noted in HPI    OBJECTIVE:    VITAL SIGNS:  Vital Signs Last 24 Hrs  T(C): 37.2 (15 Nov 2022 06:00), Max: 37.3 (15 Nov 2022 01:37)  T(F): 98.9 (15 Nov 2022 06:00), Max: 99.1 (15 Nov 2022 01:37)  HR: 110 (15 Nov 2022 08:00) (68 - 116)  BP: 97/70 (15 Nov 2022 08:00) (82/51 - 105/65)  BP(mean): 63 (15 Nov 2022 03:00) (63 - 67)  RR: 12 (15 Nov 2022 07:00) (12 - 28)  SpO2: 96% (15 Nov 2022 07:00) (81% - 100%)    Parameters below as of 15 Nov 2022 08:00  Patient On (Oxygen Delivery Method): nasal cannula, high flow  O2 Flow (L/min): 40  O2 Concentration (%): 80    Physical Exam:   General: elderly man, frail, on high flow nasal cannula   Neck: supple  CVS: JVP difficult to assess 2/2 high flow collar, irregularly irregular, s1, s2  Pulm: labored respirations, coarse breath sounds   Abd: non-distended  Ext: no lower extremity edema b/l  Neuro: awake, alert     LABS:                        7.4    20.11 )-----------( 190      ( 15 Nov 2022 06:00 )             23.2     11-15    137  |  104  |  54<H>  ----------------------------<  88  5.0   |  24  |  1.86<H>    Ca    8.7      15 Nov 2022 06:00  Phos  5.7     11-15  Mg     1.9     11-15    TPro  6.2  /  Alb  2.7<L>  /  TBili  0.9  /  DBili  x   /  AST  430<H>  /  ALT  375<H>  /  AlkPhos  134<H>  11-15        PT/INR - ( 14 Nov 2022 19:45 )   PT: 17.6 sec;   INR: 1.51 ratio         PTT - ( 14 Nov 2022 19:45 )  PTT:29.0 sec        [facility] records obtained:    TTE (9/12/22):  LVEF 20%, minor regional variation  Normal RV size and severely reduced RV systolic function  Moderate MR, Mild AR   No pericardial effusion     Coronary angiogram (10/3/22):  LIMA-LAD: patent  SVG-LCx: occluded  SVG-RCA: occluded  Recommendations: Medical therapy, EP consult       TTE (10/18/22):  LVEF 20-25%; the inferolateral wall and inferior wall appear akinetic  Moderate septal LVH  Moderately reduced RV systolic function  Severe LA enlargement  Moderate MR, Mild TR, Moderate AR  Dilated proximal ascending aorta (4.4 cm).  Large pleural effusion in the left lateral region.  No pericardial effusion     CXR (11/14/22):  Possible early CHF.    ECG (11/14/22): atrial fibrillation, anterior ST depressions (new)

## 2022-11-15 NOTE — PATIENT PROFILE ADULT - CENTRAL VENOUS CATHETER/PICC LINE
Ongoing SW/CM Assessment/Plan of Care Note     See SW/CM flowsheets for goals and other objective data.    Patient/Family discharge goal (s):  Goal #1: Psychosocial needs assessed  Goal #2: Establish present or future health care decison-maker       PT Recommendation:  Recommendation for Discharge: PT: Less intensive rehab    OT Recommendation:  Recommendations for Discharge: OT: Post acute therapy (subacute vs IRP)    SLP Recommendation:  Recommendations for Discharge: SLP: IRP vs LISA pending medical progress    Disposition:  Planned Discharge Destination: Location not determined at this time    Progress note:   Following for discharge planning.  Family able to be located today for pt from information contained in his cell phone.  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  Not able to communicate information himself.  Not a candidate for the hospital Inpatient Rehab program.  On room air.  NPO status with n/g tube feeding provision.  Two person assist with bed mobility completion.      Met with pt's sister-in-law (Sugar Moulton) and her current  (Raul Moulton).  Video  used to facilitate communication as they are Serbian speaking.  Their phone number is:  [phone].  Sister-in-law had been  to pt's brother, but he is .  Pt lives with them.  Two story house.  Has about seven entry steps with a right sided railing. Pt's bedroom is on the first floor level with a full bathroom available. Pt keeps his room locked.  Family took his keys home as identified that might need them to look for financial records if guardianship pursument or Medicaid application becomes needed.  Pt has Medicare AARP Complete plan for health insurance coverage only at present.  Insurance card is in pt's wallet.  Also found cards with medical provider information for the [facility] (Dr Fischer - Psychiatrist and Dr Paul - Podiatrist).  Address:  [address] in [place] [address].  Phone:  [phone].   Family relayed that pt receives a Social Security income check and has a bank account.  They do not know the amounts.  Pt was at an independent ability level prior to this hospitalization.  Able to complete self cares and manage medications on his own.  Was still driving.  They reported that pt had stopped drinking ETOH about a month ago in preparation for surgery.  Still smokes cigarettes frequently throughout the day.  Pt is retired.  Pt's sister-in-law is also retired, but goes to [facility] on a daily basis and will be leaving soon to visit her daughter in [place].  Her  still works.  They would not be able to care for pt at home in his current condition.               To their knowledge, pt does not have a POA-HC document.  Only other hospital stay that they are aware of is when he had his other knee replacement surgery at [facility].  Contacted Medical Records/Release of Information at [facility] (Phone:  [phone] Fax:  [phone]).  Pt was hospitalized there back on 8/6/2012.  No POA-HC document on file.    Family members indicated that pt's son (Chadwick \"Jameson\" Corky) should be the one to further discuss medical decision-making and disposition plan with.  They provided the following telephone number for him (Phone:  [phone]) and will also be trying to call him.  Was identified that son speaks English.  Will need to establish contact with him.    Aware that pt was transferred from the Neuro ICU to the medical floor today.  Message left to update Social Work peer on 10LM regarding case status.                 no

## 2022-11-15 NOTE — PROGRESS NOTE ADULT - SUBJECTIVE AND OBJECTIVE BOX
Follow-up Critical Care Progress Note  Chief Complaint : Acute respiratory failure with hypoxia        No new events overnight.  Denies SOB/CP.     Allergies :PC Pen VK (Other)      PAST MEDICAL & SURGICAL HISTORY:  Chronic atrial fibrillation    History of cardiomyopathy    CAD (coronary artery disease)    BPH with obstruction/lower urinary tract symptoms    Hyperlipidemia    History of CVA (cerebrovascular accident)  left sided weakness    S/P CABG (coronary artery bypass graft)        Medications:  MEDICATIONS  (STANDING):  aMIOdarone    Tablet 200 milliGRAM(s) Oral daily  aspirin  chewable 81 milliGRAM(s) Oral daily  cefepime   IVPB 1000 milliGRAM(s) IV Intermittent <User Schedule>  chlorhexidine 4% Liquid 1 Application(s) Topical <User Schedule>  furosemide   Injectable 20 milliGRAM(s) IV Push once  heparin   Injectable 5000 Unit(s) SubCutaneous every 12 hours  norepinephrine Infusion 0.05 MICROgram(s)/kG/Min (5.86 mL/Hr) IV Continuous <Continuous>  pantoprazole  Injectable 40 milliGRAM(s) IV Push daily    MEDICATIONS  (PRN):      Antibiotics History  cefepime   IVPB 1000 milliGRAM(s) IV Intermittent every 24 hours, 11-15-22 @ 03:22  cefepime   IVPB 1000 milliGRAM(s) IV Intermittent <User Schedule>, 11-15-22 @ 04:33  cefepime   IVPB 1000 milliGRAM(s) IV Intermittent once, 22 @ 20:34, Stop order after: 1 Doses      Heme Medications   aspirin  chewable 81 milliGRAM(s) Oral daily, 11-15-22 @ 10:44  heparin   Injectable 5000 Unit(s) SubCutaneous every 12 hours, 11-15-22 @ 01:22      GI Medications  pantoprazole  Injectable 40 milliGRAM(s) IV Push daily, 11-15-22 @ 01:21, Routine        LABS:                        7.4    20.11 )-----------( 190      ( 15 Nov 2022 06:00 )             23.2     11-15    137  |  104  |  54<H>  ----------------------------<  88  5.0   |  24  |  1.86<H>    Ca    8.7      15 Nov 2022 06:00  Phos  5.7     11-15  Mg     1.9     11-15    TPro  6.2  /  Alb  2.7<L>  /  TBili  0.9  /  DBili  x   /  AST  430<H>  /  ALT  375<H>  /  AlkPhos  134<H>  11-15            PT/INR - ( 2022 19:45 )   PT: 17.6 sec;   INR: 1.51 ratio         PTT - ( 2022 19:45 )  PTT:29.0 sec  Urinalysis Basic - ( 15 Nov 2022 01:00 )    Color: Yellow / Appearance: very cloudy / S.025 / pH: x  Gluc: x / Ketone: Negative  / Bili: Small / Urobili: 4   Blood: x / Protein: 100 / Nitrite: Negative   Leuk Esterase: Moderate / RBC: 11-25 /HPF / WBC 26-50 /HPF   Sq Epi: x / Non Sq Epi: Neg.-Few / Bacteria: TNTC /HPF      Procalcitonin, Serum: 0.20 ng/mL (11-15-22 @ 06:00)    Serum Pro-Brain Natriuretic Peptide: >20457 pg/mL (11-15-22 @ 06:00)  Serum Pro-Brain Natriuretic Peptide: >89607 pg/mL (11-15-22 @ 01:50)        CULTURES: (if applicable)    Rapid RVP Result: NotDetec (22 @ 19:45)        CAPILLARY BLOOD GLUCOSE          RADIOLOGY  CXR:      CT:    ECHO:      VITALS:  T(C): 37.2 (11-15-22 @ 06:00), Max: 37.3 (11-15-22 @ 01:37)  T(F): 98.9 (11-15-22 @ 06:00), Max: 99.1 (11-15-22 @ 01:37)  HR: 110 (11-15-22 @ 08:00) (68 - 116)  BP: 97/70 (11-15-22 @ 08:00) (82/51 - 105/65)  BP(mean): 63 (11-15-22 @ 03:00) (63 - 67)  ABP: --  ABP(mean): --  RR: 12 (11-15-22 @ 07:00) (12 - 28)  SpO2: 96% (11-15-22 @ 07:00) (81% - 100%)  CVP(mm Hg): --  CVP(cm H2O): --    Ins and Outs     22 @ 07:01  -  11-15-22 @ 07:00  --------------------------------------------------------  IN: 5.6 mL / OUT: 90 mL / NET: -84.4 mL        Height (cm): 182.9 (22 @ 17:39)  Weight (kg): 62.5 (11-15-22 @ 02:00)  BMI (kg/m2): 18.7 (11-15-22 @ 02:00)        I&O's Detail    2022 07:01  -  15 Nov 2022 07:00  --------------------------------------------------------  IN:    Norepinephrine: 5.6 mL  Total IN: 5.6 mL    OUT:    Indwelling Catheter - Urethral (mL): 90 mL  Total OUT: 90 mL    Total NET: -84.4 mL

## 2022-11-15 NOTE — PROGRESS NOTE ADULT - SUBJECTIVE AND OBJECTIVE BOX
HPI: 83 y/o M w/ pmh of htn, cad s/p cabg, severe cardiomyopathy (EF 20%), Afib (not on AC due to hematuria), bph, LUE DVT, b/l LLE DVT s/p IVC filter placement pt was discharged to SNF now tonight presents to [facility] on 2022 for hypoxia and AMS, pt was started on NC with improvement in o2 and then became hypoxic again and was started on HFNC at 40L/40% 2/2 to decompensated HF, hypotensive suspected to be 2/2 to septic, HAN, transaminitis, Lactic acidosis, UA concerning for UTI and possible PNA and admitted to the MICU.    24 hour events: pt now off pressors after started on midodrine    Review of Systems: Unable to obtain 2/2 to AMS    T(F): 98.5 (11-15-22 @ 20:00), Max: 99.1 (11-15-22 @ 01:37)  HR: 120 (11-15-22 @ 20:00) (68 - 125)  BP: 93/73 (11-15-22 @ 20:00) (80/53 - 105/65)  RR: 37 (11-15-22 @ 20:00) (12 - 37)  SpO2: 100% (11-15-22 @ 20:00) (88% - 100%)  Wt(kg): --      22 @ 07:01  -  11-15-22 @ 07:00  --------------------------------------------------------  IN: 5.6 mL / OUT: 90 mL / NET: -84.4 mL    11-15-22 @ 07:01  -  11-15-22 @ 20:32  --------------------------------------------------------  IN: 386.2 mL / OUT: 455 mL / NET: -68.8 mL        CAPILLARY BLOOD GLUCOSE      POCT Blood Glucose.: 102 mg/dL (15 Nov 2022 18:08)      I&O's Summary    2022 07:01  -  15 Nov 2022 07:00  --------------------------------------------------------  IN: 5.6 mL / OUT: 90 mL / NET: -84.4 mL    15 Nov 2022 07:01  -  15 Nov 2022 20:32  --------------------------------------------------------  IN: 386.2 mL / OUT: 455 mL / NET: -68.8 mL        Physical Exam:   General: Comfortable in bed in NAD  Neuro: awake and alert but confused w/ peroids of agitation  Heart: +RRR  Lungs: good air entry b/l  Abd: BSx4, soft, nt/nd  Ext: +edema, BLLE warm to touch  Skin: warm/dry    Meds:  cefepime   IVPB IV Intermittent    aMIOdarone    Tablet Oral  midodrine Oral  norepinephrine Infusion IV Continuous            aspirin  chewable Oral  heparin   Injectable SubCutaneous    pantoprazole  Injectable IV Push          chlorhexidine 2% Cloths Topical  chlorhexidine 4% Liquid Topical                              7.4    20.11 )-----------( 190      ( 15 Nov 2022 06:00 )             23.2       -15    137  |  104  |  54<H>  ----------------------------<  88  5.0   |  24  |  1.86<H>    Ca    8.7      15 Nov 2022 06:00  Phos  5.7     11-15  Mg     1.9     11-15    TPro  6.2  /  Alb  2.7<L>  /  TBili  0.9  /  DBili  x   /  AST  430<H>  /  ALT  375<H>  /  AlkPhos  134<H>  11-15    Lactate 1.6           11-15 @ 06:00    Lactate 3.2           11-15 @ 01:50    Lactate 4.7            @ 22:28          PT/INR - ( 2022 19:45 )   PT: 17.6 sec;   INR: 1.51 ratio         PTT - ( 2022 19:45 )  PTT:29.0 sec  Urinalysis Basic - ( 15 Nov 2022 01:00 )    Color: Yellow / Appearance: very cloudy / S.025 / pH: x  Gluc: x / Ketone: Negative  / Bili: Small / Urobili: 4   Blood: x / Protein: 100 / Nitrite: Negative   Leuk Esterase: Moderate / RBC: 11-25 /HPF / WBC 26-50 /HPF   Sq Epi: x / Non Sq Epi: Neg.-Few / Bacteria: TNTC /HPF          Rapid RVP Result: NotDetec ( @ 19:45)    Radiology:     < from: Xray Chest 1 View-PORTABLE IMMEDIATE (22 @ 21:21) >  ACC: [id number] EXAM:  XR CHEST PORTABLE IMMED 1V                          PROCEDURE DATE:  2022          INTERPRETATION:  INDICATION: Difficulty breathing    Portable chest 8:49 PM    COMPARISON: 10/17/2022    Limited examination. Right lateral chest cut from film. Overlying EKG   leads and wires.    FINDINGS:  Heart/Vascular: The mediastinum, hilum and aorta are within normal limits   for projection. Status post median sternotomy and CABG. Moderate   atherosclerotic change. Heart borderline.  Pulmonary: Midline trachea. There is hazy opacity right greater than left   lung may represent early pulmonary venous congestion. No pneumothorax or   focal consolidation.  Bones: There is no fracture.  Lines and catheter: None    Impression:    Possible early CHF.    --- End of Report ---       BENITO VASQUEZ DO; Attending Radiologist  This document has been electronically signed. Nov 15 2022  9:47AM    < end of copied text >        CENTRAL LINE: N  MUJICA: Y  A-LINE: N    GLOBAL ISSUE/BEST PRACTICE:  Analgesia: N  Sedation: N  CAM-ICU: n/a  HOB elevation: Y  Stress ulcer prophylaxis: Y  VTE prophylaxis: Y  Glycemic control: Y  Nutrition: Y    CODE STATUS: DNR/DNI

## 2022-11-15 NOTE — PROGRESS NOTE ADULT - ASSESSMENT
81 y/o M w/ pmh of htn, cad s/p cabg, severe cardiomyopathy (EF 20%), Afib (not on AC due to hematuria), bph, LUE DVT, b/l LLE DVT s/p IVC filter placement pt was discharged to SNF now tonight presents to [facility] on 11/14/2022 for hypoxia and AMS, pt was started on NC with improvement in o2 and then became hypoxic again and was started on HFNC at 40L/40% 2/2 to decompensated HF, hypotensive suspected to be 2/2 to septic, HAN, transaminitis, Lactic acidosis, UA concerning for UTI and possible PNA and admitted to the MICU.      -Neuro: no acute issues  -Cardiac: Septic shock w/ decompensated HF now off levophed and midodrine added to maintain MAP >65, +trops now down trending will cont to trend likely demand in the setting of sepsis and decompensated HF, cardiology following input noted  -Resp: Acute Hypoxic resp failure on HFNC at 40L/80% will titrate o2 therapy as needed to maintain o2 >90%  -GI: NPO, GI ppx w/ protonix, Transaminitis monitor LFT for now, avoid hepatoxic drugs  -Renal: HAN 2/2 to hypotension monitor renal function and lytes closely, monitor I&O closely, lactic acidosis now resolved   -ID: Sepsis 2/2 to UTI +/- pna start IV cefepime  -Heme: DVT ppx w/ heparin subQ, unable to use full dose AC due to recurrent hx of bleeding, unable to use SCD 2/2 to b/l hx of DVT  -Endo: monitor FS q6h, hypothyroid on synthroid  -Dispo: Pt remains in the ICU, currently DNR/DNI, dispo pending ICU course

## 2022-11-16 LAB
ALBUMIN SERPL ELPH-MCNC: 2.6 G/DL — LOW (ref 3.3–5)
ALP SERPL-CCNC: 219 U/L — HIGH (ref 40–120)
ALT FLD-CCNC: 383 U/L — HIGH (ref 10–45)
ANION GAP SERPL CALC-SCNC: 15 MMOL/L — SIGNIFICANT CHANGE UP (ref 5–17)
AST SERPL-CCNC: 272 U/L — HIGH (ref 10–40)
BILIRUB SERPL-MCNC: 0.6 MG/DL — SIGNIFICANT CHANGE UP (ref 0.2–1.2)
BUN SERPL-MCNC: 62 MG/DL — HIGH (ref 7–23)
CALCIUM SERPL-MCNC: 8.5 MG/DL — SIGNIFICANT CHANGE UP (ref 8.4–10.5)
CHLORIDE SERPL-SCNC: 103 MMOL/L — SIGNIFICANT CHANGE UP (ref 96–108)
CO2 SERPL-SCNC: 21 MMOL/L — LOW (ref 22–31)
CREAT SERPL-MCNC: 1.83 MG/DL — HIGH (ref 0.5–1.3)
EGFR: 36 ML/MIN/1.73M2 — LOW
GLUCOSE BLDC GLUCOMTR-MCNC: 120 MG/DL — HIGH (ref 70–99)
GLUCOSE BLDC GLUCOMTR-MCNC: 127 MG/DL — HIGH (ref 70–99)
GLUCOSE BLDC GLUCOMTR-MCNC: 128 MG/DL — HIGH (ref 70–99)
GLUCOSE SERPL-MCNC: 99 MG/DL — SIGNIFICANT CHANGE UP (ref 70–99)
HCT VFR BLD CALC: 24.5 % — LOW (ref 39–50)
HGB BLD-MCNC: 7.9 G/DL — LOW (ref 13–17)
MAGNESIUM SERPL-MCNC: 2 MG/DL — SIGNIFICANT CHANGE UP (ref 1.6–2.6)
MCHC RBC-ENTMCNC: 27.9 PG — SIGNIFICANT CHANGE UP (ref 27–34)
MCHC RBC-ENTMCNC: 32.2 GM/DL — SIGNIFICANT CHANGE UP (ref 32–36)
MCV RBC AUTO: 86.6 FL — SIGNIFICANT CHANGE UP (ref 80–100)
NRBC # BLD: 0 /100 WBCS — SIGNIFICANT CHANGE UP (ref 0–0)
PHOSPHATE SERPL-MCNC: 4.3 MG/DL — SIGNIFICANT CHANGE UP (ref 2.5–4.5)
PLATELET # BLD AUTO: 275 K/UL — SIGNIFICANT CHANGE UP (ref 150–400)
POTASSIUM SERPL-MCNC: 4.3 MMOL/L — SIGNIFICANT CHANGE UP (ref 3.5–5.3)
POTASSIUM SERPL-SCNC: 4.3 MMOL/L — SIGNIFICANT CHANGE UP (ref 3.5–5.3)
PROT SERPL-MCNC: 6.4 G/DL — SIGNIFICANT CHANGE UP (ref 6–8.3)
RBC # BLD: 2.83 M/UL — LOW (ref 4.2–5.8)
RBC # FLD: 16.3 % — HIGH (ref 10.3–14.5)
SODIUM SERPL-SCNC: 139 MMOL/L — SIGNIFICANT CHANGE UP (ref 135–145)
T3 SERPL-MCNC: 29 NG/DL — LOW (ref 80–200)
T4 AB SER-ACNC: 8 UG/DL — SIGNIFICANT CHANGE UP (ref 4.6–12)
TROPONIN I, HIGH SENSITIVITY RESULT: 5676.3 NG/L — HIGH
TSH SERPL-MCNC: 2.72 UIU/ML — SIGNIFICANT CHANGE UP (ref 0.36–3.74)
WBC # BLD: 18.04 K/UL — HIGH (ref 3.8–10.5)
WBC # FLD AUTO: 18.04 K/UL — HIGH (ref 3.8–10.5)

## 2022-11-16 PROCEDURE — 99232 SBSQ HOSP IP/OBS MODERATE 35: CPT

## 2022-11-16 RX ORDER — MIDODRINE HYDROCHLORIDE 2.5 MG/1
10 TABLET ORAL EVERY 8 HOURS
Refills: 0 | Status: DISCONTINUED | OUTPATIENT
Start: 2022-11-16 | End: 2022-11-23

## 2022-11-16 RX ORDER — DIGOXIN 250 MCG
250 TABLET ORAL EVERY 6 HOURS
Refills: 0 | Status: COMPLETED | OUTPATIENT
Start: 2022-11-16 | End: 2022-11-16

## 2022-11-16 RX ORDER — METOPROLOL TARTRATE 50 MG
12.5 TABLET ORAL EVERY 12 HOURS
Refills: 0 | Status: DISCONTINUED | OUTPATIENT
Start: 2022-11-16 | End: 2022-11-20

## 2022-11-16 RX ORDER — PANTOPRAZOLE SODIUM 20 MG/1
40 TABLET, DELAYED RELEASE ORAL
Refills: 0 | Status: DISCONTINUED | OUTPATIENT
Start: 2022-11-17 | End: 2022-12-01

## 2022-11-16 RX ORDER — FUROSEMIDE 40 MG
20 TABLET ORAL DAILY
Refills: 0 | Status: DISCONTINUED | OUTPATIENT
Start: 2022-11-17 | End: 2022-11-19

## 2022-11-16 RX ORDER — FUROSEMIDE 40 MG
20 TABLET ORAL ONCE
Refills: 0 | Status: COMPLETED | OUTPATIENT
Start: 2022-11-16 | End: 2022-11-16

## 2022-11-16 RX ORDER — METOPROLOL TARTRATE 50 MG
2.5 TABLET ORAL EVERY 8 HOURS
Refills: 0 | Status: DISCONTINUED | OUTPATIENT
Start: 2022-11-16 | End: 2022-11-16

## 2022-11-16 RX ADMIN — CHLORHEXIDINE GLUCONATE 1 APPLICATION(S): 213 SOLUTION TOPICAL at 12:21

## 2022-11-16 RX ADMIN — CEFEPIME 100 MILLIGRAM(S): 1 INJECTION, POWDER, FOR SOLUTION INTRAMUSCULAR; INTRAVENOUS at 09:07

## 2022-11-16 RX ADMIN — MIDODRINE HYDROCHLORIDE 10 MILLIGRAM(S): 2.5 TABLET ORAL at 13:30

## 2022-11-16 RX ADMIN — HEPARIN SODIUM 5000 UNIT(S): 5000 INJECTION INTRAVENOUS; SUBCUTANEOUS at 17:23

## 2022-11-16 RX ADMIN — Medication 150 MICROGRAM(S): at 05:49

## 2022-11-16 RX ADMIN — Medication 250 MICROGRAM(S): at 10:22

## 2022-11-16 RX ADMIN — Medication 2.5 MILLIGRAM(S): at 13:30

## 2022-11-16 RX ADMIN — HEPARIN SODIUM 5000 UNIT(S): 5000 INJECTION INTRAVENOUS; SUBCUTANEOUS at 05:49

## 2022-11-16 RX ADMIN — AMIODARONE HYDROCHLORIDE 200 MILLIGRAM(S): 400 TABLET ORAL at 05:49

## 2022-11-16 RX ADMIN — Medication 12.5 MILLIGRAM(S): at 17:24

## 2022-11-16 RX ADMIN — CHLORHEXIDINE GLUCONATE 1 APPLICATION(S): 213 SOLUTION TOPICAL at 08:15

## 2022-11-16 RX ADMIN — MIDODRINE HYDROCHLORIDE 10 MILLIGRAM(S): 2.5 TABLET ORAL at 21:02

## 2022-11-16 RX ADMIN — CEFEPIME 100 MILLIGRAM(S): 1 INJECTION, POWDER, FOR SOLUTION INTRAMUSCULAR; INTRAVENOUS at 21:02

## 2022-11-16 RX ADMIN — Medication 81 MILLIGRAM(S): at 12:21

## 2022-11-16 RX ADMIN — Medication 250 MICROGRAM(S): at 15:46

## 2022-11-16 RX ADMIN — Medication 20 MILLIGRAM(S): at 10:22

## 2022-11-16 RX ADMIN — MIDODRINE HYDROCHLORIDE 5 MILLIGRAM(S): 2.5 TABLET ORAL at 05:50

## 2022-11-16 NOTE — PROGRESS NOTE ADULT - ASSESSMENT
Physical Examination:  GENERAL:               Awake, following commands, No acute distress.    HEENT:                    Pupils equal, reactive to light.  Symmetric. No JVD, Moist MM  PULM:                     Bilateral air entry, basilar Rales, No Rhonchi, No Wheezing  CVS:                         S1, S2,  No Murmur  ABD:                        Soft, nondistended, nontender, normoactive bowel sounds,   EXT:                         No edema, nontender, No Cyanosis or Clubbing   Vascular:                Warm Extremities, Normal Capillary refill, Normal Distal Pulses  SKIN:                       Warm and well perfused, no rashes noted.   NEURO:                  Alert, oriented, interactive, nonfocal, follows commands  PSYC:                      Calm, No Insight.    Assessment:  83 y/o M w/ pmh of htn, cad s/p cabg, severe cardiomyopathy (EF 20%), Afib (not on AC due to hematuria), bph, LUE DVT, b/l LLE DVT s/p IVC filter placement pt was discharged to SNF now tonight presents to [facility] on 11/14/2022 for hypoxia and AMS, pt was started on NC with improvement in o2 and then became hypoxic again and was started on HFNC at 40L/40%, hypotensive suspected to be 2/2 to septic, HAN, transaminitis, Lactic acidosis, UA concerning for UTI and possible PNA. Pt is reported to be DNR/DNI is reported to have wanted everything else done. Pt seen and examined in the ED unable to participate in HPI due to agitation as he just keeps screaming for orange juice and not participate during interview.    1. Acute hypoxic respiratory failure  2. Heart failure with reduced EF  3. Septic shock  4. Lactic acidosis  5. UTI  6. Chronic Afib   7. VTE not on AC due to bleeding previously   8. CAD  9. Chronic hypothyroidism    Plan:  - Noted with rapid afib this morning  - Start digoxin and monitor response   - Monitor hemodyanmics   - Off levophed  - Cont. Low dose midodrine   - Diuresis to aim for negative fluid balance  - HFNC support, titrate down as tolerated  - Cont. 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 levothyroxine  - Thyroid function panel  - Oral diet  - DNR/DNI code status  - Called wife Kirsten ([phone]) 11/16 updated about current clinical condition  - Transfer to AI service

## 2022-11-16 NOTE — DIETITIAN INITIAL EVALUATION ADULT - OTHER INFO
81 y/o M w/ pmh of htn, cad s/p cabg, severe cardiomyopathy (EF 20%), Afib (not on AC due to hematuria), bph, LUE DVT, b/l LLE DVT s/p IVC filter placement pt  presents to [facility] on 11/14/2022 for hypoxia and AMS, patient currently on high flow , no GI upset reported , Multiple skin injuries noted , Stage II on penis (R) lateral (R) foot , (L) knee abrasion . Patient known from previous admission , NFPE conducted patient with evidence of severe protein calorie malnutrition , muscle wasting & loss of body fat observed ,  last BM (11/15) Patient on high flow due to same patient may benefit from altered consistency diet suggest minced moist

## 2022-11-16 NOTE — DIETITIAN INITIAL EVALUATION ADULT - PERTINENT MEDS FT
MEDICATIONS  (STANDING):  aMIOdarone    Tablet 200 milliGRAM(s) Oral daily  aspirin  chewable 81 milliGRAM(s) Oral daily  cefepime   IVPB 1000 milliGRAM(s) IV Intermittent <User Schedule>  chlorhexidine 2% Cloths 1 Application(s) Topical daily  chlorhexidine 4% Liquid 1 Application(s) Topical <User Schedule>  digoxin  Injectable 250 MICROGram(s) IV Push every 6 hours  furosemide   Injectable 20 milliGRAM(s) IV Push once  heparin   Injectable 5000 Unit(s) SubCutaneous every 12 hours  levothyroxine 150 MICROGram(s) Oral daily  midodrine 5 milliGRAM(s) Oral every 8 hours    MEDICATIONS  (PRN):

## 2022-11-16 NOTE — DIETITIAN NUTRITION RISK NOTIFICATION - TREATMENT: THE FOLLOWING DIET HAS BEEN RECOMMENDED
Diet, DASH/TLC:   Sodium & Cholesterol Restricted  Supplement Feeding Modality:  Oral  Ensure Plus High Protein Cans or Servings Per Day:  1       Frequency:  Two Times a day (11-15-22 @ 10:50) [Active]

## 2022-11-16 NOTE — PROGRESS NOTE ADULT - ASSESSMENT
Assessment:  Gabriel De La Cruz is an 82 year old man with past medical history of Coronary artery disease (s/p CABG, recent angiogram with 2/3 occluded vein grafts in 10/2022, medically managed), HFrEF (LVEF 20% in 9/2022), Hypertension, Atrial fibrillation (not on anticoagulation due to hematuria), CVA and BPH with recent hospitalization here earlier this month for acute on chronic systolic heart failure, DVTs and IVC filter placement now sent in from living facility due to altered mental status and hypoxia, found to have acute hypoxic respiratory failure likely multifactorial from congestive heart failure and pneumonia also with shock, urosepsis, acute renal injury and lactic acidosis.     ECG consistent with atrial fibrillation and new anterior ST depressions. Troponin significantly elevated and has peaked, may be due to demand ischemia from respiratory failure and acute renal injury, however cannot rule out ACS in a patient with known chronic occluded veinous bypass grafts.      Recommendations:  [] Hypoxic respiratory failure: Titrate down high flow nasal cannula as tolerated. Increase IV diuresis as tolerated, now off of pressors and on Midodrine. Not candidate for dobutamine due to arrhythmia. IV antibiotics for possible pneumonia (procalcitonin elevated) and UTI per ICU team.   [] Acute on chronic systolic heart failure exacerbation: Repeat echo with LVEF 20% and severe MR, slightly worse than recent echo. Patient was not able to tolerate guideline directed medical therapy in last hospitalization due to hypotension. Hold beta blocker and other CHF meds due to low BP (patient on Midodrine). In regards to primary prevention ICD, patient was not amenable to this on prior hospitalization.   [] Atrial fibrillation: HR elevated, recommend IV Digoxin loading (renally dosed), not on anticoagulation due to history of recurrent hematuria   [] Elevated troponins: Peaked. Likely demand ischemia as per above, but cannot rule out ACS in a patient with 2/3 occluded bypass grafts that was deemed to be medically managed at [facility]. Continue medical management    The patient is critically ill. Discussed with Dr. Muro. Will sign out to cardiologist to follow along tomorrow.    Vignesh Wallace MD  Cardiology

## 2022-11-16 NOTE — DIETITIAN INITIAL EVALUATION ADULT - PERTINENT LABORATORY DATA
11-16    139  |  103  |  62<H>  ----------------------------<  99  4.3   |  21<L>  |  1.83<H>    Ca    8.5      16 Nov 2022 05:34  Phos  4.3     11-16  Mg     2.0     11-16    TPro  6.4  /  Alb  2.6<L>  /  TBili  0.6  /  DBili  x   /  AST  272<H>  /  ALT  383<H>  /  AlkPhos  219<H>  11-16  POCT Blood Glucose.: 128 mg/dL (11-16-22 @ 05:52)

## 2022-11-16 NOTE — DIETITIAN INITIAL EVALUATION ADULT - NS FNS DIET ORDER
Diet, DASH/TLC:   Sodium & Cholesterol Restricted  Supplement Feeding Modality:  Oral  Ensure Plus High Protein Cans or Servings Per Day:  1       Frequency:  Two Times a day (11-15-22 @ 10:50)

## 2022-11-16 NOTE — DIETITIAN INITIAL EVALUATION ADULT - NUTRITIONGOAL OUTCOME1
Patient to meet 75% of assessed needs during hospitalization via po diet with nutrition supplements

## 2022-11-16 NOTE — PROGRESS NOTE ADULT - SUBJECTIVE AND OBJECTIVE BOX
YUNIEL HARPALNATE  [id number]      Chief complaint: Congestive heart failure/Acute renal injury/Lactic acidosis/UTI/Possible pneumonia     Interval history: The patient is resting, currently receiving high flow nasal cannula.     Telemetry: atrial fibrillation 120s BPM      Current meds:   aMIOdarone    Tablet 200 milliGRAM(s) Oral daily  aspirin  chewable 81 milliGRAM(s) Oral daily  cefepime   IVPB 1000 milliGRAM(s) IV Intermittent <User Schedule>  chlorhexidine 2% Cloths 1 Application(s) Topical daily  chlorhexidine 4% Liquid 1 Application(s) Topical <User Schedule>  digoxin  Injectable 250 MICROGram(s) IV Push every 6 hours  furosemide   Injectable 20 milliGRAM(s) IV Push once  heparin   Injectable 5000 Unit(s) SubCutaneous every 12 hours  levothyroxine 150 MICROGram(s) Oral daily  midodrine 5 milliGRAM(s) Oral every 8 hours      Objective:     Vital Signs:   T(C): 36.7 (11-16-22 @ 04:00), Max: 37.1 (11-15-22 @ 16:00)  HR: 136 (11-16-22 @ 08:00) (109 - 140)  BP: 98/77 (11-16-22 @ 08:00) (80/53 - 110/66)  RR: 31 (11-16-22 @ 08:00) (12 - 37)  SpO2: 100% (11-16-22 @ 08:00) (97% - 100%)  Wt(kg): --    Physical Exam:   General: elderly man, frail, on high flow nasal cannula   Neck: supple  CVS: JVP difficult to assess 2/2 high flow collar, irregularly irregular, s1, s2  Pulm: labored respirations, coarse breath sounds   Abd: non-distended  Ext: no lower extremity edema b/l  Neuro: awake, alert       Labs:   16 Nov 2022 05:34    139    |  103    |  62     ----------------------------<  99     4.3     |  21     |  1.83     Ca    8.5        16 Nov 2022 05:34  Phos  4.3       16 Nov 2022 05:34  Mg     2.0       16 Nov 2022 05:34    TPro  6.4    /  Alb  2.6    /  TBili  0.6    /  DBili  x      /  AST  272    /  ALT  383    /  AlkPhos  219    16 Nov 2022 05:34                          7.9    18.04 )-----------( 275      ( 16 Nov 2022 05:34 )             24.5     PT/INR - ( 14 Nov 2022 19:45 )   PT: 17.6 sec;   INR: 1.51 ratio         PTT - ( 14 Nov 2022 19:45 )  PTT:29.0 sec            [facility] records obtained:    TTE (9/12/22):  LVEF 20%, minor regional variation  Normal RV size and severely reduced RV systolic function  Moderate MR, Mild AR   No pericardial effusion     Coronary angiogram (10/3/22):  LIMA-LAD: patent  SVG-LCx: occluded  SVG-RCA: occluded  Recommendations: Medical therapy, EP consult       TTE (10/18/22):  LVEF 20-25%; the inferolateral wall and inferior wall appear akinetic  Moderate septal LVH  Moderately reduced RV systolic function  Severe LA enlargement  Moderate MR, Mild TR, Moderate AR  Dilated proximal ascending aorta (4.4 cm).  Large pleural effusion in the left lateral region.  No pericardial effusion     CXR (11/14/22):  Possible early CHF.    ECG (11/14/22): atrial fibrillation, anterior ST depressions (new)    TTE (11/15/22):   1. Severely decreased segmental left ventricular systolic function.   2. Left ventricular ejection fraction, by visual estimation, is 20%.   3. Severe global left ventricle hypokinesis with regional variation: the   inferolateral wall, inferior wall and basal and mid inferoseptal wall   appear akinetic.   4. Mildly increased left ventricular internal cavity size.   5. The mitral in-flow pattern reveals no discernable A-wave, therefore   no comment on diastolic function can be made.   6. There is mild septal left ventricular hypertrophy.   7. Moderately enlarged right ventricle.   8. Moderately reduced RV systolic function.   9. Moderate to severe left atrial enlargement.  10. Right atrial enlargement.  11. Mild mitral annular calcification.  12. Mild thickening and calcification of the anterior and posterior   mitral valve leaflets.  13. Severe mitral valve regurgitation.  14. Mild-moderate tricuspid regurgitation.  15. Aortic valve leaflet calcification.  16. Sclerotic aortic valve with normal opening.  17. Moderate aortic regurgitation.  18. Mild pulmonic valve regurgitation.  19. Dilated proximal ascending aorta (3.8 cm).  20. Moderately dilated pulmonary artery.  21. Estimated pulmonary artery systolic pressure is 40.4 mmHg assuming a right atrial pressure of 15 mmHg, which is consistent with mild pulmonary hypertension.  22. There is no evidence of pericardial effusion.

## 2022-11-16 NOTE — PROGRESS NOTE ADULT - SUBJECTIVE AND OBJECTIVE BOX
Follow-up Critical Care Progress Note  Chief Complaint : Acute respiratory failure with hypoxia    No new events overnight.  Denies SOB/CP.     Allergies :PC Pen VK (Other)      PAST MEDICAL & SURGICAL HISTORY:  Chronic atrial fibrillation    History of cardiomyopathy    CAD (coronary artery disease)    BPH with obstruction/lower urinary tract symptoms    Hyperlipidemia    History of CVA (cerebrovascular accident)  left sided weakness    S/P CABG (coronary artery bypass graft)        Medications:  MEDICATIONS  (STANDING):  aMIOdarone    Tablet 200 milliGRAM(s) Oral daily  aspirin  chewable 81 milliGRAM(s) Oral daily  cefepime   IVPB 1000 milliGRAM(s) IV Intermittent <User Schedule>  chlorhexidine 2% Cloths 1 Application(s) Topical daily  chlorhexidine 4% Liquid 1 Application(s) Topical <User Schedule>  digoxin  Injectable 250 MICROGram(s) IV Push every 6 hours  furosemide   Injectable 20 milliGRAM(s) IV Push once  heparin   Injectable 5000 Unit(s) SubCutaneous every 12 hours  levothyroxine 150 MICROGram(s) Oral daily  midodrine 5 milliGRAM(s) Oral every 8 hours    MEDICATIONS  (PRN):      Antibiotics History  cefepime   IVPB 1000 milliGRAM(s) IV Intermittent every 24 hours, 11-15-22 @ 03:22  cefepime   IVPB 1000 milliGRAM(s) IV Intermittent <User Schedule>, 11-15-22 @ 04:33  cefepime   IVPB 1000 milliGRAM(s) IV Intermittent once, 22 @ 20:34, Stop order after: 1 Doses      Heme Medications   aspirin  chewable 81 milliGRAM(s) Oral daily, 11-15-22 @ 10:44  heparin   Injectable 5000 Unit(s) SubCutaneous every 12 hours, 11-15-22 @ 01:22      GI Medications        LABS:                        7.9    18.04 )-----------( 275      ( 2022 05:34 )             24.5     11-    139  |  103  |  62<H>  ----------------------------<  99  4.3   |  21<L>  |  1.83<H>    Ca    8.5      2022 05:34  Phos  4.3     11-16  Mg     2.0     -    TPro  6.4  /  Alb  2.6<L>  /  TBili  0.6  /  DBili  x   /  AST  272<H>  /  ALT  383<H>  /  AlkPhos  219<H>              PT/INR - ( 2022 19:45 )   PT: 17.6 sec;   INR: 1.51 ratio         PTT - ( 2022 19:45 )  PTT:29.0 sec  Urinalysis Basic - ( 15 Nov 2022 01:00 )    Color: Yellow / Appearance: very cloudy / S.025 / pH: x  Gluc: x / Ketone: Negative  / Bili: Small / Urobili: 4   Blood: x / Protein: 100 / Nitrite: Negative   Leuk Esterase: Moderate / RBC: 11-25 /HPF / WBC 26-50 /HPF   Sq Epi: x / Non Sq Epi: Neg.-Few / Bacteria: TNTC /HPF      Procalcitonin, Serum: 0.20 ng/mL (11-15-22 @ 06:00)    Serum Pro-Brain Natriuretic Peptide: >02115 pg/mL (11-15-22 @ 06:00)  Serum Pro-Brain Natriuretic Peptide: >34276 pg/mL (11-15-22 @ 01:50)        CULTURES: (if applicable)    Culture - Blood (collected 22 @ 19:45)  Source: .Blood Blood-Peripheral  Preliminary Report (22 @ 03:02):    No growth to date.    Culture - Blood (collected 22 @ 19:45)  Source: .Blood Blood-Peripheral  Preliminary Report (22 @ 03:02):    No growth to date.      Rapid RVP Result: NotDetec (22 @ 19:45)        CAPILLARY BLOOD GLUCOSE      POCT Blood Glucose.: 128 mg/dL (2022 05:52)      RADIOLOGY  CXR:      CT:    ECHO:      VITALS:  T(C): 36.7 (22 @ 04:00), Max: 37.1 (11-15-22 @ 16:00)  T(F): 98.1 (22 @ 04:00), Max: 98.7 (11-15-22 @ 16:00)  HR: 133 (22 @ 09:21) (109 - 140)  BP: 98/77 (22 @ 08:00) (80/53 - 110/66)  BP(mean): 83 (22 @ 08:00) (63 - 84)  ABP: --  ABP(mean): --  RR: 31 (22 @ 08:00) (12 - 37)  SpO2: 100% (22 @ 09:21) (97% - 100%)  CVP(mm Hg): --  CVP(cm H2O): --    Ins and Outs     11-15-22 @ 07:01  -  22 @ 07:00  --------------------------------------------------------  IN: 686.2 mL / OUT: 855 mL / NET: -168.8 mL        Height (cm): 182.9 (22 @ 17:39)  Weight (kg): 62.5 (11-15-22 @ 02:00)  BMI (kg/m2): 18.7 (11-15-22 @ 02:00)        I&O's Detail    15 Nov 2022 07:01  -  2022 07:00  --------------------------------------------------------  IN:    IV PiggyBack: 50 mL    Norepinephrine: 11.2 mL    Oral Fluid: 625 mL  Total IN: 686.2 mL    OUT:    Indwelling Catheter - Urethral (mL): 855 mL  Total OUT: 855 mL    Total NET: -168.8 mL

## 2022-11-16 NOTE — PROGRESS NOTE ADULT - ASSESSMENT
PLAN:    GOC --> DNR/I  -remains on HFNC, titarte as able for goal sat >90%  -diurese as toelrated, follow lytes  -finish course of cefepime  -PO midorine, actively wean for goal MAP 65-70  -DVt prophylaxis  -awaiting transfer to AI unit

## 2022-11-16 NOTE — PROGRESS NOTE ADULT - SUBJECTIVE AND OBJECTIVE BOX
F/U Note:    82y Male admitted with AECHF and EF 11%, required NIPPV    Interval Hx:  -remains on HFNC, but able to toelrate diet  -DNR/I, waiting for transfer to  unit    Vital Signs Last 24 Hrs  T(C): 36.9 (16 Nov 2022 12:00), Max: 37.1 (16 Nov 2022 00:00)  T(F): 98.4 (16 Nov 2022 12:00), Max: 98.7 (16 Nov 2022 00:00)  HR: 76 (16 Nov 2022 19:00) (76 - 140)  BP: 111/66 (16 Nov 2022 19:00) (86/64 - 117/90)  BP(mean): 80 (16 Nov 2022 19:00) (70 - 109)  RR: 22 (16 Nov 2022 19:00) (7 - 32)  SpO2: 90% (16 Nov 2022 19:00) (90% - 100%)    Parameters below as of 16 Nov 2022 19:00  Patient On (Oxygen Delivery Method): nasal cannula w/ humidification  O2 Flow (L/min): 40  O2 Concentration (%): 45                            7.9    18.04 )-----------( 275      ( 16 Nov 2022 05:34 )             24.5         11-16    139  |  103  |  62<H>  ----------------------------<  99  4.3   |  21<L>  |  1.83<H>    Ca    8.5      16 Nov 2022 05:34  Phos  4.3     11-16  Mg     2.0     11-16    TPro  6.4  /  Alb  2.6<L>  /  TBili  0.6  /  DBili  x   /  AST  272<H>  /  ALT  383<H>  /  AlkPhos  219<H>  11-16        NEURO: no headaches, blurry vision, tremors, depression, anxity  CV: no chest pain, palpitations, murmurs, orthopnea  Resp: no shortness of breath, cough, wheeze, sputum production  GI: no stomach pain,nausea, vomitting, flatulence, hematemesis, hematochezia  PV: no swelling of extremities, no hair loss, no coolness to extremities  ENDO: no polydypsia, polyphagia, polyuria, weight loss, night sweats          NEURO: awake and alert  CV: (+) S1/S2, rrr, no mrg  RESP: CTA b/l  GI: soft, non tender

## 2022-11-17 LAB
-  AMIKACIN: SIGNIFICANT CHANGE UP
-  AZTREONAM: SIGNIFICANT CHANGE UP
-  CEFEPIME: SIGNIFICANT CHANGE UP
-  CEFTAZIDIME: SIGNIFICANT CHANGE UP
-  CIPROFLOXACIN: SIGNIFICANT CHANGE UP
-  GENTAMICIN: SIGNIFICANT CHANGE UP
-  IMIPENEM: SIGNIFICANT CHANGE UP
-  LEVOFLOXACIN: SIGNIFICANT CHANGE UP
-  MEROPENEM: SIGNIFICANT CHANGE UP
-  PIPERACILLIN/TAZOBACTAM: SIGNIFICANT CHANGE UP
-  TOBRAMYCIN: SIGNIFICANT CHANGE UP
ALBUMIN SERPL ELPH-MCNC: 2.6 G/DL — LOW (ref 3.3–5)
ALP SERPL-CCNC: 219 U/L — HIGH (ref 40–120)
ALT FLD-CCNC: 310 U/L — HIGH (ref 10–45)
ANION GAP SERPL CALC-SCNC: 11 MMOL/L — SIGNIFICANT CHANGE UP (ref 5–17)
AST SERPL-CCNC: 157 U/L — HIGH (ref 10–40)
BILIRUB SERPL-MCNC: 0.5 MG/DL — SIGNIFICANT CHANGE UP (ref 0.2–1.2)
BUN SERPL-MCNC: 49 MG/DL — HIGH (ref 7–23)
CALCIUM SERPL-MCNC: 8.5 MG/DL — SIGNIFICANT CHANGE UP (ref 8.4–10.5)
CHLORIDE SERPL-SCNC: 106 MMOL/L — SIGNIFICANT CHANGE UP (ref 96–108)
CO2 SERPL-SCNC: 24 MMOL/L — SIGNIFICANT CHANGE UP (ref 22–31)
CREAT SERPL-MCNC: 1.43 MG/DL — HIGH (ref 0.5–1.3)
EGFR: 49 ML/MIN/1.73M2 — LOW
GLUCOSE SERPL-MCNC: 81 MG/DL — SIGNIFICANT CHANGE UP (ref 70–99)
HCT VFR BLD CALC: 26 % — LOW (ref 39–50)
HGB BLD-MCNC: 7.8 G/DL — LOW (ref 13–17)
MAGNESIUM SERPL-MCNC: 2.2 MG/DL — SIGNIFICANT CHANGE UP (ref 1.6–2.6)
MCHC RBC-ENTMCNC: 27 PG — SIGNIFICANT CHANGE UP (ref 27–34)
MCHC RBC-ENTMCNC: 30 GM/DL — LOW (ref 32–36)
MCV RBC AUTO: 90 FL — SIGNIFICANT CHANGE UP (ref 80–100)
METHOD TYPE: SIGNIFICANT CHANGE UP
NRBC # BLD: 0 /100 WBCS — SIGNIFICANT CHANGE UP (ref 0–0)
PHOSPHATE SERPL-MCNC: 2.9 MG/DL — SIGNIFICANT CHANGE UP (ref 2.5–4.5)
PLATELET # BLD AUTO: 251 K/UL — SIGNIFICANT CHANGE UP (ref 150–400)
POTASSIUM SERPL-MCNC: 3.6 MMOL/L — SIGNIFICANT CHANGE UP (ref 3.5–5.3)
POTASSIUM SERPL-SCNC: 3.6 MMOL/L — SIGNIFICANT CHANGE UP (ref 3.5–5.3)
PROT SERPL-MCNC: 6.1 G/DL — SIGNIFICANT CHANGE UP (ref 6–8.3)
RBC # BLD: 2.89 M/UL — LOW (ref 4.2–5.8)
RBC # FLD: 16.2 % — HIGH (ref 10.3–14.5)
SODIUM SERPL-SCNC: 141 MMOL/L — SIGNIFICANT CHANGE UP (ref 135–145)
WBC # BLD: 17.13 K/UL — HIGH (ref 3.8–10.5)
WBC # FLD AUTO: 17.13 K/UL — HIGH (ref 3.8–10.5)

## 2022-11-17 PROCEDURE — 99232 SBSQ HOSP IP/OBS MODERATE 35: CPT

## 2022-11-17 RX ADMIN — HEPARIN SODIUM 5000 UNIT(S): 5000 INJECTION INTRAVENOUS; SUBCUTANEOUS at 17:34

## 2022-11-17 RX ADMIN — Medication 20 MILLIGRAM(S): at 05:01

## 2022-11-17 RX ADMIN — AMIODARONE HYDROCHLORIDE 200 MILLIGRAM(S): 400 TABLET ORAL at 05:01

## 2022-11-17 RX ADMIN — HEPARIN SODIUM 5000 UNIT(S): 5000 INJECTION INTRAVENOUS; SUBCUTANEOUS at 05:01

## 2022-11-17 RX ADMIN — Medication 12.5 MILLIGRAM(S): at 17:34

## 2022-11-17 RX ADMIN — CEFEPIME 100 MILLIGRAM(S): 1 INJECTION, POWDER, FOR SOLUTION INTRAMUSCULAR; INTRAVENOUS at 21:24

## 2022-11-17 RX ADMIN — Medication 12.5 MILLIGRAM(S): at 05:01

## 2022-11-17 RX ADMIN — PANTOPRAZOLE SODIUM 40 MILLIGRAM(S): 20 TABLET, DELAYED RELEASE ORAL at 05:01

## 2022-11-17 RX ADMIN — MIDODRINE HYDROCHLORIDE 10 MILLIGRAM(S): 2.5 TABLET ORAL at 14:01

## 2022-11-17 RX ADMIN — Medication 81 MILLIGRAM(S): at 11:31

## 2022-11-17 RX ADMIN — Medication 150 MICROGRAM(S): at 05:01

## 2022-11-17 RX ADMIN — MIDODRINE HYDROCHLORIDE 10 MILLIGRAM(S): 2.5 TABLET ORAL at 05:01

## 2022-11-17 RX ADMIN — CEFEPIME 100 MILLIGRAM(S): 1 INJECTION, POWDER, FOR SOLUTION INTRAMUSCULAR; INTRAVENOUS at 10:11

## 2022-11-17 NOTE — PROGRESS NOTE ADULT - SUBJECTIVE AND OBJECTIVE BOX
F/U Note:    82y Male admitted with AECHF and EF 11%, required NIPPV    Interval Hx:  -off HRNC today  -now on AI unit  -remains DNR/DNI    Vital Signs Last 24 Hrs  T(C): 36.7 (17 Nov 2022 20:21), Max: 36.7 (17 Nov 2022 05:00)  T(F): 98.1 (17 Nov 2022 20:21), Max: 98.1 (17 Nov 2022 20:21)  HR: 85 (17 Nov 2022 20:21) (70 - 100)  BP: 102/67 (17 Nov 2022 20:21) (91/56 - 124/78)  BP(mean): 81 (17 Nov 2022 14:00) (60 - 92)  RR: 16 (17 Nov 2022 20:21) (10 - 37)  SpO2: 97% (17 Nov 2022 20:21) (88% - 100%)    Parameters below as of 17 Nov 2022 20:21  Patient On (Oxygen Delivery Method): room air                                7.8    17.13 )-----------( 251      ( 17 Nov 2022 06:38 )             26.0         11-17    141  |  106  |  49<H>  ----------------------------<  81  3.6   |  24  |  1.43<H>    Ca    8.5      17 Nov 2022 06:38  Phos  2.9     11-17  Mg     2.2     11-17    TPro  6.1  /  Alb  2.6<L>  /  TBili  0.5  /  DBili  x   /  AST  157<H>  /  ALT  310<H>  /  AlkPhos  219<H>  11-17        NEURO: no headaches, blurry vision, tremors, depression, anxity  CV: no chest pain, palpitations, murmurs, orthopnea  Resp: no shortness of breath, cough, wheeze, sputum production  GI: no stomach pain,nausea, vomitting, flatulence, hematemesis, hematochezia  PV: no swelling of extremities, no hair loss, no coolness to extremities  ENDO: no polydypsia, polyphagia, polyuria, weight loss, night sweats          NEURO: awake and alert  CV: (+) S1/S2, rrr, no mrg  RESP: CTA b/l  GI: soft, non tender

## 2022-11-17 NOTE — PROGRESS NOTE ADULT - SUBJECTIVE AND OBJECTIVE BOX
Follow up for af/chf  SUBJ: patient feelinf fairly well offers no complaints  PMH  Chronic atrial fibrillation    History of cardiomyopathy    CAD (coronary artery disease)    BPH with obstruction/lower urinary tract symptoms    Hyperlipidemia    History of CVA (cerebrovascular accident)        MEDICATIONS  (STANDING):  aMIOdarone    Tablet 200 milliGRAM(s) Oral daily  aspirin  chewable 81 milliGRAM(s) Oral daily  cefepime   IVPB 1000 milliGRAM(s) IV Intermittent <User Schedule>  chlorhexidine 2% Cloths 1 Application(s) Topical daily  furosemide   Injectable 20 milliGRAM(s) IV Push daily  heparin   Injectable 5000 Unit(s) SubCutaneous every 12 hours  levothyroxine 150 MICROGram(s) Oral daily  metoprolol tartrate 12.5 milliGRAM(s) Oral every 12 hours  midodrine 10 milliGRAM(s) Oral every 8 hours  pantoprazole    Tablet 40 milliGRAM(s) Oral before breakfast    MEDICATIONS  (PRN):        PHYSICAL EXAM:  Vital Signs Last 24 Hrs  T(C): 36.7 (17 Nov 2022 05:00), Max: 36.9 (16 Nov 2022 12:00)  T(F): 98 (17 Nov 2022 05:00), Max: 98.4 (16 Nov 2022 12:00)  HR: 80 (17 Nov 2022 10:00) (70 - 140)  BP: 112/67 (17 Nov 2022 10:00) (86/64 - 124/78)  BP(mean): 82 (17 Nov 2022 10:00) (60 - 109)  RR: 15 (17 Nov 2022 10:00) (7 - 34)  SpO2: 100% (17 Nov 2022 10:00) (88% - 100%)    Parameters below as of 17 Nov 2022 10:00  Patient On (Oxygen Delivery Method): room air        GENERAL: NAD, well-groomed, well-developed  HEAD:  Atraumatic, Normocephalic  EYES: EOMI, PERRLA, conjunctiva and sclera clear  ENT: Moist mucous membranes,  NECK: Supple, No JVD, no bruits  CHEST/LUNG: decreased bs  HEART: irregularly irregular  ABDOMEN: Soft, Nontender, Nondistended; Bowel sounds present  EXTREMITIES:  2+ Peripheral Pulses, No clubbing, cyanosis, or edema  SKIN: No rashes or lesions  NERVOUS SYSTEM:  Alert & Oriented X3, Good concentration; Motor Strength 5/5 B/L upper and lower extremities; DTRs 2+ intact and symmetric      TELEMETRY:af with moderate response    ECG:    LABS:                        7.8    17.13 )-----------( 251      ( 17 Nov 2022 06:38 )             26.0     11-17    141  |  106  |  49<H>  ----------------------------<  81  3.6   |  24  |  1.43<H>    Ca    8.5      17 Nov 2022 06:38  Phos  2.9     11-17  Mg     2.2     11-17    TPro  6.1  /  Alb  2.6<L>  /  TBili  0.5  /  DBili  x   /  AST  157<H>  /  ALT  310<H>  /  AlkPhos  219<H>  11-17            I&O's Summary    16 Nov 2022 07:01  -  17 Nov 2022 07:00  --------------------------------------------------------  IN: 600 mL / OUT: 1975 mL / NET: -1375 mL    17 Nov 2022 07:01  -  17 Nov 2022 11:23  --------------------------------------------------------  IN: 0 mL / OUT: 320 mL / NET: -320 mL      BNP    RADIOLOGY & ADDITIONAL STUDIES:    ECHO:

## 2022-11-17 NOTE — PROGRESS NOTE ADULT - SUBJECTIVE AND OBJECTIVE BOX
Follow-up Critical Care Progress Note  Chief Complaint : Acute respiratory failure with hypoxia      patient seen and examined  comfortable  on Room air  no cp, sob, palp, n/v  + BM today       Allergies :PC Pen VK (Other)      PAST MEDICAL & SURGICAL HISTORY:  Chronic atrial fibrillation    History of cardiomyopathy    CAD (coronary artery disease)    BPH with obstruction/lower urinary tract symptoms    Hyperlipidemia    History of CVA (cerebrovascular accident)  left sided weakness    S/P CABG (coronary artery bypass graft)        Medications:  MEDICATIONS  (STANDING):  aMIOdarone    Tablet 200 milliGRAM(s) Oral daily  aspirin  chewable 81 milliGRAM(s) Oral daily  cefepime   IVPB 1000 milliGRAM(s) IV Intermittent <User Schedule>  chlorhexidine 2% Cloths 1 Application(s) Topical daily  furosemide   Injectable 20 milliGRAM(s) IV Push daily  heparin   Injectable 5000 Unit(s) SubCutaneous every 12 hours  levothyroxine 150 MICROGram(s) Oral daily  metoprolol tartrate 12.5 milliGRAM(s) Oral every 12 hours  midodrine 10 milliGRAM(s) Oral every 8 hours  pantoprazole    Tablet 40 milliGRAM(s) Oral before breakfast    MEDICATIONS  (PRN):      Antibiotics History  cefepime   IVPB 1000 milliGRAM(s) IV Intermittent <User Schedule>, 11-15-22 @ 04:33  cefepime   IVPB 1000 milliGRAM(s) IV Intermittent every 24 hours, 11-15-22 @ 03:22  cefepime   IVPB 1000 milliGRAM(s) IV Intermittent once, 11-14-22 @ 20:34, Stop order after: 1 Doses      Heme Medications   aspirin  chewable 81 milliGRAM(s) Oral daily, 11-15-22 @ 10:44  heparin   Injectable 5000 Unit(s) SubCutaneous every 12 hours, 11-15-22 @ 01:22      GI Medications  pantoprazole    Tablet 40 milliGRAM(s) Oral before breakfast, 11-17-22 @ 00:00, Routine      COVID  11-14-22 @ 19:45  COVID -   NotDetec  11-07-22 @ 07:10  COVID -   NotDetec  10-31-22 @ 19:50  COVID -   NotDetec       Trend Cardiac Enzymes  11-16-22 @ 05:34  PJZ-KSOGY-XKYHV-CPKMM/Trop I - -- - --  - --  -  --  /  5676.3<H>  11-15-22 @ 06:00  FSW-UWIJA-ATCXT-CPKMM/Trop I - -- - --  - --  -  --  /  8739.0<H>  11-15-22 @ 01:50  PQL-CXOMY-TFGGW-CPKMM/Trop I - -- - --  - --  -  --  /  9605.6<H>    Trend BNP  11-15-22 @ 06:00   -  >85105<H>  11-15-22 @ 01:50   -  >77897<H>  10-17-22 @ 19:55   -  73041<H>    Procalcitonin Trend  11-15-22 @ 06:00   -   0.20<H>  10-17-22 @ 23:15   -   0.02    WBC Trend  11-17-22 @ 06:38   -  17.13<H>  11-16-22 @ 05:34   -  18.04<H>  11-15-22 @ 06:00   -  20.11<H>  11-14-22 @ 19:45   -  22.00<H>    H/H Trend  11-17-22 @ 06:38   -   7.8<L>/ 26.0<L>  11-16-22 @ 05:34   -   7.9<L>/ 24.5<L>  11-15-22 @ 06:00   -   7.4<L>/ 23.2<L>  11-14-22 @ 19:45   -   8.5<L>/ 27.9<L>  11-07-22 @ 07:33   -   8.1<L>/ 26.2<L>  11-06-22 @ 06:30   -   7.8<L>/ 24.9<L>     Platelet Trend  11-17-22 @ 06:38   -  251  11-16-22 @ 05:34   -  275  11-15-22 @ 06:00   -  190  11-14-22 @ 19:45   -  372    Trend Sodium  11-17-22 @ 06:38   -  141  11-16-22 @ 05:34   -  139  11-15-22 @ 06:00   -  137  11-15-22 @ 01:50   -  140  11-14-22 @ 19:45   -  139    Trend Potassium  11-17-22 @ 06:38   -  3.6  11-16-22 @ 05:34   -  4.3  11-15-22 @ 06:00   -  5.0  11-15-22 @ 01:50   -  4.5  11-14-22 @ 19:45   -  5.3    Trend Bun/Cr  11-17-22 @ 06:38  BUN/CR -  49<H> / 1.43<H>  11-16-22 @ 05:34  BUN/CR -  62<H> / 1.83<H>  11-15-22 @ 06:00  BUN/CR -  54<H> / 1.86<H>  11-15-22 @ 01:50  BUN/CR -  47<H> / 2.11<H>  11-14-22 @ 19:45  BUN/CR -  44<H> / 2.09<H>    Lactic Acid Trend  11-15-22 @ 06:00   -   1.6  11-15-22 @ 01:50   -   3.2<H>  11-14-22 @ 22:28   -   4.7<HH>  11-14-22 @ 19:45   -   7.8<HH>    ABG Trend    Trend AST/ALT/ALK Phos/Bili  11-17-22 @ 06:38   157<H>/310<H>/219<H>/0.5  11-16-22 @ 05:34   272<H>/383<H>/219<H>/0.6  11-15-22 @ 06:00   430<H>/375<H>/134<H>/0.9  11-15-22 @ 01:50   444<H>/371<H>/142<H>/0.9  11-14-22 @ 19:45   448<H>/366<H>/150<H>/0.9  11-06-22 @ 06:30   29/49<H>/116/0.3  11-02-22 @ 06:00   33/58<H>/130<H>/0.3  10-31-22 @ 06:50   34/68<H>/126<H>/0.3  10-27-22 @ 08:20   48<H>/81<H>/153<H>/0.3  10-26-22 @ 06:50   52<H>/83<H>/157<H>/0.3  10-21-22 @ 06:30   49<H>/91<H>/201<H>/0.4  10-20-22 @ 08:00   60<H>/112<H>/215<H>/0.4       Albumin Trend  11-17-22 @ 06:38   -   2.6<L>  11-16-22 @ 05:34   -   2.6<L>  11-15-22 @ 06:00   -   2.7<L>  11-15-22 @ 01:50   -   2.9<L>  11-14-22 @ 19:45   -   3.2<L>  11-06-22 @ 06:30   -   2.7<L>      PTT - PT - INR Trend  11-14-22 @ 19:45   -   29.0 - 17.6<H> - 1.51<H>  11-03-22 @ 07:53   -   29.6 - 13.9<H> - 1.20<H>  10-30-22 @ 07:53   -   55.7<H> - -- - --  10-29-22 @ 20:13   -   >200.0<HH> - -- - --  10-29-22 @ 06:33   -   36.1<H> - -- - --  10-29-22 @ 03:15   -   64.1<H> - -- - --    Glucose Trend  11-17-22 @ 06:38   -  -- 81 --  11-16-22 @ 17:10   -  -- -- 120<H>  11-16-22 @ 11:52   -  -- -- 127<H>  11-16-22 @ 05:52   -  -- -- 128<H>  11-16-22 @ 05:34   -  -- 99 --  11-15-22 @ 23:43   -  -- -- 128<H>  11-15-22 @ 18:08   -  -- -- 102<H>  11-15-22 @ 11:40   -  -- -- 87  11-15-22 @ 06:00   -  -- 88 --  11-15-22 @ 01:50   -  -- 85 --        LABS:                        7.8    17.13 )-----------( 251      ( 17 Nov 2022 06:38 )             26.0     11-17    141  |  106  |  49<H>  ----------------------------<  81  3.6   |  24  |  1.43<H>    Ca    8.5      17 Nov 2022 06:38  Phos  2.9     11-17  Mg     2.2     11-17    TPro  6.1  /  Alb  2.6<L>  /  TBili  0.5  /  DBili  x   /  AST  157<H>  /  ALT  310<H>  /  AlkPhos  219<H>  11-17     Procalcitonin, Serum: 0.20 ng/mL (11-15-22 @ 06:00)    Serum Pro-Brain Natriuretic Peptide: >47452 pg/mL (11-15-22 @ 06:00)  Serum Pro-Brain Natriuretic Peptide: >19728 pg/mL (11-15-22 @ 01:50)        CULTURES: (if applicable)    Culture - Urine (collected 11-15-22 @ 03:20)  Source: Clean Catch Clean Catch (Midstream)  Preliminary Report (11-16-22 @ 23:49):    >100,000 CFU/ml Pseudomonas aeruginosa    >100,000 CFU/ml Gram positive organisms    Culture - Blood (collected 11-14-22 @ 19:45)  Source: .Blood Blood-Peripheral  Preliminary Report (11-16-22 @ 03:02):    No growth to date.    Culture - Blood (collected 11-14-22 @ 19:45)  Source: .Blood Blood-Peripheral  Preliminary Report (11-16-22 @ 03:02):    No growth to date.      Rapid RVP Result: NotDetec (11-14-22 @ 19:45)       RADIOLOGY  CXR:   < from: Xray Chest 1 View-PORTABLE IMMEDIATE (11.14.22 @ 21:21) >    ACC: [id number] EXAM:  XR CHEST PORTABLE IMMED 1V                          PROCEDURE DATE:  11/14/2022          INTERPRETATION:  INDICATION: Difficulty breathing    Portable chest 8:49 PM    COMPARISON: 10/17/2022    Limited examination. Right lateral chest cut from film. Overlying EKG   leads and wires.    FINDINGS:  Heart/Vascular: The mediastinum, hilum and aorta are within normal limits   for projection. Status post median sternotomy and CABG. Moderate   atherosclerotic change. Heart borderline.  Pulmonary: Midline trachea. There is hazy opacity right greater than left   lung may represent early pulmonary venous congestion. No pneumothorax or   focal consolidation.  Bones: There is no fracture.  Lines and catheter: None    Impression:    Possible early CHF.    --- End of Report ---    < end of copied text >    VITALS:  T(C): 36.7 (11-17-22 @ 05:00), Max: 36.9 (11-16-22 @ 12:00)  T(F): 98 (11-17-22 @ 05:00), Max: 98.4 (11-16-22 @ 12:00)  HR: 78 (11-17-22 @ 08:00) (70 - 140)  BP: 99/62 (11-17-22 @ 08:00) (86/64 - 124/78)  BP(mean): 75 (11-17-22 @ 08:00) (60 - 109)  ABP: --  ABP(mean): --  RR: 11 (11-17-22 @ 08:00) (7 - 34)  SpO2: 95% (11-17-22 @ 08:00) (88% - 100%)  CVP(mm Hg): --  CVP(cm H2O): --    Ins and Outs     11-16-22 @ 07:01  -  11-17-22 @ 07:00  --------------------------------------------------------  IN: 600 mL / OUT: 1975 mL / NET: -1375 mL    11-17-22 @ 07:01  -  11-17-22 @ 10:20  --------------------------------------------------------  IN: 0 mL / OUT: 320 mL / NET: -320 mL        Height (cm): 182.9 (11-14-22 @ 17:39)  Weight (kg): 62.5 (11-15-22 @ 02:00)  BMI (kg/m2): 18.7 (11-15-22 @ 02:00)        I&O's Detail    16 Nov 2022 07:01  -  17 Nov 2022 07:00  --------------------------------------------------------  IN:    Oral Fluid: 600 mL  Total IN: 600 mL    OUT:    Indwelling Catheter - Urethral (mL): 1975 mL  Total OUT: 1975 mL    Total NET: -1375 mL      17 Nov 2022 07:01  -  17 Nov 2022 10:20  --------------------------------------------------------  IN:  Total IN: 0 mL    OUT:    Indwelling Catheter - Urethral (mL): 320 mL  Total OUT: 320 mL    Total NET: -320 mL

## 2022-11-17 NOTE — PROGRESS NOTE ADULT - ASSESSMENT
PLAN:    GOC --> DNR/I  -weaned to nasal canula today  -diurese as toelrated, follow lytes  -finish course of cefepime  -PO midorine, actively wean for goal MAP 65-70  -DVt prophylaxis  - on AI service now

## 2022-11-17 NOTE — PROGRESS NOTE ADULT - ASSESSMENT
IMP  CAD WITH CHF AND LV SYSTOLIC DYSFUNCTION      SUGGEST    REPEAT CXR  CONSIDER EITHER HEME/GI MALDONADO FOR ANEMIA OR TRANSFUSIOIN TO IMPROVE O2 CARRYING CAPACITY IN A PATIENT WITH CAD AND LV DYSFUNCTION AS WELL AS RELATIVE HYPOTENSION    NO ACE OR ARB DUE TO RENAL INSUFFICIENCY AT THIS TIME    LOW BP PRECLUDES NITRATES AND APRESOLINE AS WELL

## 2022-11-17 NOTE — PROGRESS NOTE ADULT - ASSESSMENT
Physical Examination:  GENERAL:               Awake, following commands, No acute distress.    HEENT:                    No JVD, Moist MM  PULM:                     Bilateral air entry, basilar Rales, No Rhonchi, No Wheezing  CVS:                         S1, S2,  +Murmur  ABD:                        Soft, nondistended, nontender, normoactive bowel sounds,   EXT:                         No edema, nontender, No Cyanosis or Clubbing   Vascular:                Cool Extremities, poor Capillary refill  SKIN:                       Cool + Right Lower Extremity ulcer   NEURO:                  Alert, oriented, interactive, nonfocal, follows commands  PSYC:                      Calm, No Insight.    Assessment:  83 y/o M w/ pmh of htn, cad s/p cabg, severe cardiomyopathy (EF 20%), Afib (not on AC due to hematuria), bph, LUE DVT, b/l LLE DVT s/p IVC filter placement pt was discharged to SNF now tonight presents to [facility] on 11/14/2022 for hypoxia and AMS, pt was started on NC with improvement in o2 and then became hypoxic again and was started on HFNC at 40L/40%, hypotensive suspected to be 2/2 to septic, HAN, transaminitis, Lactic acidosis, UA concerning for UTI and possible PNA. Pt is reported to be DNR/DNI is reported to have wanted everything else done. Pt seen and examined in the ED unable to participate in HPI due to agitation as he just keeps screaming for orange juice and not participate during interview.    1. Acute hypoxic respiratory failure  2. Heart failure with reduced EF  3. Septic shock  4. Lactic acidosis  5. UTI in patient with chronic dubois  6. Chronic Afib   7. VTE not on AC due to bleeding previously   8. CAD  9. Chronic hypothyroidism    Plan:  - Monitor hemodynamics   - off pressors and High flow today   - Continue Low dose midodrine   - Continue Diuresis to aim for negative fluid balance  - Cont. amiodarone and aspirin for now  - Monitor for further bleeding  - Follow up cultures  - Broad spectrum antibiotics  - Cardiology follow up   - Lactate normalized   - Aspiration precautions  - Continue levothyroxine  - Oral diet  - DNR/DNI code status  - Called cortez Curtis ([phone]) 11/15 updated about current clinical condition  - Can transfer to Tele today   - Consider Vascular evaluation for Right Lower Extremity wound

## 2022-11-18 LAB
ALBUMIN SERPL ELPH-MCNC: 2.5 G/DL — LOW (ref 3.3–5)
ALP SERPL-CCNC: 206 U/L — HIGH (ref 40–120)
ALT FLD-CCNC: 278 U/L — HIGH (ref 10–45)
ANION GAP SERPL CALC-SCNC: 6 MMOL/L — SIGNIFICANT CHANGE UP (ref 5–17)
AST SERPL-CCNC: 116 U/L — HIGH (ref 10–40)
BILIRUB SERPL-MCNC: 0.5 MG/DL — SIGNIFICANT CHANGE UP (ref 0.2–1.2)
BUN SERPL-MCNC: 33 MG/DL — HIGH (ref 7–23)
CALCIUM SERPL-MCNC: 8.4 MG/DL — SIGNIFICANT CHANGE UP (ref 8.4–10.5)
CHLORIDE SERPL-SCNC: 105 MMOL/L — SIGNIFICANT CHANGE UP (ref 96–108)
CO2 SERPL-SCNC: 29 MMOL/L — SIGNIFICANT CHANGE UP (ref 22–31)
CREAT SERPL-MCNC: 0.92 MG/DL — SIGNIFICANT CHANGE UP (ref 0.5–1.3)
EGFR: 82 ML/MIN/1.73M2 — SIGNIFICANT CHANGE UP
GLUCOSE SERPL-MCNC: 91 MG/DL — SIGNIFICANT CHANGE UP (ref 70–99)
HCT VFR BLD CALC: 26.6 % — LOW (ref 39–50)
HGB BLD-MCNC: 8 G/DL — LOW (ref 13–17)
MAGNESIUM SERPL-MCNC: 1.9 MG/DL — SIGNIFICANT CHANGE UP (ref 1.6–2.6)
MCHC RBC-ENTMCNC: 27.3 PG — SIGNIFICANT CHANGE UP (ref 27–34)
MCHC RBC-ENTMCNC: 30.1 GM/DL — LOW (ref 32–36)
MCV RBC AUTO: 90.8 FL — SIGNIFICANT CHANGE UP (ref 80–100)
NRBC # BLD: 0 /100 WBCS — SIGNIFICANT CHANGE UP (ref 0–0)
PHOSPHATE SERPL-MCNC: 2 MG/DL — LOW (ref 2.5–4.5)
PLATELET # BLD AUTO: 253 K/UL — SIGNIFICANT CHANGE UP (ref 150–400)
POTASSIUM SERPL-MCNC: 3.5 MMOL/L — SIGNIFICANT CHANGE UP (ref 3.5–5.3)
POTASSIUM SERPL-SCNC: 3.5 MMOL/L — SIGNIFICANT CHANGE UP (ref 3.5–5.3)
PROT SERPL-MCNC: 6.2 G/DL — SIGNIFICANT CHANGE UP (ref 6–8.3)
RBC # BLD: 2.93 M/UL — LOW (ref 4.2–5.8)
RBC # FLD: 16.2 % — HIGH (ref 10.3–14.5)
SODIUM SERPL-SCNC: 140 MMOL/L — SIGNIFICANT CHANGE UP (ref 135–145)
WBC # BLD: 17.23 K/UL — HIGH (ref 3.8–10.5)
WBC # FLD AUTO: 17.23 K/UL — HIGH (ref 3.8–10.5)

## 2022-11-18 PROCEDURE — 99233 SBSQ HOSP IP/OBS HIGH 50: CPT | Mod: FS

## 2022-11-18 PROCEDURE — 71045 X-RAY EXAM CHEST 1 VIEW: CPT | Mod: 26

## 2022-11-18 PROCEDURE — 99232 SBSQ HOSP IP/OBS MODERATE 35: CPT

## 2022-11-18 RX ADMIN — HEPARIN SODIUM 5000 UNIT(S): 5000 INJECTION INTRAVENOUS; SUBCUTANEOUS at 18:18

## 2022-11-18 RX ADMIN — MIDODRINE HYDROCHLORIDE 10 MILLIGRAM(S): 2.5 TABLET ORAL at 16:33

## 2022-11-18 RX ADMIN — Medication 81 MILLIGRAM(S): at 11:35

## 2022-11-18 RX ADMIN — Medication 20 MILLIGRAM(S): at 05:34

## 2022-11-18 RX ADMIN — MIDODRINE HYDROCHLORIDE 10 MILLIGRAM(S): 2.5 TABLET ORAL at 21:52

## 2022-11-18 RX ADMIN — CEFEPIME 100 MILLIGRAM(S): 1 INJECTION, POWDER, FOR SOLUTION INTRAMUSCULAR; INTRAVENOUS at 09:09

## 2022-11-18 RX ADMIN — Medication 12.5 MILLIGRAM(S): at 18:18

## 2022-11-18 RX ADMIN — CEFEPIME 100 MILLIGRAM(S): 1 INJECTION, POWDER, FOR SOLUTION INTRAMUSCULAR; INTRAVENOUS at 21:53

## 2022-11-18 NOTE — PROGRESS NOTE ADULT - ASSESSMENT
imp    patient with non revascularizable cad with severe lv systolic dysfunction.    could benefit from increase in hgb/hct    if bp tolerates could consider low dose ace

## 2022-11-18 NOTE — PHYSICAL THERAPY INITIAL EVALUATION ADULT - DISCHARGE PLANNER MADE AWARE
Peripheral nerve/Neuraxial procedure note : intrathecal  Pre-Procedure  Performed by  NELLY LLOYD   Location: OR    Procedure Times:8/9/2018 7:39 AM and 8/9/2018 7:43 AM  Pre-Anesthestic Checklist: patient identified, IV checked, site marked, risks and benefits discussed, informed consent, monitors and equipment checked, pre-op evaluation, at physician/surgeon's request and post-op pain management    Timeout  Correct Patient: Yes   Correct Procedure: Yes   Correct Site: Yes   Correct Laterality: Yes   Correct Position: Yes     .   Procedure Documentation  ASA 2  Diagnosis:Hysterectomy.    Procedure:    Intrathecal.  Insertion Site:L3-4  (midline approach)      Patient Prep;chlorhexidine gluconate and isopropyl alcohol.  .  Needle: Ryan tip Spinal Needle (gauge): 25  Spinal/LP Needle Length (inches): 3 # of attempts: 1 and # of redirects:  Introducer used Introducer: 20 G .       Assessment/Narrative  Paresthesias: No.  .  .  clear CSF fluid removed while sitting   . Time Injected: 07:43           yes

## 2022-11-18 NOTE — PROGRESS NOTE ADULT - ASSESSMENT
Assessment:  81 y/o M w/ pmh of htn, cad s/p cabg, severe cardiomyopathy (EF 20%), Afib (not on AC due to hematuria), bph, LUE DVT, b/l LLE DVT s/p IVC filter placement pt was discharged to SNF now tonight presents to [facility] on 11/14/2022 for hypoxia and AMS, pt was started on NC with improvement in O2 and then became hypoxic again and was started on HFNC at 40L/40%, hypotensive suspected to be 2/2 to septic, HAN, transaminitis, Lactic acidosis, UA concerning for UTI and possible PNA. Pt is reported to be DNR/DNI is reported to have wanted everything else done. Pt seen and examined in the ED unable to participate in HPI due to agitation as he just keeps screaming for orange juice and not participate during interview.    Today, 11/18/22 he is comfortable and improved.    #Acute hypoxic respiratory failure  -Improved, off high flow O2.   -CXR pending, f/u CXR    #Heart failure with reduced EF, CAD  -Cardio following, note appreciated.    #Chronic Afib   -VTE not on AC due to bleeding previously     # Septic shock  -resolved    # Lactic acidosis  -resolved    #UTI in patient with chronic dubois  -continue Cefepime    #Chronic hypothyroidism  -continue synthroid    #GOC  -DNR/DNI    #dispo  -PT consult    - Wife Kirsten ([phone]) was called 11/15 updated about current clinical condition     Assessment:  81 y/o M w/ pmh of htn, cad s/p cabg, severe cardiomyopathy (EF 20%), Afib (not on AC due to hematuria), bph, LUE DVT, b/l LLE DVT s/p IVC filter placement pt was discharged to SNF now tonight presents to [facility] on 11/14/2022 for hypoxia and AMS, pt was started on NC with improvement in O2 and then became hypoxic again and was started on HFNC at 40L/40%, hypotensive suspected to be 2/2 to septic, HAN, transaminitis, Lactic acidosis, UA concerning for UTI and possible PNA. Pt is reported to be DNR/DNI is reported to have wanted everything else done. Pt seen and examined in the ED unable to participate in HPI due to agitation as he just keeps screaming for orange juice and not participate during interview.    Today, 11/18/22 he is comfortable and improved.    #Acute hypoxic respiratory failure  -Improved, off high flow O2.   -CXR pending, f/u CXR    #Heart failure with reduced EF, CAD  -Cardio following, note appreciated.    #Chronic Afib   -VTE not on AC due to bleeding previously     # Septic shock  -resolved    # Lactic acidosis  -resolved    #UTI in patient with chronic dubois  -continue Cefepime    #Chronic hypothyroidism  -continue synthroid    #GOC  -DNR/DNI    #dispo  -PT consult-came from     - Wife Kirsten ([phone]) was called 11/15 updated about current clinical condition     Assessment:  83 y/o M w/ pmh of htn, cad s/p cabg, severe cardiomyopathy (EF 20%), Afib (not on AC due to hematuria), bph, LUE DVT, b/l LLE DVT s/p IVC filter placement pt was discharged to SNF now tonight presents to [facility] on 11/14/2022 for hypoxia and AMS, pt was started on NC with improvement in O2 and then became hypoxic again and was started on HFNC at 40L/40%, hypotensive suspected to be 2/2 to septic, HAN, transaminitis, Lactic acidosis, UA concerning for UTI and possible PNA. Pt is reported to be DNR/DNI is reported to have wanted everything else done. Pt seen and examined in the ED unable to participate in HPI due to agitation as he just keeps screaming for orange juice and not participate during interview.    Today, 11/18/22 he is comfortable and improved.    #Acute hypoxic respiratory failure  -Improved, off high flow O2.   -CXR pending, f/u CXR    #Heart failure with reduced EF, CAD  -Cardio following, note appreciated.    #Chronic Afib   -VTE not on AC due to bleeding previously     # Septic shock  -resolved    # Lactic acidosis  -resolved    #UTI in patient with chronic dubois  -continue Cefepime    #Chronic hypothyroidism  -continue synthroid    #GOC  -DNR/DNI    #RLE wound  -local wound care  -SALEEM studies ordered, f/u result    #dispo  -PT consult-came from [facility]    - Wife Kirsten ([phone]) was called 11/15 updated about current clinical condition

## 2022-11-18 NOTE — PROGRESS NOTE ADULT - SUBJECTIVE AND OBJECTIVE BOX
Patient is a 82y old  Male who presents with a chief complaint of acute hypoxic resp failure (17 Nov 2022 19:26)    Patient is an ICU downgrade, seen and examined at bedside, eating breakfast, comfortable and in NAD.       ALLERGIES:  PC Pen VK (Other)    MEDICATIONS  (STANDING):  aMIOdarone    Tablet 200 milliGRAM(s) Oral daily  aspirin  chewable 81 milliGRAM(s) Oral daily  cefepime   IVPB 1000 milliGRAM(s) IV Intermittent <User Schedule>  furosemide   Injectable 20 milliGRAM(s) IV Push daily  heparin   Injectable 5000 Unit(s) SubCutaneous every 12 hours  levothyroxine 150 MICROGram(s) Oral daily  metoprolol tartrate 12.5 milliGRAM(s) Oral every 12 hours  midodrine 10 milliGRAM(s) Oral every 8 hours  pantoprazole    Tablet 40 milliGRAM(s) Oral before breakfast    MEDICATIONS  (PRN):    Vital Signs Last 24 Hrs  T(F): 98.1 (17 Nov 2022 20:21), Max: 98.1 (17 Nov 2022 20:21)  HR: 85 (17 Nov 2022 20:21) (74 - 94)  BP: 102/67 (17 Nov 2022 20:21) (91/56 - 113/76)  RR: 16 (17 Nov 2022 20:21) (15 - 37)  SpO2: 97% (17 Nov 2022 20:21) (96% - 100%)  I&O's Summary    17 Nov 2022 07:01  -  18 Nov 2022 07:00  --------------------------------------------------------  IN: 0 mL / OUT: 1020 mL / NET: -1020 mL    18 Nov 2022 07:01  -  18 Nov 2022 10:54  --------------------------------------------------------  IN: 120 mL / OUT: 0 mL / NET: 120 mL        PHYSICAL EXAM:  GENERAL: NAD, well-groomed, well-developed  HEAD:  Atraumatic, Normocephalic  EYES: EOMI, conjunctiva and sclera clear  ENMT: Moist mucous membranes, Good dentition, no thrush  NECK: Supple, No JVD  CHEST/LUNG: Clear to auscultation bilaterally, good air entry, non-labored breathing  HEART: RRR; S1/S2, No murmur  ABDOMEN: Soft, Nontender, Nondistended; Bowel sounds present  VASCULAR: Normal pulses, Normal capillary refill  EXTREMITIES: BLLE in protective boots, R foot with Allevyn dressing on wound.   LYMPH: Normal; No lymphadenopathy noted  SKIN: Warm, Intact  PSYCH: Normal mood, Normal affect  NERVOUS SYSTEM:  No focal deficits    LABS:                        8.0    17.23 )-----------( 253      ( 18 Nov 2022 06:00 )             26.6     11-18    140  |  105  |  33  ----------------------------<  91  3.5   |  29  |  0.92    Ca    8.4      18 Nov 2022 06:00  Phos  2.0     11-18  Mg     1.9     11-18    TPro  6.2  /  Alb  2.5  /  TBili  0.5  /  DBili  x   /  AST  116  /  ALT  278  /  AlkPhos  206  11-18          CARDIAC MARKERS ( 16 Nov 2022 05:34 )  x     / 5676.3 ng/L / x     / x     / x          11-06 Chol 99 mg/dL LDL -- HDL 31 mg/dL Trig 81 mg/dL  TSH --   TSH with FT4 reflex --  Total T3 29    Culture - Urine (collected 15 Nov 2022 03:20)  Source: Clean Catch Clean Catch (Midstream)  Preliminary Report (16 Nov 2022 23:49):    >100,000 CFU/ml Pseudomonas aeruginosa    >100,000 CFU/ml Gram positive organisms  Organism: Pseudomonas aeruginosa (17 Nov 2022 17:21)  Organism: Pseudomonas aeruginosa (17 Nov 2022 17:21)      -  Amikacin: S <=16      -  Aztreonam: S 8      -  Cefepime: S 8      -  Ceftazidime: S 8      -  Ciprofloxacin: R >2      -  Gentamicin: R >8      -  Imipenem: S <=1      -  Levofloxacin: R >4      -  Meropenem: S <=1      -  Piperacillin/Tazobactam: S <=8      -  Tobramycin: I 8      Method Type: ARIE    Culture - Blood (collected 14 Nov 2022 19:45)  Source: .Blood Blood-Peripheral  Preliminary Report (16 Nov 2022 03:02):    No growth to date.    Culture - Blood (collected 14 Nov 2022 19:45)  Source: .Blood Blood-Peripheral  Preliminary Report (16 Nov 2022 03:02):    No growth to date.      COVID-19 PCR: NotDetec (11-07-22 @ 07:10)  COVID-19 PCR: NotDetec (10-31-22 @ 19:50)      RADIOLOGY & ADDITIONAL TESTS:    Care Discussed with Consultants/Other Providers:

## 2022-11-18 NOTE — PROGRESS NOTE ADULT - SUBJECTIVE AND OBJECTIVE BOX
Follow up for cardiac issues  SUBJ: patient comfortable offers no complaints  PMH  Chronic atrial fibrillation    History of cardiomyopathy    CAD (coronary artery disease)    BPH with obstruction/lower urinary tract symptoms    Hyperlipidemia    History of CVA (cerebrovascular accident)        MEDICATIONS  (STANDING):  aMIOdarone    Tablet 200 milliGRAM(s) Oral daily  aspirin  chewable 81 milliGRAM(s) Oral daily  cefepime   IVPB 1000 milliGRAM(s) IV Intermittent <User Schedule>  furosemide   Injectable 20 milliGRAM(s) IV Push daily  heparin   Injectable 5000 Unit(s) SubCutaneous every 12 hours  levothyroxine 150 MICROGram(s) Oral daily  metoprolol tartrate 12.5 milliGRAM(s) Oral every 12 hours  midodrine 10 milliGRAM(s) Oral every 8 hours  pantoprazole    Tablet 40 milliGRAM(s) Oral before breakfast    MEDICATIONS  (PRN):        PHYSICAL EXAM:  Vital Signs Last 24 Hrs  T(C): 36.7 (17 Nov 2022 20:21), Max: 36.7 (17 Nov 2022 17:11)  T(F): 98.1 (17 Nov 2022 20:21), Max: 98.1 (17 Nov 2022 20:21)  HR: 85 (17 Nov 2022 20:21) (75 - 87)  BP: 102/67 (17 Nov 2022 20:21) (102/67 - 113/61)  BP(mean): 81 (17 Nov 2022 14:00) (74 - 81)  RR: 16 (17 Nov 2022 20:21) (15 - 24)  SpO2: 97% (17 Nov 2022 20:21) (97% - 99%)    Parameters below as of 17 Nov 2022 23:17  Patient On (Oxygen Delivery Method): room air        GENERAL: NAD, well-groomed, well-developed  HEAD:  Atraumatic, Normocephalic  EYES: EOMI, PERRLA, conjunctiva and sclera clear  ENT: Moist mucous membranes,  NECK: Supple, No JVD, no bruits  CHEST/LUNG: Clear to percussion bilaterally; No rales, rhonchi, wheezing, or rubs  HEART: irregularly irregular  ABDOMEN: Soft, Nontender, Nondistended; Bowel sounds present  EXTREMITIES:  2+ Peripheral Pulses, No clubbing, cyanosis, or edema  SKIN: No rashes or lesions  NERVOUS SYSTEM:  Alert & Oriented X3, Good concentration; Motor Strength 5/5 B/L upper and lower extremities; DTRs 2+ intact and symmetric      TELEMETRY:af rate approx 100        LABS:                        8.0    17.23 )-----------( 253      ( 18 Nov 2022 06:00 )             26.6     11-18    140  |  105  |  33<H>  ----------------------------<  91  3.5   |  29  |  0.92    Ca    8.4      18 Nov 2022 06:00  Phos  2.0     11-18  Mg     1.9     11-18    TPro  6.2  /  Alb  2.5<L>  /  TBili  0.5  /  DBili  x   /  AST  116<H>  /  ALT  278<H>  /  AlkPhos  206<H>  11-18            I&O's Summary    17 Nov 2022 07:01  -  18 Nov 2022 07:00  --------------------------------------------------------  IN: 0 mL / OUT: 1020 mL / NET: -1020 mL    cxr-clear    18 Nov 2022 07:01  -  18 Nov 2022 12:47  --------------------------------------------------------  IN: 120 mL / OUT: 0 mL / NET: 120 mL

## 2022-11-18 NOTE — PROGRESS NOTE ADULT - ASSESSMENT
Physical Examination:  GENERAL:               Awake, following commands, No acute distress.    HEENT:                    No JVD, Moist MM  PULM:                     Bilateral air entry, basilar Rales, No Rhonchi, No Wheezing  CVS:                         S1, S2,  +Murmur  ABD:                        Soft, nondistended, nontender, normoactive bowel sounds,   EXT:                         No edema, nontender, No Cyanosis or Clubbing   Vascular:                Cool Extremities, poor Capillary refill  SKIN:                       Cool + Right Lower Extremity ulcer   NEURO:                  Alert, oriented, interactive, nonfocal, follows commands  PSYC:                      Calm, No Insight.    Assessment:  81 y/o M w/ pmh of htn, cad s/p cabg, severe cardiomyopathy (EF 20%), Afib (not on AC due to hematuria), bph, LUE DVT, b/l LLE DVT s/p IVC filter placement pt was discharged to SNF now tonight presents to [facility] on 11/14/2022 for hypoxia and AMS, pt was started on NC with improvement in o2 and then became hypoxic again and was started on HFNC at 40L/40%, hypotensive suspected to be 2/2 to septic, HAN, transaminitis, Lactic acidosis, UA concerning for UTI and possible PNA. Pt is reported to be DNR/DNI is reported to have wanted everything else done. Pt seen and examined in the ED unable to participate in HPI due to agitation as he just keeps screaming for orange juice and not participate during interview.    1. Acute hypoxic respiratory failure - Resolved  2. Heart failure with reduced EF  3. Septic shock - Resolved  4. Lactic acidosis  5. UTI in patient with chronic Starks   6. Chronic Afib   7. VTE not on AC due to bleeding previously   8. CAD  9. Chronic hypothyroidism      Plan:  - Monitor hemodynamics   - Continue to monitor on RA  - On Cefepime for pseudomonas UTI continue for 5 day course.   - Continue Low dose midodrine   - Continue Diuresis to aim for negative fluid balance  - Cont. amiodarone and aspirin for now  - Monitor for further bleeding  - Follow up cultures  - Broad spectrum antibiotics  - Cardiology follow up   - Lactate normalized   - Aspiration precautions  - Continue levothyroxine  - Oral diet  - DNR/DNI code status  - Case d/w Medical floor today , doing well, continue care on medical floor

## 2022-11-18 NOTE — PROGRESS NOTE ADULT - SUBJECTIVE AND OBJECTIVE BOX
F/U Note:    82y Male admitted with AECHF and EF 11%, required NIPPV    Interval Hx:  -remains OFF HFNC    Vital Signs Last 24 Hrs  T(C): 36.7 (18 Nov 2022 13:08), Max: 36.7 (18 Nov 2022 13:08)  T(F): 98 (18 Nov 2022 13:08), Max: 98 (18 Nov 2022 13:08)  HR: 85 (18 Nov 2022 13:08) (85 - 85)  BP: 106/72 (18 Nov 2022 13:08) (106/72 - 106/72)  BP(mean): --  RR: 16 (18 Nov 2022 13:08) (16 - 16)  SpO2: 98% (18 Nov 2022 13:08) (98% - 98%)    Parameters below as of 18 Nov 2022 13:08  Patient On (Oxygen Delivery Method): room air                                8.0    17.23 )-----------( 253      ( 18 Nov 2022 06:00 )             26.6         11-18    140  |  105  |  33<H>  ----------------------------<  91  3.5   |  29  |  0.92    Ca    8.4      18 Nov 2022 06:00  Phos  2.0     11-18  Mg     1.9     11-18    TPro  6.2  /  Alb  2.5<L>  /  TBili  0.5  /  DBili  x   /  AST  116<H>  /  ALT  278<H>  /  AlkPhos  206<H>  11-18          NEURO: no headaches, blurry vision, tremors, depression, anxity  CV: no chest pain, palpitations, murmurs, orthopnea  Resp: no shortness of breath, cough, wheeze, sputum production  GI: no stomach pain,nausea, vomitting, flatulence, hematemesis, hematochezia  PV: no swelling of extremities, no hair loss, no coolness to extremities  ENDO: no polydypsia, polyphagia, polyuria, weight loss, night sweats          NEURO: awake and alert  CV: (+) S1/S2, rrr, no mrg  RESP: CTA b/l  GI: soft, non tender

## 2022-11-18 NOTE — PHYSICAL THERAPY INITIAL EVALUATION ADULT - PERTINENT HX OF CURRENT PROBLEM, REHAB EVAL
HPI: 81 y/o M w/ pmh of htn, cad s/p cabg, severe cardiomyopathy (EF 20%), Afib (not on AC due to hematuria), bph, LUE DVT, b/l LLE DVT s/p IVC filter placement pt was [facility] and AMS

## 2022-11-18 NOTE — PROGRESS NOTE ADULT - ASSESSMENT
PLAN:    GOC --> DNR/I  -weaned to nasal canula today  -diurese as toelrated, follow lytes  -finish course of cefepime  -PO midorine, actively wean for goal MAP 65-70  -DVt prophylaxis  - on AI service now     80yo F PMHx DM type 2, Anxiety/Depression, GERD, and chronic back/leg pain presents with weakness. Pt is Croatian speaking with son at bedside who she lives with to help with translation. Son reports that pt woke up this morning confused and confabulated. It continued into the day. Later on she was sitting on the floor and unable to get up even with help. Pt did not fall. Family tried to help her. They were initially concerned that she was hypoglycemic, but she refused candy and juice. Family could not get her off floor thus they called EMS. Pt has no history of stroke, MI, or CHF. Pt has returned to baseline since then, no longer confused or confabulating. Pt denies chest pain, sob, sweating, abdominal pain, nausea, diarrhea, urinary symptoms, leg edema.     VS: T 98.1 HR 76 /73 RR 16 SpO2 98% on RA  Labs: Hgb 11.3 Trop 0.076  UA borderline   CXR: unremarkable  CT head: no acute findings  EKG:    In ED received 1L NS 78yo F PMHx DM type 2, Anxiety/Depression, GERD, and chronic back/leg pain presents with weakness. Pt is Icelandic speaking with son at bedside who she lives with to help with translation. Son reports that pt woke up this morning confused and confabulated. It continued into the day. Later on she was sitting on the floor and unable to get up even with help. Pt did not fall. Family tried to help her. They were initially concerned that she was hypoglycemic, but she refused candy and juice. Family could not get her off floor thus they called EMS. Pt has no history of stroke, MI, or CHF. Pt has returned to baseline since then, no longer confused or confabulating. Pt denies chest pain, sob, sweating, abdominal pain, nausea, diarrhea, urinary symptoms, leg edema.     VS: T 98.1 HR 76 /73 RR 16 SpO2 98% on RA  Labs: Hgb 11.3 Trop 0.076  UA borderline   CXR: unremarkable  CT head: no acute findings  EKG: NSR    In ED received 1L NS 78yo F PMHx DM type 2, Anxiety/Depression, GERD, and chronic back/leg pain presents with weakness. Pt is Lao speaking with son at bedside who she lives with to help with translation. Son reports that pt woke up this morning confused and confabulated. It continued into the day. Later on she was sitting on the floor and unable to get up even with help. Pt did not fall. Family tried to help her. They were initially concerned that she was hypoglycemic, but she refused candy and juice. Family could not get her off floor thus they called EMS. Pt has no history of stroke, MI, or CHF. Pt has returned to baseline since then, no longer confused or confabulating. Pt denies chest pain, sob, sweating, abdominal pain, nausea, diarrhea, urinary symptoms, leg edema. She complains of headache as well as dizziness with standing up. Denies syncope.    VS: T 98.1 HR 76 /73 RR 16 SpO2 98% on RA  Labs: Hgb 11.3 Trop 0.076  UA borderline   CXR: unremarkable  CT head: no acute findings  EKG: NSR    In ED received 1L NS

## 2022-11-18 NOTE — PHYSICAL THERAPY INITIAL EVALUATION ADULT - ADDITIONAL COMMENTS
pt poor informant, hx per chart and previous PT tx, pt from [facility], was non ambulatory but able to transfer w/assist, requires assist for most ADL's

## 2022-11-18 NOTE — PHARMACOTHERAPY INTERVENTION NOTE - COMMENTS
Modified penicillin allergy to state patient tolerated cefepime during this admission.    Allen Abarca, PharmD  Clinical Pharmacy Specialist, Infectious Diseases  Tele-Antimicrobial Stewardship Program (Tele-ASP)  Tele-ASP Phone: [phone]

## 2022-11-18 NOTE — PROGRESS NOTE ADULT - SUBJECTIVE AND OBJECTIVE BOX
Follow-up Pulmonary Progress Note  Chief Complaint : Acute respiratory failure with hypoxia      pt on Room air  no cp, sob, palp, n/v, cough  asking for coffee     Allergies :PC Pen VK (Other)      PAST MEDICAL & SURGICAL HISTORY:  Chronic atrial fibrillation    History of cardiomyopathy    CAD (coronary artery disease)    BPH with obstruction/lower urinary tract symptoms    Hyperlipidemia    History of CVA (cerebrovascular accident)  left sided weakness    S/P CABG (coronary artery bypass graft)        Medications:  MEDICATIONS  (STANDING):  aMIOdarone    Tablet 200 milliGRAM(s) Oral daily  aspirin  chewable 81 milliGRAM(s) Oral daily  cefepime   IVPB 1000 milliGRAM(s) IV Intermittent <User Schedule>  furosemide   Injectable 20 milliGRAM(s) IV Push daily  heparin   Injectable 5000 Unit(s) SubCutaneous every 12 hours  levothyroxine 150 MICROGram(s) Oral daily  metoprolol tartrate 12.5 milliGRAM(s) Oral every 12 hours  midodrine 10 milliGRAM(s) Oral every 8 hours  pantoprazole    Tablet 40 milliGRAM(s) Oral before breakfast    MEDICATIONS  (PRN):      Antibiotics History  cefepime   IVPB 1000 milliGRAM(s) IV Intermittent every 24 hours, 11-15-22 @ 03:22  cefepime   IVPB 1000 milliGRAM(s) IV Intermittent <User Schedule>, 11-15-22 @ 04:33  cefepime   IVPB 1000 milliGRAM(s) IV Intermittent once, 11-14-22 @ 20:34, Stop order after: 1 Doses      Heme Medications   aspirin  chewable 81 milliGRAM(s) Oral daily, 11-15-22 @ 10:44  heparin   Injectable 5000 Unit(s) SubCutaneous every 12 hours, 11-15-22 @ 01:22      GI Medications  pantoprazole    Tablet 40 milliGRAM(s) Oral before breakfast, 11-17-22 @ 00:00, Routine        LABS:                        8.0    17.23 )-----------( 253      ( 18 Nov 2022 06:00 )             26.6     11-18    140  |  105  |  33<H>  ----------------------------<  91  3.5   |  29  |  0.92    Ca    8.4      18 Nov 2022 06:00  Phos  2.0     11-18  Mg     1.9     11-18    TPro  6.2  /  Alb  2.5<L>  /  TBili  0.5  /  DBili  x   /  AST  116<H>  /  ALT  278<H>  /  AlkPhos  206<H>  11-18         CULTURES: (if applicable)    Culture - Urine (collected 11-15-22 @ 03:20)  Source: Clean Catch Clean Catch (Midstream)  Preliminary Report (11-16-22 @ 23:49):    >100,000 CFU/ml Pseudomonas aeruginosa    >100,000 CFU/ml Gram positive organisms  Organism: Pseudomonas aeruginosa (11-17-22 @ 17:21)  Organism: Pseudomonas aeruginosa (11-17-22 @ 17:21)      -  Amikacin: S <=16      -  Aztreonam: S 8      -  Cefepime: S 8      -  Ceftazidime: S 8      -  Ciprofloxacin: R >2      -  Gentamicin: R >8      -  Imipenem: S <=1      -  Levofloxacin: R >4      -  Meropenem: S <=1      -  Piperacillin/Tazobactam: S <=8      -  Tobramycin: I 8      Method Type: ARIE    Culture - Blood (collected 11-14-22 @ 19:45)  Source: .Blood Blood-Peripheral  Preliminary Report (11-16-22 @ 03:02):    No growth to date.    Culture - Blood (collected 11-14-22 @ 19:45)  Source: .Blood Blood-Peripheral  Preliminary Report (11-16-22 @ 03:02):    No growth to date.      Rapid RVP Result: NotDetec (11-14-22 @ 19:45)        CAPILLARY BLOOD GLUCOSE         VITALS:  T(C): 36.7 (11-17-22 @ 20:21), Max: 36.7 (11-17-22 @ 17:11)  T(F): 98.1 (11-17-22 @ 20:21), Max: 98.1 (11-17-22 @ 20:21)  HR: 85 (11-17-22 @ 20:21) (75 - 87)  BP: 102/67 (11-17-22 @ 20:21) (102/67 - 113/61)  BP(mean): 81 (11-17-22 @ 14:00) (74 - 81)  ABP: --  ABP(mean): --  RR: 16 (11-17-22 @ 20:21) (15 - 24)  SpO2: 97% (11-17-22 @ 20:21) (97% - 99%)  CVP(mm Hg): --  CVP(cm H2O): --    Ins and Outs     11-17-22 @ 07:01  -  11-18-22 @ 07:00  --------------------------------------------------------  IN: 0 mL / OUT: 1020 mL / NET: -1020 mL    11-18-22 @ 07:01  -  11-18-22 @ 12:46  --------------------------------------------------------  IN: 120 mL / OUT: 0 mL / NET: 120 mL                I&O's Detail    17 Nov 2022 07:01  -  18 Nov 2022 07:00  --------------------------------------------------------  IN:  Total IN: 0 mL    OUT:    Indwelling Catheter - Urethral (mL): 1020 mL  Total OUT: 1020 mL    Total NET: -1020 mL      18 Nov 2022 07:01  -  18 Nov 2022 12:46  --------------------------------------------------------  IN:    Oral Fluid: 120 mL  Total IN: 120 mL    OUT:  Total OUT: 0 mL    Total NET: 120 mL

## 2022-11-19 LAB
-  AMPICILLIN: SIGNIFICANT CHANGE UP
-  CIPROFLOXACIN: SIGNIFICANT CHANGE UP
-  LEVOFLOXACIN: SIGNIFICANT CHANGE UP
-  NITROFURANTOIN: SIGNIFICANT CHANGE UP
-  TETRACYCLINE: SIGNIFICANT CHANGE UP
-  VANCOMYCIN: SIGNIFICANT CHANGE UP
ANION GAP SERPL CALC-SCNC: 8 MMOL/L — SIGNIFICANT CHANGE UP (ref 5–17)
BUN SERPL-MCNC: 27 MG/DL — HIGH (ref 7–23)
CALCIUM SERPL-MCNC: 8.9 MG/DL — SIGNIFICANT CHANGE UP (ref 8.4–10.5)
CHLORIDE SERPL-SCNC: 105 MMOL/L — SIGNIFICANT CHANGE UP (ref 96–108)
CO2 SERPL-SCNC: 28 MMOL/L — SIGNIFICANT CHANGE UP (ref 22–31)
CREAT SERPL-MCNC: 1.04 MG/DL — SIGNIFICANT CHANGE UP (ref 0.5–1.3)
CULTURE RESULTS: SIGNIFICANT CHANGE UP
EGFR: 71 ML/MIN/1.73M2 — SIGNIFICANT CHANGE UP
GLUCOSE SERPL-MCNC: 98 MG/DL — SIGNIFICANT CHANGE UP (ref 70–99)
HCT VFR BLD CALC: 28.9 % — LOW (ref 39–50)
HGB BLD-MCNC: 8.7 G/DL — LOW (ref 13–17)
MCHC RBC-ENTMCNC: 27.1 PG — SIGNIFICANT CHANGE UP (ref 27–34)
MCHC RBC-ENTMCNC: 30.1 GM/DL — LOW (ref 32–36)
MCV RBC AUTO: 90 FL — SIGNIFICANT CHANGE UP (ref 80–100)
METHOD TYPE: SIGNIFICANT CHANGE UP
NRBC # BLD: 0 /100 WBCS — SIGNIFICANT CHANGE UP (ref 0–0)
ORGANISM # SPEC MICROSCOPIC CNT: SIGNIFICANT CHANGE UP
PLATELET # BLD AUTO: 290 K/UL — SIGNIFICANT CHANGE UP (ref 150–400)
POTASSIUM SERPL-MCNC: 3.9 MMOL/L — SIGNIFICANT CHANGE UP (ref 3.5–5.3)
POTASSIUM SERPL-SCNC: 3.9 MMOL/L — SIGNIFICANT CHANGE UP (ref 3.5–5.3)
RBC # BLD: 3.21 M/UL — LOW (ref 4.2–5.8)
RBC # FLD: 16.2 % — HIGH (ref 10.3–14.5)
SODIUM SERPL-SCNC: 141 MMOL/L — SIGNIFICANT CHANGE UP (ref 135–145)
SPECIMEN SOURCE: SIGNIFICANT CHANGE UP
WBC # BLD: 20.04 K/UL — HIGH (ref 3.8–10.5)
WBC # FLD AUTO: 20.04 K/UL — HIGH (ref 3.8–10.5)

## 2022-11-19 PROCEDURE — 99233 SBSQ HOSP IP/OBS HIGH 50: CPT

## 2022-11-19 PROCEDURE — 99232 SBSQ HOSP IP/OBS MODERATE 35: CPT

## 2022-11-19 RX ORDER — FUROSEMIDE 40 MG
20 TABLET ORAL DAILY
Refills: 0 | Status: DISCONTINUED | OUTPATIENT
Start: 2022-11-20 | End: 2022-11-22

## 2022-11-19 RX ORDER — LISINOPRIL 2.5 MG/1
2.5 TABLET ORAL DAILY
Refills: 0 | Status: DISCONTINUED | OUTPATIENT
Start: 2022-11-19 | End: 2022-11-19

## 2022-11-19 RX ORDER — SACUBITRIL AND VALSARTAN 24; 26 MG/1; MG/1
1 TABLET, FILM COATED ORAL
Refills: 0 | Status: DISCONTINUED | OUTPATIENT
Start: 2022-11-19 | End: 2022-11-22

## 2022-11-19 RX ADMIN — MIDODRINE HYDROCHLORIDE 10 MILLIGRAM(S): 2.5 TABLET ORAL at 05:39

## 2022-11-19 RX ADMIN — Medication 20 MILLIGRAM(S): at 05:39

## 2022-11-19 RX ADMIN — HEPARIN SODIUM 5000 UNIT(S): 5000 INJECTION INTRAVENOUS; SUBCUTANEOUS at 05:39

## 2022-11-19 RX ADMIN — CEFEPIME 100 MILLIGRAM(S): 1 INJECTION, POWDER, FOR SOLUTION INTRAMUSCULAR; INTRAVENOUS at 21:56

## 2022-11-19 RX ADMIN — Medication 81 MILLIGRAM(S): at 11:29

## 2022-11-19 RX ADMIN — HEPARIN SODIUM 5000 UNIT(S): 5000 INJECTION INTRAVENOUS; SUBCUTANEOUS at 17:30

## 2022-11-19 RX ADMIN — CEFEPIME 100 MILLIGRAM(S): 1 INJECTION, POWDER, FOR SOLUTION INTRAMUSCULAR; INTRAVENOUS at 09:16

## 2022-11-19 RX ADMIN — Medication 150 MICROGRAM(S): at 05:39

## 2022-11-19 RX ADMIN — AMIODARONE HYDROCHLORIDE 200 MILLIGRAM(S): 400 TABLET ORAL at 05:39

## 2022-11-19 RX ADMIN — MIDODRINE HYDROCHLORIDE 10 MILLIGRAM(S): 2.5 TABLET ORAL at 14:39

## 2022-11-19 RX ADMIN — SACUBITRIL AND VALSARTAN 1 TABLET(S): 24; 26 TABLET, FILM COATED ORAL at 17:30

## 2022-11-19 RX ADMIN — MIDODRINE HYDROCHLORIDE 10 MILLIGRAM(S): 2.5 TABLET ORAL at 21:57

## 2022-11-19 RX ADMIN — Medication 12.5 MILLIGRAM(S): at 17:30

## 2022-11-19 RX ADMIN — PANTOPRAZOLE SODIUM 40 MILLIGRAM(S): 20 TABLET, DELAYED RELEASE ORAL at 05:39

## 2022-11-19 NOTE — PROGRESS NOTE ADULT - SUBJECTIVE AND OBJECTIVE BOX
Patient is a 82y old  Male who presents with a chief complaint of acute hypoxic resp failure (18 Nov 2022 19:49)      INTERVAL HPI/OVERNIGHT EVENTS: Patient seen and examined at bedside. No overnight events.  Tolerating RA. Enjoying breakfast.   Reports mild discomfort in bilateral lower extremities     MEDICATIONS  (STANDING):  aMIOdarone    Tablet 200 milliGRAM(s) Oral daily  aspirin  chewable 81 milliGRAM(s) Oral daily  cefepime   IVPB 1000 milliGRAM(s) IV Intermittent <User Schedule>  furosemide    Tablet 20 milliGRAM(s) Oral daily  heparin   Injectable 5000 Unit(s) SubCutaneous every 12 hours  levothyroxine 150 MICROGram(s) Oral daily  lisinopril 2.5 milliGRAM(s) Oral daily  metoprolol tartrate 12.5 milliGRAM(s) Oral every 12 hours  midodrine 10 milliGRAM(s) Oral every 8 hours  pantoprazole    Tablet 40 milliGRAM(s) Oral before breakfast    MEDICATIONS  (PRN):      Allergies    PC Pen VK (Other)    Intolerances        REVIEW OF SYSTEMS:  CONSTITUTIONAL: No fever or chills  CARDIOVASCULAR: No chest pain, palpitations    Vital Signs Last 24 Hrs  T(C): 36.4 (19 Nov 2022 05:27), Max: 36.7 (18 Nov 2022 13:08)  T(F): 97.5 (19 Nov 2022 05:27), Max: 98 (18 Nov 2022 13:08)  HR: 100 (19 Nov 2022 05:27) (85 - 107)  BP: 101/62 (19 Nov 2022 05:27) (101/62 - 109/67)  BP(mean): --  RR: 16 (19 Nov 2022 05:27) (16 - 18)  SpO2: 98% (19 Nov 2022 05:27) (92% - 98%)    Parameters below as of 19 Nov 2022 05:27  Patient On (Oxygen Delivery Method): room air      I&O's Summary    18 Nov 2022 07:01  -  19 Nov 2022 07:00  --------------------------------------------------------  IN: 360 mL / OUT: 3150 mL / NET: -2790 mL    19 Nov 2022 07:01  -  19 Nov 2022 11:40  --------------------------------------------------------  IN: 100 mL / OUT: 0 mL / NET: 100 mL      BMI (kg/m2): 18.7 (11-15-22 @ 02:00)    PHYSICAL EXAM:  GENERAL: elderly male in NAD  HEENT:  AT/NC, anicteric, moist mucous membranes, EOMI, PERRL, no lid-lag, conjunctiva and sclera clear  CHEST/LUNG:  CTA b/l, no rales, wheezes, or rhonchi,  normal respiratory effort, no intercostal retractions  HEART:  RRR, S1, S2, 3/6 systolic murmur; no pitting edema  ABDOMEN:  BS+, soft, nontender, nondistended  : Starks in place  MSK/EXTREMITIES: faint/cool peripheral pulses, bilateral lower extremity protective boots in place, right foot dressing c/d/i, poor capillary refill   NERVOUS SYSTEM: answers questions and follows commands appropriately, A&Ox2-3, forgetful, grossly moves all extremities limited ROM in lower extremities   PSYCH: Appropriate affect, Alert & Awake; poor judgement    LABS: Personally reviewed                        8.7    20.04 )-----------( 290      ( 19 Nov 2022 05:30 )             28.9     11-19    141  |  105  |  27  ----------------------------<  98  3.9   |  28  |  1.04    Ca    8.9      19 Nov 2022 05:30  Phos  2.0     11-18  Mg     1.9     11-18    TPro  6.2  /  Alb  2.5  /  TBili  0.5  /  DBili  x   /  AST  116  /  ALT  278  /  AlkPhos  206  11-18                    11-06 Chol 99 mg/dL LDL -- HDL 31 mg/dL Trig 81 mg/dL                      Culture - Urine (collected 15 Nov 2022 03:20)  Source: Clean Catch Clean Catch (Midstream)  Final Report (19 Nov 2022 08:47):    >100,000 CFU/ml Pseudomonas aeruginosa    >100,000 CFU/ml Enterococcus faecium  Organism: Pseudomonas aeruginosa  Enterococcus faecium (19 Nov 2022 08:47)  Organism: Enterococcus faecium (19 Nov 2022 08:47)      -  Ampicillin: R >8 Predicts results to ampicillin/sulbactam, amoxacillin-clavulanate and  piperacillin-tazobactam.      -  Ciprofloxacin: R >2      -  Levofloxacin: R >4      -  Nitrofurantoin: I 64 Should not be used to treat pyelonephritis.      -  Tetracycline: R >8      -  Vancomycin: S 2      Method Type: ARIE  Organism: Pseudomonas aeruginosa (19 Nov 2022 08:47)      -  Amikacin: S <=16      -  Aztreonam: S 8      -  Cefepime: S 8      -  Ceftazidime: S 8      -  Ciprofloxacin: R >2      -  Gentamicin: R >8      -  Imipenem: S <=1      -  Levofloxacin: R >4      -  Meropenem: S <=1      -  Piperacillin/Tazobactam: S <=8      -  Tobramycin: I 8      Method Type: ARIE    Culture - Blood (collected 14 Nov 2022 19:45)  Source: .Blood Blood-Peripheral  Preliminary Report (16 Nov 2022 03:02):    No growth to date.    Culture - Blood (collected 14 Nov 2022 19:45)  Source: .Blood Blood-Peripheral  Preliminary Report (16 Nov 2022 03:02):    No growth to date.      COVID-19 PCR: NotDetec (11-07-22 @ 07:10)  COVID-19 PCR: NotDetec (10-31-22 @ 19:50)        Culture - Urine (collected 11-15-22 @ 03:20)  Source: Clean Catch Clean Catch (Midstream)  Final Report (11-19-22 @ 08:47):    >100,000 CFU/ml Pseudomonas aeruginosa    >100,000 CFU/ml Enterococcus faecium  Organism: Pseudomonas aeruginosa  Enterococcus faecium (11-19-22 @ 08:47)  Organism: Enterococcus faecium (11-19-22 @ 08:47)      -  Ampicillin: R >8 Predicts results to ampicillin/sulbactam, amoxacillin-clavulanate and  piperacillin-tazobactam.      -  Ciprofloxacin: R >2      -  Levofloxacin: R >4      -  Nitrofurantoin: I 64 Should not be used to treat pyelonephritis.      -  Tetracycline: R >8      -  Vancomycin: S 2      Method Type: ARIE  Organism: Pseudomonas aeruginosa (11-19-22 @ 08:47)      -  Amikacin: S <=16      -  Aztreonam: S 8      -  Cefepime: S 8      -  Ceftazidime: S 8      -  Ciprofloxacin: R >2      -  Gentamicin: R >8      -  Imipenem: S <=1      -  Levofloxacin: R >4      -  Meropenem: S <=1      -  Piperacillin/Tazobactam: S <=8      -  Tobramycin: I 8      Method Type: ARIE    Culture - Blood (collected 11-14-22 @ 19:45)  Source: .Blood Blood-Peripheral  Preliminary Report (11-16-22 @ 03:02):    No growth to date.    Culture - Blood (collected 11-14-22 @ 19:45)  Source: .Blood Blood-Peripheral  Preliminary Report (11-16-22 @ 03:02):    No growth to date.        RADIOLOGY & ADDITIONAL TESTS: Personally reviewed.   SALEEM US ordered     Consultant(s) Notes Reviewed:  [x] YES  [ ] NO - discussed with pulm Dr Rodriguez, continue to taper midodrine as tolerated with appropriate SBP.   Discussed with SW/CLARISSE, RN

## 2022-11-19 NOTE — CONSULT NOTE ADULT - ASSESSMENT
Patient is a 82y male with a past history of HTN, CAD, low Ef(20%), A fib, BPH,CVA with left sided weakness,  DVT of B/L lower extremities, IVC filter, and chronic dubois who was admitted 11/14 with hypoxic respiratory failure secondary to sepsis.  He was covered with cefepime and has been stabilized. He is a DNR/DNI but would like medical measures.  His blood cultures have been negative, he remains confused, but he has been hemodynamically stable and was moved to floor today.  He is confused, unable to give a history. He has a persistent leukocytosis which is of concern.  While he has made some progress in that he is hemodynamically stable, his respiratory status has improved, and he is tolerating a diet, his elevated wbc has not moderated.  Suggest:  1. Favor limiting cefepime to 1-2 more days- coverage of pseudomonas in urine and pulmonary coverage  2.I would not treat enterococcus faecium in urine, it is part of polymicrobic peggy and usually a low grade pathogen  3.consider a RUQ sonogram given elevated LFT's  4.We may consider surveillance blood cultures in next 1-2 days  5.Additional w/u pending course, he has a severe cardiomyopathy, underlying CAD, and his prognosis is guarded.  6. No clear explanation for leukocytosis, may be multifactorial. Patient is a 82y male with a past history of HTN, CAD, low Ef(20%), A fib, BPH,CVA with left sided weakness,  DVT of B/L lower extremities, IVC filter, and chronic dubois who was admitted 11/14 with hypoxic respiratory failure secondary to sepsis.  He was covered with cefepime and has been stabilized. He is a DNR/DNI but would like medical measures.  His blood cultures have been negative, he remains confused, but he has been hemodynamically stable and was moved to floor today.  He is confused, unable to give a history. He has a persistent leukocytosis which is of concern.  While he has made some progress in that he is hemodynamically stable, his respiratory status has improved, and he is tolerating a diet, his elevated wbc has not moderated.  Suggest:  1. Favor limiting cefepime to 1-2 more days- coverage of pseudomonas in urine and pulmonary coverage  2.I would not treat enterococcus faecium in urine, it is part of polymicrobic peggy and usually a low grade pathogen  3.consider a RUQ sonogram given elevated LFT's  4.We may consider surveillance blood cultures in next 1-2 days  5.Additional w/u pending course, he has a severe cardiomyopathy, underlying CAD, and his prognosis is guarded.  6. No clear explanation for leukocytosis, may be multifactorial.  7.Rt foot has open tissue injury, does not look acutely infected, options for further management may be limited by underlying condition.

## 2022-11-19 NOTE — PROGRESS NOTE ADULT - ASSESSMENT
Physical Examination:  GENERAL:               Awake, following commands, No acute distress.    HEENT:                    No JVD, Moist MM  PULM:                     Bilateral air entry, basilar Rales, No Rhonchi, No Wheezing  CVS:                         S1, S2,  +Murmur  ABD:                        Soft, nondistended, nontender, normoactive bowel sounds,   EXT:                         No edema, nontender, No Cyanosis or Clubbing   Vascular:                Cool Extremities, poor Capillary refill  SKIN:                       Cool + Right Lower Extremity ulcer   NEURO:                  Alert,  interactive, nonfocal, follows commands  PSYC:                      Calm, No Insight.    Assessment:  81 y/o M w/ pmh of htn, cad s/p cabg, severe cardiomyopathy (EF 20%), Afib (not on AC due to hematuria), bph, LUE DVT, b/l LLE DVT s/p IVC filter placement pt was discharged to SNF now tonight presents to [facility] on 11/14/2022 for hypoxia and AMS, pt was started on NC with improvement in o2 and then became hypoxic again and was started on HFNC at 40L/40%, hypotensive suspected to be 2/2 to septic, HAN, transaminitis, Lactic acidosis, UA concerning for UTI and possible PNA. Pt is reported to be DNR/DNI is reported to have wanted everything else done. Pt seen and examined in the ED unable to participate in HPI due to agitation as he just keeps screaming for orange juice and not participate during interview.    1. Acute hypoxic respiratory failure - Resolved  2. Heart failure with reduced EF  3. Septic shock - Resolved  4. Lactic acidosis  5. UTI in patient with chronic Starks   6. Chronic Afib   7. VTE not on AC due to bleeding previously   8. CAD  9. Chronic hypothyroidism      Plan:  - Monitor hemodynamics   - Continue to monitor on RA  - On Cefepime for pseudomonas UTI continue for 5 day course.  but also has enterococcus and WBC increasing  - Consider ID eval   - Continue Low dose midodrine   - Continue Diuresis to aim for negative fluid balance  - Cont. amiodarone and aspirin for now  - Monitor for further bleeding  - Follow up cultures  - Broad spectrum antibiotics  - Cardiology follow up   - Aspiration precautions  - Continue levothyroxine  - Oral diet  - DNR/DNI code status  - SALEEM LE   - Case d/w Dr Randall, continue care on medical floor, plan for ID Consult, reconsult icu/pulm as needed

## 2022-11-19 NOTE — PROGRESS NOTE ADULT - ASSESSMENT
s/p respiratory failure  chronic CAD reported non revascularizable  severe LV dysfunction  pneumonia  improved renal function        suggest  change b blocker to long acting, continue on telemetry  consider cautious trial of entresto if BP permits rather than lisinopril

## 2022-11-19 NOTE — PROGRESS NOTE ADULT - ASSESSMENT
82M with PMH HTN, CAD s/p CABG, severe cardiomyopathy (TTE 11/22: EF 20%), Afib (not on AC due to hematuria), BPH, LUE DVT, b/l LLE DVT s/p IVC filter placement pt was discharged to SNF now presents to [facility] on 11/14/2022 for hypoxia and AMS, also hypotensive suspected 2/2 to septic shock, HAN, transaminitis, Lactic acidosis, UTI and CAP. Spo2 improved, currently on RA. Downgraded to tele.     #Acute hypoxic respiratory failure possibly due to CAP and decompensated systolic CHF  -Downgraded from ICU  -Spo2 appropriate on RA  -Continue IV Lasix 20mg daily - transition to PO Lasix  -Start Lisinopril 2.5mg daily  -Continue Midodrine - plan to taper to discontinuation as BP tolerates  -Strict I&Os, daily weights  -TTE performed    #Septic shock due to CAP and UTI in a patient with chronic Starks  -Continue IV Cefepime   -Continue Starks  -Currently on Midodrine - plan to wean to discontinuation  -ID consulted    #HAN on CKD Stage 2  Likely ATN in setting of of sepsis  -Cr improving back to baseline  -Avoid nephrotoxic medications  -Follow up AM BMP    #Transaminitis  Likely shock liver in setting of sepsis  -Downtrending  -Avoid hepatotoxic medications  -Follow up AM CMP    #CAD s/p CABG  Positive trops likely demand in setting of of sepsis and shock  -Continue ASA  -Continue Metoprolol  -Start Lisinopril 2.5mg daily    #A fib  -Continue rate control with Metoprolol  -Continue rhythm control with Amiodarone   -Not on AC due to hematuria    #Hypothyroidism  -Continue synthroid    #RLE wound  -Continue local wound care  -SALEEM studies ordered, f/u result  -Wound care consulted     #Prophylactic Measure  -DVT ppx: Heparin  -PT consult-came from [facility]    Discussed with wife Kirsten ([phone]) aware and in agreement with above      82M with PMH HTN, CAD s/p CABG, severe cardiomyopathy (TTE 11/22: EF 20%), Afib (not on AC due to hematuria), BPH, LUE DVT, b/l LLE DVT s/p IVC filter placement pt was discharged to SNF now presents to [facility] on 11/14/2022 for hypoxia and AMS, also hypotensive suspected 2/2 to septic shock, HAN, transaminitis, Lactic acidosis, UTI and CAP. Spo2 improved, currently on RA. Downgraded to tele.     #Acute hypoxic respiratory failure possibly due to CAP and decompensated systolic CHF  -Downgraded from ICU  -Spo2 appropriate on RA  -Continue IV Lasix 20mg daily - transition to PO Lasix  -Start Entresto - monitor vitals closely  -Continue Metoprolol BID - plan to transition to long acting in AM  -Continue Midodrine - plan to taper to discontinuation as BP tolerates  -Strict I&Os, daily weights  -TTE performed    #Septic shock due to CAP and UTI in a patient with chronic Starks  -Continue IV Cefepime   -Continue Starks  -Currently on Midodrine - plan to wean to discontinuation  -ID consulted    #HAN on CKD Stage 2  Likely ATN in setting of of sepsis  -Cr improving back to baseline  -Avoid nephrotoxic medications  -Follow up AM BMP    #Transaminitis  Likely shock liver in setting of sepsis  -Downtrending  -Avoid hepatotoxic medications  -Follow up AM CMP    #CAD s/p CABG  Positive trops likely demand in setting of of sepsis and shock  -Continue ASA  -Continue Metoprolol  -Start Lisinopril 2.5mg daily    #A fib  -Continue rate control with Metoprolol  -Continue rhythm control with Amiodarone   -Not on AC due to hematuria    #Hypothyroidism  -Continue synthroid    #RLE wound  -Continue local wound care  -SALEEM studies ordered, f/u result  -Wound care consulted     #Prophylactic Measure  -DVT ppx: Heparin  -PT consult-came from [facility]    Discussed with wife Kirsten ([phone]) aware and in agreement with above      82M with PMH HTN, CAD s/p CABG, severe cardiomyopathy (TTE 11/22: EF 20%), Afib (not on AC due to hematuria), BPH, LUE DVT, b/l LLE DVT s/p IVC filter placement pt was discharged to SNF now presents to [facility] on 11/14/2022 for hypoxia and AMS, also hypotensive suspected 2/2 to septic shock, HAN, transaminitis, Lactic acidosis, UTI and CAP. Spo2 improved, currently on RA. Downgraded to tele.     #Acute hypoxic respiratory failure possibly due to CAP and decompensated systolic CHF  -Downgraded from ICU  -Spo2 appropriate on RA  -Continue IV Lasix 20mg daily - transition to PO Lasix  -Start Entresto - monitor vitals closely  -Continue Metoprolol BID - plan to transition to long acting in AM  -Continue Midodrine - plan to taper to discontinuation as BP tolerates  -Strict I&Os, daily weights  -TTE performed    #Septic shock due to CAP and UTI in a patient with chronic Starks  -Continue IV Cefepime   -Continue Starks  -Currently on Midodrine - plan to wean to discontinuation  -Discussed with ID Dr Clement, rising WBC psuedomonas not sensitive     #HAN on CKD Stage 2  Likely ATN in setting of of sepsis  -Cr improving back to baseline  -Avoid nephrotoxic medications  -Follow up AM BMP    #Transaminitis  Likely shock liver in setting of sepsis  -Downtrending  -Avoid hepatotoxic medications  -Follow up AM CMP    #CAD s/p CABG  Positive trops likely demand in setting of of sepsis and shock  -Continue ASA  -Continue Metoprolol  -Start Lisinopril 2.5mg daily    #A fib  -Continue rate control with Metoprolol  -Continue rhythm control with Amiodarone   -Not on AC due to hematuria    #Hypothyroidism  -Continue synthroid    #RLE wound  -Continue local wound care  -SALEEM studies ordered, f/u result  -Wound care consulted     #Prophylactic Measure  -DVT ppx: Heparin  -PT consult-came from [facility]    Discussed with wife Kirsten ([phone]) aware and in agreement with above      82M with PMH HTN, CAD s/p CABG, severe cardiomyopathy (TTE 11/22: EF 20%), Afib (not on AC due to hematuria), BPH, LUE DVT, b/l LLE DVT s/p IVC filter placement pt was discharged to SNF now presents to [facility] on 11/14/2022 for hypoxia and AMS, also hypotensive suspected 2/2 to septic shock, HAN, transaminitis, Lactic acidosis, UTI and CAP. Spo2 improved, currently on RA. Downgraded to tele.     #Acute hypoxic respiratory failure possibly due to CAP and decompensated systolic CHF  -Downgraded from ICU  -Spo2 appropriate on RA  -Continue IV Lasix 20mg daily - transition to PO Lasix  -Start Entresto - monitor vitals closely  -Continue Metoprolol BID - plan to transition to long acting in AM  -Continue Midodrine - plan to taper to discontinuation as BP tolerates  -Strict I&Os, daily weights  -TTE performed    #Septic shock due to CAP and UTI in a patient with chronic Starks  -Continue IV Cefepime   -Continue Starks  -Currently on Midodrine - plan to wean to discontinuation  -Discussed with ID Dr Clement, persistent leukocytosis, limit cefepime to 1-2 more days    #HAN on CKD Stage 2  Likely ATN in setting of of sepsis  -Cr improving back to baseline  -Avoid nephrotoxic medications  -Follow up AM BMP    #Transaminitis  Likely shock liver in setting of sepsis  -Downtrending  -Avoid hepatotoxic medications  -Follow up AM CMP  -Check RUQ sono    #CAD s/p CABG  Positive trops likely demand in setting of of sepsis and shock  -Continue ASA  -Continue Metoprolol  -Start Lisinopril 2.5mg daily    #A fib  -Continue rate control with Metoprolol  -Continue rhythm control with Amiodarone   -Not on AC due to hematuria    #Hypothyroidism  -Continue synthroid    #RLE wound  -Continue local wound care  -SALEEM studies ordered, f/u result  -Wound care consulted     #Prophylactic Measure  -DVT ppx: Heparin  -PT consult-came from [facility]    Discussed with wife Kirsten ([phone]) aware and in agreement with above

## 2022-11-19 NOTE — PROGRESS NOTE ADULT - SUBJECTIVE AND OBJECTIVE BOX
Follow up for :   CAD sob    SUBJ:  says he feels well today, no c/p sob    PMH  Chronic atrial fibrillation    History of cardiomyopathy    CAD (coronary artery disease)    BPH with obstruction/lower urinary tract symptoms    Hyperlipidemia    History of CVA (cerebrovascular accident)        MEDICATIONS  (STANDING):  aMIOdarone    Tablet 200 milliGRAM(s) Oral daily  aspirin  chewable 81 milliGRAM(s) Oral daily  cefepime   IVPB 1000 milliGRAM(s) IV Intermittent <User Schedule>  furosemide   Injectable 20 milliGRAM(s) IV Push daily  heparin   Injectable 5000 Unit(s) SubCutaneous every 12 hours  levothyroxine 150 MICROGram(s) Oral daily  metoprolol tartrate 12.5 milliGRAM(s) Oral every 12 hours  midodrine 10 milliGRAM(s) Oral every 8 hours  pantoprazole    Tablet 40 milliGRAM(s) Oral before breakfast    MEDICATIONS  (PRN):        PHYSICAL EXAM:  Vital Signs Last 24 Hrs  T(C): 36.4 (2022 05:27), Max: 36.7 (2022 13:08)  T(F): 97.5 (2022 05:27), Max: 98 (2022 13:08)  HR: 100 (2022 05:27) (85 - 107)  BP: 101/62 (2022 05:27) (101/62 - 109/67)  BP(mean): --  RR: 16 (2022 05:27) (16 - 18)  SpO2: 98% (2022 05:27) (92% - 98%)    Parameters below as of 2022 05:27  Patient On (Oxygen Delivery Method): room air        GENERAL: NAD, well-groomed, well-developed  HEAD:  Atraumatic, Normocephalic  EYES:  conjunctiva and sclera clear  NECK: Supple, No JVD, no bruits  CHEST/LUNG:  No rales, rhonchi, wheezing, or rubs  HEART:  No murmurs, rubs, or gallops PMI non displaced.  ABDOMEN: Soft, Nontender, Nondistended; Bowel sounds present  EXTREMITIES:  No clubbing, cyanosis, or edema  SKIN: No rashes   NERVOUS SYSTEM:  Alert       TELEMETRY:  af variable rates    ECG:  < from: 12 Lead ECG (22 @ 20:28) >    Ventricular Rate 99 BPM    Atrial Rate 100 BPM    QRS Duration 98 ms    Q-T Interval 464 ms    QTC Calculation(Bazett) 595 ms    R Axis -17 degrees    T Axis 74 degrees    Diagnosis Line Atrial fibrillation  ST and T wave abnormality, consider lateral ischemia  ST and T wave abnormality, consider anteroseptal ischemia  Abnormal ECG  When compared with ECG of 21-OCT-2022 08:07,  ST depressions now present in lateral and anteroseptal leads    Confirmed by Balwinder Wallace ([id number]) on 11/15/2022 4:58:09 PM    < end of copied text >      LABS:                        8.7    20.04 )-----------( 290      ( 2022 05:30 )             28.9         141  |  105  |  27<H>  ----------------------------<  98  3.9   |  28  |  1.04    Ca    8.9      2022 05:30  Phos  2.0       Mg     1.9         TPro  6.2  /  Alb  2.5<L>  /  TBili  0.5  /  DBili  x   /  AST  116<H>  /  ALT  278<H>  /  AlkPhos  206<H>                I&O's Summary    2022 07:  -  2022 07:00  --------------------------------------------------------  IN: 360 mL / OUT: 3150 mL / NET: -2790 mL    2022 07:01  -  2022 11:35  --------------------------------------------------------  IN: 100 mL / OUT: 0 mL / NET: 100 mL          RADIOLOGY & ADDITIONAL STUDIES:  < from: Xray Chest 1 View-PORTABLE IMMEDIATE (22 @ 21:21) >    ACC: [id number] EXAM:  XR CHEST PORTABLE IMMED 1V                          PROCEDURE DATE:  2022          INTERPRETATION:  INDICATION: Difficulty breathing    Portable chest 8:49 PM    COMPARISON: 10/17/2022    Limited examination. Right lateral chest cut from film. Overlying EKG   leads and wires.    FINDINGS:  Heart/Vascular: The mediastinum, hilum and aorta are within normal limits   for projection. Status post median sternotomy and CABG. Moderate   atherosclerotic change. Heart borderline.  Pulmonary: Midline trachea. There is hazy opacity right greater than left   lung may represent early pulmonary venous congestion. No pneumothorax or   focal consolidation.  Bones: There is no fracture.  Lines and catheter: None    Impression:    Possible early CHF.    --- End of Report ---             BENITO VASQUEZ DO; Attending Radiologist  This document has been electronically signed. Nov 15 2022  9:47AM    < end of copied text >      ECHO:< from: TTE Echo Complete w/o Contrast w/ Doppler (11.15.22 @ 08:37) >    ACC: [id number] EXAM:  ECHO TTE WO CON COMP W DOPP                          PROCEDURE DATE:  11/15/2022          INTERPRETATION:  TRANSTHORACIC ECHOCARDIOGRAM REPORT        Patient Name:   YUNIEL ALEBRTO Patient Location: [address] Rec #:  [id number]    Accession #:      [id number]  Account #:      [id number]       Height:           72.0 in 183.0 cm  YOB: 1940    Weight:           147.0 lb 66.70 kg  Patient Age:    82 years     BSA:              1.87 m²  Patient Gender: M            BP:               92/58 mmHg      Date of Exam:        11/15/2022 8:37:57 AM  Sonographer:         Cristian Sun  Referring Physician: MICHELLE VALLES    Procedure:     2D Echo/Doppler/Color Doppler Complete.  Indications:   Cardiogenic shock - R57.0  Diagnosis:     Unspecified systolic (congestive) heart failure - I50.20  Study Details: Technically fair study.        2D AND M-MODE MEASUREMENTS (normal ranges within parentheses):  Left                 Normal   Aorta/Left            Normal  Ventricle:                 Atrium:  IVSd (2D):    1.30  (0.7-1.1) Aortic Root   3.36  (2.4-3.7)                 cm             (2D):          cm  LVPWd (2D):   1.00  (0.7-1.1) Aortic Root   3.33  (2.4-3.7)                 cm             (Mmode):       cm  LVIDd (2D):   5.82  (3.4-5.7) AoV Cusp      1.74  (1.5-2.6)                 cm             Separation:    cm  LVIDs (2D):   5.59            Left Atrium   5.34  (1.9-4.0)                 cm             (2D):          cm  LV FS (2D):   4.0 %  (>25%)   Left Atrium   4.22  (1.9-4.0)  LV EF (2D):    9 %   (>55%)   (Mmode):       cm  Relative Wall 0.34   (<0.42)  LA Volume     47.6  Thickness                     Index        ml/m²                                Right Ventricle:   RVd (2D):        3.67 cm                                TAPSE:           1.10 cm    LV SYSTOLIC FUNCTION BY 2D PLANIMETRY (MOD):  EF-A4C View: 11.7 % EF-A2C View: 11.3 % EF-Biplane: 10 %    LV DIASTOLIC FUNCTION:  MV Peak E: 1.01 m/s Decel Time: 74 msec  MV Peak A: 0.42 m/s  E/A Ratio: 2.39    SPECTRAL DOPPLER ANALYSIS (where applicable):  Mitral Valve:  MV P1/2 Time: 21.39 msec  MV Area, PHT: 10.28 cm²    Aortic Valve: AoV Max Trav: 0.65 m/s AoV Peak P.7 mmHg AoV Mean P.3 mmHg    LVOT Vmax: 0.66 m/s LVOT VTI: 0.100 m LVOT Diameter: 2.34 cm    AoV Area, Vmax: 4.37 cm² AoV Area, VTI: 4.09 cm² AoV Area, Vmn: 3.42 cm²  Ao VTI: 0.105  Aortic Insufficiency:  AI Half-time:  553 msec  AI Decel Rate: 1.90 m/s²    Tricuspid Valve and PA/RV Systolic Pressure: TR Max Velocity: 2.52 m/s RA   Pressure: 15 mmHg RVSP/PASP: 40.4 mmHg      PHYSICIAN INTERPRETATION:  Left Ventricle: The left ventricular internal cavity size is mildly   increased. There is mild septal left ventricular hypertrophy involving   the septal wall. The LVH involves septal walls.  Left ventricular ejection fraction, by visual estimation, is 20%.   Severely decreased segmental left ventricular systolic function. The   mitral in-flow pattern reveals no discernable A-wave, therefore no   comment on diastolic function can be made.  Severe global left ventricle hypokinesis with regional variation: the   inferolateral wall, inferior wall and basal and mid inferoseptal wall   appear akinetic.  Right Ventricle: The right ventricular size is moderately enlarged. RV   systolic function is moderately reduced.  Left Atrium: Moderate to severe left atrial enlargement.  Right Atrium: Right atrial enlargement.  Pericardium: There is no evidence of pericardial effusion.  Mitral Valve: Mild thickening and calcification of the anterior and   posterior mitral valve leaflets. There is mild mitral annular   calcification. Severe mitral valve regurgitation is seen.  Tricuspid Valve: The tricuspid valve is normal in structure.   Mild-moderate tricuspid regurgitation is visualized. Estimated pulmonary   artery systolic pressure is 40.4 mmHg assuming a right atrial pressure of   15 mmHg, which is consistent with mild pulmonary hypertension.  Aortic Valve: The aortic valve is trileaflet. Sclerotic aortic valve with   normal opening. Moderate aortic valve regurgitation is seen. Aortic valve   leaflet calcification.  Pulmonic Valve: The pulmonic valve is normal. Mild pulmonic valve   regurgitation.  Aorta: Aortic root measured at Sinus of Valsalva is normal. Dilated   proximal ascending aorta (3.8 cm).  Pulmonary Artery: The pulmonary artery is moderately dilated.  Venous: The inferior vena cava is abnormal. The inferior vena cava was   dilated, with respiratory size variation less than 50%, consistent with   elevated right atrial pressure.      Summary:   1. Severely decreased segmental left ventricular systolic function.   2. Left ventricular ejection fraction, by visual estimation, is 20%.   3. Severe global left ventricle hypokinesis with regional variation: the   inferolateral wall, inferior wall and basal and mid inferoseptal wall   appear akinetic.   4. Mildly increased left ventricular internal cavity size.   5. The mitral in-flow pattern reveals no discernable A-wave, therefore   no comment on diastolic function can be made.   6. There is mild septal left ventricular hypertrophy.   7. Moderately enlarged right ventricle.   8. Moderately reduced RV systolic function.   9. Moderate to severe left atrialenlargement.  10. Right atrial enlargement.  11. Mild mitral annular calcification.  12. Mild thickening and calcification of the anterior and posterior   mitral valve leaflets.  13. Severe mitral valve regurgitation.  14. Mild-moderate tricuspid regurgitation.  15. Aortic valve leaflet calcification.  16. Sclerotic aortic valve with normal opening.  17. Moderate aortic regurgitation.  18. Mild pulmonic valve regurgitation.  19. Dilated proximal ascending aorta (3.8 cm).  20. Moderately dilated pulmonary artery.  21. Estimated pulmonary artery systolic pressure is 40.4 mmHg assuming a   right atrial pressure of 15 mmHg, which is consistent with mild pulmonary   hypertension.  22. There is no evidence of pericardial effusion.    [id number] Vignesh Wallace MD Electronically signed on   11/15/2022 at 1:53:05 PM          < end of copied text >

## 2022-11-19 NOTE — PROGRESS NOTE ADULT - SUBJECTIVE AND OBJECTIVE BOX
Follow-up Critical Care Progress Note  Chief Complaint : Acute respiratory failure with hypoxia      Patient on Room air  no secretions  alert, rsponsive    Allergies :PC Pen VK (Other)      PAST MEDICAL & SURGICAL HISTORY:  Chronic atrial fibrillation    History of cardiomyopathy    CAD (coronary artery disease)    BPH with obstruction/lower urinary tract symptoms    Hyperlipidemia    History of CVA (cerebrovascular accident)  left sided weakness    S/P CABG (coronary artery bypass graft)        Medications:  MEDICATIONS  (STANDING):  aMIOdarone    Tablet 200 milliGRAM(s) Oral daily  aspirin  chewable 81 milliGRAM(s) Oral daily  cefepime   IVPB 1000 milliGRAM(s) IV Intermittent <User Schedule>  furosemide    Tablet 20 milliGRAM(s) Oral daily  heparin   Injectable 5000 Unit(s) SubCutaneous every 12 hours  levothyroxine 150 MICROGram(s) Oral daily  metoprolol tartrate 12.5 milliGRAM(s) Oral every 12 hours  midodrine 10 milliGRAM(s) Oral every 8 hours  pantoprazole    Tablet 40 milliGRAM(s) Oral before breakfast  sacubitril 24 mG/valsartan 26 mG 1 Tablet(s) Oral two times a day    MEDICATIONS  (PRN):      Antibiotics History  cefepime   IVPB 1000 milliGRAM(s) IV Intermittent every 24 hours, 11-15-22 @ 03:22  cefepime   IVPB 1000 milliGRAM(s) IV Intermittent <User Schedule>, 11-15-22 @ 04:33  cefepime   IVPB 1000 milliGRAM(s) IV Intermittent once, 11-14-22 @ 20:34, Stop order after: 1 Doses      Heme Medications   aspirin  chewable 81 milliGRAM(s) Oral daily, 11-15-22 @ 10:44  heparin   Injectable 5000 Unit(s) SubCutaneous every 12 hours, 11-15-22 @ 01:22      GI Medications  pantoprazole    Tablet 40 milliGRAM(s) Oral before breakfast, 11-17-22 @ 00:00, Routine      COVID  11-14-22 @ 19:45  COVID -   NotDetec  11-07-22 @ 07:10  COVID -   NotDetec  10-31-22 @ 19:50  COVID -   NotDetec         Trend BNP  11-15-22 @ 06:00   -  >27495<H>  11-15-22 @ 01:50   -  >23538<H>  10-17-22 @ 19:55   -  31266<H>    Procalcitonin Trend  11-15-22 @ 06:00   -   0.20<H>  10-17-22 @ 23:15   -   0.02    WBC Trend  11-19-22 @ 05:30   -  20.04<H>  11-18-22 @ 06:00   -  17.23<H>  11-17-22 @ 06:38   -  17.13<H>    H/H Trend  11-19-22 @ 05:30   -   8.7<L>/ 28.9<L>  11-18-22 @ 06:00   -   8.0<L>/ 26.6<L>  11-17-22 @ 06:38   -   7.8<L>/ 26.0<L>  11-16-22 @ 05:34   -   7.9<L>/ 24.5<L>  11-15-22 @ 06:00   -   7.4<L>/ 23.2<L>  11-14-22 @ 19:45   -   8.5<L>/ 27.9<L>         Platelet Trend  11-19-22 @ 05:30   -  290  11-18-22 @ 06:00   -  253  11-17-22 @ 06:38   -  251    Trend Sodium  11-19-22 @ 05:30   -  141  11-18-22 @ 06:00   -  140  11-17-22 @ 06:38   -  141    Trend Potassium  11-19-22 @ 05:30   -  3.9  11-18-22 @ 06:00   -  3.5  11-17-22 @ 06:38   -  3.6    Trend Bun/Cr  11-19-22 @ 05:30  BUN/CR -  27<H> / 1.04  11-18-22 @ 06:00  BUN/CR -  33<H> / 0.92  11-17-22 @ 06:38  BUN/CR -  49<H> / 1.43<H>    Lactic Acid Trend  11-15-22 @ 06:00   -   1.6  11-15-22 @ 01:50   -   3.2<H>  11-14-22 @ 22:28   -   4.7<HH>  11-14-22 @ 19:45   -   7.8<HH>    ABG Trend    Trend AST/ALT/ALK Phos/Bili  11-18-22 @ 06:00   116<H>/278<H>/206<H>/0.5  11-17-22 @ 06:38   157<H>/310<H>/219<H>/0.5  11-16-22 @ 05:34   272<H>/383<H>/219<H>/0.6  11-15-22 @ 06:00   430<H>/375<H>/134<H>/0.9  11-15-22 @ 01:50   444<H>/371<H>/142<H>/0.9  11-14-22 @ 19:45   448<H>/366<H>/150<H>/0.9  11-06-22 @ 06:30   29/49<H>/116/0.3  11-02-22 @ 06:00   33/58<H>/130<H>/0.3  10-31-22 @ 06:50   34/68<H>/126<H>/0.3  10-27-22 @ 08:20   48<H>/81<H>/153<H>/0.3  10-26-22 @ 06:50   52<H>/83<H>/157<H>/0.3  10-21-22 @ 06:30   49<H>/91<H>/201<H>/0.4         Albumin Trend  11-18-22 @ 06:00   -   2.5<L>  11-17-22 @ 06:38   -   2.6<L>  11-16-22 @ 05:34   -   2.6<L>  11-15-22 @ 06:00   -   2.7<L>  11-15-22 @ 01:50   -   2.9<L>  11-14-22 @ 19:45   -   3.2<L>      PTT - PT - INR Trend  11-14-22 @ 19:45   -   29.0 - 17.6<H> - 1.51<H>  11-03-22 @ 07:53   -   29.6 - 13.9<H> - 1.20<H>  10-30-22 @ 07:53   -   55.7<H> - -- - --  10-29-22 @ 20:13   -   >200.0<HH> - -- - --  10-29-22 @ 06:33   -   36.1<H> - -- - --  10-29-22 @ 03:15   -   64.1<H> - -- - --    Glucose Trend  11-19-22 @ 05:30   -  -- 98 --  11-18-22 @ 06:00   -  -- 91 --  11-17-22 @ 06:38   -  -- 81 --  11-16-22 @ 17:10   -  -- -- 120<H>        LABS:                        8.7    20.04 )-----------( 290      ( 19 Nov 2022 05:30 )             28.9     11-19    141  |  105  |  27<H>  ----------------------------<  98  3.9   |  28  |  1.04    Ca    8.9      19 Nov 2022 05:30  Phos  2.0     11-18  Mg     1.9     11-18    TPro  6.2  /  Alb  2.5<L>  /  TBili  0.5  /  DBili  x   /  AST  116<H>  /  ALT  278<H>  /  AlkPhos  206<H>  11-18         CULTURES: (if applicable)    Culture - Urine (collected 11-15-22 @ 03:20)  Source: Clean Catch Clean Catch (Midstream)  Final Report (11-19-22 @ 08:47):    >100,000 CFU/ml Pseudomonas aeruginosa    >100,000 CFU/ml Enterococcus faecium  Organism: Pseudomonas aeruginosa  Enterococcus faecium (11-19-22 @ 08:47)  Organism: Enterococcus faecium (11-19-22 @ 08:47)      -  Ampicillin: R >8 Predicts results to ampicillin/sulbactam, amoxacillin-clavulanate and  piperacillin-tazobactam.      -  Ciprofloxacin: R >2      -  Levofloxacin: R >4      -  Nitrofurantoin: I 64 Should not be used to treat pyelonephritis.      -  Tetracycline: R >8      -  Vancomycin: S 2      Method Type: ARIE  Organism: Pseudomonas aeruginosa (11-19-22 @ 08:47)      -  Amikacin: S <=16      -  Aztreonam: S 8      -  Cefepime: S 8      -  Ceftazidime: S 8      -  Ciprofloxacin: R >2      -  Gentamicin: R >8      -  Imipenem: S <=1      -  Levofloxacin: R >4      -  Meropenem: S <=1      -  Piperacillin/Tazobactam: S <=8      -  Tobramycin: I 8      Method Type: ARIE    Culture - Blood (collected 11-14-22 @ 19:45)  Source: .Blood Blood-Peripheral  Preliminary Report (11-16-22 @ 03:02):    No growth to date.    Culture - Blood (collected 11-14-22 @ 19:45)  Source: .Blood Blood-Peripheral  Preliminary Report (11-16-22 @ 03:02):    No growth to date.      Rapid RVP Result: NotDetec (11-14-22 @ 19:45)        VITALS:  T(C): 36.7 (11-19-22 @ 12:09), Max: 36.7 (11-18-22 @ 13:08)  T(F): 98.1 (11-19-22 @ 12:09), Max: 98.1 (11-19-22 @ 12:09)  HR: 70 (11-19-22 @ 12:09) (70 - 107)  BP: 116/70 (11-19-22 @ 12:09) (101/62 - 116/70)  BP(mean): --  ABP: --  ABP(mean): --  RR: 16 (11-19-22 @ 12:09) (16 - 18)  SpO2: 98% (11-19-22 @ 12:09) (92% - 98%)  CVP(mm Hg): --  CVP(cm H2O): --    Ins and Outs     11-18-22 @ 07:01  -  11-19-22 @ 07:00  --------------------------------------------------------  IN: 360 mL / OUT: 3150 mL / NET: -2790 mL    11-19-22 @ 07:01  -  11-19-22 @ 12:11  --------------------------------------------------------  IN: 100 mL / OUT: 0 mL / NET: 100 mL                I&O's Detail    18 Nov 2022 07:01  -  19 Nov 2022 07:00  --------------------------------------------------------  IN:    Oral Fluid: 360 mL  Total IN: 360 mL    OUT:    Indwelling Catheter - Urethral (mL): 500 mL    Voided (mL): 2650 mL  Total OUT: 3150 mL    Total NET: -2790 mL      19 Nov 2022 07:01  -  19 Nov 2022 12:11  --------------------------------------------------------  IN:    Oral Fluid: 100 mL  Total IN: 100 mL    OUT:  Total OUT: 0 mL    Total NET: 100 mL

## 2022-11-20 LAB
ALBUMIN SERPL ELPH-MCNC: 2.6 G/DL — LOW (ref 3.3–5)
ALP SERPL-CCNC: 210 U/L — HIGH (ref 40–120)
ALT FLD-CCNC: 173 U/L — HIGH (ref 10–45)
ANION GAP SERPL CALC-SCNC: 9 MMOL/L — SIGNIFICANT CHANGE UP (ref 5–17)
AST SERPL-CCNC: 42 U/L — HIGH (ref 10–40)
BILIRUB SERPL-MCNC: 0.5 MG/DL — SIGNIFICANT CHANGE UP (ref 0.2–1.2)
BUN SERPL-MCNC: 23 MG/DL — SIGNIFICANT CHANGE UP (ref 7–23)
CALCIUM SERPL-MCNC: 9.2 MG/DL — SIGNIFICANT CHANGE UP (ref 8.4–10.5)
CHLORIDE SERPL-SCNC: 105 MMOL/L — SIGNIFICANT CHANGE UP (ref 96–108)
CO2 SERPL-SCNC: 26 MMOL/L — SIGNIFICANT CHANGE UP (ref 22–31)
CREAT SERPL-MCNC: 0.99 MG/DL — SIGNIFICANT CHANGE UP (ref 0.5–1.3)
CULTURE RESULTS: SIGNIFICANT CHANGE UP
CULTURE RESULTS: SIGNIFICANT CHANGE UP
EGFR: 76 ML/MIN/1.73M2 — SIGNIFICANT CHANGE UP
GLUCOSE SERPL-MCNC: 113 MG/DL — HIGH (ref 70–99)
HCT VFR BLD CALC: 29.9 % — LOW (ref 39–50)
HGB BLD-MCNC: 8.8 G/DL — LOW (ref 13–17)
MCHC RBC-ENTMCNC: 26.7 PG — LOW (ref 27–34)
MCHC RBC-ENTMCNC: 29.4 GM/DL — LOW (ref 32–36)
MCV RBC AUTO: 90.9 FL — SIGNIFICANT CHANGE UP (ref 80–100)
NRBC # BLD: 0 /100 WBCS — SIGNIFICANT CHANGE UP (ref 0–0)
PLATELET # BLD AUTO: 344 K/UL — SIGNIFICANT CHANGE UP (ref 150–400)
POTASSIUM SERPL-MCNC: 3.7 MMOL/L — SIGNIFICANT CHANGE UP (ref 3.5–5.3)
POTASSIUM SERPL-SCNC: 3.7 MMOL/L — SIGNIFICANT CHANGE UP (ref 3.5–5.3)
PROT SERPL-MCNC: 6.7 G/DL — SIGNIFICANT CHANGE UP (ref 6–8.3)
RBC # BLD: 3.29 M/UL — LOW (ref 4.2–5.8)
RBC # FLD: 16.5 % — HIGH (ref 10.3–14.5)
SODIUM SERPL-SCNC: 140 MMOL/L — SIGNIFICANT CHANGE UP (ref 135–145)
SPECIMEN SOURCE: SIGNIFICANT CHANGE UP
SPECIMEN SOURCE: SIGNIFICANT CHANGE UP
WBC # BLD: 21.27 K/UL — HIGH (ref 3.8–10.5)
WBC # FLD AUTO: 21.27 K/UL — HIGH (ref 3.8–10.5)

## 2022-11-20 PROCEDURE — 99233 SBSQ HOSP IP/OBS HIGH 50: CPT

## 2022-11-20 PROCEDURE — 76705 ECHO EXAM OF ABDOMEN: CPT | Mod: 26

## 2022-11-20 PROCEDURE — 99232 SBSQ HOSP IP/OBS MODERATE 35: CPT

## 2022-11-20 RX ORDER — METOPROLOL TARTRATE 50 MG
12.5 TABLET ORAL EVERY 12 HOURS
Refills: 0 | Status: COMPLETED | OUTPATIENT
Start: 2022-11-20 | End: 2022-11-20

## 2022-11-20 RX ORDER — METOPROLOL TARTRATE 50 MG
25 TABLET ORAL DAILY
Refills: 0 | Status: DISCONTINUED | OUTPATIENT
Start: 2022-11-21 | End: 2022-11-22

## 2022-11-20 RX ADMIN — HEPARIN SODIUM 5000 UNIT(S): 5000 INJECTION INTRAVENOUS; SUBCUTANEOUS at 17:22

## 2022-11-20 RX ADMIN — Medication 20 MILLIGRAM(S): at 05:39

## 2022-11-20 RX ADMIN — SACUBITRIL AND VALSARTAN 1 TABLET(S): 24; 26 TABLET, FILM COATED ORAL at 05:39

## 2022-11-20 RX ADMIN — MIDODRINE HYDROCHLORIDE 10 MILLIGRAM(S): 2.5 TABLET ORAL at 05:40

## 2022-11-20 RX ADMIN — HEPARIN SODIUM 5000 UNIT(S): 5000 INJECTION INTRAVENOUS; SUBCUTANEOUS at 05:38

## 2022-11-20 RX ADMIN — MIDODRINE HYDROCHLORIDE 10 MILLIGRAM(S): 2.5 TABLET ORAL at 21:08

## 2022-11-20 RX ADMIN — CEFEPIME 100 MILLIGRAM(S): 1 INJECTION, POWDER, FOR SOLUTION INTRAMUSCULAR; INTRAVENOUS at 21:08

## 2022-11-20 RX ADMIN — PANTOPRAZOLE SODIUM 40 MILLIGRAM(S): 20 TABLET, DELAYED RELEASE ORAL at 05:39

## 2022-11-20 RX ADMIN — MIDODRINE HYDROCHLORIDE 10 MILLIGRAM(S): 2.5 TABLET ORAL at 14:39

## 2022-11-20 RX ADMIN — AMIODARONE HYDROCHLORIDE 200 MILLIGRAM(S): 400 TABLET ORAL at 05:39

## 2022-11-20 RX ADMIN — SACUBITRIL AND VALSARTAN 1 TABLET(S): 24; 26 TABLET, FILM COATED ORAL at 17:21

## 2022-11-20 RX ADMIN — Medication 81 MILLIGRAM(S): at 12:12

## 2022-11-20 RX ADMIN — Medication 12.5 MILLIGRAM(S): at 17:21

## 2022-11-20 RX ADMIN — Medication 12.5 MILLIGRAM(S): at 05:39

## 2022-11-20 RX ADMIN — Medication 150 MICROGRAM(S): at 05:39

## 2022-11-20 RX ADMIN — CEFEPIME 100 MILLIGRAM(S): 1 INJECTION, POWDER, FOR SOLUTION INTRAMUSCULAR; INTRAVENOUS at 10:16

## 2022-11-20 NOTE — PROGRESS NOTE ADULT - ASSESSMENT
s/p respiratory failure  pneumonia  HFrEF  CAD, closed grafts  AF  improved renal parameters  h/0 DVT ivc filter  h/o cva      Suggest  current cardiac meds, if tolerates, may consider farxiga and aldactone or eplerenone noting still receiving midodrine for bp support  monitor lytes/bun

## 2022-11-20 NOTE — PROGRESS NOTE ADULT - SUBJECTIVE AND OBJECTIVE BOX
CC: f/u for sepsis    Patient reports: he remains very confused, however he denies and abdominal pain or dyspnea    REVIEW OF SYSTEMS:  All other review of systems negative (Comprehensive ROS)    Antimicrobials Day #  :day 6  cefepime   IVPB 1000 milliGRAM(s) IV Intermittent <User Schedule>    Other Medications Reviewed  MEDICATIONS  (STANDING):  aMIOdarone    Tablet 200 milliGRAM(s) Oral daily  aspirin  chewable 81 milliGRAM(s) Oral daily  cefepime   IVPB 1000 milliGRAM(s) IV Intermittent <User Schedule>  furosemide    Tablet 20 milliGRAM(s) Oral daily  heparin   Injectable 5000 Unit(s) SubCutaneous every 12 hours  levothyroxine 150 MICROGram(s) Oral daily  metoprolol succinate ER 25 milliGRAM(s) Oral daily  metoprolol tartrate 12.5 milliGRAM(s) Oral every 12 hours  midodrine 10 milliGRAM(s) Oral every 8 hours  pantoprazole    Tablet 40 milliGRAM(s) Oral before breakfast  sacubitril 24 mG/valsartan 26 mG 1 Tablet(s) Oral two times a day    T(F): 97.5 (11-20-22 @ 05:14), Max: 98.3 (11-19-22 @ 20:16)  HR: 99 (11-20-22 @ 05:14)  BP: 129/71 (11-20-22 @ 05:14)  RR: 18 (11-20-22 @ 05:14)  SpO2: 99% (11-20-22 @ 05:14)  Wt(kg): --    PHYSICAL EXAM:  General: alert, no acute distress, chronically debilitated  Eyes:  anicteric, no conjunctival injection, no discharge  Oropharynx: no lesions or injection 	  Neck: supple, without adenopathy  Lungs: clear to auscultation  Heart: irregular rate and rhythm;   Abdomen: soft, nondistended, nontender, without mass or organomegaly  Skin: no rashes  Extremities: no clubbing, cyanosis, or edema. Left foot with deep tissue injury lateral region around 5th metatarsal  Neurologic: alert, confused, left side is weak  : dubois  LAB RESULTS:                        8.8    21.27 )-----------( 344      ( 20 Nov 2022 06:23 )             29.9     11-20    140  |  105  |  23  ----------------------------<  113<H>  3.7   |  26  |  0.99    Ca    9.2      20 Nov 2022 06:23    TPro  6.7  /  Alb  2.6<L>  /  TBili  0.5  /  DBili  x   /  AST  42<H>  /  ALT  173<H>  /  AlkPhos  210<H>  11-20    LIVER FUNCTIONS - ( 20 Nov 2022 06:23 )  Alb: 2.6 g/dL / Pro: 6.7 g/dL / ALK PHOS: 210 U/L / ALT: 173 U/L / AST: 42 U/L / GGT: x             MICROBIOLOGY:  RECENT CULTURES:      RADIOLOGY REVIEWED:    < from: US Abdomen Limited (11.20.22 @ 09:35) >  IMPRESSION:    Limited visualization of the pancreas, otherwise unremarkable right upper   quadrant ultrasound.    < end of copied text >  < from: Xray Chest 1 View-PORTABLE IMMEDIATE (11.14.22 @ 21:21) >    Impression:    Possible early CHF.    < end of copied text >

## 2022-11-20 NOTE — PROGRESS NOTE ADULT - ASSESSMENT
Patient is a 82y male with a past history of HTN, CAD, low Ef(20%), A fib, BPH,CVA with left sided weakness,  DVT of B/L lower extremities, IVC filter, and chronic dubois who was admitted 11/14 with hypoxic respiratory failure secondary to sepsis.  He was covered with cefepime and has been stabilized. He is a DNR/DNI but would like medical measures.  His blood cultures have been negative, he remains confused, but he has been hemodynamically stable and was moved to floor today.  He is confused, unable to give a history. He has a persistent leukocytosis which is of concern.  While he has made some progress in that he is hemodynamically stable, his respiratory status has improved, and he is tolerating a diet, his elevated wbc has not moderated.RUQ sonogram noted, negative study.  He has a persistent leukocytosis without clear explanation.  Suggest:  1. 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 Consider surveillance blood cultures today or in AM given ongoing unexplained leukocytosis  4.Long term prognosis of left foot may be guarded. He does not appear to be candidate for major interventions, consider vascular assessment.

## 2022-11-20 NOTE — PROGRESS NOTE ADULT - ASSESSMENT
82M with PMH HTN, CAD s/p CABG, severe cardiomyopathy (TTE 11/22: EF 20%), Afib (not on AC due to hematuria), BPH, LUE DVT, b/l LLE DVT s/p IVC filter placement pt was discharged to [facility] now presents to [facility] on 11/14/2022 for hypoxia and AMS, also hypotensive suspected 2/2 to septic shock, HAN, transaminitis, Lactic acidosis, UTI and CAP. Spo2 improved, currently on RA. Downgraded to tele.     #Acute hypoxic respiratory failure possibly due to CAP and decompensated systolic CHF  -Downgraded from ICU  -Spo2 appropriate on RA  -Continue PO Lasix (transitioned from IV Lasix)  -Tolerating Entresto - monitor vitals closely  -Switch to long acting Toprol from short acting Metoprolol BID  -Continue Midodrine - plan to taper to discontinuation as BP tolerates  -Strict I&Os, daily weights  -TTE performed    #Septic shock due to CAP and UTI in a patient with chronic Starks  -Continue IV Cefepime   -Continue Starks  -Currently on Midodrine - plan to wean to discontinuation  -Discussed with ID Dr Clement, persistent leukocytosis, limit cefepime to 1-2 more days    #HAN on CKD Stage 2  Likely ATN in setting of of sepsis  -Cr improving back to baseline  -Avoid nephrotoxic medications  -Follow up AM BMP    #Transaminitis  Likely shock liver in setting of sepsis  -Downtrending  -Avoid hepatotoxic medications  -Follow up AM CMP  -Check RUQ sono    #CAD s/p CABG  Positive trops likely demand in setting of of sepsis and shock  -Continue ASA  -Continue Metoprolol    #A fib  -Continue rate control with Metoprolol  -Continue rhythm control with Amiodarone   -Not on AC due to hematuria    #Hypothyroidism  -Continue synthroid    #RLE wound  -Continue local wound care  -SALEEM studies ordered, f/u result  -Wound care consulted     #Prophylactic Measure  -DVT ppx: Heparin  -PT consult-came from [facility]    wife Kirsten ([phone])   82M with PMH HTN, CAD s/p CABG, severe cardiomyopathy (TTE 11/22: EF 20%), Afib (not on AC due to hematuria), BPH, LUE DVT, b/l LLE DVT s/p IVC filter placement pt was discharged to SNF now presents to [facility] on 11/14/2022 for hypoxia and AMS, also hypotensive suspected 2/2 to septic shock, HAN, transaminitis, Lactic acidosis, UTI and CAP. Spo2 improved, currently on RA. Downgraded to tele.     #Acute hypoxic respiratory failure possibly due to CAP and decompensated systolic CHF  -Downgraded from ICU  -Spo2 appropriate on RA  -Continue PO Lasix (transitioned from IV Lasix)  -Tolerating Entresto - monitor vitals closely  -Switch to long acting Toprol from short acting Metoprolol BID  -Continue Midodrine - plan to taper to discontinuation as BP tolerates  -Strict I&Os, daily weights  -TTE performed    #Septic shock due to CAP and UTI in a patient with chronic Starks  -Continue IV Cefepime   -Continue Starks  -Currently on Midodrine - plan to wean to discontinuation  -Discussed with ID Dr Clement, persistent leukocytosis, limit cefepime to 1-2 more days    #HAN on CKD Stage 2  Likely ATN in setting of of sepsis  -Cr improving back to baseline  -Avoid nephrotoxic medications  -Follow up AM BMP    #Transaminitis  Likely shock liver in setting of sepsis  -Downtrending  -Avoid hepatotoxic medications  -Follow up AM CMP  -RUQ sono performed     #CAD s/p CABG  Positive trops likely demand in setting of of sepsis and shock  -Continue ASA  -Continue Metoprolol    #A fib  -Continue rate control with Metoprolol  -Continue rhythm control with Amiodarone   -Not on AC due to hematuria    #Hypothyroidism  -Continue synthroid    #RLE wound  -Continue local wound care  -SALEEM studies ordered, f/u result  -Wound care consulted Shabana SANTAMARIA to stephane in AM    #Prophylactic Measure  -DVT ppx: Heparin  -PT consult-[facility]    Discussed with wife Kirsten ([phone]) aware and in agreement with above. Would like patient to go to [facility] in [place].

## 2022-11-20 NOTE — PROGRESS NOTE ADULT - SUBJECTIVE AND OBJECTIVE BOX
Patient is a 82y old  Male who presents with a chief complaint of acute hypoxic resp failure (20 Nov 2022 07:25)      INTERVAL HPI/OVERNIGHT EVENTS: Patient seen and examined at bedside. No overnight events.    MEDICATIONS  (STANDING):  aMIOdarone    Tablet 200 milliGRAM(s) Oral daily  aspirin  chewable 81 milliGRAM(s) Oral daily  cefepime   IVPB 1000 milliGRAM(s) IV Intermittent <User Schedule>  furosemide    Tablet 20 milliGRAM(s) Oral daily  heparin   Injectable 5000 Unit(s) SubCutaneous every 12 hours  levothyroxine 150 MICROGram(s) Oral daily  metoprolol succinate ER 25 milliGRAM(s) Oral daily  metoprolol tartrate 12.5 milliGRAM(s) Oral every 12 hours  midodrine 10 milliGRAM(s) Oral every 8 hours  pantoprazole    Tablet 40 milliGRAM(s) Oral before breakfast  sacubitril 24 mG/valsartan 26 mG 1 Tablet(s) Oral two times a day    MEDICATIONS  (PRN):      Allergies    PC Pen VK (Other)    Intolerances        REVIEW OF SYSTEMS:  CONSTITUTIONAL: No fever or chills  CARDIOVASCULAR: No chest pain, palpitations  GASTROINTESTINAL: No abd pain, nausea, vomiting    Vital Signs Last 24 Hrs  T(C): 36.4 (20 Nov 2022 05:14), Max: 36.8 (19 Nov 2022 20:16)  T(F): 97.5 (20 Nov 2022 05:14), Max: 98.3 (19 Nov 2022 20:16)  HR: 99 (20 Nov 2022 05:14) (70 - 99)  BP: 129/71 (20 Nov 2022 05:14) (116/70 - 129/71)  BP(mean): --  RR: 18 (20 Nov 2022 05:14) (16 - 18)  SpO2: 99% (20 Nov 2022 05:14) (97% - 99%)    Parameters below as of 20 Nov 2022 05:14  Patient On (Oxygen Delivery Method): room air      I&O's Summary    19 Nov 2022 07:01  -  20 Nov 2022 07:00  --------------------------------------------------------  IN: 200 mL / OUT: 1900 mL / NET: -1700 mL        PHYSICAL EXAM:  GENERAL: elderly male in NAD  HEENT:  AT/NC, anicteric, moist mucous membranes, EOMI, PERRL, no lid-lag, conjunctiva and sclera clear  CHEST/LUNG:  CTA b/l, no rales, wheezes, or rhonchi,  normal respiratory effort, no intercostal retractions  HEART:  RRR, S1, S2, 3/6 systolic murmur; no pitting edema  ABDOMEN:  BS+, soft, nontender, nondistended  : Starks in place  MSK/EXTREMITIES: faint/cool peripheral pulses, bilateral lower extremity protective boots in place, right foot dressing c/d/i, poor capillary refill   NERVOUS SYSTEM: answers questions and follows commands appropriately, A&Ox2-3, forgetful, grossly moves all extremities limited ROM in lower extremities   PSYCH: Appropriate affect, Alert & Awake; poor judgement      LABS: Personally reviewed                        8.8    21.27 )-----------( 344      ( 20 Nov 2022 06:23 )             29.9     11-20    140  |  105  |  23  ----------------------------<  113  3.7   |  26  |  0.99    Ca    9.2      20 Nov 2022 06:23  Phos  2.0     11-18  Mg     1.9     11-18    TPro  6.7  /  Alb  2.6  /  TBili  0.5  /  DBili  x   /  AST  42  /  ALT  173  /  AlkPhos  210  11-20                    11-06 Chol 99 mg/dL LDL -- HDL 31 mg/dL Trig 81 mg/dL                      Culture - Urine (collected 15 Nov 2022 03:20)  Source: Clean Catch Clean Catch (Midstream)  Final Report (19 Nov 2022 08:47):    >100,000 CFU/ml Pseudomonas aeruginosa    >100,000 CFU/ml Enterococcus faecium  Organism: Pseudomonas aeruginosa  Enterococcus faecium (19 Nov 2022 08:47)  Organism: Enterococcus faecium (19 Nov 2022 08:47)      -  Ampicillin: R >8 Predicts results to ampicillin/sulbactam, amoxacillin-clavulanate and  piperacillin-tazobactam.      -  Ciprofloxacin: R >2      -  Levofloxacin: R >4      -  Nitrofurantoin: I 64 Should not be used to treat pyelonephritis.      -  Tetracycline: R >8      -  Vancomycin: S 2      Method Type: ARIE  Organism: Pseudomonas aeruginosa (19 Nov 2022 08:47)      -  Amikacin: S <=16      -  Aztreonam: S 8      -  Cefepime: S 8      -  Ceftazidime: S 8      -  Ciprofloxacin: R >2      -  Gentamicin: R >8      -  Imipenem: S <=1      -  Levofloxacin: R >4      -  Meropenem: S <=1      -  Piperacillin/Tazobactam: S <=8      -  Tobramycin: I 8      Method Type: ARIE    Culture - Blood (collected 14 Nov 2022 19:45)  Source: .Blood Blood-Peripheral  Final Report (20 Nov 2022 03:00):    No Growth Final    Culture - Blood (collected 14 Nov 2022 19:45)  Source: .Blood Blood-Peripheral  Final Report (20 Nov 2022 03:00):    No Growth Final      COVID-19 PCR: NotDetec (11-07-22 @ 07:10)  COVID-19 PCR: NotDetec (10-31-22 @ 19:50)        Culture - Urine (collected 11-15-22 @ 03:20)  Source: Clean Catch Clean Catch (Midstream)  Final Report (11-19-22 @ 08:47):    >100,000 CFU/ml Pseudomonas aeruginosa    >100,000 CFU/ml Enterococcus faecium  Organism: Pseudomonas aeruginosa  Enterococcus faecium (11-19-22 @ 08:47)  Organism: Enterococcus faecium (11-19-22 @ 08:47)      -  Ampicillin: R >8 Predicts results to ampicillin/sulbactam, amoxacillin-clavulanate and  piperacillin-tazobactam.      -  Ciprofloxacin: R >2      -  Levofloxacin: R >4      -  Nitrofurantoin: I 64 Should not be used to treat pyelonephritis.      -  Tetracycline: R >8      -  Vancomycin: S 2      Method Type: ARIE  Organism: Pseudomonas aeruginosa (11-19-22 @ 08:47)      -  Amikacin: S <=16      -  Aztreonam: S 8      -  Cefepime: S 8      -  Ceftazidime: S 8      -  Ciprofloxacin: R >2      -  Gentamicin: R >8      -  Imipenem: S <=1      -  Levofloxacin: R >4      -  Meropenem: S <=1      -  Piperacillin/Tazobactam: S <=8      -  Tobramycin: I 8      Method Type: ARIE    Culture - Blood (collected 11-14-22 @ 19:45)  Source: .Blood Blood-Peripheral  Final Report (11-20-22 @ 03:00):    No Growth Final    Culture - Blood (collected 11-14-22 @ 19:45)  Source: .Blood Blood-Peripheral  Final Report (11-20-22 @ 03:00):    No Growth Final        RADIOLOGY & ADDITIONAL TESTS: Personally reviewed.   < from: US Abdomen Limited (11.20.22 @ 09:35) >    ACC: [id number] EXAM:  US ABDOMEN LIMITED                          PROCEDURE DATE:  11/20/2022          INTERPRETATION:  CLINICAL INFORMATION: Abnormal liver function tests.    COMPARISON: Chest CT dated 10/17/2022    TECHNIQUE: Sonography of the right upper quadrant.    FINDINGS:    Liver: Normal in size and echogenicity without focal lesions.  Bile ducts: Normal caliber. Common bile duct measures 4 mm.  Gallbladder: Within normal limits.  Pancreas: Not well visualized.  Right kidney: 10.9 cm. No hydronephrosis.  Ascites: Trace amount of free fluid.  IVC: Visualized portions are within normal limits.    IMPRESSION:    Limited visualization of the pancreas, otherwise unremarkable right upper   quadrant ultrasound.        --- End of Report ---            AJ REDDING MD; Attending Radiologist  This document has been electronically signed. Nov 20 2022 10:01AM    < end of copied text >    Consultant(s) Notes Reviewed:  [x] YES  [ ] NO - discussed with wound care RN Shabana for examination of lower extremities   Discussed with KETURAH/CLARISSE RN

## 2022-11-20 NOTE — PROGRESS NOTE ADULT - SUBJECTIVE AND OBJECTIVE BOX
Follow up for :  s/p respiratory failure. CAD, HFrEF, graft closure, af, renal insufficiency    Interval history:  changing to entresto and metoprolol succinate    SUBJ:  alert, no c/p, sob    PMH  Chronic atrial fibrillation    History of cardiomyopathy    CAD (coronary artery disease)    BPH with obstruction/lower urinary tract symptoms    Hyperlipidemia    History of CVA (cerebrovascular accident)        MEDICATIONS  (STANDING):  aMIOdarone    Tablet 200 milliGRAM(s) Oral daily  aspirin  chewable 81 milliGRAM(s) Oral daily  cefepime   IVPB 1000 milliGRAM(s) IV Intermittent <User Schedule>  furosemide    Tablet 20 milliGRAM(s) Oral daily  heparin   Injectable 5000 Unit(s) SubCutaneous every 12 hours  levothyroxine 150 MICROGram(s) Oral daily  metoprolol succinate ER 25 milliGRAM(s) Oral daily  metoprolol tartrate 12.5 milliGRAM(s) Oral every 12 hours  midodrine 10 milliGRAM(s) Oral every 8 hours  pantoprazole    Tablet 40 milliGRAM(s) Oral before breakfast  sacubitril 24 mG/valsartan 26 mG 1 Tablet(s) Oral two times a day    MEDICATIONS  (PRN):        PHYSICAL EXAM:  Vital Signs Last 24 Hrs  T(C): 36.4 (2022 05:14), Max: 36.8 (2022 20:16)  T(F): 97.5 (2022 05:14), Max: 98.3 (2022 20:16)  HR: 99 (2022 05:14) (70 - 99)  BP: 129/71 (2022 05:14) (116/70 - 129/71)  BP(mean): --  RR: 18 (2022 05:14) (16 - 18)  SpO2: 99% (2022 05:14) (97% - 99%)    Parameters below as of 2022 05:14  Patient On (Oxygen Delivery Method): room air        GENERAL: NAD, well-groomed, well-developed  HEAD:  Atraumatic, Normocephalic  EYES:  conjunctiva and sclera clear  ENT: dry  mucous membranes,  NECK: Supple, No JVD, no bruits  CHEST/LUNG: Clear to auscultation bilaterally; No rales, rhonchi, wheezing, or rubs  HEART:No murmurs, rubs, or gallops PMI non displaced.  ABDOMEN: Soft, Nontender, Nondistended; Bowel sounds present  EXTREMITIES:   No clubbing, cyanosis, or edema  SKIN: No rashes or lesions  NERVOUS SYSTEM:  Alert       TELEMETRY:   af controlled vr    LABS:                        8.8    21.27 )-----------( 344      ( 2022 06:23 )             29.9         140  |  105  |  23  ----------------------------<  113<H>  3.7   |  26  |  0.99    Ca    9.2      2022 06:23    TPro  6.7  /  Alb  2.6<L>  /  TBili  0.5  /  DBili  x   /  AST  42<H>  /  ALT  173<H>  /  AlkPhos  210<H>                I&O's Summary    2022 07:01  -  2022 07:00  --------------------------------------------------------  IN: 200 mL / OUT: 1900 mL / NET: -1700 mL          RADIOLOGY & ADDITIONAL STUDIES:  < from: US Abdomen Limited (22 @ 09:35) >    ACC: [id number] EXAM:  US ABDOMEN LIMITED                          PROCEDURE DATE:  2022          INTERPRETATION:  CLINICAL INFORMATION: Abnormal liver function tests.    COMPARISON: Chest CT dated 10/17/2022    TECHNIQUE: Sonography of the right upper quadrant.    FINDINGS:    Liver: Normal in size and echogenicity without focal lesions.  Bile ducts: Normal caliber. Common bile duct measures 4 mm.  Gallbladder: Within normal limits.  Pancreas: Not well visualized.  Right kidney: 10.9 cm. No hydronephrosis.  Ascites: Trace amount of free fluid.  IVC: Visualized portions are within normal limits.    IMPRESSION:    Limited visualization of the pancreas, otherwise unremarkable right upper   quadrant ultrasound.        --- End of Report ---            AJ REDDING MD; Attending Radiologist  This document has been electronically signed. 2022 10:01AM    < end of copied text >      ECHO:  < from: TTE Echo Complete w/o Contrast w/ Doppler (11.15.22 @ 08:37) >    ACC: [id number] EXAM:  ECHO TTE WO CON COMP W DOPP                          PROCEDURE DATE:  11/15/2022          INTERPRETATION:  TRANSTHORACIC ECHOCARDIOGRAM REPORT        Patient Name:   YUNIEL ALBERTO Patient Location: [address] Rec #:  [id number]    Accession #:      [id number]  Account #:      [id number]       Height:           72.0 in 183.0 cm  YOB: 1940    Weight:           147.0 lb 66.70 kg  Patient Age:    82 years     BSA:              1.87 m²  Patient Gender: M            BP:               92/58 mmHg      Date of Exam:        11/15/2022 8:37:57 AM  Sonographer:         Cristian Sun  Referring Physician: MICHELLE VALLES    Procedure:     2D Echo/Doppler/Color Doppler Complete.  Indications:   Cardiogenic shock - R57.0  Diagnosis:     Unspecified systolic (congestive) heart failure - I50.20  Study Details: Technically fair study.        2D AND M-MODE MEASUREMENTS (normal ranges within parentheses):  Left                 Normal   Aorta/Left            Normal  Ventricle:                 Atrium:  IVSd (2D):    1.30  (0.7-1.1) Aortic Root   3.36  (2.4-3.7)                 cm             (2D):          cm  LVPWd (2D):   1.00  (0.7-1.1) Aortic Root   3.33  (2.4-3.7)                 cm             (Mmode):       cm  LVIDd (2D):   5.82  (3.4-5.7) AoV Cusp      1.74  (1.5-2.6)                 cm             Separation:    cm  LVIDs (2D):   5.59            Left Atrium   5.34  (1.9-4.0)                 cm             (2D):          cm  LV FS (2D):   4.0 %  (>25%)   Left Atrium   4.22  (1.9-4.0)  LV EF (2D):    9 %   (>55%)   (Mmode):       cm  Relative Wall 0.34   (<0.42)  LA Volume     47.6  Thickness                     Index        ml/m²                                Right Ventricle:   RVd (2D):        3.67 cm                                TAPSE:           1.10 cm    LV SYSTOLIC FUNCTION BY 2D PLANIMETRY (MOD):  EF-A4C View: 11.7 % EF-A2C View: 11.3 % EF-Biplane: 10 %    LV DIASTOLIC FUNCTION:  MV Peak E: 1.01 m/s Decel Time: 74 msec  MV Peak A: 0.42 m/s  E/A Ratio: 2.39    SPECTRAL DOPPLER ANALYSIS (where applicable):  Mitral Valve:  MV P1/2 Time: 21.39 msec  MV Area, PHT: 10.28 cm²    Aortic Valve: AoV Max Trav: 0.65 m/s AoV Peak P.7 mmHg AoV Mean P.3 mmHg    LVOT Vmax: 0.66 m/s LVOT VTI: 0.100 m LVOT Diameter: 2.34 cm    AoV Area, Vmax: 4.37 cm² AoV Area, VTI: 4.09 cm² AoV Area, Vmn: 3.42 cm²  Ao VTI: 0.105  Aortic Insufficiency:  AI Half-time:  553 msec  AI Decel Rate: 1.90 m/s²    Tricuspid Valve and PA/RV Systolic Pressure: TR Max Velocity: 2.52 m/s RA   Pressure: 15 mmHg RVSP/PASP: 40.4 mmHg      PHYSICIAN INTERPRETATION:  Left Ventricle: The left ventricular internal cavity size is mildly   increased. There is mild septal left ventricular hypertrophy involving   the septal wall. The LVH involves septal walls.  Left ventricular ejection fraction, by visual estimation, is 20%.   Severely decreased segmental left ventricular systolic function. The   mitral in-flow pattern reveals no discernable A-wave, therefore no   comment on diastolic function can be made.  Severe global left ventricle hypokinesis with regional variation: the   inferolateral wall, inferior wall and basal and mid inferoseptal wall   appear akinetic.  Right Ventricle: The right ventricular size is moderately enlarged. RV   systolic function is moderately reduced.  Left Atrium: Moderate to severe left atrial enlargement.  Right Atrium: Right atrial enlargement.  Pericardium: There is no evidence of pericardial effusion.  Mitral Valve: Mild thickening and calcification of the anterior and   posterior mitral valve leaflets. There is mild mitral annular   calcification. Severe mitral valve regurgitation is seen.  Tricuspid Valve: The tricuspid valve is normal in structure.   Mild-moderate tricuspid regurgitation is visualized. Estimated pulmonary   artery systolic pressure is 40.4 mmHg assuming a right atrial pressure of   15 mmHg, which is consistent with mild pulmonary hypertension.  Aortic Valve: The aortic valve is trileaflet. Sclerotic aortic valve with   normal opening. Moderate aortic valve regurgitation is seen. Aortic valve   leaflet calcification.  Pulmonic Valve: The pulmonic valve is normal. Mild pulmonic valve   regurgitation.  Aorta: Aortic root measured at Sinus of Valsalva is normal. Dilated   proximal ascending aorta (3.8 cm).  Pulmonary Artery: The pulmonary artery is moderately dilated.  Venous: The inferior vena cava is abnormal. The inferior vena cava was   dilated, with respiratory size variation less than 50%, consistent with   elevated right atrial pressure.      Summary:   1. Severely decreased segmental left ventricular systolic function.   2. Left ventricular ejection fraction, by visual estimation, is 20%.   3. Severe global left ventricle hypokinesis with regional variation: the   inferolateral wall, inferior wall and basal and mid inferoseptal wall   appear akinetic.   4. Mildly increased left ventricular internal cavity size.   5. The mitral in-flow pattern reveals no discernable A-wave, therefore   no comment on diastolic function can be made.   6. There is mild septal left ventricular hypertrophy.   7. Moderately enlarged right ventricle.   8. Moderately reduced RV systolic function.   9. Moderate to severe left atrialenlargement.  10. Right atrial enlargement.  11. Mild mitral annular calcification.  12. Mild thickening and calcification of the anterior and posterior   mitral valve leaflets.  13. Severe mitral valve regurgitation.  14. Mild-moderate tricuspid regurgitation.  15. Aortic valve leaflet calcification.  16. Sclerotic aortic valve with normal opening.  17. Moderate aortic regurgitation.  18. Mild pulmonic valve regurgitation.  19. Dilated proximal ascending aorta (3.8 cm).  20. Moderately dilated pulmonary artery.  21. Estimated pulmonary artery systolic pressure is 40.4 mmHg assuming a   right atrial pressure of 15 mmHg, which is consistent with mild pulmonary   hypertension.  22. There is no evidence of pericardial effusion.    [id number] Vignesh Wallace MD Electronically signed on   11/15/2022 at 1:53:05 PM            < end of copied text >

## 2022-11-21 ENCOUNTER — TRANSCRIPTION ENCOUNTER (OUTPATIENT)
Age: 82
End: 2022-11-21

## 2022-11-21 LAB
ALBUMIN SERPL ELPH-MCNC: 2.3 G/DL — LOW (ref 3.3–5)
ALP SERPL-CCNC: 179 U/L — HIGH (ref 40–120)
ALT FLD-CCNC: 116 U/L — HIGH (ref 10–45)
ANION GAP SERPL CALC-SCNC: 10 MMOL/L — SIGNIFICANT CHANGE UP (ref 5–17)
AST SERPL-CCNC: 32 U/L — SIGNIFICANT CHANGE UP (ref 10–40)
BILIRUB SERPL-MCNC: 0.5 MG/DL — SIGNIFICANT CHANGE UP (ref 0.2–1.2)
BUN SERPL-MCNC: 22 MG/DL — SIGNIFICANT CHANGE UP (ref 7–23)
CALCIUM SERPL-MCNC: 8.6 MG/DL — SIGNIFICANT CHANGE UP (ref 8.4–10.5)
CHLORIDE SERPL-SCNC: 104 MMOL/L — SIGNIFICANT CHANGE UP (ref 96–108)
CO2 SERPL-SCNC: 25 MMOL/L — SIGNIFICANT CHANGE UP (ref 22–31)
CREAT SERPL-MCNC: 0.81 MG/DL — SIGNIFICANT CHANGE UP (ref 0.5–1.3)
EGFR: 87 ML/MIN/1.73M2 — SIGNIFICANT CHANGE UP
GLUCOSE SERPL-MCNC: 90 MG/DL — SIGNIFICANT CHANGE UP (ref 70–99)
HCT VFR BLD CALC: 29.7 % — LOW (ref 39–50)
HGB BLD-MCNC: 8.8 G/DL — LOW (ref 13–17)
MCHC RBC-ENTMCNC: 26.6 PG — LOW (ref 27–34)
MCHC RBC-ENTMCNC: 29.6 GM/DL — LOW (ref 32–36)
MCV RBC AUTO: 89.7 FL — SIGNIFICANT CHANGE UP (ref 80–100)
NRBC # BLD: 0 /100 WBCS — SIGNIFICANT CHANGE UP (ref 0–0)
PLATELET # BLD AUTO: 348 K/UL — SIGNIFICANT CHANGE UP (ref 150–400)
POTASSIUM SERPL-MCNC: 4.2 MMOL/L — SIGNIFICANT CHANGE UP (ref 3.5–5.3)
POTASSIUM SERPL-SCNC: 4.2 MMOL/L — SIGNIFICANT CHANGE UP (ref 3.5–5.3)
PROT SERPL-MCNC: 6.2 G/DL — SIGNIFICANT CHANGE UP (ref 6–8.3)
RBC # BLD: 3.31 M/UL — LOW (ref 4.2–5.8)
RBC # FLD: 17 % — HIGH (ref 10.3–14.5)
SARS-COV-2 RNA SPEC QL NAA+PROBE: SIGNIFICANT CHANGE UP
SODIUM SERPL-SCNC: 139 MMOL/L — SIGNIFICANT CHANGE UP (ref 135–145)
WBC # BLD: 20.57 K/UL — HIGH (ref 3.8–10.5)
WBC # FLD AUTO: 20.57 K/UL — HIGH (ref 3.8–10.5)

## 2022-11-21 PROCEDURE — 99233 SBSQ HOSP IP/OBS HIGH 50: CPT

## 2022-11-21 PROCEDURE — 99232 SBSQ HOSP IP/OBS MODERATE 35: CPT

## 2022-11-21 RX ORDER — LANOLIN ALCOHOL/MO/W.PET/CERES
3 CREAM (GRAM) TOPICAL ONCE
Refills: 0 | Status: COMPLETED | OUTPATIENT
Start: 2022-11-21 | End: 2022-11-21

## 2022-11-21 RX ADMIN — Medication 81 MILLIGRAM(S): at 11:40

## 2022-11-21 RX ADMIN — PANTOPRAZOLE SODIUM 40 MILLIGRAM(S): 20 TABLET, DELAYED RELEASE ORAL at 05:50

## 2022-11-21 RX ADMIN — HEPARIN SODIUM 5000 UNIT(S): 5000 INJECTION INTRAVENOUS; SUBCUTANEOUS at 17:49

## 2022-11-21 RX ADMIN — MIDODRINE HYDROCHLORIDE 10 MILLIGRAM(S): 2.5 TABLET ORAL at 15:24

## 2022-11-21 RX ADMIN — Medication 20 MILLIGRAM(S): at 05:53

## 2022-11-21 RX ADMIN — MIDODRINE HYDROCHLORIDE 10 MILLIGRAM(S): 2.5 TABLET ORAL at 05:49

## 2022-11-21 RX ADMIN — SACUBITRIL AND VALSARTAN 1 TABLET(S): 24; 26 TABLET, FILM COATED ORAL at 05:49

## 2022-11-21 RX ADMIN — Medication 25 MILLIGRAM(S): at 05:50

## 2022-11-21 RX ADMIN — AMIODARONE HYDROCHLORIDE 200 MILLIGRAM(S): 400 TABLET ORAL at 05:49

## 2022-11-21 RX ADMIN — Medication 150 MICROGRAM(S): at 05:50

## 2022-11-21 RX ADMIN — HEPARIN SODIUM 5000 UNIT(S): 5000 INJECTION INTRAVENOUS; SUBCUTANEOUS at 05:50

## 2022-11-21 RX ADMIN — MIDODRINE HYDROCHLORIDE 10 MILLIGRAM(S): 2.5 TABLET ORAL at 21:45

## 2022-11-21 RX ADMIN — Medication 3 MILLIGRAM(S): at 23:42

## 2022-11-21 RX ADMIN — CEFEPIME 100 MILLIGRAM(S): 1 INJECTION, POWDER, FOR SOLUTION INTRAMUSCULAR; INTRAVENOUS at 08:52

## 2022-11-21 NOTE — DISCHARGE NOTE NURSING/CASE MANAGEMENT/SOCIAL WORK - NSDCPEFALRISK_GEN_ALL_CORE
For information on Fall & Injury Prevention, visit: [email or web address] OR  [email or web address] OR  https://www.cdc.gov/steadi/patient.html

## 2022-11-21 NOTE — PROGRESS NOTE ADULT - ASSESSMENT
Patient is a 82y male with a past history of HTN, CAD, low Ef(20%), A fib, BPH,CVA with left sided weakness,  DVT of B/L lower extremities, IVC filter, and chronic dubois who was admitted 11/14 with hypoxic respiratory failure secondary to sepsis.  He was covered with cefepime and has been stabilized. He is a DNR/DNI but would like medical measures.  His blood cultures have been negative, he remains confused, but he has been hemodynamically stable and was moved to floor today.  He is confused, unable to give a history. He has a persistent leukocytosis which is of concern.  While he has made some progress in that he is hemodynamically stable, his respiratory status has improved, and he is tolerating a diet, his elevated wbc has not moderated.RUQ sonogram noted, negative study.  He has a persistent leukocytosis without clear explanation.He has  reduced blood flow to legs, an ischemic appearing tissue injury to left foot, and his arterial studies show vascular disease.  Suggest:  1. 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 Advise surveillance blood cultures today  given ongoing unexplained leukocytosis  4.Long term prognosis of left foot may be guarded. He does not appear to be candidate for major interventions, consider vascular assessment.  5.Fate guarded.

## 2022-11-21 NOTE — ADVANCED PRACTICE NURSE CONSULT - RECOMMEDATIONS
Suggest daily normal saline cleanse of right lateral foot wound, pat dry. Apply Cavillon film barrier to periwound, allow to dry. Apply cut to fit Datahug Ag to wound bed, cover with gauze then wrap with ebony.   Left toe ischemic areas may be left open to air, but monitor for skin breakdown.  Podiatry consult pending, consider Vascular consult.   Continue heel offloading booties at all times, bilateral heels may be painted with Cavillon film barrier daily.  Offload all bony prominences with strict Turn & Position at least every 2 hours.  Nutritional support per Dietician's recommendations.

## 2022-11-21 NOTE — PROGRESS NOTE ADULT - ASSESSMENT
Assessment:  Gabriel De La Cruz is an 82 year old man with past medical history of Coronary artery disease (s/p CABG, recent angiogram with 2/3 occluded vein grafts in 10/2022, medically managed), HFrEF (LVEF 20% in 9/2022), Hypertension, Atrial fibrillation (not on anticoagulation due to hematuria), CVA and BPH with recent hospitalization here earlier this month for acute on chronic systolic heart failure, DVTs and IVC filter placement now sent in from living facility due to altered mental status and hypoxia, found to have acute hypoxic respiratory failure likely multifactorial from congestive heart failure and pneumonia also with shock, urosepsis, acute renal injury and lactic acidosis.     ECG consistent with atrial fibrillation and new anterior ST depressions. Troponin significantly elevated and has peaked, may be due to demand ischemia from respiratory failure and acute renal injury, however cannot rule out ACS in a patient with known chronic occluded veinous bypass grafts.      Recommendations:  [] Acute on chronic systolic heart failure exacerbation: Repeat echo with LVEF 20% and severe MR, slightly worse than recent echo. Patient was not able to tolerate guideline directed medical therapy in last hospitalization due to hypotension, now tolerating low dose Metoprolol succinate, Entresto while on Midodrine. Eventually Midodrine should be weaned off in this CHF patient. In regards to primary prevention ICD, patient was not amenable to this on prior hospitalization, can re-evaluate as outpatient along with severe MR evaluation.   [] Atrial fibrillation: HR controlled on low dose beta blocker, not on anticoagulation due to history of recurrent hematuria   [] Elevated troponins: Peaked. Likely demand ischemia as per above, but cannot rule out ACS in a patient with 2/3 occluded bypass grafts that was deemed to be medically managed at [facility]. Continue medical management  [] Pneumonia, UTI: Antibiotics per primary team and ID    The patient should follow up with his cardiologist within 1 week of discharge. Please re-consult if needed.    Vignesh Wallace MD  Cardiology    Assessment:  Gabriel De La Cruz is an 82 year old man with past medical history of Coronary artery disease (s/p CABG, recent angiogram with 2/3 occluded vein grafts in 10/2022, medically managed), HFrEF (LVEF 20% in 9/2022), Hypertension, Atrial fibrillation (not on anticoagulation due to hematuria), CVA and BPH with recent hospitalization here earlier this month for acute on chronic systolic heart failure, DVTs and IVC filter placement now sent in from living facility due to altered mental status and hypoxia, found to have acute hypoxic respiratory failure likely multifactorial from congestive heart failure and pneumonia also with shock, urosepsis, acute renal injury and lactic acidosis.     ECG consistent with atrial fibrillation and new anterior ST depressions. Troponin significantly elevated and has peaked, may be due to demand ischemia from respiratory failure and acute renal injury, however cannot rule out ACS in a patient with known chronic occluded veinous bypass grafts.      Recommendations:  [] Acute on chronic systolic heart failure exacerbation: Repeat echo with LVEF 20% and severe MR, slightly worse than recent echo. Patient was not able to tolerate guideline directed medical therapy in last hospitalization due to hypotension, now tolerating low dose Metoprolol succinate, Entresto while on Midodrine. Eventually Midodrine should be weaned off in this CHF patient. Continue Lasix 20 mg PO daily, appears near-euvolemic. In regards to primary prevention ICD, patient was not amenable to this on prior hospitalization, can re-evaluate as outpatient along with severe MR evaluation.   [] Atrial fibrillation: HR controlled on low dose beta blocker, not on anticoagulation due to history of recurrent hematuria   [] Elevated troponins: Peaked. Likely demand ischemia as per above, but cannot rule out ACS in a patient with 2/3 occluded bypass grafts that was deemed to be medically managed at [facility]. Continue medical management  [] Pneumonia, UTI: Antibiotics per primary team and ID    The patient should follow up with his cardiologist within 1 week of discharge. Please re-consult if needed.    Vignesh Wallace MD  Cardiology

## 2022-11-21 NOTE — PROGRESS NOTE ADULT - SUBJECTIVE AND OBJECTIVE BOX
Patient is a 82y old  Male who presents with a chief complaint of acute hypoxic resp failure (21 Nov 2022 10:37)    Patient seen and examined at bedside. No acute complaints at this time    ALLERGIES:  PC Pen VK (Other)    MEDICATIONS  (STANDING):  aMIOdarone    Tablet 200 milliGRAM(s) Oral daily  aspirin  chewable 81 milliGRAM(s) Oral daily  cefepime   IVPB 1000 milliGRAM(s) IV Intermittent <User Schedule>  furosemide    Tablet 20 milliGRAM(s) Oral daily  heparin   Injectable 5000 Unit(s) SubCutaneous every 12 hours  levothyroxine 150 MICROGram(s) Oral daily  metoprolol succinate ER 25 milliGRAM(s) Oral daily  midodrine 10 milliGRAM(s) Oral every 8 hours  pantoprazole    Tablet 40 milliGRAM(s) Oral before breakfast  sacubitril 24 mG/valsartan 26 mG 1 Tablet(s) Oral two times a day    MEDICATIONS  (PRN):    Vital Signs Last 24 Hrs  T(F): 97 (21 Nov 2022 12:17), Max: 98.1 (21 Nov 2022 06:42)  HR: 100 (21 Nov 2022 12:17) (91 - 100)  BP: 93/75 (21 Nov 2022 12:17) (93/75 - 156/72)  RR: 18 (21 Nov 2022 12:17) (17 - 18)  SpO2: 100% (21 Nov 2022 12:17) (97% - 100%)  I&O's Summary    20 Nov 2022 07:01  -  21 Nov 2022 07:00  --------------------------------------------------------  IN: 720 mL / OUT: 1500 mL / NET: -780 mL    21 Nov 2022 07:01  -  21 Nov 2022 13:52  --------------------------------------------------------  IN: 200 mL / OUT: 0 mL / NET: 200 mL      PHYSICAL EXAM:  GENERAL: elderly male in NAD, A/O x 3  HEENT:  MMM, conjunctiva and sclera clear  CHEST/LUNG:  CTA b/l, normal respiratory effort, no intercostal retractions  HEART: IRRR, S1S2, + systolic murmur  ABDOMEN:  BS+, soft, nontender, nondistended  : Starks in place  EXTREMITIES: bilateral pedal pulses palpated, warm extremities, bilateral lower extremity protective boots in place, RIGHT lateral foot wound appears without any discharge  PSYCH: Appropriate mood and affect, fair concentration      LABS:                        8.8    20.57 )-----------( 348      ( 21 Nov 2022 08:08 )             29.7     11-21    139  |  104  |  22  ----------------------------<  90  4.2   |  25  |  0.81    Ca    8.6      21 Nov 2022 08:08    TPro  6.2  /  Alb  2.3  /  TBili  0.5  /  DBili  x   /  AST  32  /  ALT  116  /  AlkPhos  179  11-21      11-06 Chol 99 mg/dL LDL -- HDL 31 mg/dL Trig 81 mg/dL    Culture - Urine (collected 15 Nov 2022 03:20)  Source: Clean Catch Clean Catch (Midstream)  Final Report (19 Nov 2022 08:47):    >100,000 CFU/ml Pseudomonas aeruginosa    >100,000 CFU/ml Enterococcus faecium  Organism: Pseudomonas aeruginosa  Enterococcus faecium (19 Nov 2022 08:47)  Organism: Enterococcus faecium (19 Nov 2022 08:47)      -  Ampicillin: R >8 Predicts results to ampicillin/sulbactam, amoxacillin-clavulanate and  piperacillin-tazobactam.      -  Ciprofloxacin: R >2      -  Levofloxacin: R >4      -  Nitrofurantoin: I 64 Should not be used to treat pyelonephritis.      -  Tetracycline: R >8      -  Vancomycin: S 2      Method Type: ARIE  Organism: Pseudomonas aeruginosa (19 Nov 2022 08:47)      -  Amikacin: S <=16      -  Aztreonam: S 8      -  Cefepime: S 8      -  Ceftazidime: S 8      -  Ciprofloxacin: R >2      -  Gentamicin: R >8      -  Imipenem: S <=1      -  Levofloxacin: R >4      -  Meropenem: S <=1      -  Piperacillin/Tazobactam: S <=8      -  Tobramycin: I 8      Method Type: ARIE    Culture - Blood (collected 14 Nov 2022 19:45)  Source: .Blood Blood-Peripheral  Final Report (20 Nov 2022 03:00):    No Growth Final    Culture - Blood (collected 14 Nov 2022 19:45)  Source: .Blood Blood-Peripheral  Final Report (20 Nov 2022 03:00):    No Growth Final      COVID-19 PCR: NotDetec (11-21-22 @ 10:55)  COVID-19 PCR: NotDetec (11-07-22 @ 07:10)  COVID-19 PCR: NotDetec (10-31-22 @ 19:50)    < from: [facility] Physiol Extremity Lower 3+ Level, BI (11.19.22 @ 14:10) >  Left lower extremity: The ankle brachial index is unobtainable. The pulse   waveformsare diminished throughout.    The patient could not tolerate right posterior tibial and entire left leg   blood pressure cuff measurements.  At least a moderate peripheral artery disease was detected on the right.    < end of copied text >  < from: VA Physiol Extremity Lower 3+ Level, BI (11.19.22 @ 14:10) >  Right lower extremity: The ankle brachial index is 0.67. The pulse   waveforms are diminished in the ankle and foot.    < end of copied text >

## 2022-11-21 NOTE — PROGRESS NOTE ADULT - SUBJECTIVE AND OBJECTIVE BOX
CC: f/u for sepsis    Patient reports: he is sleepy, no focal complaints    REVIEW OF SYSTEMS:  All other review of systems negative (Comprehensive ROS): limited, has a dubois, taking some po    Antimicrobials Day #  :day 7  cefepime   IVPB 1000 milliGRAM(s) IV Intermittent <User Schedule>    Other Medications Reviewed  MEDICATIONS  (STANDING):  aMIOdarone    Tablet 200 milliGRAM(s) Oral daily  aspirin  chewable 81 milliGRAM(s) Oral daily  cefepime   IVPB 1000 milliGRAM(s) IV Intermittent <User Schedule>  furosemide    Tablet 20 milliGRAM(s) Oral daily  heparin   Injectable 5000 Unit(s) SubCutaneous every 12 hours  levothyroxine 150 MICROGram(s) Oral daily  metoprolol succinate ER 25 milliGRAM(s) Oral daily  midodrine 10 milliGRAM(s) Oral every 8 hours  pantoprazole    Tablet 40 milliGRAM(s) Oral before breakfast  sacubitril 24 mG/valsartan 26 mG 1 Tablet(s) Oral two times a day    T(F): 98.1 (11-21-22 @ 06:42), Max: 98.1 (11-21-22 @ 06:42)  HR: 92 (11-21-22 @ 06:42)  BP: 156/72 (11-21-22 @ 06:42)  RR: 18 (11-21-22 @ 06:42)  SpO2: 100% (11-21-22 @ 06:42)  Wt(kg): --    PHYSICAL EXAM:  General: sleepy, no acute distress  Eyes:  anicteric, no conjunctival injection, no discharge  Oropharynx: no lesions or injection 	  Neck: supple, without adenopathy  Lungs: clear to auscultation, decreased at bases  Heart: irregular rate and rhythm;   Abdomen: soft, nondistended, nontender, without mass or organomegaly  Skin: no rash  Extremities: no clubbing, cyanosis, or edema  Neurologic: alert, oriented, moves all extremities  Left foot with stable lateral deep tissue injury  LAB RESULTS:                        8.8    20.57 )-----------( 348      ( 21 Nov 2022 08:08 )             29.7     11-21    139  |  104  |  22  ----------------------------<  90  4.2   |  25  |  0.81    Ca    8.6      21 Nov 2022 08:08    TPro  6.2  /  Alb  2.3<L>  /  TBili  0.5  /  DBili  x   /  AST  32  /  ALT  116<H>  /  AlkPhos  179<H>  11-21    LIVER FUNCTIONS - ( 21 Nov 2022 08:08 )  Alb: 2.3 g/dL / Pro: 6.2 g/dL / ALK PHOS: 179 U/L / ALT: 116 U/L / AST: 32 U/L / GGT: x             MICROBIOLOGY:  RECENT CULTURES:      RADIOLOGY REVIEWED:  < from: US Abdomen Limited (11.20.22 @ 09:35) >  IMPRESSION:    Limited visualization of the pancreas, otherwise unremarkable right upper   quadrant ultrasound.    < end of copied text >  < from: [facility] Physiol Extremity Lower 3+ Level, BI (11.19.22 @ 14:10) >  INTERPRETATION:  Clinical Information: Peripheral vascular disease. Right   leg wound    Technique: Bilateral lower extremity ABIs/PVR    Comparison: None    Findings/  Impression:  Right lower extremity: The ankle brachial index is 0.67. The pulse   waveforms are diminished in the ankle and foot.    Left lower extremity: The ankle brachial index is unobtainable. The pulse   waveformsare diminished throughout.    The patient could not tolerate right posterior tibial and entire left leg   blood pressure cuff measurements.  At least a moderate peripheral artery disease was detected on the right.    --- End of Report ---    < from: Xray Chest 1 View- PORTABLE-Routine (11.18.22 @ 10:44) >    INTERPRETATION:  Exam:XR CHEST    clinical history:Hypoxia    Heart size is stable. Improved aeration of the right lung since November 14. No acute infiltrate appreciated. No obvious effusions.    IMPRESSION: No acute infiltrates    < end of copied text >  < end of copied text >

## 2022-11-21 NOTE — PROGRESS NOTE ADULT - SUBJECTIVE AND OBJECTIVE BOX
YUNIEL ALBERTO  [id number]      Chief complaint: Congestive heart failure/Acute renal injury/Lactic acidosis/UTI/Possible pneumonia     Interval history: The patient is resting, reports breathing is stable.     Telemetry: atrial fibrillation 80s BPM, 2 second pause      Current meds:   aMIOdarone    Tablet 200 milliGRAM(s) Oral daily  aspirin  chewable 81 milliGRAM(s) Oral daily  cefepime   IVPB 1000 milliGRAM(s) IV Intermittent <User Schedule>  furosemide    Tablet 20 milliGRAM(s) Oral daily  heparin   Injectable 5000 Unit(s) SubCutaneous every 12 hours  levothyroxine 150 MICROGram(s) Oral daily  metoprolol succinate ER 25 milliGRAM(s) Oral daily  midodrine 10 milliGRAM(s) Oral every 8 hours  pantoprazole    Tablet 40 milliGRAM(s) Oral before breakfast  sacubitril 24 mG/valsartan 26 mG 1 Tablet(s) Oral two times a day      Objective:     Vital Signs:   T(C): 36.7 (11-21-22 @ 06:42), Max: 36.7 (11-20-22 @ 20:01)  HR: 92 (11-21-22 @ 06:42) (91 - 97)  BP: 156/72 (11-21-22 @ 06:42) (110/77 - 156/72)  RR: 18 (11-21-22 @ 06:42) (17 - 18)  SpO2: 100% (11-21-22 @ 06:42) (96% - 100%)  Wt(kg): --    Physical Exam:   General: elderly man, frail, no acute distress  Neck: supple  CVS: JVP ~ 9 cm H20, irregularly irregular, s1, s2  Pulm: unlabored respirations, decreased breath sounds  Ext: no lower extremity edema b/l  Neuro: awake, alert       Labs:   21 Nov 2022 08:08    139    |  104    |  22     ----------------------------<  90     4.2     |  25     |  0.81     Ca    8.6        21 Nov 2022 08:08    TPro  6.2    /  Alb  2.3    /  TBili  0.5    /  DBili  x      /  AST  32     /  ALT  116    /  AlkPhos  179    21 Nov 2022 08:08                          8.8    20.57 )-----------( 348      ( 21 Nov 2022 08:08 )             29.7           [facility] records obtained:    TTE (9/12/22):  LVEF 20%, minor regional variation  Normal RV size and severely reduced RV systolic function  Moderate MR, Mild AR   No pericardial effusion     Coronary angiogram (10/3/22):  LIMA-LAD: patent  SVG-LCx: occluded  SVG-RCA: occluded  Recommendations: Medical therapy, EP consult       TTE (10/18/22):  LVEF 20-25%; the inferolateral wall and inferior wall appear akinetic  Moderate septal LVH  Moderately reduced RV systolic function  Severe LA enlargement  Moderate MR, Mild TR, Moderate AR  Dilated proximal ascending aorta (4.4 cm).  Large pleural effusion in the left lateral region.  No pericardial effusion     CXR (11/14/22):  Possible early CHF.    ECG (11/14/22): atrial fibrillation, anterior ST depressions (new)    TTE (11/15/22):   1. Severely decreased segmental left ventricular systolic function.   2. Left ventricular ejection fraction, by visual estimation, is 20%.   3. Severe global left ventricle hypokinesis with regional variation: the   inferolateral wall, inferior wall and basal and mid inferoseptal wall   appear akinetic.   4. Mildly increased left ventricular internal cavity size.   5. The mitral in-flow pattern reveals no discernable A-wave, therefore   no comment on diastolic function can be made.   6. There is mild septal left ventricular hypertrophy.   7. Moderately enlarged right ventricle.   8. Moderately reduced RV systolic function.   9. Moderate to severe left atrial enlargement.  10. Right atrial enlargement.  11. Mild mitral annular calcification.  12. Mild thickening and calcification of the anterior and posterior   mitral valve leaflets.  13. Severe mitral valve regurgitation.  14. Mild-moderate tricuspid regurgitation.  15. Aortic valve leaflet calcification.  16. Sclerotic aortic valve with normal opening.  17. Moderate aortic regurgitation.  18. Mild pulmonic valve regurgitation.  19. Dilated proximal ascending aorta (3.8 cm).  20. Moderately dilated pulmonary artery.  21. Estimated pulmonary artery systolic pressure is 40.4 mmHg assuming a right atrial pressure of 15 mmHg, which is consistent with mild pulmonary hypertension.  22. There is no evidence of pericardial effusion.

## 2022-11-21 NOTE — CONSULT NOTE ADULT - ASSESSMENT
O:  Ulceration right foot    P:  -Patient examined, chart reviewed  -Culture obtained   -DSD applied to right foot wound  -Continue with IV abx as per ID team  -Pending xrays   -Recommend obtaining MRI for evaluation of deep abscess and osteomyelitis   -Podiatry to follow

## 2022-11-21 NOTE — PROGRESS NOTE ADULT - ASSESSMENT
82M with PMH HTN, CAD s/p CABG, severe cardiomyopathy (TTE : EF 20%), Afib (not on AC due to hematuria), BPH, LUE DVT, b/l LLE DVT s/p IVC filter placement pt was discharged to SNF now presents to [facility] on 2022 for hypoxia and AMS, also hypotensive suspected 2/2 to septic shock, HAN, transaminitis, Lactic acidosis, UTI and CAP. Spo2 improved, currently on RA. Downgraded to tele.     #Acute hypoxic respiratory failure possibly due to CAP and decompensated systolic CHF  -Downgraded from ICU  -Spo2 appropriate on RA  -Continue PO Lasix (transitioned from IV Lasix)  -Tolerating Entresto - monitor vitals closely  -Currently on long acting beta blocker   -Continue Midodrine - plan to taper to discontinuation as BP tolerates.... will hold off on titrating today as patient with one episode of SBP < 100  -Strict I&Os, daily weights  Weight in k.6 (2022 06:42)  Weight in k.6 (2022 05:14)  Weight in k.9 (2022 05:27)  Weight in k.6 (2022 05:00)  Weight in k.5 (15 Nov 2022 02:00)  -EF: 20%    #Septic shock due to CAP and UTI in a patient with chronic Starks  #Right lateral foot wound  -Continue chronic Starks  -Currently on Midodrine - plan to wean to discontinuation as able  -Discussed with ID Dr Clement, will stop Cefepime today  -Case d/w vascular surgeon Dr. Traylor - who feels this wound necessitates podiatry eval for possible debridement  -Consult podiatry for right foot wound  -Wound care consult also previously placed    #HAN on CKD Stage 2  Likely ATN in setting of sepsis  -Cr back to baseline  -Avoid nephrotoxic medications    #Transaminitis  Likely shock liver in setting of sepsis  -Downtrending  -Avoid hepatotoxic medications  -Follow up AM CMP  -RUQ sono performed - unremarkable     #CAD s/p CABG  Positive trops likely demand in setting of of sepsis and shock  -Continue ASA  -Continue Metoprolol  -cardio following    #Chronic Afib  -Continue rate control with Metoprolol  -Continue rhythm control with Amiodarone   -Not on AC due to hematuria    #Hypothyroidism  -Continue synthroid    #Prophylactic Measure  -DVT ppx: Heparin  -PT consult-[facility]    Discussed with wife Kirsten ([phone]) aware and in agreement with above. Would like patient to go to [facility] at [facility] in [place].

## 2022-11-21 NOTE — CONSULT NOTE ADULT - SUBJECTIVE AND OBJECTIVE BOX
81y/o male seen at bedside for evaluation for right foot wound. He does not recall how long the wound has been present for. He denies any prior treatment. He states the wound is very sensitive.     HPI:  HPI: 83 y/o M w/ pmh of htn, cad s/p cabg, severe cardiomyopathy (EF 20%), Afib (not on AC due to hematuria), bph, LUE DVT, b/l LLE DVT s/p IVC filter placement pt was discharged to SNF now tonight presents to [facility] on 11/14/2022 for hypoxia and AMS, pt was started on NC with improvement in o2 and then became hypoxic again and was started on HFNC at 40L/40%, hypotensive suspected to be 2/2 to septic, HAN, transaminitis, Lactic acidosis, UA concerning for UTI and possible PNA. Pt is reported to be DNR/DNI is reported to have wanted everything else done. Pt seen and examined in the ED unable to participate in HPI due to agitation as he just keeps screaming for orange juice and not participate during interview. (14 Nov 2022 23:51)    PAST MEDICAL & SURGICAL HISTORY:  Chronic atrial fibrillation      History of cardiomyopathy      CAD (coronary artery disease)      BPH with obstruction/lower urinary tract symptoms      Hyperlipidemia      History of CVA (cerebrovascular accident)  left sided weakness      S/P CABG (coronary artery bypass graft)      11-21    139  |  104  |  22  ----------------------------<  90  4.2   |  25  |  0.81    Ca    8.6      21 Nov 2022 08:08    TPro  6.2  /  Alb  2.3<L>  /  TBili  0.5  /  DBili  x   /  AST  32  /  ALT  116<H>  /  AlkPhos  179<H>  11-21    CBC Full  -  ( 21 Nov 2022 08:08 )  WBC Count : 20.57 K/uL  RBC Count : 3.31 M/uL  Hemoglobin : 8.8 g/dL  Hematocrit : 29.7 %  Platelet Count - Automated : 348 K/uL  Mean Cell Volume : 89.7 fl  Mean Cell Hemoglobin : 26.6 pg  Mean Cell Hemoglobin Concentration : 29.6 gm/dL  Auto Neutrophil # : x  Auto Lymphocyte # : x  Auto Monocyte # : x  Auto Eosinophil # : x  Auto Basophil # : x  Auto Neutrophil % : x  Auto Lymphocyte % : x  Auto Monocyte % : x  Auto Eosinophil % : x  Auto Basophil % : x    O:   Noted is right foot wound on lateral and plantar lateral aspect. Wound appears to be fibronecrotic with macerated tissue, no active drainage noted.

## 2022-11-21 NOTE — ADVANCED PRACTICE NURSE CONSULT - ASSESSMENT
Patient admitted to CCU for Acute Respiratory Failure  Seen & examined at bedside in TELE, sleeping but arousable to voice, verbal, oriented x 2-3.  Presents with small ischemic wounds (not likely DTI's) to toes of left foot.   Distal tips of toes with small (< 1 cm) purplish discolorations, no openings in skin, toes cool to touch.  Right lateral foot with full thickness but superficial wound, extending midfoot to toes.   Wound bed pale pink, with scant to moderate yellow/green purulent drainage noted on prior dressing.  Mild erythema of periwound.  Of note SALEEM on 11/19 for RLE was 0.67, indicating moderate vascular insufficiency; LLE SALEEM unable to be obtained due to intolerance of cuff.

## 2022-11-21 NOTE — DISCHARGE NOTE NURSING/CASE MANAGEMENT/SOCIAL WORK - NSPROMEDSBROUGHTTOHOSP_GEN_A_NUR
no
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 Patient had previous umbilical hernia repair in Nov. 2017 with Dr. Angulo without mesh. Patient states that night prior to admission she was watching television when she had a sudden onset of sharp abdominal pain. The pain was sharp, focused in the umbilical region, and associated with nausea and vomiting. She also noted a new tender bulge in her umbilicus w/ erythema. She states that her last bowel movement was this morning (small) and that she has had less flatus than usual. She presented the ED for evaluation this morning. Surgery was consulted for possible incarcerated vs. strangulated ventral hernia.    In ED, patient w/ labs notable for lactate 4.1. After evaluation, patient noted to have absence of flatus for last few hours.     Patient was admitted to the surgical service and taken to the operating room same day, 10/5/18. She was found to have dusky loop of bowel within hernia that slowly reperfused after reduction. No bowel resection. Hernia was repaired primarily. PD catheter left in place.     Pt tolerated procedure well, without complication. Pt remained hemodynamically stable in the PACU and transferred to the surgical floor.     Nephrology was consulted for management of ESRD. Plan was to hold peritoneal dialysis for at least one week post operation. Alternate access was needed.   10/6: awaiting return of GI function  10/7: NGT removed; diet advanced as tolerated  10/8: Non-tunneled hemodialysis catheter placement. Successful use with HD  10/9: Complaints of subcostal pain. IVF stopped.   chest x-ray: Mild pulmonary vascular congestion with small bilateral pleural effusions with likely associated passive atelectasis.  10/10: Fluoroscopy guided conversion of non-tunneled to tunneled dialysis catheter. Successful use with HD  Heme/onc consulted for possible sickle cell crisis in setting of extreme pain.  Hematology recommended was concerned for PE given her hx of sickle cell LE dopplers and CTA chest to r/o DVT/PE were completed  10/11: CT angio: No pulmonary embolism     Pain control was transitioned from IV to PO pain meds. Pt currently ambulating, voiding, tolerating a regular diet, with pain well controlled on PO pain meds. Per Attending, pt is hemodynamically stable for discharge home to follow up as an outpatient, instructed to call to schedule appointment.

## 2022-11-22 LAB
ALBUMIN SERPL ELPH-MCNC: 2.3 G/DL — LOW (ref 3.3–5)
ALP SERPL-CCNC: 167 U/L — HIGH (ref 40–120)
ALT FLD-CCNC: 91 U/L — HIGH (ref 10–45)
ANION GAP SERPL CALC-SCNC: 8 MMOL/L — SIGNIFICANT CHANGE UP (ref 5–17)
AST SERPL-CCNC: 24 U/L — SIGNIFICANT CHANGE UP (ref 10–40)
BILIRUB SERPL-MCNC: 0.4 MG/DL — SIGNIFICANT CHANGE UP (ref 0.2–1.2)
BUN SERPL-MCNC: 29 MG/DL — HIGH (ref 7–23)
CALCIUM SERPL-MCNC: 8.7 MG/DL — SIGNIFICANT CHANGE UP (ref 8.4–10.5)
CHLORIDE SERPL-SCNC: 103 MMOL/L — SIGNIFICANT CHANGE UP (ref 96–108)
CO2 SERPL-SCNC: 28 MMOL/L — SIGNIFICANT CHANGE UP (ref 22–31)
CREAT SERPL-MCNC: 0.91 MG/DL — SIGNIFICANT CHANGE UP (ref 0.5–1.3)
EGFR: 84 ML/MIN/1.73M2 — SIGNIFICANT CHANGE UP
GLUCOSE SERPL-MCNC: 107 MG/DL — HIGH (ref 70–99)
HCT VFR BLD CALC: 28.1 % — LOW (ref 39–50)
HGB BLD-MCNC: 8.4 G/DL — LOW (ref 13–17)
MCHC RBC-ENTMCNC: 26.7 PG — LOW (ref 27–34)
MCHC RBC-ENTMCNC: 29.9 GM/DL — LOW (ref 32–36)
MCV RBC AUTO: 89.2 FL — SIGNIFICANT CHANGE UP (ref 80–100)
NRBC # BLD: 0 /100 WBCS — SIGNIFICANT CHANGE UP (ref 0–0)
PLATELET # BLD AUTO: 377 K/UL — SIGNIFICANT CHANGE UP (ref 150–400)
POTASSIUM SERPL-MCNC: 4 MMOL/L — SIGNIFICANT CHANGE UP (ref 3.5–5.3)
POTASSIUM SERPL-SCNC: 4 MMOL/L — SIGNIFICANT CHANGE UP (ref 3.5–5.3)
PROT SERPL-MCNC: 6 G/DL — SIGNIFICANT CHANGE UP (ref 6–8.3)
RBC # BLD: 3.15 M/UL — LOW (ref 4.2–5.8)
RBC # FLD: 16.9 % — HIGH (ref 10.3–14.5)
SODIUM SERPL-SCNC: 139 MMOL/L — SIGNIFICANT CHANGE UP (ref 135–145)
WBC # BLD: 21.58 K/UL — HIGH (ref 3.8–10.5)
WBC # FLD AUTO: 21.58 K/UL — HIGH (ref 3.8–10.5)

## 2022-11-22 PROCEDURE — 73630 X-RAY EXAM OF FOOT: CPT | Mod: 26,RT

## 2022-11-22 PROCEDURE — 99233 SBSQ HOSP IP/OBS HIGH 50: CPT

## 2022-11-22 RX ORDER — SACUBITRIL AND VALSARTAN 24; 26 MG/1; MG/1
1 TABLET, FILM COATED ORAL
Refills: 0 | Status: DISCONTINUED | OUTPATIENT
Start: 2022-11-22 | End: 2022-12-01

## 2022-11-22 RX ORDER — METOPROLOL TARTRATE 50 MG
25 TABLET ORAL AT BEDTIME
Refills: 0 | Status: DISCONTINUED | OUTPATIENT
Start: 2022-11-22 | End: 2022-12-01

## 2022-11-22 RX ORDER — LANOLIN ALCOHOL/MO/W.PET/CERES
3 CREAM (GRAM) TOPICAL ONCE
Refills: 0 | Status: COMPLETED | OUTPATIENT
Start: 2022-11-22 | End: 2022-11-22

## 2022-11-22 RX ORDER — FUROSEMIDE 40 MG
20 TABLET ORAL EVERY 24 HOURS
Refills: 0 | Status: DISCONTINUED | OUTPATIENT
Start: 2022-11-23 | End: 2022-12-01

## 2022-11-22 RX ORDER — FUROSEMIDE 40 MG
20 TABLET ORAL DAILY
Refills: 0 | Status: DISCONTINUED | OUTPATIENT
Start: 2022-11-22 | End: 2022-11-22

## 2022-11-22 RX ADMIN — PANTOPRAZOLE SODIUM 40 MILLIGRAM(S): 20 TABLET, DELAYED RELEASE ORAL at 06:20

## 2022-11-22 RX ADMIN — SACUBITRIL AND VALSARTAN 1 TABLET(S): 24; 26 TABLET, FILM COATED ORAL at 22:14

## 2022-11-22 RX ADMIN — Medication 3 MILLIGRAM(S): at 22:14

## 2022-11-22 RX ADMIN — Medication 25 MILLIGRAM(S): at 22:14

## 2022-11-22 RX ADMIN — AMIODARONE HYDROCHLORIDE 200 MILLIGRAM(S): 400 TABLET ORAL at 09:30

## 2022-11-22 RX ADMIN — Medication 20 MILLIGRAM(S): at 09:30

## 2022-11-22 RX ADMIN — MIDODRINE HYDROCHLORIDE 10 MILLIGRAM(S): 2.5 TABLET ORAL at 14:32

## 2022-11-22 RX ADMIN — Medication 150 MICROGRAM(S): at 06:20

## 2022-11-22 RX ADMIN — HEPARIN SODIUM 5000 UNIT(S): 5000 INJECTION INTRAVENOUS; SUBCUTANEOUS at 06:20

## 2022-11-22 RX ADMIN — HEPARIN SODIUM 5000 UNIT(S): 5000 INJECTION INTRAVENOUS; SUBCUTANEOUS at 18:52

## 2022-11-22 RX ADMIN — Medication 25 MILLIGRAM(S): at 09:31

## 2022-11-22 RX ADMIN — Medication 81 MILLIGRAM(S): at 12:06

## 2022-11-22 RX ADMIN — SACUBITRIL AND VALSARTAN 1 TABLET(S): 24; 26 TABLET, FILM COATED ORAL at 09:30

## 2022-11-22 RX ADMIN — MIDODRINE HYDROCHLORIDE 10 MILLIGRAM(S): 2.5 TABLET ORAL at 06:20

## 2022-11-22 RX ADMIN — MIDODRINE HYDROCHLORIDE 10 MILLIGRAM(S): 2.5 TABLET ORAL at 22:14

## 2022-11-22 NOTE — PROGRESS NOTE ADULT - SUBJECTIVE AND OBJECTIVE BOX
S : 82y year old Male seen at bedside for right foot ulceration.     Chief Complaint : Patient is a 82y old  Male who presents with a chief complaint of acute hypoxic resp failure (21 Nov 2022 23:54)    HPI : HPI:  HPI: 83 y/o M w/ pmh of htn, cad s/p cabg, severe cardiomyopathy (EF 20%), Afib (not on AC due to hematuria), bph, LUE DVT, b/l LLE DVT s/p IVC filter placement pt was discharged to SNF now tonight presents to [facility] on 11/14/2022 for hypoxia and AMS, pt was started on NC with improvement in o2 and then became hypoxic again and was started on HFNC at 40L/40%, hypotensive suspected to be 2/2 to septic, HAN, transaminitis, Lactic acidosis, UA concerning for UTI and possible PNA. Pt is reported to be DNR/DNI is reported to have wanted everything else done. Pt seen and examined in the ED unable to participate in HPI due to agitation as he just keeps screaming for orange juice and not participate during interview. (14 Nov 2022 23:51)      Patient admits to  (-) Fevers, (-) Chills, (-) Nausea, (-) Vomiting, (-) Shortness of Breath      PMH: Chronic atrial fibrillation    History of cardiomyopathy    CAD (coronary artery disease)    BPH with obstruction/lower urinary tract symptoms    Hyperlipidemia    History of CVA (cerebrovascular accident)      PSH:S/P CABG (coronary artery bypass graft)        Allergies:PC Pen VK (Other)      Labs:                          8.4    21.58 )-----------( 377      ( 22 Nov 2022 06:06 )             28.1     WBC Trend  21.58<H> Date (11-22 @ 06:06)  20.57<H> Date (11-21 @ 08:08)  21.27<H> Date (11-20 @ 06:23)  20.04<H> Date (11-19 @ 05:30)  17.23<H> Date (11-18 @ 06:00)  17.13<H> Date (11-17 @ 06:38)  18.04<H> Date (11-16 @ 05:34)  20.11<H> Date (11-15 @ 06:00)  22.00<H> Date (11-14 @ 19:45)  12.01<H> Date (11-07 @ 07:33)  9.90 Date (11-06 @ 06:30)  9.35 Date (11-05 @ 07:00)  10.45 Date (11-04 @ 06:55)  11.33<H> Date (11-03 @ 07:53)  12.22<H> Date (11-02 @ 06:00)  14.14<H> Date (11-01 @ 20:04)  10.88<H> Date (11-01 @ 09:25)  10.72<H> Date (10-31 @ 06:50)  12.47<H> Date (10-30 @ 19:50)  12.61<H> Date (10-30 @ 12:10)  10.97<H> Date (10-30 @ 07:53)  12.27<H> Date (10-29 @ 20:13)      Chem  11-22    139  |  103  |  29<H>  ----------------------------<  107<H>  4.0   |  28  |  0.91    Ca    8.7      22 Nov 2022 06:06    TPro  6.0  /  Alb  2.3<L>  /  TBili  0.4  /  DBili  x   /  AST  24  /  ALT  91<H>  /  AlkPhos  167<H>  11-22          T(F): 97.5 (11-22-22 @ 08:55), Max: 98.2 (11-22-22 @ 05:13)  HR: 80 (11-22-22 @ 08:55) (62 - 100)  BP: 114/72 (11-22-22 @ 08:55) (91/62 - 114/72)  RR: 18 (11-22-22 @ 08:55) (16 - 18)  SpO2: 99% (11-22-22 @ 08:55) (98% - 100%)  Wt(kg): --    O: Noted is ulceration right foot dorsolateral aspect with fibronecrotic issue. No active drainage noted.    A: Right foot ulceration      P:   Chart reviewed and Patient evaluated  Discussed diagnosis and treatment with patient  Await foot xray findings  Followup wound culture results

## 2022-11-22 NOTE — PROGRESS NOTE ADULT - SUBJECTIVE AND OBJECTIVE BOX
Patient is a 82y old  Male who presents with a chief complaint of acute hypoxic resp failure (22 Nov 2022 12:29)      Patient seen and examined at bedside. No overnight events reported. Pt states he is hungry.    ALLERGIES:  PC Pen VK (Other)    MEDICATIONS  (STANDING):  aMIOdarone    Tablet 200 milliGRAM(s) Oral daily  aspirin  chewable 81 milliGRAM(s) Oral daily  furosemide    Tablet 20 milliGRAM(s) Oral daily  heparin   Injectable 5000 Unit(s) SubCutaneous every 12 hours  levothyroxine 150 MICROGram(s) Oral daily  metoprolol succinate ER 25 milliGRAM(s) Oral daily  midodrine 10 milliGRAM(s) Oral every 8 hours  pantoprazole    Tablet 40 milliGRAM(s) Oral before breakfast  sacubitril 24 mG/valsartan 26 mG 1 Tablet(s) Oral two times a day    MEDICATIONS  (PRN):    Vital Signs Last 24 Hrs  T(F): 97.5 (22 Nov 2022 08:55), Max: 98.2 (22 Nov 2022 05:13)  HR: 80 (22 Nov 2022 08:55) (62 - 100)  BP: 114/72 (22 Nov 2022 08:55) (91/62 - 114/72)  RR: 18 (22 Nov 2022 08:55) (16 - 18)  SpO2: 99% (22 Nov 2022 08:55) (98% - 100%)  I&O's Summary    21 Nov 2022 07:01  -  22 Nov 2022 07:00  --------------------------------------------------------  IN: 200 mL / OUT: 1000 mL / NET: -800 mL    22 Nov 2022 07:01  -  22 Nov 2022 13:50  --------------------------------------------------------  IN: 0 mL / OUT: 200 mL / NET: -200 mL      PHYSICAL EXAM:  General: NAD, A/O x 2, gets confused and agitated, concerned about his food.  ENT: No gross hearing impairment, Moist mucous membranes, no thrush  Neck: Supple, No JVD  Lungs: Clear to auscultation bilaterally, good air entry, non-labored breathing  Cardio: RRR, S1/S2, No murmur  Abdomen: Soft, Nontender, Nondistended; Bowel sounds present  Extremities: No calf tenderness, No cyanosis, No pitting edema  Neuro:  alert, nonfocal    LABS:                        8.4    21.58 )-----------( 377      ( 22 Nov 2022 06:06 )             28.1     11-22    139  |  103  |  29  ----------------------------<  107  4.0   |  28  |  0.91    Ca    8.7      22 Nov 2022 06:06    TPro  6.0  /  Alb  2.3  /  TBili  0.4  /  DBili  x   /  AST  24  /  ALT  91  /  AlkPhos  167  11-22                    11-06 Chol 99 mg/dL LDL -- HDL 31 mg/dL Trig 81 mg/dL                      COVID-19 PCR: NotDetec (11-21-22 @ 10:55)  COVID-19 PCR: NotDetec (11-07-22 @ 07:10)  COVID-19 PCR: NotDetec (10-31-22 @ 19:50)    RADIOLOGY & ADDITIONAL TESTS:    Care Discussed with Consultants/Other Providers:

## 2022-11-22 NOTE — PROGRESS NOTE ADULT - ASSESSMENT
82M with PMH HTN, CAD s/p CABG, severe cardiomyopathy (TTE : EF 20%), Afib (not on AC due to hematuria), BPH, LUE DVT, b/l LLE DVT s/p IVC filter placement pt was discharged to SNF now presents to [facility] on 2022 for hypoxia and AMS, also hypotensive suspected 2/2 to septic shock, HAN, transaminitis, Lactic acidosis, UTI and CAP. Spo2 improved, currently on RA. Downgraded to tele.     #Acute hypoxic respiratory failure possibly due to CAP and decompensated systolic CHF  -Downgraded from ICU  -Spo2 appropriate on RA  -Continue PO Lasix (transitioned from IV Lasix)  -Tolerating Entresto - monitor vitals closely  -Currently on long acting beta blocker   -Continue Midodrine - plan to taper to discontinuation as BP tolerates....which are improving  -Strict I&Os, daily weights  Weight in k.6 (2022 06:42)  Weight in k.6 (2022 05:14)  Weight in k.9 (2022 05:27)  Weight in k.6 (2022 05:00)  Weight in k.5 (15 Nov 2022 02:00)  -EF: 20%    #Septic shock due to CAP and UTI in a patient with chronic Starks; resolved  #Right foot ulceration  -Continue chronic Starks  -per ID Dr Clement, Cefepime stopped  -Case d/w vascular surgeon Dr. Traylor - who advises podiatry eval for possible debridement  -Podiatry noted; await xray reading of right foot, continue local wound care for now  -Wound care consult also previously placed    #Leukocytosis  -wbc still trending up 21.58 today  -pt afebrile  -cont to monitor    #HAN on CKD Stage 2  Likely ATN in setting of sepsis  -Cr back to baseline  -Avoid nephrotoxic medications    #Transaminitis  Likely shock liver in setting of sepsis  -Downtrending  -Avoid hepatotoxic medications  -RUQ sono performed - unremarkable     #CAD s/p CABG  Positive trops likely demand in setting of of sepsis and shock  -Continue ASA  -Continue Metoprolol  -cardio following    #Chronic Afib  -Continue rate control with Metoprolol  -Continue rhythm control with Amiodarone   -Not on AC due to hematuria    #Hypothyroidism  -Continue synthroid    #Prophylactic Measure  -DVT ppx: Heparin  -PT consult-[facility]    GOC:  DNR    Dispo:  updated wife Kirsten ([phone]) on plan of care.  Would like patient to go to [facility] at [facility].

## 2022-11-23 LAB
ANION GAP SERPL CALC-SCNC: 6 MMOL/L — SIGNIFICANT CHANGE UP (ref 5–17)
BUN SERPL-MCNC: 25 MG/DL — HIGH (ref 7–23)
CALCIUM SERPL-MCNC: 8.8 MG/DL — SIGNIFICANT CHANGE UP (ref 8.4–10.5)
CHLORIDE SERPL-SCNC: 102 MMOL/L — SIGNIFICANT CHANGE UP (ref 96–108)
CO2 SERPL-SCNC: 28 MMOL/L — SIGNIFICANT CHANGE UP (ref 22–31)
CREAT SERPL-MCNC: 0.71 MG/DL — SIGNIFICANT CHANGE UP (ref 0.5–1.3)
EGFR: 91 ML/MIN/1.73M2 — SIGNIFICANT CHANGE UP
GLUCOSE SERPL-MCNC: 96 MG/DL — SIGNIFICANT CHANGE UP (ref 70–99)
HCT VFR BLD CALC: 27.9 % — LOW (ref 39–50)
HGB BLD-MCNC: 8.6 G/DL — LOW (ref 13–17)
MAGNESIUM SERPL-MCNC: 1.8 MG/DL — SIGNIFICANT CHANGE UP (ref 1.6–2.6)
MCHC RBC-ENTMCNC: 27 PG — SIGNIFICANT CHANGE UP (ref 27–34)
MCHC RBC-ENTMCNC: 30.8 GM/DL — LOW (ref 32–36)
MCV RBC AUTO: 87.7 FL — SIGNIFICANT CHANGE UP (ref 80–100)
NRBC # BLD: 0 /100 WBCS — SIGNIFICANT CHANGE UP (ref 0–0)
PLATELET # BLD AUTO: 393 K/UL — SIGNIFICANT CHANGE UP (ref 150–400)
POTASSIUM SERPL-MCNC: 4.2 MMOL/L — SIGNIFICANT CHANGE UP (ref 3.5–5.3)
POTASSIUM SERPL-SCNC: 4.2 MMOL/L — SIGNIFICANT CHANGE UP (ref 3.5–5.3)
RBC # BLD: 3.18 M/UL — LOW (ref 4.2–5.8)
RBC # FLD: 16.8 % — HIGH (ref 10.3–14.5)
SODIUM SERPL-SCNC: 136 MMOL/L — SIGNIFICANT CHANGE UP (ref 135–145)
WBC # BLD: 17.47 K/UL — HIGH (ref 3.8–10.5)
WBC # FLD AUTO: 17.47 K/UL — HIGH (ref 3.8–10.5)

## 2022-11-23 PROCEDURE — 99233 SBSQ HOSP IP/OBS HIGH 50: CPT

## 2022-11-23 RX ORDER — MIDODRINE HYDROCHLORIDE 2.5 MG/1
10 TABLET ORAL
Refills: 0 | Status: DISCONTINUED | OUTPATIENT
Start: 2022-11-23 | End: 2022-11-26

## 2022-11-23 RX ORDER — ONDANSETRON 8 MG/1
4 TABLET, FILM COATED ORAL EVERY 6 HOURS
Refills: 0 | Status: DISCONTINUED | OUTPATIENT
Start: 2022-11-23 | End: 2022-12-01

## 2022-11-23 RX ORDER — LANOLIN ALCOHOL/MO/W.PET/CERES
6 CREAM (GRAM) TOPICAL AT BEDTIME
Refills: 0 | Status: DISCONTINUED | OUTPATIENT
Start: 2022-11-23 | End: 2022-12-01

## 2022-11-23 RX ADMIN — HEPARIN SODIUM 5000 UNIT(S): 5000 INJECTION INTRAVENOUS; SUBCUTANEOUS at 17:53

## 2022-11-23 RX ADMIN — MIDODRINE HYDROCHLORIDE 10 MILLIGRAM(S): 2.5 TABLET ORAL at 06:29

## 2022-11-23 RX ADMIN — Medication 20 MILLIGRAM(S): at 08:02

## 2022-11-23 RX ADMIN — SACUBITRIL AND VALSARTAN 1 TABLET(S): 24; 26 TABLET, FILM COATED ORAL at 06:29

## 2022-11-23 RX ADMIN — SACUBITRIL AND VALSARTAN 1 TABLET(S): 24; 26 TABLET, FILM COATED ORAL at 17:53

## 2022-11-23 RX ADMIN — MIDODRINE HYDROCHLORIDE 10 MILLIGRAM(S): 2.5 TABLET ORAL at 17:53

## 2022-11-23 RX ADMIN — Medication 81 MILLIGRAM(S): at 11:28

## 2022-11-23 RX ADMIN — PANTOPRAZOLE SODIUM 40 MILLIGRAM(S): 20 TABLET, DELAYED RELEASE ORAL at 06:29

## 2022-11-23 RX ADMIN — Medication 25 MILLIGRAM(S): at 21:32

## 2022-11-23 RX ADMIN — AMIODARONE HYDROCHLORIDE 200 MILLIGRAM(S): 400 TABLET ORAL at 06:29

## 2022-11-23 RX ADMIN — Medication 150 MICROGRAM(S): at 06:29

## 2022-11-23 RX ADMIN — Medication 6 MILLIGRAM(S): at 21:37

## 2022-11-23 NOTE — PROGRESS NOTE ADULT - SUBJECTIVE AND OBJECTIVE BOX
Patient is a 82y old  Male who presents with a chief complaint of acute hypoxic resp failure (23 Nov 2022 11:11)      Patient seen and examined at bedside. No overnight events reported.  He has no complaints, just is hungry.    ALLERGIES:  PC Pen VK (Other)    MEDICATIONS  (STANDING):  aMIOdarone    Tablet 200 milliGRAM(s) Oral daily  aspirin  chewable 81 milliGRAM(s) Oral daily  furosemide    Tablet 20 milliGRAM(s) Oral every 24 hours  heparin   Injectable 5000 Unit(s) SubCutaneous every 12 hours  levothyroxine 150 MICROGram(s) Oral daily  metoprolol succinate ER 25 milliGRAM(s) Oral at bedtime  midodrine 10 milliGRAM(s) Oral every 8 hours  pantoprazole    Tablet 40 milliGRAM(s) Oral before breakfast  sacubitril 24 mG/valsartan 26 mG 1 Tablet(s) Oral two times a day    MEDICATIONS  (PRN):    Vital Signs Last 24 Hrs  T(F): 98.2 (23 Nov 2022 05:25), Max: 98.2 (23 Nov 2022 05:25)  HR: 60 (23 Nov 2022 08:00) (60 - 97)  BP: 101/57 (23 Nov 2022 08:00) (94/63 - 117/66)  RR: 18 (23 Nov 2022 08:00) (14 - 18)  SpO2: 98% (23 Nov 2022 05:25) (94% - 98%)  I&O's Summary    22 Nov 2022 07:01  -  23 Nov 2022 07:00  --------------------------------------------------------  IN: 0 mL / OUT: 1250 mL / NET: -1250 mL      PHYSICAL EXAM:  General: NAD, A/O x 2, poor hygiene.  ENT: No gross hearing impairment, Moist mucous membranes, no thrush  Neck: Supple, No JVD  Lungs:  good air entry, non-labored breathing, decreased breath sounds  Cardio: RRR, S1/S2, No murmur  Abdomen: Soft, Nontender, Nondistended; Bowel sounds present  Extremities: right foot dressed  Neuro:  alert, confused    LABS:                        8.6    17.47 )-----------( 393      ( 23 Nov 2022 06:30 )             27.9     11-23    136  |  102  |  25  ----------------------------<  96  4.2   |  28  |  0.71    Ca    8.8      23 Nov 2022 06:30  Mg     1.8     11-23    TPro  6.0  /  Alb  2.3  /  TBili  0.4  /  DBili  x   /  AST  24  /  ALT  91  /  AlkPhos  167  11-22 11-06 Chol 99 mg/dL LDL -- HDL 31 mg/dL Trig 81 mg/dL                      Culture - Surgical Swab (collected 21 Nov 2022 19:30)  Source: .Surgical Swab Surgical Swab  Preliminary Report (23 Nov 2022 11:40):    Moderate Enterococcus faecalis    Moderate Staphylococcus aureus      COVID-19 PCR: NotDetec (11-21-22 @ 10:55)  COVID-19 PCR: NotDetec (11-07-22 @ 07:10)  COVID-19 PCR: NotDetec (10-31-22 @ 19:50)    RADIOLOGY & ADDITIONAL TESTS:  < from: Xray Foot AP + Lateral + Oblique, Right (11.22.22 @ 09:15) >    INTERPRETATION:  Left foot. 3 views. Patient has a wound laterally.    Fluid is rather free of degeneration. No bone destruction or fracture is   evident. No soft tissue air is seen.    IMPRESSION: No acute finding.    < end of copied text >    Care Discussed with Consultants/Other Providers:

## 2022-11-23 NOTE — PROGRESS NOTE ADULT - ASSESSMENT
A: Right foot ulceration.    P:  1. Patient evaluated  2. Wound cleansed with normal saline and Aquacel Ag and DSD applied.   3. Continue antibiotics per ID  4. Strict decub protocol  5. Awaiting xray report.   6. Continue local wound care.

## 2022-11-23 NOTE — PROGRESS NOTE ADULT - SUBJECTIVE AND OBJECTIVE BOX
Patient seen and examined at bedside.    CBC Full  -  ( 23 Nov 2022 06:30 )  WBC Count : 17.47 K/uL  RBC Count : 3.18 M/uL  Hemoglobin : 8.6 g/dL  Hematocrit : 27.9 %  Platelet Count - Automated : 393 K/uL  Mean Cell Volume : 87.7 fl  Mean Cell Hemoglobin : 27.0 pg  Mean Cell Hemoglobin Concentration : 30.8 gm/dL  Auto Neutrophil # : x  Auto Lymphocyte # : x  Auto Monocyte # : x  Auto Eosinophil # : x  Auto Basophil # : x  Auto Neutrophil % : x  Auto Lymphocyte % : x  Auto Monocyte % : x  Auto Eosinophil % : x  Auto Basophil % : x      Vital Signs Last 24 Hrs  T(C): 36.8 (23 Nov 2022 05:25), Max: 36.8 (23 Nov 2022 05:25)  T(F): 98.2 (23 Nov 2022 05:25), Max: 98.2 (23 Nov 2022 05:25)  HR: 65 (23 Nov 2022 05:25) (65 - 97)  BP: 113/63 (23 Nov 2022 05:25) (94/63 - 117/66)  BP(mean): --  RR: 16 (23 Nov 2022 05:25) (14 - 16)  SpO2: 98% (23 Nov 2022 05:25) (94% - 98%)    Parameters below as of 23 Nov 2022 05:25  Patient On (Oxygen Delivery Method): room air

## 2022-11-23 NOTE — PROGRESS NOTE ADULT - ASSESSMENT
82M with PMH HTN, CAD s/p CABG, severe cardiomyopathy (TTE 11/22: EF 20%), Afib (not on AC due to hematuria), BPH, LUE DVT, b/l LLE DVT s/p IVC filter placement pt was discharged to SNF now presents to [facility] on 11/14/2022 for hypoxia and AMS, also hypotensive suspected 2/2 to septic shock, HAN, transaminitis, Lactic acidosis, UTI and CAP. Spo2 improved, currently on RA. Downgraded to tele.     #Acute hypoxic respiratory failure possibly due to CAP and decompensated systolic CHF  #Hypotension  -Spo2 appropriate on RA  -Continue PO Lasix (transitioned from IV Lasix)  -Tolerating Entresto - monitor vitals closely  -Currently on long acting beta blocker   -Continue Midodrine - plan to taper to discontinuation as BP tolerates....which are improving  -Strict I&Os, daily weights  -Echo done; EF: 20%    #Septic shock due to CAP and UTI in a patient with chronic Starks; resolved  #Right foot ulceration  -Continue chronic Starks  -per ID Dr Clement, Cefepime stopped  -Case d/w vascular surgeon Dr. Traylor - who advises podiatry eval for possible debridement  -Podiatry noted; XR of right foot with no bony destruction or fx continue local wound care for now  -Wound care consulted for local wound orders    #Leukocytosis; improving  -follow cbc  -pt afebrile  -cont to monitor    #HAN on CKD Stage 2  Likely ATN in setting of sepsis  -Cr back to baseline  -Avoid nephrotoxic medications    #Transaminitis  Likely shock liver in setting of sepsis  -Downtrending  -Avoid hepatotoxic medications  -RUQ sono performed - unremarkable     #CAD s/p CABG  Positive trops likely demand in setting of of sepsis and shock  -Continue ASA  -Continue Metoprolol  -cardio following    #Chronic Afib  -Continue rate control with Metoprolol  -Continue rhythm control with Amiodarone   -Not on AC due to hematuria    #Hypothyroidism  -Continue synthroid    #Prophylactic Measure  -DVT ppx: Heparin  -PT consult-[facility]    GOC:  DNR    Dispo:  updated wife Kirsten ([phone]) on plan of care.  Would like patient to go to [facility].

## 2022-11-23 NOTE — PROGRESS NOTE ADULT - ASSESSMENT
Patient is a 82y male with a past history of HTN, CAD, low Ef(20%), A fib, BPH,CVA with left sided weakness,  DVT of B/L lower extremities, IVC filter, and chronic dubois who was admitted 11/14 with hypoxic respiratory failure secondary to sepsis.  He was covered with cefepime and has been stabilized. He is a DNR/DNI but would like medical measures.  His blood cultures have been negative, he remains confused, but he has been hemodynamically stable and was moved to floor today.  He is confused, unable to give a history. He has a persistent leukocytosis which is of concern.  While he has made some progress in that he is hemodynamically stable, his respiratory status has improved, and he is tolerating a diet, his elevated wbc has not moderated.RUQ sonogram noted, negative study.  He has a persistent leukocytosis without clear explanation.He has  reduced blood flow to legs, an ischemic appearing tissue injury to left foot, and his arterial studies show vascular disease.  7 days of cefepime stopped 11/21. He has been stable off antibiotics.  Plain films of foot negative  Suggest:  1. 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 Advise surveillance blood cultures today  given ongoing unexplained leukocytosis  4.Long term prognosis of left foot may be guarded. He does not appear to be candidate for major interventions, vascular and podiatry are evaluating rt foot.  5.[place] guarded.

## 2022-11-23 NOTE — PROGRESS NOTE ADULT - SUBJECTIVE AND OBJECTIVE BOX
CC: f/u for resolved "sepsis:    Patient reportsHe is confused, no meaningful info.    REVIEW OF SYSTEMS:  All other review of systems negative (Comprehensive ROS): limited by condition    Antimicrobials Day #  :off    Other Medications Reviewed    T(F): 97.3 (11-23-22 @ 16:24), Max: 98.2 (11-23-22 @ 05:25)  HR: 75 (11-23-22 @ 16:24)  BP: 99/61 (11-23-22 @ 16:24)  RR: 18 (11-23-22 @ 16:24)  SpO2: 98% (11-23-22 @ 05:25)  Wt(kg): --    PHYSICAL EXAM:  General: alert, no acute distress, chronically ill appearing  Eyes:  anicteric, no conjunctival injection, no discharge  Oropharynx: no lesions or injection 	  Neck: supple, without adenopathy  Lungs: clear to auscultation  Heart: irregular rate and rhythm; no murmur, rubs or gallops  Abdomen: soft, nondistended, nontender, without mass or organomegaly  Skin: RT foot with lateral wound  Extremities: no clubbing, cyanosis, or edema  Neurologic: confused, left side weak   dubois    LAB RESULTS:                        8.6    17.47 )-----------( 393      ( 23 Nov 2022 06:30 )             27.9     11-23    136  |  102  |  25<H>  ----------------------------<  96  4.2   |  28  |  0.71    Ca    8.8      23 Nov 2022 06:30  Mg     1.8     11-23    TPro  6.0  /  Alb  2.3<L>  /  TBili  0.4  /  DBili  x   /  AST  24  /  ALT  91<H>  /  AlkPhos  167<H>  11-22    LIVER FUNCTIONS - ( 22 Nov 2022 06:06 )  Alb: 2.3 g/dL / Pro: 6.0 g/dL / ALK PHOS: 167 U/L / ALT: 91 U/L / AST: 24 U/L / GGT: x             MICROBIOLOGY:  RECENT CULTURES:  11-21 @ 19:30 .Surgical Swab Surgical Swab     Moderate Enterococcus faecalis  Moderate Staphylococcus aureus          RADIOLOGY REVIEWED:    < from: Xray Foot AP + Lateral + Oblique, Right (11.22.22 @ 09:15) >  IMPRESSION: No acute finding.    < end of copied text >

## 2022-11-24 LAB
-  AMPICILLIN/SULBACTAM: SIGNIFICANT CHANGE UP
-  CEFAZOLIN: SIGNIFICANT CHANGE UP
-  CLINDAMYCIN: SIGNIFICANT CHANGE UP
-  DAPTOMYCIN: SIGNIFICANT CHANGE UP
-  ERYTHROMYCIN: SIGNIFICANT CHANGE UP
-  GENTAMICIN: SIGNIFICANT CHANGE UP
-  LINEZOLID: SIGNIFICANT CHANGE UP
-  OXACILLIN: SIGNIFICANT CHANGE UP
-  PENICILLIN: SIGNIFICANT CHANGE UP
-  RIFAMPIN: SIGNIFICANT CHANGE UP
-  TETRACYCLINE: SIGNIFICANT CHANGE UP
-  TRIMETHOPRIM/SULFAMETHOXAZOLE: SIGNIFICANT CHANGE UP
-  VANCOMYCIN: SIGNIFICANT CHANGE UP
ALBUMIN SERPL ELPH-MCNC: 2.3 G/DL — LOW (ref 3.3–5)
ALP SERPL-CCNC: 152 U/L — HIGH (ref 40–120)
ALT FLD-CCNC: 83 U/L — HIGH (ref 10–45)
ANION GAP SERPL CALC-SCNC: 5 MMOL/L — SIGNIFICANT CHANGE UP (ref 5–17)
AST SERPL-CCNC: 31 U/L — SIGNIFICANT CHANGE UP (ref 10–40)
BILIRUB SERPL-MCNC: 0.4 MG/DL — SIGNIFICANT CHANGE UP (ref 0.2–1.2)
BUN SERPL-MCNC: 22 MG/DL — SIGNIFICANT CHANGE UP (ref 7–23)
CALCIUM SERPL-MCNC: 8.7 MG/DL — SIGNIFICANT CHANGE UP (ref 8.4–10.5)
CHLORIDE SERPL-SCNC: 102 MMOL/L — SIGNIFICANT CHANGE UP (ref 96–108)
CO2 SERPL-SCNC: 30 MMOL/L — SIGNIFICANT CHANGE UP (ref 22–31)
CREAT SERPL-MCNC: 0.95 MG/DL — SIGNIFICANT CHANGE UP (ref 0.5–1.3)
EGFR: 79 ML/MIN/1.73M2 — SIGNIFICANT CHANGE UP
GLUCOSE SERPL-MCNC: 100 MG/DL — HIGH (ref 70–99)
HCT VFR BLD CALC: 31.2 % — LOW (ref 39–50)
HGB BLD-MCNC: 9.3 G/DL — LOW (ref 13–17)
MAGNESIUM SERPL-MCNC: 1.9 MG/DL — SIGNIFICANT CHANGE UP (ref 1.6–2.6)
MCHC RBC-ENTMCNC: 26.5 PG — LOW (ref 27–34)
MCHC RBC-ENTMCNC: 29.8 GM/DL — LOW (ref 32–36)
MCV RBC AUTO: 88.9 FL — SIGNIFICANT CHANGE UP (ref 80–100)
METHOD TYPE: SIGNIFICANT CHANGE UP
NRBC # BLD: 0 /100 WBCS — SIGNIFICANT CHANGE UP (ref 0–0)
PLATELET # BLD AUTO: 487 K/UL — HIGH (ref 150–400)
POTASSIUM SERPL-MCNC: 4.1 MMOL/L — SIGNIFICANT CHANGE UP (ref 3.5–5.3)
POTASSIUM SERPL-SCNC: 4.1 MMOL/L — SIGNIFICANT CHANGE UP (ref 3.5–5.3)
PROT SERPL-MCNC: 6.3 G/DL — SIGNIFICANT CHANGE UP (ref 6–8.3)
RBC # BLD: 3.51 M/UL — LOW (ref 4.2–5.8)
RBC # FLD: 17.1 % — HIGH (ref 10.3–14.5)
SODIUM SERPL-SCNC: 137 MMOL/L — SIGNIFICANT CHANGE UP (ref 135–145)
WBC # BLD: 18.66 K/UL — HIGH (ref 3.8–10.5)
WBC # FLD AUTO: 18.66 K/UL — HIGH (ref 3.8–10.5)

## 2022-11-24 PROCEDURE — 99233 SBSQ HOSP IP/OBS HIGH 50: CPT

## 2022-11-24 RX ORDER — VANCOMYCIN HCL 1 G
750 VIAL (EA) INTRAVENOUS EVERY 12 HOURS
Refills: 0 | Status: DISCONTINUED | OUTPATIENT
Start: 2022-11-24 | End: 2022-11-27

## 2022-11-24 RX ADMIN — Medication 20 MILLIGRAM(S): at 08:50

## 2022-11-24 RX ADMIN — SACUBITRIL AND VALSARTAN 1 TABLET(S): 24; 26 TABLET, FILM COATED ORAL at 05:53

## 2022-11-24 RX ADMIN — MIDODRINE HYDROCHLORIDE 10 MILLIGRAM(S): 2.5 TABLET ORAL at 05:53

## 2022-11-24 RX ADMIN — Medication 150 MICROGRAM(S): at 05:55

## 2022-11-24 RX ADMIN — AMIODARONE HYDROCHLORIDE 200 MILLIGRAM(S): 400 TABLET ORAL at 05:53

## 2022-11-24 RX ADMIN — Medication 81 MILLIGRAM(S): at 13:05

## 2022-11-24 RX ADMIN — PANTOPRAZOLE SODIUM 40 MILLIGRAM(S): 20 TABLET, DELAYED RELEASE ORAL at 06:15

## 2022-11-24 RX ADMIN — Medication 250 MILLIGRAM(S): at 20:55

## 2022-11-24 RX ADMIN — Medication 6 MILLIGRAM(S): at 21:18

## 2022-11-24 NOTE — PROGRESS NOTE ADULT - ASSESSMENT
82M with PMH HTN, CAD s/p CABG, severe cardiomyopathy (TTE 11/22: EF 20%), Afib (not on AC due to hematuria), BPH, LUE DVT, b/l LLE DVT s/p IVC filter placement pt was discharged to SNF now presents to [facility] on 11/14/2022 for hypoxia and AMS, also hypotensive suspected 2/2 to septic shock, HAN, transaminitis, Lactic acidosis, UTI and CAP. Spo2 improved, currently on RA. Downgraded to tele.     #Acute hypoxic respiratory failure due to CAP and decompensated systolic CHF  - Currently tolerating room air   - TTE with EF 20%  - C/w toprol, entresto, lasix, amiodarone  - Hold farxiga/sprionolactone for hypotension   - Will titrate down Midodrine to q12hrs to see if he tolerates, will see if we can potentially titrate off  - Strict I&Os, daily weights    #Septic shock due to CAP and UTI in a patient with chronic dubois; resolved  #Persistent leukocytosis  - Hypotension improving  - Completed cefepime 7 day course on 11/21  - Noted persistent leukocytosis - will send surveillance blood cultures as per ID    #Right foot ulceration  - Case d/w vascular surgeon Dr. Traylor - who advised podiatry eval for possible debridement  - Podiatry noted; XR of right foot with no bony destruction or fx continue local wound care for now  - Wound care consulted for local wound orders  - Continue local wound care.     #HAN on CKD Stage 2  - Likely ATN in setting of sepsis  - Cr back to baseline  - Avoid nephrotoxic medications  - Continue chronic dubois?    #Transaminitis  - Likely shock liver in setting of sepsis  - Downtrending  - Avoid hepatotoxic medications  - RUQ sono performed - unremarkable     #CAD s/p CABG  - Positive trops likely demand in setting of sepsis and shock  - Continue ASA  - Continue Metoprolol  - Cardiology consulted     #Chronic Afib  - Continue rate control with Metoprolol  - Continue rhythm control with Amiodarone   - Not on AC due to hematuria    #Hypothyroidism  - Continue synthroid    #DVT ppx  - Heparin    GOC: DNR    Dispo: PT recommending [facility], family would prefer [facility] in [place].     Wife Kirsten ([phone])    82M with PMH HTN, CAD s/p CABG, severe cardiomyopathy (TTE 11/22: EF 20%), Afib (not on AC due to hematuria), BPH, LUE DVT, b/l LLE DVT s/p IVC filter placement pt was discharged to SNF now presents to [facility] on 11/14/2022 for hypoxia and AMS, also hypotensive suspected 2/2 to septic shock, HAN, transaminitis, Lactic acidosis, UTI and CAP. Spo2 improved, currently on RA. Downgraded to tele.     #Acute hypoxic respiratory failure due to CAP and decompensated systolic CHF  - Currently tolerating room air   - TTE with EF 20%  - C/w toprol, entresto, lasix, amiodarone  - Hold farxiga/sprionolactone for hypotension   - Midodrine titrated down to q12hrs, continue to titrate to off as tolerated   - Strict I&Os, daily weights    #Septic shock due to CAP and UTI in a patient with chronic dubois; resolved  #Persistent leukocytosis  - Hypotension improving  - Completed cefepime 7 day course on 11/21  - Noted persistent leukocytosis - will send surveillance blood cultures as per ID    #Right foot ulceration  - Case d/w vascular surgeon Dr. Traylor - who advised podiatry eval for possible debridement  - Podiatry noted; XR of right foot with no bony destruction or fx. Continue local wound care for now  - Wound care consulted for local wound orders  - Continue local wound care.   - f/u wound culture    #HAN on CKD Stage 2  - Likely ATN in setting of sepsis  - Cr back to baseline  - Avoid nephrotoxic medications  - Continue chronic dubois    #Transaminitis  - Likely shock liver in setting of sepsis  - Downtrending  - Avoid hepatotoxic medications  - RUQ sono performed - unremarkable     #CAD s/p CABG  - Positive trops likely demand in setting of sepsis and shock  - Continue ASA  - Continue Metoprolol  - Cardiology consulted     #Chronic Afib  - Continue rate control with Metoprolol  - Continue rhythm control with Amiodarone   - Not on AC due to hematuria    #Hypothyroidism  - Continue synthroid    #DVT ppx  - Heparin    GOC: DNR    Dispo: PT recommending [facility], family would prefer [facility] in [place].  Pending ??podiatry clearance, also surveillance blood cultures    Wife Kirsten ([phone])

## 2022-11-24 NOTE — PROGRESS NOTE ADULT - ASSESSMENT
82y male with a past history of HTN, CAD, low Ef(20%), A fib, BPH,CVA with left sided weakness,  DVT of B/L lower extremities, IVC filter, and chronic dubois who was admitted 11/14 with hypoxic respiratory failure secondary to sepsis.  He was covered with cefepime and stabilized.   He is a DNR/DNI but would like medical measures.  His blood cultures have been negative.  He is confused, unable to give a history. He has a persistent leukocytosis.  While he has made some progress in that he is hemodynamically stable, his respiratory status improved, and he is tolerating a diet, his elevated wbc has not moderated. RUQ sonogram- negative study.  He has reduced blood flow to legs, an ischemic appearing tissue injury to left foot, and his arterial studies show vascular disease.  7 days of cefepime stopped 11/21. He has been stable off antibiotics.  Plain films of foot negative  Per podiatry, foot wound without active drainage & has fibronecrotic tissue, then unclear why a wound swab was submitted on 11/21/22, now isolating Staph aureus & enterococcus ? clinical significance, pt refuses foot exam by me & reports that he is waiting for his podiatrist    Suggest:  1. Will start IV vancomycin  2. Follow surveillance blood cultures given ongoing unexplained leukocytosis  3. Long term prognosis of foot may be guarded. He does not appear to be candidate for major interventions.  4. Await podiatry follow up - given new wound cx now & additional plans  5.[place] guarded.

## 2022-11-24 NOTE — PROGRESS NOTE ADULT - SUBJECTIVE AND OBJECTIVE BOX
Patient is a 82y old  Male who presents with a chief complaint of acute hypoxic resp failure (24 Nov 2022 07:27)    Patient seen and examined at bedside. No overnight events reported. Patient c/o foot pain, otherwise no complaints at this time.    ALLERGIES:  PC Pen VK (Other)    MEDICATIONS  (STANDING):  aMIOdarone    Tablet 200 milliGRAM(s) Oral daily  aspirin  chewable 81 milliGRAM(s) Oral daily  furosemide    Tablet 20 milliGRAM(s) Oral every 24 hours  heparin   Injectable 5000 Unit(s) SubCutaneous every 12 hours  levothyroxine 150 MICROGram(s) Oral daily  melatonin 6 milliGRAM(s) Oral at bedtime  metoprolol succinate ER 25 milliGRAM(s) Oral at bedtime  midodrine. 10 milliGRAM(s) Oral <User Schedule>  pantoprazole    Tablet 40 milliGRAM(s) Oral before breakfast  sacubitril 24 mG/valsartan 26 mG 1 Tablet(s) Oral two times a day    MEDICATIONS  (PRN):  ondansetron Injectable 4 milliGRAM(s) IV Push every 6 hours PRN Nausea and/or Vomiting    Vital Signs Last 24 Hrs  T(F): 98.4 (24 Nov 2022 05:28), Max: 98.4 (24 Nov 2022 05:28)  HR: 84 (24 Nov 2022 05:28) (72 - 84)  BP: 100/62 (24 Nov 2022 05:28) (99/61 - 106/66)  RR: 16 (24 Nov 2022 05:28) (16 - 18)  SpO2: 97% (24 Nov 2022 05:28) (96% - 97%)    I&O's Summary  24 Nov 2022 07:01  -  24 Nov 2022 09:01  --------------------------------------------------------  IN: 0 mL / OUT: 400 mL / NET: -400 mL    PHYSICAL EXAM:  General: NAD, A/O x 3  ENT: No gross hearing impairment, Moist mucous membranes, no thrush  Neck: Supple, No JVD  Lungs: Clear to auscultation bilaterally, good air entry, non-labored breathing  Cardio: RRR, S1/S2, No murmur  Abdomen: Soft, Nontender, Nondistended; Bowel sounds present  Extremities: No calf tenderness, No cyanosis, right foot dressed   Psych: Appropriate mood and affect    LABS:                        9.3    18.66 )-----------( 487      ( 24 Nov 2022 07:15 )             31.2     11-24    137  |  102  |  22  ----------------------------<  100  4.1   |  30  |  0.95    Ca    8.7      24 Nov 2022 07:15  Mg     1.9     11-24    TPro  6.3  /  Alb  2.3  /  TBili  0.4  /  DBili  x   /  AST  31  /  ALT  83  /  AlkPhos  152  11-24 11-06 Chol 99 mg/dL LDL -- HDL 31 mg/dL Trig 81 mg/dL  Culture - Surgical Swab (collected 21 Nov 2022 19:30)  Source: .Surgical Swab Surgical Swab  Preliminary Report (23 Nov 2022 11:40):    Moderate Enterococcus faecalis    Moderate Staphylococcus aureus    COVID-19 PCR: NotDetec (11-21-22 @ 10:55)  COVID-19 PCR: NotDetec (11-07-22 @ 07:10)  COVID-19 PCR: NotDetec (10-31-22 @ 19:50)    RADIOLOGY & ADDITIONAL TESTS:    Care Discussed with Consultants/Other Providers:

## 2022-11-24 NOTE — PROGRESS NOTE ADULT - SUBJECTIVE AND OBJECTIVE BOX
S : 82y year old Male seen at bedside for Right foot eschar.  Patient is in NAD. Patient denies any pain to the right foot at this moment.    Chief Complaint : Patient is a 82y old  Male who presents with a chief complaint of acute hypoxic resp failure (24 Nov 2022 11:03)    HPI :   HPI: 83 y/o M w/ pmh of htn, cad s/p cabg, severe cardiomyopathy (EF 20%), Afib (not on AC due to hematuria), bph, LUE DVT, b/l LLE DVT s/p IVC filter placement pt was discharged to SNF now tonight presents to [facility] on 11/14/2022 for hypoxia and AMS, pt was started on NC with improvement in o2 and then became hypoxic again and was started on HFNC at 40L/40%, hypotensive suspected to be 2/2 to septic, HAN, transaminitis, Lactic acidosis, UA concerning for UTI and possible PNA. Pt is reported to be DNR/DNI is reported to have wanted everything else done. Pt seen and examined in the ED unable to participate in HPI due to agitation as he just keeps screaming for orange juice and not participate during interview. (14 Nov 2022 23:51)      Patient admits to  (-) Fevers, (-) Chills, (-) Nausea, (-) Vomiting, (-) Shortness of Breath      PMH: Chronic atrial fibrillation    History of cardiomyopathy    CAD (coronary artery disease)    BPH with obstruction/lower urinary tract symptoms    Hyperlipidemia    History of CVA (cerebrovascular accident)      PSH:S/P CABG (coronary artery bypass graft)        Allergies:PC Pen VK (Other)      Labs:                          9.3    18.66 )-----------( 487      ( 24 Nov 2022 07:15 )             31.2     WBC Trend  18.66<H> Date (11-24 @ 07:15)  17.47<H> Date (11-23 @ 06:30)  21.58<H> Date (11-22 @ 06:06)  20.57<H> Date (11-21 @ 08:08)  21.27<H> Date (11-20 @ 06:23)  20.04<H> Date (11-19 @ 05:30)  17.23<H> Date (11-18 @ 06:00)  17.13<H> Date (11-17 @ 06:38)  18.04<H> Date (11-16 @ 05:34)  20.11<H> Date (11-15 @ 06:00)  22.00<H> Date (11-14 @ 19:45)  12.01<H> Date (11-07 @ 07:33)  9.90 Date (11-06 @ 06:30)  9.35 Date (11-05 @ 07:00)  10.45 Date (11-04 @ 06:55)  11.33<H> Date (11-03 @ 07:53)  12.22<H> Date (11-02 @ 06:00)  14.14<H> Date (11-01 @ 20:04)  10.88<H> Date (11-01 @ 09:25)      Chem  11-24    137  |  102  |  22  ----------------------------<  100<H>  4.1   |  30  |  0.95    Ca    8.7      24 Nov 2022 07:15  Mg     1.9     11-24    TPro  6.3  /  Alb  2.3<L>  /  TBili  0.4  /  DBili  x   /  AST  31  /  ALT  83<H>  /  AlkPhos  152<H>  11-24          T(F): 98.4 (11-24-22 @ 15:53), Max: 98.4 (11-24-22 @ 05:28)  HR: 113 (11-24-22 @ 15:53) (72 - 113)  BP: 111/76 (11-24-22 @ 15:53) (100/62 - 111/76)  RR: 17 (11-24-22 @ 15:53) (16 - 17)  SpO2: 98% (11-24-22 @ 15:53) (96% - 98%)  Wt(kg): --    O:   General: Pleasant  male NAD & AOX3.    Integument:  Skin warm, dry and supple bilateral.    Right foot eschar at the lateral aspect of the right foot: Stable, dry, negative erythema, negative malodor, negative edema, negative drainage, negative clinical signs of acute infection.   Vascular: Dorsalis Pedis and Posterior Tibial pulses non-palpable.  Capillary re-fill time more than 3 seconds digits 1-5 bilateral.    Neuro: Protective sensation intact to the level of the digits bilateral.  MSK: Muscle strength 5/5 all major muscle groups bilateral.    ------------------------------------------------------  < from: Xray Foot AP + Lateral + Oblique, Right (11.22.22 @ 09:15) >  ACC: [id number] EXAM:  XR FOOT COMP MIN 3 VIEWS RT                          PROCEDURE DATE:  11/22/2022          INTERPRETATION:  3 views. Patient has a wound laterally.    Fluid is rather free of degeneration. No bone destruction or fracture is   evident. No soft tissue air is seen.    IMPRESSION: No acute finding.    ----------------------------------------------------------------------------    A: Right foot eschar      P:   Chart reviewed and Patient evaluated  Discussed diagnosis and treatment with patient  Applied betadine with dry sterile dressing to eschar   X-rays reviewed as above, no OM  Local wound care with betadine DSD changes, daily.  Offloading to bilateral Heels applied, used while in bed.  Patient may need vascular consult  Patient's eschar can be managed at wound care center as outpatient once medically stable.     Wound care instructions:  1- Apply betadine to eschar on lateral aspect of right foot  2- Apply gauze to the area  3- Secure with ebony  4- Change dressing daily

## 2022-11-24 NOTE — PROGRESS NOTE ADULT - SUBJECTIVE AND OBJECTIVE BOX
CC: f/u for resolved sepsis     Patient reports that he is okay, feeding breakfast to himself, right foot hurts, does not want that examined    REVIEW OF SYSTEMS:  All other review of systems negative (Comprehensive ROS)    Antimicrobials Day #  : Off of antibiotics     Other Medications Reviewed    T(F): 98.4 (11-24-22 @ 05:28), Max: 98.4 (11-24-22 @ 05:28)  HR: 84 (11-24-22 @ 05:28)  BP: 100/62 (11-24-22 @ 05:28)  RR: 16 (11-24-22 @ 05:28)  SpO2: 97% (11-24-22 @ 05:28)  Wt(kg): --    PHYSICAL EXAM:  General: alert, no acute distress  Eyes:  anicteric, no conjunctival injection, no discharge  Oropharynx: no lesions or injection 	  Neck: supple  Lungs: clear to auscultation anteriorly  Heart: regular rate and rhythm; no murmurs  Abdomen: soft, nondistended, nontender  Extremities: no clubbing, cyanosis, or edema. right foot dressed  Neurologic: alert, communicative, moves all extremities  : dubois with clear urine    LAB RESULTS:                        9.3    18.66 )-----------( 487      ( 24 Nov 2022 07:15 )             31.2     11-24    137  |  102  |  22  ----------------------------<  100<H>  4.1   |  30  |  0.95    Ca    8.7      24 Nov 2022 07:15  Mg     1.9     11-24    TPro  6.3  /  Alb  2.3<L>  /  TBili  0.4  /  DBili  x   /  AST  31  /  ALT  83<H>  /  AlkPhos  152<H>  11-24    LIVER FUNCTIONS - ( 24 Nov 2022 07:15 )  Alb: 2.3 g/dL / Pro: 6.3 g/dL / ALK PHOS: 152 U/L / ALT: 83 U/L / AST: 31 U/L / GGT: x             MICROBIOLOGY:  RECENT CULTURES:  11-21 @ 19:30 .Surgical Swab Surgical Swab     Moderate Enterococcus faecalis  Moderate Staphylococcus aureus        RADIOLOGY REVIEWED:

## 2022-11-25 LAB
-  AMPICILLIN/SULBACTAM: SIGNIFICANT CHANGE UP
-  AMPICILLIN: SIGNIFICANT CHANGE UP
-  CEFAZOLIN: SIGNIFICANT CHANGE UP
-  CLINDAMYCIN: SIGNIFICANT CHANGE UP
-  DAPTOMYCIN: SIGNIFICANT CHANGE UP
-  ERYTHROMYCIN: SIGNIFICANT CHANGE UP
-  GENTAMICIN: SIGNIFICANT CHANGE UP
-  LINEZOLID: SIGNIFICANT CHANGE UP
-  OXACILLIN: SIGNIFICANT CHANGE UP
-  PENICILLIN: SIGNIFICANT CHANGE UP
-  RIFAMPIN: SIGNIFICANT CHANGE UP
-  TETRACYCLINE: SIGNIFICANT CHANGE UP
-  TETRACYCLINE: SIGNIFICANT CHANGE UP
-  TRIMETHOPRIM/SULFAMETHOXAZOLE: SIGNIFICANT CHANGE UP
-  VANCOMYCIN: SIGNIFICANT CHANGE UP
-  VANCOMYCIN: SIGNIFICANT CHANGE UP
ALBUMIN SERPL ELPH-MCNC: 2.5 G/DL — LOW (ref 3.3–5)
ALP SERPL-CCNC: 147 U/L — HIGH (ref 40–120)
ALT FLD-CCNC: 72 U/L — HIGH (ref 10–45)
ANION GAP SERPL CALC-SCNC: 4 MMOL/L — LOW (ref 5–17)
AST SERPL-CCNC: 31 U/L — SIGNIFICANT CHANGE UP (ref 10–40)
BILIRUB SERPL-MCNC: 0.4 MG/DL — SIGNIFICANT CHANGE UP (ref 0.2–1.2)
BUN SERPL-MCNC: 22 MG/DL — SIGNIFICANT CHANGE UP (ref 7–23)
CALCIUM SERPL-MCNC: 8.8 MG/DL — SIGNIFICANT CHANGE UP (ref 8.4–10.5)
CHLORIDE SERPL-SCNC: 101 MMOL/L — SIGNIFICANT CHANGE UP (ref 96–108)
CO2 SERPL-SCNC: 29 MMOL/L — SIGNIFICANT CHANGE UP (ref 22–31)
CREAT SERPL-MCNC: 0.87 MG/DL — SIGNIFICANT CHANGE UP (ref 0.5–1.3)
CULTURE RESULTS: SIGNIFICANT CHANGE UP
EGFR: 86 ML/MIN/1.73M2 — SIGNIFICANT CHANGE UP
GLUCOSE SERPL-MCNC: 136 MG/DL — HIGH (ref 70–99)
HCT VFR BLD CALC: 26.2 % — LOW (ref 39–50)
HGB BLD-MCNC: 8 G/DL — LOW (ref 13–17)
MCHC RBC-ENTMCNC: 26.4 PG — LOW (ref 27–34)
MCHC RBC-ENTMCNC: 30.5 GM/DL — LOW (ref 32–36)
MCV RBC AUTO: 86.5 FL — SIGNIFICANT CHANGE UP (ref 80–100)
METHOD TYPE: SIGNIFICANT CHANGE UP
METHOD TYPE: SIGNIFICANT CHANGE UP
NRBC # BLD: 0 /100 WBCS — SIGNIFICANT CHANGE UP (ref 0–0)
ORGANISM # SPEC MICROSCOPIC CNT: SIGNIFICANT CHANGE UP
PLATELET # BLD AUTO: 414 K/UL — HIGH (ref 150–400)
POTASSIUM SERPL-MCNC: 4.3 MMOL/L — SIGNIFICANT CHANGE UP (ref 3.5–5.3)
POTASSIUM SERPL-SCNC: 4.3 MMOL/L — SIGNIFICANT CHANGE UP (ref 3.5–5.3)
PROT SERPL-MCNC: 6.3 G/DL — SIGNIFICANT CHANGE UP (ref 6–8.3)
RBC # BLD: 3.03 M/UL — LOW (ref 4.2–5.8)
RBC # FLD: 16.8 % — HIGH (ref 10.3–14.5)
SODIUM SERPL-SCNC: 134 MMOL/L — LOW (ref 135–145)
SPECIMEN SOURCE: SIGNIFICANT CHANGE UP
WBC # BLD: 17.16 K/UL — HIGH (ref 3.8–10.5)
WBC # FLD AUTO: 17.16 K/UL — HIGH (ref 3.8–10.5)

## 2022-11-25 PROCEDURE — 99232 SBSQ HOSP IP/OBS MODERATE 35: CPT

## 2022-11-25 RX ORDER — METOPROLOL TARTRATE 50 MG
5 TABLET ORAL ONCE
Refills: 0 | Status: COMPLETED | OUTPATIENT
Start: 2022-11-25 | End: 2022-11-25

## 2022-11-25 RX ORDER — ACETAMINOPHEN 500 MG
650 TABLET ORAL EVERY 6 HOURS
Refills: 0 | Status: DISCONTINUED | OUTPATIENT
Start: 2022-11-25 | End: 2022-11-27

## 2022-11-25 RX ADMIN — Medication 6 MILLIGRAM(S): at 21:25

## 2022-11-25 RX ADMIN — Medication 150 MICROGRAM(S): at 06:11

## 2022-11-25 RX ADMIN — MIDODRINE HYDROCHLORIDE 10 MILLIGRAM(S): 2.5 TABLET ORAL at 06:50

## 2022-11-25 RX ADMIN — Medication 650 MILLIGRAM(S): at 22:57

## 2022-11-25 RX ADMIN — Medication 250 MILLIGRAM(S): at 06:12

## 2022-11-25 RX ADMIN — HEPARIN SODIUM 5000 UNIT(S): 5000 INJECTION INTRAVENOUS; SUBCUTANEOUS at 06:50

## 2022-11-25 RX ADMIN — SACUBITRIL AND VALSARTAN 1 TABLET(S): 24; 26 TABLET, FILM COATED ORAL at 18:24

## 2022-11-25 RX ADMIN — HEPARIN SODIUM 5000 UNIT(S): 5000 INJECTION INTRAVENOUS; SUBCUTANEOUS at 18:25

## 2022-11-25 RX ADMIN — Medication 650 MILLIGRAM(S): at 21:27

## 2022-11-25 RX ADMIN — Medication 650 MILLIGRAM(S): at 08:37

## 2022-11-25 RX ADMIN — Medication 25 MILLIGRAM(S): at 21:24

## 2022-11-25 RX ADMIN — PANTOPRAZOLE SODIUM 40 MILLIGRAM(S): 20 TABLET, DELAYED RELEASE ORAL at 07:05

## 2022-11-25 RX ADMIN — Medication 5 MILLIGRAM(S): at 22:32

## 2022-11-25 RX ADMIN — AMIODARONE HYDROCHLORIDE 200 MILLIGRAM(S): 400 TABLET ORAL at 06:50

## 2022-11-25 RX ADMIN — Medication 20 MILLIGRAM(S): at 06:50

## 2022-11-25 RX ADMIN — Medication 25 MILLIGRAM(S): at 00:05

## 2022-11-25 RX ADMIN — MIDODRINE HYDROCHLORIDE 10 MILLIGRAM(S): 2.5 TABLET ORAL at 18:24

## 2022-11-25 RX ADMIN — Medication 250 MILLIGRAM(S): at 18:25

## 2022-11-25 RX ADMIN — Medication 650 MILLIGRAM(S): at 09:37

## 2022-11-25 RX ADMIN — SACUBITRIL AND VALSARTAN 1 TABLET(S): 24; 26 TABLET, FILM COATED ORAL at 06:50

## 2022-11-25 RX ADMIN — Medication 81 MILLIGRAM(S): at 12:53

## 2022-11-25 NOTE — PROGRESS NOTE ADULT - SUBJECTIVE AND OBJECTIVE BOX
Patient is a 82y old  Male who presents with a chief complaint of acute hypoxic resp failure       Patient seen and examined at bedside. States he has continued pain in his right foot and feels generally unwell with pain.  Denies chest pain, SOB,dizziness, fever, chills, n/v/d or further complaints.     ALLERGIES:  PC Pen VK (Other)    MEDICATIONS  (STANDING):  aMIOdarone    Tablet 200 milliGRAM(s) Oral daily  aspirin  chewable 81 milliGRAM(s) Oral daily  furosemide    Tablet 20 milliGRAM(s) Oral every 24 hours  heparin   Injectable 5000 Unit(s) SubCutaneous every 12 hours  levothyroxine 150 MICROGram(s) Oral daily  melatonin 6 milliGRAM(s) Oral at bedtime  metoprolol succinate ER 25 milliGRAM(s) Oral at bedtime  midodrine. 10 milliGRAM(s) Oral <User Schedule>  pantoprazole    Tablet 40 milliGRAM(s) Oral before breakfast  sacubitril 24 mG/valsartan 26 mG 1 Tablet(s) Oral two times a day  vancomycin  IVPB 750 milliGRAM(s) IV Intermittent every 12 hours    MEDICATIONS  (PRN):  acetaminophen     Tablet .. 650 milliGRAM(s) Oral every 6 hours PRN Mild Pain (1 - 3)  ondansetron Injectable 4 milliGRAM(s) IV Push every 6 hours PRN Nausea and/or Vomiting    Vital Signs Last 24 Hrs  T(F): 97.6 (25 Nov 2022 06:04), Max: 98.4 (24 Nov 2022 15:53)  HR: 99 (25 Nov 2022 06:04) (99 - 113)  BP: 121/82 (25 Nov 2022 06:04) (101/67 - 121/82)  RR: 17 (25 Nov 2022 06:04) (17 - 18)  SpO2: 99% (25 Nov 2022 06:04) (97% - 99%)  I&O's Summary    24 Nov 2022 07:01  -  25 Nov 2022 07:00  --------------------------------------------------------  IN: 500 mL / OUT: 400 mL / NET: 100 mL      PHYSICAL EXAM:  General: NAD, Ill appearing, A/O x 3  ENT: MMM, no oral thrush   Neck: Supple, No JVD  Lungs: Clear to auscultation bilaterally, non labored breathing  Cardio: IRIR, S1/S2, No murmurs  Abdomen: Soft, Nontender, Nondistended; Bowel sounds present  Extremities: +Tenderness to both lower extremities, Right foot wrapped with ebony, No calf tenderness,   LABS:                        8.0    17.16 )-----------( 414      ( 25 Nov 2022 08:35 )             26.2     11-25    134  |  101  |  22  ----------------------------<  136  4.3   |  29  |  0.87    Ca    8.8      25 Nov 2022 08:35  Mg     1.9     11-24    TPro  6.3  /  Alb  2.5  /  TBili  0.4  /  DBili  x   /  AST  31  /  ALT  72  /  AlkPhos  147  11-25 11-06 Chol 99 mg/dL LDL -- HDL 31 mg/dL Trig 81 mg/dL                      Culture - Surgical Swab (collected 21 Nov 2022 19:30)  Source: .Surgical Swab Surgical Swab  Preliminary Report (24 Nov 2022 11:20):    Moderate Enterococcus faecalis Susceptibility to follow.    Moderate Methicillin Resistant Staphylococcus aureus    Rare Bacteroides vulgatus group "Susceptibilities not performed"    Few Bacteroides salyersiae "Susceptibilities not performed"  Organism: Methicillin resistant Staphylococcus aureus  Methicillin resistant Staphylococcus aureus (25 Nov 2022 09:38)  Organism: Methicillin resistant Staphylococcus aureus (25 Nov 2022 09:38)      -  Ampicillin/Sulbactam: R <=8/4      -  Cefazolin: R >16      -  Clindamycin: R >4      -  Daptomycin: S 0.5      -  Erythromycin: R >4      -  Gentamicin: S <=1 Should not be used as monotherapy      -  Linezolid: S 2      -  Oxacillin: R >2      -  Penicillin: R >8      -  Rifampin: S <=1 Should not be used as monotherapy      -  Tetracycline: R >8      -  Trimethoprim/Sulfamethoxazole: R >2/38      -  Vancomycin: S 1      Method Type: ARIE  Organism: Methicillin resistant Staphylococcus aureus (24 Nov 2022 11:19)      -  Ampicillin/Sulbactam: R 16/8      -  Cefazolin: R >16      -  Clindamycin: R >4      -  Daptomycin: S 1      -  Erythromycin: R >4      -  Gentamicin: S <=1 Should not be used as monotherapy      -  Linezolid: S 2      -  Oxacillin: R >2      -  Penicillin: R >8      -  Rifampin: S <=1 Should not be used as monotherapy      -  Tetracycline: R >8      -  Trimethoprim/Sulfamethoxazole: R >2/38      -  Vancomycin: S 2      Method Type: ARIE      COVID-19 PCR: NotDetec (11-21-22 @ 10:55)  COVID-19 PCR: NotDetec (11-07-22 @ 07:10)  COVID-19 PCR: NotDetec (10-31-22 @ 19:50)      RADIOLOGY & ADDITIONAL TESTS:    Care Discussed with Consultants/Other Providers:

## 2022-11-25 NOTE — PROGRESS NOTE ADULT - SUBJECTIVE AND OBJECTIVE BOX
F/U Note:    -called by bedside RN for tachycardia  -patient with hx of afin, currently on PO lopressor and amio  -monitor with afib -120, periods of aberrancy  -review shows lytes were good at around 8am today        Vital Signs Last 24 Hrs  T(C): 36.7 (25 Nov 2022 20:55), Max: 36.7 (25 Nov 2022 20:55)  T(F): 98.1 (25 Nov 2022 20:55), Max: 98.1 (25 Nov 2022 20:55)  HR: 124 (25 Nov 2022 20:55) (73 - 124)  BP: 130/78 (25 Nov 2022 20:55) (105/72 - 130/78)  BP(mean): --  RR: 17 (25 Nov 2022 20:55) (15 - 17)  SpO2: 100% (25 Nov 2022 20:55) (99% - 100%)    Parameters below as of 25 Nov 2022 20:55  Patient On (Oxygen Delivery Method): nasal cannula  O2 Flow (L/min): 2                              8.0    17.16 )-----------( 414      ( 25 Nov 2022 08:35 )             26.2         11-25    134<L>  |  101  |  22  ----------------------------<  136<H>  4.3   |  29  |  0.87    Ca    8.8      25 Nov 2022 08:35  Mg     1.9     11-24    TPro  6.3  /  Alb  2.5<L>  /  TBili  0.4  /  DBili  x   /  AST  31  /  ALT  72<H>  /  AlkPhos  147<H>  11-25        PLAN:    -will order 1 time dose of IVP lopressor 5 mg (BP with SBP in 130s)  -check BMP now to ensure K+ WNL  -follow up HR post treatment  -bedside RN to ensure patient has no pain or fever, and to alert provider if either of these is (+)

## 2022-11-25 NOTE — PROGRESS NOTE ADULT - ASSESSMENT
82M with PMH HTN, CAD s/p CABG, severe cardiomyopathy (TTE 11/22: EF 20%), Afib (not on AC due to hematuria), BPH, LUE DVT, b/l LLE DVT s/p IVC filter placement pt was discharged to SNF, admitted to [facility] on 11/14/2022 for hypoxia and AMS, also hypotensive suspected 2/2 to septic shock, HAN, transaminitis, Lactic acidosis, UTI and CAP. Spo2 improved, currently on RA. Downgraded to medical floor    #Acute hypoxic respiratory failure due to CAP and decompensated systolic CHF  - Currently tolerating room air   - TTE with EF 20%  - C/w toprol, entresto, lasix, amiodarone  - Hold farxiga/sprionolactone for hypotension   - Midodrine titrated down to q12hrs, continue to titrate to off as tolerated   - Strict I&Os, daily weights  -DC tele    #Septic shock due to CAP and UTI in a patient with chronic dubois; resolved  #Persistent leukocytosis  - Hypotension improving  - Completed cefepime 7 day course on 11/21  - Noted persistent leukocytosis - FU repeat BC    #Right foot ulceration  #MRSA  - Case prior d/w vascular surgeon Dr. Traylor - who advised podiatry eval for possible debridement  - Podiatry noted; XR of right foot with no bony destruction or fx. Continue local wound care for now  - Wound care consulted for local wound orders  - Continue local wound care.   - MRSA noted in wound culture. Sensitivities noted. Continuing Vanco. ID recs appreciated     #HAN on CKD Stage 2  - Likely ATN in setting of sepsis  - Cr back to baseline  - Avoid nephrotoxic medications  - Continue chronic dubois    #Transaminitis   - Likely shock liver in setting of sepsis  - Downtrending  - Avoid hepatotoxic medications  - RUQ sono performed - unremarkable     #CAD s/p CABG  - Positive trops likely demand in setting of sepsis and shock  - Continue ASA  - Continue Metoprolol  - Cardiology recs    #Chronic Afib  - Continue rate control with Metoprolol  - Continue rhythm control with Amiodarone   - Not on AC due to prior hematuria    #Hypothyroidism  - Continue synthroid    #DVT ppx  - Heparin    GOC: DNR    Dispo: PT recommending [facility], family would prefer [facility] in [place].  Pending ??podiatry clearance, also surveillance blood cultures    Updated Wife Kirsten ([phone])

## 2022-11-26 LAB
ANION GAP SERPL CALC-SCNC: 5 MMOL/L — SIGNIFICANT CHANGE UP (ref 5–17)
BUN SERPL-MCNC: 28 MG/DL — HIGH (ref 7–23)
CALCIUM SERPL-MCNC: 9 MG/DL — SIGNIFICANT CHANGE UP (ref 8.4–10.5)
CHLORIDE SERPL-SCNC: 103 MMOL/L — SIGNIFICANT CHANGE UP (ref 96–108)
CO2 SERPL-SCNC: 28 MMOL/L — SIGNIFICANT CHANGE UP (ref 22–31)
CREAT SERPL-MCNC: 1.03 MG/DL — SIGNIFICANT CHANGE UP (ref 0.5–1.3)
EGFR: 72 ML/MIN/1.73M2 — SIGNIFICANT CHANGE UP
GLUCOSE SERPL-MCNC: 161 MG/DL — HIGH (ref 70–99)
HCT VFR BLD CALC: 27 % — LOW (ref 39–50)
HGB BLD-MCNC: 8.3 G/DL — LOW (ref 13–17)
MCHC RBC-ENTMCNC: 26.6 PG — LOW (ref 27–34)
MCHC RBC-ENTMCNC: 30.7 GM/DL — LOW (ref 32–36)
MCV RBC AUTO: 86.5 FL — SIGNIFICANT CHANGE UP (ref 80–100)
NRBC # BLD: 0 /100 WBCS — SIGNIFICANT CHANGE UP (ref 0–0)
PLATELET # BLD AUTO: 454 K/UL — HIGH (ref 150–400)
POTASSIUM SERPL-MCNC: 4 MMOL/L — SIGNIFICANT CHANGE UP (ref 3.5–5.3)
POTASSIUM SERPL-SCNC: 4 MMOL/L — SIGNIFICANT CHANGE UP (ref 3.5–5.3)
RBC # BLD: 3.12 M/UL — LOW (ref 4.2–5.8)
RBC # FLD: 16.9 % — HIGH (ref 10.3–14.5)
SODIUM SERPL-SCNC: 136 MMOL/L — SIGNIFICANT CHANGE UP (ref 135–145)
WBC # BLD: 18.62 K/UL — HIGH (ref 3.8–10.5)
WBC # FLD AUTO: 18.62 K/UL — HIGH (ref 3.8–10.5)

## 2022-11-26 PROCEDURE — 99233 SBSQ HOSP IP/OBS HIGH 50: CPT

## 2022-11-26 RX ORDER — MIDODRINE HYDROCHLORIDE 2.5 MG/1
5 TABLET ORAL
Refills: 0 | Status: DISCONTINUED | OUTPATIENT
Start: 2022-11-26 | End: 2022-11-27

## 2022-11-26 RX ADMIN — AMIODARONE HYDROCHLORIDE 200 MILLIGRAM(S): 400 TABLET ORAL at 06:19

## 2022-11-26 RX ADMIN — MIDODRINE HYDROCHLORIDE 5 MILLIGRAM(S): 2.5 TABLET ORAL at 17:09

## 2022-11-26 RX ADMIN — Medication 650 MILLIGRAM(S): at 08:37

## 2022-11-26 RX ADMIN — SACUBITRIL AND VALSARTAN 1 TABLET(S): 24; 26 TABLET, FILM COATED ORAL at 06:19

## 2022-11-26 RX ADMIN — PANTOPRAZOLE SODIUM 40 MILLIGRAM(S): 20 TABLET, DELAYED RELEASE ORAL at 06:20

## 2022-11-26 RX ADMIN — Medication 20 MILLIGRAM(S): at 08:37

## 2022-11-26 RX ADMIN — Medication 650 MILLIGRAM(S): at 09:07

## 2022-11-26 RX ADMIN — Medication 25 MILLIGRAM(S): at 22:00

## 2022-11-26 RX ADMIN — MIDODRINE HYDROCHLORIDE 10 MILLIGRAM(S): 2.5 TABLET ORAL at 06:20

## 2022-11-26 RX ADMIN — Medication 150 MICROGRAM(S): at 06:19

## 2022-11-26 RX ADMIN — HEPARIN SODIUM 5000 UNIT(S): 5000 INJECTION INTRAVENOUS; SUBCUTANEOUS at 06:18

## 2022-11-26 RX ADMIN — HEPARIN SODIUM 5000 UNIT(S): 5000 INJECTION INTRAVENOUS; SUBCUTANEOUS at 17:09

## 2022-11-26 RX ADMIN — Medication 6 MILLIGRAM(S): at 21:59

## 2022-11-26 RX ADMIN — SACUBITRIL AND VALSARTAN 1 TABLET(S): 24; 26 TABLET, FILM COATED ORAL at 17:09

## 2022-11-26 RX ADMIN — Medication 81 MILLIGRAM(S): at 11:08

## 2022-11-26 RX ADMIN — Medication 250 MILLIGRAM(S): at 06:20

## 2022-11-26 NOTE — PROGRESS NOTE ADULT - SUBJECTIVE AND OBJECTIVE BOX
F/U Note:    -had issues with afib rvr overnight  -tonight afib, but rate controlled  -day notes reviewed       Vital Signs Last 24 Hrs  T(C): 36.3 (26 Nov 2022 20:01), Max: 36.6 (26 Nov 2022 16:55)  T(F): 97.4 (26 Nov 2022 20:01), Max: 97.9 (26 Nov 2022 16:55)  HR: 94 (26 Nov 2022 20:01) (78 - 94)  BP: 115/63 (26 Nov 2022 20:01) (101/62 - 115/63)  BP(mean): --  RR: 16 (26 Nov 2022 20:01) (16 - 18)  SpO2: 100% (26 Nov 2022 20:01) (98% - 100%)    Parameters below as of 26 Nov 2022 20:01  Patient On (Oxygen Delivery Method): nasal cannula  O2 Flow (L/min): 2                              8.3    18.62 )-----------( 454      ( 26 Nov 2022 12:25 )             27.0         11-26    136  |  103  |  28<H>  ----------------------------<  161<H>  4.0   |  28  |  1.03    Ca    9.0      26 Nov 2022 12:25    TPro  6.3  /  Alb  2.5<L>  /  TBili  0.4  /  DBili  x   /  AST  31  /  ALT  72<H>  /  AlkPhos  147<H>  11-25

## 2022-11-26 NOTE — PROGRESS NOTE ADULT - ASSESSMENT
-rate controlled afib tonight  -would maintain current regimen of lopressor and amio  -podiatry note reviewed, foot does not ten ctively infected, aptient curently on course of vanco IVPB  -daily lasix, follow lytes especially sicne patient had sisues with arrythmia  -DVT prophylaxis  -AM labs

## 2022-11-26 NOTE — PROGRESS NOTE ADULT - SUBJECTIVE AND OBJECTIVE BOX
Patient is a 82y old  Male who presents with a chief complaint of acute hypoxic resp failure (26 Nov 2022 07:42)    Patient seen and examined at bedside. Overnight patient was tachycardic, improved after IV metoprolol x1.  Patient c/o pain all over after getting cleaned/turned this AM, also complains of soreness at catheter site.     ALLERGIES:  PC Pen VK (Other)    MEDICATIONS  (STANDING):  aMIOdarone    Tablet 200 milliGRAM(s) Oral daily  aspirin  chewable 81 milliGRAM(s) Oral daily  furosemide    Tablet 20 milliGRAM(s) Oral every 24 hours  heparin   Injectable 5000 Unit(s) SubCutaneous every 12 hours  levothyroxine 150 MICROGram(s) Oral daily  melatonin 6 milliGRAM(s) Oral at bedtime  metoprolol succinate ER 25 milliGRAM(s) Oral at bedtime  midodrine. 10 milliGRAM(s) Oral <User Schedule>  pantoprazole    Tablet 40 milliGRAM(s) Oral before breakfast  sacubitril 24 mG/valsartan 26 mG 1 Tablet(s) Oral two times a day  vancomycin  IVPB 750 milliGRAM(s) IV Intermittent every 12 hours    MEDICATIONS  (PRN):  acetaminophen     Tablet .. 650 milliGRAM(s) Oral every 6 hours PRN Mild Pain (1 - 3)  ondansetron Injectable 4 milliGRAM(s) IV Push every 6 hours PRN Nausea and/or Vomiting    Vital Signs Last 24 Hrs  T(F): 97.2 (26 Nov 2022 04:42), Max: 98.1 (25 Nov 2022 20:55)  HR: 81 (26 Nov 2022 04:42) (73 - 124)  BP: 106/68 (26 Nov 2022 04:42) (101/67 - 130/78)  RR: 18 (26 Nov 2022 04:42) (15 - 18)  SpO2: 100% (26 Nov 2022 04:42) (99% - 100%)    I&O's Summary  25 Nov 2022 07:01  -  26 Nov 2022 07:00  --------------------------------------------------------  IN: 490 mL / OUT: 700 mL / NET: -210 mL    PHYSICAL EXAM:  General: NAD, Ill appearing, A/O x 3  ENT: MMM, no oral thrush   Neck: Supple, No JVD  Lungs: Clear to auscultation bilaterally, non labored breathing  Cardio: IRIR, S1/S2, No murmurs  Abdomen: Soft, Nontender, Nondistended; Bowel sounds present  Extremities: +Tenderness to both lower extremities, Right foot wrapped with ebony, No calf tenderness, both feet in inflated boots     LABS:                        8.0    17.16 )-----------( 414      ( 25 Nov 2022 08:35 )             26.2     11-25    134  |  101  |  22  ----------------------------<  136  4.3   |  29  |  0.87    Ca    8.8      25 Nov 2022 08:35  Mg     1.9     11-24    TPro  6.3  /  Alb  2.5  /  TBili  0.4  /  DBili  x   /  AST  31  /  ALT  72  /  AlkPhos  147  11-25                    11-06 Chol 99 mg/dL LDL -- HDL 31 mg/dL Trig 81 mg/dL                      Culture - Blood (collected 24 Nov 2022 09:45)  Source: .Blood Blood-Peripheral  Preliminary Report (25 Nov 2022 14:01):    No growth to date.    Culture - Blood (collected 24 Nov 2022 09:45)  Source: .Blood Blood-Peripheral  Preliminary Report (25 Nov 2022 14:01):    No growth to date.    Culture - Surgical Swab (collected 21 Nov 2022 19:30)  Source: .Surgical Swab Surgical Swab  Final Report (25 Nov 2022 13:18):    Moderate Enterococcus faecalis    Moderate Methicillin Resistant Staphylococcus aureus    Moderate Methicillin Resistant Staphylococcus aureus #2 Multiple    Morphological Strains    Rare Bacteroides vulgatus group "Susceptibilities not performed"    Few Bacteroides salyersiae "Susceptibilities not performed"  Organism: Enterococcus faecalis  Methicillin resistant Staphylococcus aureus  Methicillin resistant Staphylococcus aureus (25 Nov 2022 13:18)  Organism: Methicillin resistant Staphylococcus aureus (25 Nov 2022 13:18)      -  Ampicillin/Sulbactam: R <=8/4      -  Cefazolin: R >16      -  Clindamycin: R >4      -  Daptomycin: S 0.5      -  Erythromycin: R >4      -  Gentamicin: S <=1 Should not be used as monotherapy      -  Linezolid: S 2      -  Oxacillin: R >2      -  Penicillin: R >8      -  Rifampin: S <=1 Should not be used as monotherapy      -  Tetracycline: R >8      -  Trimethoprim/Sulfamethoxazole: R >2/38      -  Vancomycin: S 1      Method Type: ARIE  Organism: Methicillin resistant Staphylococcus aureus (25 Nov 2022 13:18)      -  Ampicillin/Sulbactam: R 16/8      -  Cefazolin: R >16      -  Clindamycin: R >4      -  Daptomycin: S 1      -  Erythromycin: R >4      -  Gentamicin: S <=1 Should not be used as monotherapy      -  Linezolid: S 2      -  Oxacillin: R >2      -  Penicillin: R >8      -  Rifampin: S <=1 Should not be used as monotherapy      -  Tetracycline: R >8      -  Trimethoprim/Sulfamethoxazole: R >2/38      -  Vancomycin: S 2      Method Type: ARIE  Organism: Enterococcus faecalis (25 Nov 2022 13:18)      -  Ampicillin: S <=2 Predicts results to ampicillin/sulbactam, amoxacillin-clavulanate and  piperacillin-tazobactam.      -  Tetracycline: R >8      -  Vancomycin: S 4      Method Type: ARIE      COVID-19 PCR: NotDetec (11-21-22 @ 10:55)  COVID-19 PCR: NotDetec (11-07-22 @ 07:10)  COVID-19 PCR: NotDetec (10-31-22 @ 19:50)    RADIOLOGY & ADDITIONAL TESTS:    Care Discussed with Consultants/Other Providers:

## 2022-11-26 NOTE — PROGRESS NOTE ADULT - ASSESSMENT
82M with PMH HTN, CAD s/p CABG, severe cardiomyopathy (TTE 11/22: EF 20%), Afib (not on AC due to hematuria), BPH, LUE DVT, b/l LLE DVT s/p IVC filter placement pt was discharged to SNF, admitted to [facility] on 11/14/2022 for hypoxia and AMS, also hypotensive suspected 2/2 to septic shock, HAN, transaminitis, Lactic acidosis, UTI and CAP. Spo2 improved, currently on RA. Downgraded to medical floor    #Acute hypoxic respiratory failure due to CAP and decompensated systolic CHF  - Currently tolerating room air   - TTE with EF 20%  - C/w toprol, entresto, lasix, amiodarone  - Hold farxiga/sprionolactone for hypotension   - Midodrine titrated down to q12hrs, continue to titrate to off as tolerated   - Strict I&Os, daily weights  - Tele dc'ed    #Septic shock due to CAP and UTI in a patient with chronic dubois; resolved  #Persistent leukocytosis  - Hypotension improving  - Completed cefepime 7 day course on 11/21  - Noted persistent leukocytosis - repeat blood cultures with NGTD    #Right foot ulceration  #MRSA  - Case prior d/w vascular surgeon Dr. Traylor - who advised podiatry eval for possible debridement  - Podiatry noted; XR of right foot with no bony destruction or fx. Continue local wound care for now, advised outpatient follow up   - Wound care consulted for local wound orders  - Continue local wound care.   - MRSA noted in wound culture. Sensitivities noted. Continuing Vanco. ID recs appreciated     #HAN on CKD Stage 2  - Likely ATN in setting of sepsis  - Cr back to baseline  - Avoid nephrotoxic medications  - Continue chronic dubois    #Transaminitis   - Likely shock liver in setting of sepsis  - Downtrending  - Avoid hepatotoxic medications  - RUQ sono performed - unremarkable     #CAD s/p CABG  - Positive trops likely demand in setting of sepsis and shock  - Continue ASA  - Continue Metoprolol  - Cardiology recs    #Chronic Afib  - Continue rate control with Metoprolol  - Continue rhythm control with Amiodarone   - Not on AC due to prior hematuria    #Hypothyroidism  - Continue synthroid    #DVT ppx  - Heparin    GOC: DNR    Dispo: PT recommending [facility], family would prefer [facility] in [place].  Pending ID clearance    Wife Kirsten ([phone])    82M with PMH HTN, CAD s/p CABG, severe cardiomyopathy (TTE 11/22: EF 20%), Afib (not on AC due to hematuria), BPH, LUE DVT, b/l LLE DVT s/p IVC filter placement pt was discharged to SNF, admitted to [facility] on 11/14/2022 for hypoxia and AMS, also hypotensive suspected 2/2 to septic shock, HAN, transaminitis, Lactic acidosis, UTI and CAP. Spo2 improved, currently on RA. Downgraded to medical floor    #Acute hypoxic respiratory failure due to CAP and decompensated systolic CHF  - Currently tolerating room air   - TTE with EF 20%  - C/w toprol, entresto, lasix, amiodarone  - Hold farxiga/sprionolactone for hypotension   - Midodrine titrated down to q12hrs, continue to titrate to off as tolerated - will switch from 10mg BID to 5 mg BID today 11/26  - Strict I&Os, daily weights    #Septic shock due to CAP and UTI in a patient with chronic dubois; resolved  #Persistent leukocytosis  - Hypotension improving  - Completed cefepime 7 day course on 11/21  - Noted persistent leukocytosis - repeat blood cultures with NGTD    #Right foot ulceration  #MRSA  - Case prior d/w vascular surgeon Dr. Traylor - who advised podiatry eval for possible debridement  - Podiatry noted; XR of right foot with no bony destruction or fx. Continue local wound care for now, advised outpatient follow up   - Wound care consulted for local wound orders  - Continue local wound care.   - MRSA noted in wound culture. Sensitivities noted. Continuing Vanco. ID recs appreciated     #HAN on CKD Stage 2  - Likely ATN in setting of sepsis  - Cr back to baseline  - Avoid nephrotoxic medications  - Continue chronic dubois    #Transaminitis   - Likely shock liver in setting of sepsis  - Downtrending  - Avoid hepatotoxic medications  - RUQ sono performed - unremarkable     #CAD s/p CABG  - Positive trops likely demand in setting of sepsis and shock  - Continue ASA  - Continue Metoprolol  - Cardiology recs    #Chronic Afib  - Continue rate control with Metoprolol  - Continue rhythm control with Amiodarone   - Not on AC due to prior hematuria    #Hypothyroidism  - Continue synthroid    #DVT ppx  - Heparin    GOC: DNR    Dispo: PT recommending [facility], family would prefer [facility] in [place].  Pending ID clearance    Wife Kirsten ([phone])    82M with PMH HTN, CAD s/p CABG, severe cardiomyopathy (TTE 11/22: EF 20%), Afib (not on AC due to hematuria), BPH, LUE DVT, b/l LLE DVT s/p IVC filter placement pt was discharged to SNF, admitted to [facility] on 11/14/2022 for hypoxia and AMS, also hypotensive suspected 2/2 to septic shock, HAN, transaminitis, Lactic acidosis, UTI and CAP. Spo2 improved, currently on RA. Downgraded to medical floor    #Acute hypoxic respiratory failure due to CAP and decompensated systolic CHF  - Currently tolerating room air   - TTE with EF 20%  - C/w toprol, entresto, lasix, amiodarone  - Hold farxiga/sprionolactone for hypotension   - Midodrine titrated down to q12hrs, continue to titrate to off as tolerated - will switch from 10mg BID to 5 mg BID today 11/26  - Strict I&Os, daily weights    #Septic shock due to CAP and UTI in a patient with chronic dubois; resolved  #Persistent leukocytosis  - Hypotension improving  - Completed cefepime 7 day course on 11/21  - Noted persistent leukocytosis - repeat blood cultures with NGTD    #Right foot ulceration  #MRSA  - Case prior d/w vascular surgeon Dr. Traylor - who advised podiatry eval for possible debridement  - Podiatry noted; XR of right foot with no bony destruction or fx. Continue local wound care for now, advised outpatient follow up   - Wound care consulted for local wound orders  - Continue local wound care   - MRSA noted in wound culture. Sensitivities noted. Continuing Vanco. ID recs appreciated     #HAN on CKD Stage 2  - Likely ATN in setting of sepsis  - Cr back to baseline  - Avoid nephrotoxic medications  - Continue chronic dubois  - Patient pulled out dubois today 11/26, will replace     #Transaminitis   - Likely shock liver in setting of sepsis  - Downtrending  - Avoid hepatotoxic medications  - RUQ sono performed - unremarkable     #CAD s/p CABG  - Positive trops likely demand in setting of sepsis and shock  - Continue ASA  - Continue Metoprolol  - Cardiology recs    #Chronic Afib  - Continue rate control with Metoprolol  - Continue rhythm control with Amiodarone   - Not on AC due to prior hematuria    #Hypothyroidism  - Continue synthroid    #DVT ppx  - Heparin    GOC: DNR    Dispo: PT recommending [facility], family would prefer [facility] in [place].  Pending ID & podiatry clearance    11/26: Updated wife Kirsten ([phone])

## 2022-11-26 NOTE — PROGRESS NOTE ADULT - SUBJECTIVE AND OBJECTIVE BOX
Patient with right foot wound.  Foot wound not clinically infected at this time and is not likely source of high white count. Wound culture from Nov 21st consistent with MRSA. Recommend continuation of antibiotics as per ID. No podiatric surgical intervention at this time. Continue wound care daily.  Patient to follow up with Podiatry at [address] [place] [address] once discharged.

## 2022-11-27 ENCOUNTER — TRANSCRIPTION ENCOUNTER (OUTPATIENT)
Age: 82
End: 2022-11-27

## 2022-11-27 LAB
ANION GAP SERPL CALC-SCNC: 8 MMOL/L — SIGNIFICANT CHANGE UP (ref 5–17)
BUN SERPL-MCNC: 26 MG/DL — HIGH (ref 7–23)
CALCIUM SERPL-MCNC: 8.6 MG/DL — SIGNIFICANT CHANGE UP (ref 8.4–10.5)
CHLORIDE SERPL-SCNC: 105 MMOL/L — SIGNIFICANT CHANGE UP (ref 96–108)
CO2 SERPL-SCNC: 26 MMOL/L — SIGNIFICANT CHANGE UP (ref 22–31)
CREAT SERPL-MCNC: 1 MG/DL — SIGNIFICANT CHANGE UP (ref 0.5–1.3)
EGFR: 75 ML/MIN/1.73M2 — SIGNIFICANT CHANGE UP
GLUCOSE SERPL-MCNC: 116 MG/DL — HIGH (ref 70–99)
HCT VFR BLD CALC: 26.9 % — LOW (ref 39–50)
HGB BLD-MCNC: 8 G/DL — LOW (ref 13–17)
MAGNESIUM SERPL-MCNC: 2 MG/DL — SIGNIFICANT CHANGE UP (ref 1.6–2.6)
MCHC RBC-ENTMCNC: 26.2 PG — LOW (ref 27–34)
MCHC RBC-ENTMCNC: 29.7 GM/DL — LOW (ref 32–36)
MCV RBC AUTO: 88.2 FL — SIGNIFICANT CHANGE UP (ref 80–100)
NRBC # BLD: 0 /100 WBCS — SIGNIFICANT CHANGE UP (ref 0–0)
PLATELET # BLD AUTO: 447 K/UL — HIGH (ref 150–400)
POTASSIUM SERPL-MCNC: 4.2 MMOL/L — SIGNIFICANT CHANGE UP (ref 3.5–5.3)
POTASSIUM SERPL-SCNC: 4.2 MMOL/L — SIGNIFICANT CHANGE UP (ref 3.5–5.3)
RBC # BLD: 3.05 M/UL — LOW (ref 4.2–5.8)
RBC # FLD: 17.2 % — HIGH (ref 10.3–14.5)
SARS-COV-2 RNA SPEC QL NAA+PROBE: SIGNIFICANT CHANGE UP
SODIUM SERPL-SCNC: 139 MMOL/L — SIGNIFICANT CHANGE UP (ref 135–145)
WBC # BLD: 14.52 K/UL — HIGH (ref 3.8–10.5)
WBC # FLD AUTO: 14.52 K/UL — HIGH (ref 3.8–10.5)

## 2022-11-27 PROCEDURE — 99233 SBSQ HOSP IP/OBS HIGH 50: CPT

## 2022-11-27 RX ORDER — ACETAMINOPHEN 500 MG
975 TABLET ORAL EVERY 8 HOURS
Refills: 0 | Status: DISCONTINUED | OUTPATIENT
Start: 2022-11-27 | End: 2022-12-01

## 2022-11-27 RX ORDER — LINEZOLID 600 MG/300ML
600 INJECTION, SOLUTION INTRAVENOUS EVERY 12 HOURS
Refills: 0 | Status: DISCONTINUED | OUTPATIENT
Start: 2022-11-28 | End: 2022-12-01

## 2022-11-27 RX ORDER — MIDODRINE HYDROCHLORIDE 2.5 MG/1
10 TABLET ORAL
Refills: 0 | Status: DISCONTINUED | OUTPATIENT
Start: 2022-11-27 | End: 2022-12-01

## 2022-11-27 RX ORDER — ACETAMINOPHEN 500 MG
975 TABLET ORAL ONCE
Refills: 0 | Status: COMPLETED | OUTPATIENT
Start: 2022-11-27 | End: 2022-11-27

## 2022-11-27 RX ORDER — MIDODRINE HYDROCHLORIDE 2.5 MG/1
5 TABLET ORAL ONCE
Refills: 0 | Status: COMPLETED | OUTPATIENT
Start: 2022-11-27 | End: 2022-11-27

## 2022-11-27 RX ADMIN — MIDODRINE HYDROCHLORIDE 5 MILLIGRAM(S): 2.5 TABLET ORAL at 16:53

## 2022-11-27 RX ADMIN — HEPARIN SODIUM 5000 UNIT(S): 5000 INJECTION INTRAVENOUS; SUBCUTANEOUS at 06:39

## 2022-11-27 RX ADMIN — SACUBITRIL AND VALSARTAN 1 TABLET(S): 24; 26 TABLET, FILM COATED ORAL at 06:40

## 2022-11-27 RX ADMIN — MIDODRINE HYDROCHLORIDE 10 MILLIGRAM(S): 2.5 TABLET ORAL at 17:33

## 2022-11-27 RX ADMIN — SACUBITRIL AND VALSARTAN 1 TABLET(S): 24; 26 TABLET, FILM COATED ORAL at 17:33

## 2022-11-27 RX ADMIN — MIDODRINE HYDROCHLORIDE 5 MILLIGRAM(S): 2.5 TABLET ORAL at 06:40

## 2022-11-27 RX ADMIN — Medication 20 MILLIGRAM(S): at 13:11

## 2022-11-27 RX ADMIN — Medication 975 MILLIGRAM(S): at 17:15

## 2022-11-27 RX ADMIN — Medication 250 MILLIGRAM(S): at 06:42

## 2022-11-27 RX ADMIN — HEPARIN SODIUM 5000 UNIT(S): 5000 INJECTION INTRAVENOUS; SUBCUTANEOUS at 17:35

## 2022-11-27 RX ADMIN — AMIODARONE HYDROCHLORIDE 200 MILLIGRAM(S): 400 TABLET ORAL at 06:40

## 2022-11-27 RX ADMIN — Medication 6 MILLIGRAM(S): at 21:54

## 2022-11-27 RX ADMIN — Medication 250 MILLIGRAM(S): at 17:34

## 2022-11-27 RX ADMIN — Medication 150 MICROGRAM(S): at 06:39

## 2022-11-27 RX ADMIN — PANTOPRAZOLE SODIUM 40 MILLIGRAM(S): 20 TABLET, DELAYED RELEASE ORAL at 06:39

## 2022-11-27 RX ADMIN — Medication 81 MILLIGRAM(S): at 13:10

## 2022-11-27 RX ADMIN — Medication 25 MILLIGRAM(S): at 21:53

## 2022-11-27 RX ADMIN — Medication 975 MILLIGRAM(S): at 16:51

## 2022-11-27 NOTE — PROGRESS NOTE ADULT - ASSESSMENT
82y male with a past history of HTN, CAD, low Ef(20%), A fib, BPH,CVA with left sided weakness,  DVT of B/L lower extremities, IVC filter, and chronic dubois who was admitted 11/14 with hypoxic respiratory failure secondary to sepsis.  He was covered with cefepime and stabilized.   He is a DNR/DNI but would like medical measures.  His blood cultures have been negative.  He is confused, unable to give a history. He has a persistent leukocytosis.  While he has made some progress in that he is hemodynamically stable, his respiratory status improved, and he is tolerating a diet, his elevated wbc has not moderated. RUQ sonogram- negative study.  He has reduced blood flow to legs, an ischemic appearing tissue injury to left foot, and his arterial studies show vascular disease.  7 days of cefepime stopped 11/21. He has been stable off antibiotics.  Plain films of foot negative  Per podiatry, foot wound without active drainage & has fibronecrotic tissue, then unclear why a wound swab was submitted on 11/21/22, now isolating Staph aureus & enterococcus ? clinical significance, pt refuses foot exam       Suggest:  Day 3 of Vanc  with some improvement of his WBC  Podiatry input appreciated--no clear infection, but ischemic changes as previously documented  Its difficult to completely discount improvement in his WBC with 3 days of Vanc  oral Zyvox 600 bid for 10 days may be an option if plan for discharge and no other interventions  he is followed with podiatry for outpatient wound care

## 2022-11-27 NOTE — DISCHARGE NOTE PROVIDER - NSDCMRMEDTOKEN_GEN_ALL_CORE_FT
amiodarone 200 mg oral tablet: 1 tab(s) orally once a day  aspirin 81 mg oral tablet, chewable: 1 tab(s) orally once a day  Dulcolax Laxative 10 mg rectal suppository: 1 suppository(ies) rectal once a day, As Needed  famotidine 20 mg oral tablet: 1 tab(s) orally once a day  finasteride 5 mg oral tablet: 1 tab(s) orally once a day  Flomax 0.4 mg oral capsule: 1 cap(s) orally once a day (at bedtime)  freetext medication     -: 2 cap(s) orally once a day  lactulose 10 g/15 mL oral syrup: 30 milliliter(s) orally once a day, As Needed  levothyroxine 150 mcg (0.15 mg) oral tablet: 1 tab(s) orally once a day  losartan 25 mg oral tablet: 1 tab(s) orally once a day  metoprolol succinate 50 mg oral tablet, extended release: 1 tab(s) orally once a day  mexiletine 150 mg oral capsule: 1 cap(s) orally 2 times a day  Milk of Magnesia 8% oral suspension: 30 milliliter(s) orally once a day, As Needed  rosuvastatin 40 mg oral tablet: 1 tab(s) orally once a day (at bedtime)  Senna 8.6 mg oral tablet: 2 tab(s) orally once a day (at bedtime), As Needed  sertraline 150 mg oral capsule: 1 cap(s) orally once a day  traZODone 50 mg oral tablet: 0.5 milligram(s) orally once a day (at bedtime)  Tylenol 325 mg oral tablet: 2 tab(s) orally every 6 hours, As Needed   acetaminophen 325 mg oral tablet: 3 tab(s) orally every 8 hours, As needed, Temp greater or equal to 38C (100.4F), Mild Pain (1 - 3)  amiodarone 200 mg oral tablet: 1 tab(s) orally once a day  aspirin 81 mg oral tablet, chewable: 1 tab(s) orally once a day  Dulcolax Laxative 10 mg rectal suppository: 1 suppository(ies) rectal once a day, As Needed  famotidine 20 mg oral tablet: 1 tab(s) orally once a day  furosemide 20 mg oral tablet: 1 tab(s) orally every 24 hours  lactulose 10 g/15 mL oral syrup: 30 milliliter(s) orally once a day, As Needed  levothyroxine 150 mcg (0.15 mg) oral tablet: 1 tab(s) orally once a day  linezolid 600 mg oral tablet: 1 tab(s) orally every 12 hours  melatonin 3 mg oral tablet: 2 tab(s) orally once a day (at bedtime)  metoprolol succinate 25 mg oral tablet, extended release: 1 tab(s) orally once a day (at bedtime)  midodrine 10 mg oral tablet: 1 tab(s) orally 2 times a day  pantoprazole 40 mg oral delayed release tablet: 1 tab(s) orally once a day (before a meal)  rosuvastatin 40 mg oral tablet: 1 tab(s) orally once a day (at bedtime)  sacubitril-valsartan 24 mg-26 mg oral tablet: 1 tab(s) orally 2 times a day  Senna 8.6 mg oral tablet: 2 tab(s) orally once a day (at bedtime), As Needed   acetaminophen 325 mg oral tablet: 3 tab(s) orally every 8 hours, As needed, Temp greater or equal to 38C (100.4F), Mild Pain (1 - 3)  amiodarone 200 mg oral tablet: 1 tab(s) orally once a day  aspirin 81 mg oral tablet, chewable: 1 tab(s) orally once a day  Dulcolax Laxative 10 mg rectal suppository: 1 suppository(ies) rectal once a day, As Needed  finasteride 5 mg oral tablet: 1 tab(s) orally once a day  furosemide 20 mg oral tablet: 1 tab(s) orally every 24 hours  levothyroxine 150 mcg (0.15 mg) oral tablet: 1 tab(s) orally once a day  linezolid 600 mg oral tablet: 1 tab(s) orally every 12 hours  melatonin 3 mg oral tablet: 2 tab(s) orally once a day (at bedtime)  metoprolol succinate 25 mg oral tablet, extended release: 1 tab(s) orally once a day (at bedtime)  midodrine 10 mg oral tablet: 1 tab(s) orally 2 times a day  pantoprazole 40 mg oral delayed release tablet: 1 tab(s) orally once a day (before a meal)  sacubitril-valsartan 24 mg-26 mg oral tablet: 1 tab(s) orally 2 times a day  tamsulosin 0.4 mg oral capsule: 1 cap(s) orally once a day   acetaminophen 325 mg oral tablet: 3 tab(s) orally every 8 hours, As needed, Temp greater or equal to 38C (100.4F), Mild Pain (1 - 3)  amiodarone 200 mg oral tablet: 1 tab(s) orally once a day  aspirin 81 mg oral tablet, chewable: 1 tab(s) orally once a day  Dulcolax Laxative 10 mg rectal suppository: 1 suppository(ies) rectal once a day, As Needed  finasteride 5 mg oral tablet: 1 tab(s) orally once a day  furosemide 20 mg oral tablet: 1 tab(s) orally every 24 hours  levothyroxine 150 mcg (0.15 mg) oral tablet: 1 tab(s) orally once a day  linezolid 600 mg oral tablet: 1 tab(s) orally every 12 hours  melatonin 3 mg oral tablet: 2 tab(s) orally once a day (at bedtime)  metoprolol succinate 25 mg oral tablet, extended release: 1 tab(s) orally once a day (at bedtime)  midodrine 10 mg oral tablet: 1 tab(s) orally 2 times a day  pantoprazole 40 mg oral delayed release tablet: 1 tab(s) orally once a day (before a meal)  rosuvastatin 40 mg oral tablet: 1 tab(s) orally once a day (at bedtime)  sacubitril-valsartan 24 mg-26 mg oral tablet: 1 tab(s) orally 2 times a day  tamsulosin 0.4 mg oral capsule: 1 cap(s) orally once a day

## 2022-11-27 NOTE — DISCHARGE NOTE PROVIDER - CARE PROVIDER_API CALL
Bhupinder Weller (DO)  Internal Medicine  [address] [id number]  Phone: [phone]  Fax: [phone]  Follow Up Time:

## 2022-11-27 NOTE — DISCHARGE NOTE PROVIDER - DETAILS OF MALNUTRITION DIAGNOSIS/DIAGNOSES
This patient has been assessed with a concern for Malnutrition and was treated during this hospitalization for the following Nutrition diagnosis/diagnoses:     -  11/16/2022: Severe protein-calorie malnutrition   -  11/16/2022: Underweight (BMI < 19)

## 2022-11-27 NOTE — DISCHARGE NOTE PROVIDER - NSDCCPCAREPLAN_GEN_ALL_CORE_FT
PRINCIPAL DISCHARGE DIAGNOSIS  Diagnosis: Acute respiratory failure with hypoxia  Assessment and Plan of Treatment: - You were treated for pneumonia, UTI, and chronic foot wound  - Follow up with medical team at Subacute rehab

## 2022-11-27 NOTE — PROGRESS NOTE ADULT - SUBJECTIVE AND OBJECTIVE BOX
Patient is a 82y old  Male who presents with a chief complaint of acute hypoxic resp failure (27 Nov 2022 07:22)    Patient seen and examined at bedside. No overnight events reported. Patient is resting in bed today, he says "everything hurts," he is requesting orange juice and coffee     ALLERGIES:  PC Pen VK (Other)    MEDICATIONS  (STANDING):  aMIOdarone    Tablet 200 milliGRAM(s) Oral daily  aspirin  chewable 81 milliGRAM(s) Oral daily  furosemide    Tablet 20 milliGRAM(s) Oral every 24 hours  heparin   Injectable 5000 Unit(s) SubCutaneous every 12 hours  levothyroxine 150 MICROGram(s) Oral daily  melatonin 6 milliGRAM(s) Oral at bedtime  metoprolol succinate ER 25 milliGRAM(s) Oral at bedtime  midodrine. 5 milliGRAM(s) Oral <User Schedule>  pantoprazole    Tablet 40 milliGRAM(s) Oral before breakfast  sacubitril 24 mG/valsartan 26 mG 1 Tablet(s) Oral two times a day  vancomycin  IVPB 750 milliGRAM(s) IV Intermittent every 12 hours    MEDICATIONS  (PRN):  acetaminophen     Tablet .. 650 milliGRAM(s) Oral every 6 hours PRN Mild Pain (1 - 3)  ondansetron Injectable 4 milliGRAM(s) IV Push every 6 hours PRN Nausea and/or Vomiting    Vital Signs Last 24 Hrs  T(F): 97.4 (27 Nov 2022 06:50), Max: 97.9 (26 Nov 2022 16:55)  HR: 82 (27 Nov 2022 06:50) (78 - 94)  BP: 97/60 (27 Nov 2022 06:50) (97/60 - 115/63)  RR: 16 (27 Nov 2022 06:50) (16 - 16)  SpO2: 96% (27 Nov 2022 06:50) (96% - 100%)    I&O's Summary  26 Nov 2022 07:01  -  27 Nov 2022 07:00  --------------------------------------------------------  IN: 300 mL / OUT: 0 mL / NET: 300 mL    PHYSICAL EXAM:  General: NAD, Ill appearing, A/O x 3  ENT: MMM, no oral thrush   Neck: Supple, No JVD  Lungs: Clear to auscultation bilaterally, non labored breathing  Cardio: IRIR, S1/S2, No murmurs  Abdomen: Soft, Nontender, Nondistended; Bowel sounds present  Extremities: Right foot wrapped with ebony, No calf tenderness, both feet in inflated boots     LABS:                        8.3    18.62 )-----------( 454      ( 26 Nov 2022 12:25 )             27.0     11-26    136  |  103  |  28  ----------------------------<  161  4.0   |  28  |  1.03    Ca    9.0      26 Nov 2022 12:25    TPro  6.3  /  Alb  2.5  /  TBili  0.4  /  DBili  x   /  AST  31  /  ALT  72  /  AlkPhos  147  11-25    11-06 Chol 99 mg/dL LDL -- HDL 31 mg/dL Trig 81 mg/dL    Culture - Blood (collected 24 Nov 2022 09:45)  Source: .Blood Blood-Peripheral  Preliminary Report (25 Nov 2022 14:01):    No growth to date.    Culture - Blood (collected 24 Nov 2022 09:45)  Source: .Blood Blood-Peripheral  Preliminary Report (25 Nov 2022 14:01):    No growth to date.    Culture - Surgical Swab (collected 21 Nov 2022 19:30)  Source: .Surgical Swab Surgical Swab  Final Report (25 Nov 2022 13:18):    Moderate Enterococcus faecalis    Moderate Methicillin Resistant Staphylococcus aureus    Moderate Methicillin Resistant Staphylococcus aureus #2 Multiple    Morphological Strains    Rare Bacteroides vulgatus group "Susceptibilities not performed"    Few Bacteroides salyersiae "Susceptibilities not performed"  Organism: Enterococcus faecalis  Methicillin resistant Staphylococcus aureus  Methicillin resistant Staphylococcus aureus (25 Nov 2022 13:18)  Organism: Methicillin resistant Staphylococcus aureus (25 Nov 2022 13:18)      -  Ampicillin/Sulbactam: R <=8/4      -  Cefazolin: R >16      -  Clindamycin: R >4      -  Daptomycin: S 0.5      -  Erythromycin: R >4      -  Gentamicin: S <=1 Should not be used as monotherapy      -  Linezolid: S 2      -  Oxacillin: R >2      -  Penicillin: R >8      -  Rifampin: S <=1 Should not be used as monotherapy      -  Tetracycline: R >8      -  Trimethoprim/Sulfamethoxazole: R >2/38      -  Vancomycin: S 1      Method Type: ARIE  Organism: Methicillin resistant Staphylococcus aureus (25 Nov 2022 13:18)      -  Ampicillin/Sulbactam: R 16/8      -  Cefazolin: R >16      -  Clindamycin: R >4      -  Daptomycin: S 1      -  Erythromycin: R >4      -  Gentamicin: S <=1 Should not be used as monotherapy      -  Linezolid: S 2      -  Oxacillin: R >2      -  Penicillin: R >8      -  Rifampin: S <=1 Should not be used as monotherapy      -  Tetracycline: R >8      -  Trimethoprim/Sulfamethoxazole: R >2/38      -  Vancomycin: S 2      Method Type: ARIE  Organism: Enterococcus faecalis (25 Nov 2022 13:18)      -  Ampicillin: S <=2 Predicts results to ampicillin/sulbactam, amoxacillin-clavulanate and  piperacillin-tazobactam.      -  Tetracycline: R >8      -  Vancomycin: S 4      Method Type: ARIE    COVID-19 PCR: NotDetec (11-21-22 @ 10:55)  COVID-19 PCR: NotDetec (11-07-22 @ 07:10)  COVID-19 PCR: NotDetec (10-31-22 @ 19:50)    RADIOLOGY & ADDITIONAL TESTS:    Care Discussed with Consultants/Other Providers:    Patient is a 82y old  Male who presents with a chief complaint of acute hypoxic resp failure (27 Nov 2022 07:22)    Patient seen and examined at bedside. No overnight events reported. Patient is resting in bed today, he says "everything hurts," he is requesting orange juice and coffee. Refused AM labs    ALLERGIES:  PC Pen VK (Other)    MEDICATIONS  (STANDING):  aMIOdarone    Tablet 200 milliGRAM(s) Oral daily  aspirin  chewable 81 milliGRAM(s) Oral daily  furosemide    Tablet 20 milliGRAM(s) Oral every 24 hours  heparin   Injectable 5000 Unit(s) SubCutaneous every 12 hours  levothyroxine 150 MICROGram(s) Oral daily  melatonin 6 milliGRAM(s) Oral at bedtime  metoprolol succinate ER 25 milliGRAM(s) Oral at bedtime  midodrine. 5 milliGRAM(s) Oral <User Schedule>  pantoprazole    Tablet 40 milliGRAM(s) Oral before breakfast  sacubitril 24 mG/valsartan 26 mG 1 Tablet(s) Oral two times a day  vancomycin  IVPB 750 milliGRAM(s) IV Intermittent every 12 hours    MEDICATIONS  (PRN):  acetaminophen     Tablet .. 650 milliGRAM(s) Oral every 6 hours PRN Mild Pain (1 - 3)  ondansetron Injectable 4 milliGRAM(s) IV Push every 6 hours PRN Nausea and/or Vomiting    Vital Signs Last 24 Hrs  T(F): 97.4 (27 Nov 2022 06:50), Max: 97.9 (26 Nov 2022 16:55)  HR: 82 (27 Nov 2022 06:50) (78 - 94)  BP: 97/60 (27 Nov 2022 06:50) (97/60 - 115/63)  RR: 16 (27 Nov 2022 06:50) (16 - 16)  SpO2: 96% (27 Nov 2022 06:50) (96% - 100%)    I&O's Summary  26 Nov 2022 07:01  -  27 Nov 2022 07:00  --------------------------------------------------------  IN: 300 mL / OUT: 0 mL / NET: 300 mL    PHYSICAL EXAM:  General: NAD, Ill appearing, A/O x 3  ENT: MMM, no oral thrush   Neck: Supple, No JVD  Lungs: Clear to auscultation bilaterally, non labored breathing  Cardio: IRIR, S1/S2, No murmurs  Abdomen: Soft, Nontender, Nondistended; Bowel sounds present  Extremities: Right foot wrapped with ebony, No calf tenderness, both feet in inflated boots   : hematuria noted to dubois bag    LABS:                        8.3    18.62 )-----------( 454      ( 26 Nov 2022 12:25 )             27.0     11-26    136  |  103  |  28  ----------------------------<  161  4.0   |  28  |  1.03    Ca    9.0      26 Nov 2022 12:25    TPro  6.3  /  Alb  2.5  /  TBili  0.4  /  DBili  x   /  AST  31  /  ALT  72  /  AlkPhos  147  11-25    11-06 Chol 99 mg/dL LDL -- HDL 31 mg/dL Trig 81 mg/dL    Culture - Blood (collected 24 Nov 2022 09:45)  Source: .Blood Blood-Peripheral  Preliminary Report (25 Nov 2022 14:01):    No growth to date.    Culture - Blood (collected 24 Nov 2022 09:45)  Source: .Blood Blood-Peripheral  Preliminary Report (25 Nov 2022 14:01):    No growth to date.    Culture - Surgical Swab (collected 21 Nov 2022 19:30)  Source: .Surgical Swab Surgical Swab  Final Report (25 Nov 2022 13:18):    Moderate Enterococcus faecalis    Moderate Methicillin Resistant Staphylococcus aureus    Moderate Methicillin Resistant Staphylococcus aureus #2 Multiple    Morphological Strains    Rare Bacteroides vulgatus group "Susceptibilities not performed"    Few Bacteroides salyersiae "Susceptibilities not performed"  Organism: Enterococcus faecalis  Methicillin resistant Staphylococcus aureus  Methicillin resistant Staphylococcus aureus (25 Nov 2022 13:18)  Organism: Methicillin resistant Staphylococcus aureus (25 Nov 2022 13:18)      -  Ampicillin/Sulbactam: R <=8/4      -  Cefazolin: R >16      -  Clindamycin: R >4      -  Daptomycin: S 0.5      -  Erythromycin: R >4      -  Gentamicin: S <=1 Should not be used as monotherapy      -  Linezolid: S 2      -  Oxacillin: R >2      -  Penicillin: R >8      -  Rifampin: S <=1 Should not be used as monotherapy      -  Tetracycline: R >8      -  Trimethoprim/Sulfamethoxazole: R >2/38      -  Vancomycin: S 1      Method Type: ARIE  Organism: Methicillin resistant Staphylococcus aureus (25 Nov 2022 13:18)      -  Ampicillin/Sulbactam: R 16/8      -  Cefazolin: R >16      -  Clindamycin: R >4      -  Daptomycin: S 1      -  Erythromycin: R >4      -  Gentamicin: S <=1 Should not be used as monotherapy      -  Linezolid: S 2      -  Oxacillin: R >2      -  Penicillin: R >8      -  Rifampin: S <=1 Should not be used as monotherapy      -  Tetracycline: R >8      -  Trimethoprim/Sulfamethoxazole: R >2/38      -  Vancomycin: S 2      Method Type: ARIE  Organism: Enterococcus faecalis (25 Nov 2022 13:18)      -  Ampicillin: S <=2 Predicts results to ampicillin/sulbactam, amoxacillin-clavulanate and  piperacillin-tazobactam.      -  Tetracycline: R >8      -  Vancomycin: S 4      Method Type: ARIE    COVID-19 PCR: NotDetec (11-21-22 @ 10:55)  COVID-19 PCR: NotDetec (11-07-22 @ 07:10)  COVID-19 PCR: NotDetec (10-31-22 @ 19:50)    RADIOLOGY & ADDITIONAL TESTS:    Care Discussed with Consultants/Other Providers:

## 2022-11-27 NOTE — DISCHARGE NOTE PROVIDER - HOSPITAL COURSE
Hospital Course  HPI:  HPI: 81 y/o M w/ pmh of htn, cad s/p cabg, severe cardiomyopathy (EF 20%), Afib (not on AC due to hematuria), bph, LUE DVT, b/l LLE DVT s/p IVC filter placement pt was discharged to SNF now tonight presents to [facility] on 11/14/2022 for hypoxia and AMS, pt was started on NC with improvement in o2 and then became hypoxic again and was started on HFNC at 40L/40%, hypotensive suspected to be 2/2 to septic, HAN, transaminitis, Lactic acidosis, UA concerning for UTI and possible PNA. Pt is reported to be DNR/DNI is reported to have wanted everything else done. Pt seen and examined in the ED unable to participate in HPI due to agitation as he just keeps screaming for orange juice and not participate during interview. (14 Nov 2022 23:51)    Patient was admitted to ICU for septic shock due to CAP and UTI in a patient with chronic dubois, and acute hypoxic respiratory failure due to CAP and decompensated systolic CHF. He was stabilized and transferred to medical floor. Completed cefepime 7 day course on 11/21. Now tolerating room air. ID was consulted.     Patient also has a chronic right foot ulceration, with MRSA on culture and persistent leukocytosis. Repeat blood cultures were sent on 11/24, NGTD. Podiatry was consulted, advised XRAY which showed right foot with no bony destruction or fx, recommended outpatient follow up, abx as per ID. ID continued to follow for foot wound, recommended vancomycin, will need course lasting ____________    Patient has a chronic dubois, he pulled out dubois on 11/26, was replaced same day    PT recommended NILSON      Source of Infection:  Antibiotic / Last Day:    Palliative Care / Advanced Care Planning  Code Status: DNR    Discharging Provider:    Contact Info: Cell 		 			Please call with any questions or concerns.    Outpatient Provider:  Signout given to  SNF Provider:   Hospital Course  HPI:  HPI: 81 y/o M w/ pmh of htn, cad s/p cabg, severe cardiomyopathy (EF 20%), Afib (not on AC due to hematuria), bph, LUE DVT, b/l LLE DVT s/p IVC filter placement pt was discharged to SNF now tonight presents to [facility] on 11/14/2022 for hypoxia and AMS, pt was started on NC with improvement in o2 and then became hypoxic again and was started on HFNC at 40L/40%, hypotensive suspected to be 2/2 to septic, HAN, transaminitis, Lactic acidosis, UA concerning for UTI and possible PNA. Pt is reported to be DNR/DNI is reported to have wanted everything else done. Pt seen and examined in the ED unable to participate in HPI due to agitation as he just keeps screaming for orange juice and not participate during interview. (14 Nov 2022 23:51)    Patient was admitted to ICU for septic shock due to CAP and UTI in a patient with chronic dubois, and acute hypoxic respiratory failure due to CAP and decompensated systolic CHF. He was stabilized and transferred to medical floor. Completed cefepime 7 day course on 11/21. Now tolerating room air. ID was consulted.     Patient also has a chronic right foot ulceration, with MRSA on culture and persistent leukocytosis. Repeat blood cultures were sent on 11/24, NGTD. Podiatry was consulted, advised XRAY which showed right foot with no bony destruction or fx, recommended outpatient follow up, abx as per ID. ID continued to follow for foot wound, recommended Zyvox 10 day course,     Patient has a chronic dubois, he pulled out udbois on 11/26, was replaced same day    PT recommended NILSON      Source of Infection: Foot ulcer  Antibiotic / Last Day: Zyvox through 12/4    Palliative Care / Advanced Care Planning  Code Status: DNR    Discharging Provider:  Anu Griffin NP  Contact Info: cell [phone]	 			Please call with any questions or concerns.    Outpatient Provider:  Signout given to  SNF Provider: Dr Pettit - notified    Hospital Course  HPI:  HPI: 83 y/o M w/ pmh of htn, cad s/p cabg, severe cardiomyopathy (EF 20%), Afib (not on AC due to hematuria), bph, LUE DVT, b/l LLE DVT s/p IVC filter placement pt was discharged [facility] SNF [facility] and AMS, pt was started on NC with improvement in o2 and then became hypoxic again and was started on HFNC at 40L/40%, hypotensive suspected to be 2/2 to septic, HAN, transaminitis, Lactic acidosis, UA concerning for UTI and possible PNA. Pt is reported to be DNR/DNI is reported to have wanted everything else done. Pt seen and examined in the ED unable to participate in HPI due to agitation as he just keeps screaming for orange juice and not participate during interview. (14 Nov 2022 23:51)    Patient was admitted to ICU for septic shock due to CAP and UTI in a patient with chronic dubois, and acute hypoxic respiratory failure due to CAP and decompensated systolic CHF. He was stabilized and transferred to medical floor. Completed cefepime 7 day course on 11/21. Now tolerating room air. ID was consulted.     Patient also has a chronic right foot ulceration, with MRSA on culture and persistent leukocytosis. Repeat blood cultures were sent on 11/24, NGTD. Podiatry was consulted, advised XRAY which showed right foot with no bony destruction or fx, recommended outpatient follow up, abx as per ID. ID continued to follow for foot wound, recommended Zyvox 10 day course,     Patient has a chronic dubois, he pulled out dubois on 11/26, was replaced same day    PT recommended NILSON. Patient requires encouragement to participate in PT. Family requests ROM exercises in bed. Plan for DC to [facility]     On trazodone at home, he did not receive trazodone during this admission, continue to hold trazodone for 2 weeks after zyvox completion     Source of Infection: Foot ulcer  Antibiotic / Last Day: Zyvox through 12/4    Palliative Care / Advanced Care Planning  Code Status: DNR    Discharging Provider:  Anu Griffin NP  Contact Info: Cell 	[phone] Please call with any questions or concerns.    Outpatient Provider: Dr Weller   Signout given to  SNF Provider: Dr Pettit - notified

## 2022-11-27 NOTE — PROGRESS NOTE ADULT - SUBJECTIVE AND OBJECTIVE BOX
CC: f/u for resolved sepsis     Patient reports no new c/o, refuses wound exam of his foot    REVIEW OF SYSTEMS:  All other review of systems negative (Comprehensive ROS)    Antimicrobials Day #3 vanc    Other Medications Reviewed    T(F): 98.4 (11-24-22 @ 05:28), Max: 98.4 (11-24-22 @ 05:28)  HR: 84 (11-24-22 @ 05:28)  BP: 100/62 (11-24-22 @ 05:28)  RR: 16 (11-24-22 @ 05:28)  SpO2: 97% (11-24-22 @ 05:28)  Wt(kg): --    PHYSICAL EXAM:  General: alert, no acute distress  Eyes:  anicteric, no conjunctival injection, no discharge  Oropharynx: no lesions or injection 	  Neck: supple  Lungs: clear to auscultation anteriorly  Heart: regular rate and rhythm; no murmurs  Abdomen: soft, nondistended, nontender  Extremities: no clubbing, cyanosis, or edema. right foot dressed  Neurologic: alert, communicative, moves all extremities  : dubois with clear urine    LAB RESULTS:                                   8.0    14.52 )-----------( 447      ( 27 Nov 2022 16:14 )             26.9   11-27    139  |  105  |  26<H>  ----------------------------<  116<H>  4.2   |  26  |  1.00    Ca    8.6      27 Nov 2022 16:14  Mg     2.0     11-27              MICROBIOLOGY:  RECENT CULTURES:  11-21 @ 19:30 .Surgical Swab Surgical Swab     Moderate Enterococcus faecalis  Moderate Staphylococcus aureus    Culture - Blood (11.24.22 @ 09:45)   Specimen Source: .Blood Blood-Peripheral   Culture Results:   No growth to date.     RADIOLOGY REVIEWED:  < from: Xray Foot AP + Lateral + Oblique, Right (11.22.22 @ 09:15) >  IMPRESSION: No acute finding.    --- End of Report ---    < end of copied text >

## 2022-11-27 NOTE — DISCHARGE NOTE PROVIDER - NSDCFUADDINST_GEN_ALL_CORE_FT
Right Foot Wound  1. Apply betadine to eschar on lateral aspect of right foot  2. Apply gauze to area  3. Secure with ebony  4. Change dressing M,W,F

## 2022-11-27 NOTE — PROGRESS NOTE ADULT - ASSESSMENT
82M with PMH HTN, CAD s/p CABG, severe cardiomyopathy (TTE 11/22: EF 20%), Afib (not on AC due to hematuria), BPH, LUE DVT, b/l LLE DVT s/p IVC filter placement pt was discharged to SNF, admitted to [facility] on 11/14/2022 for hypoxia and AMS, also hypotensive suspected 2/2 to septic shock, HAN, transaminitis, Lactic acidosis, UTI and CAP. Spo2 improved, currently on RA. Downgraded to medical floor    #Acute hypoxic respiratory failure due to CAP and decompensated systolic CHF  - Currently tolerating room air   - TTE with EF 20%  - C/w toprol, entresto, lasix, amiodarone  - Hold farxiga/sprionolactone for hypotension   - Midodrine titrated down to q12hrs, continue to titrate to off as tolerated - titrated switch from 10mg BID to 5 mg BID on 11/26  - Strict I&Os, daily weights    #Septic shock due to CAP and UTI in a patient with chronic dubois; resolved  #Persistent leukocytosis  - Hypotension improving  - Completed cefepime 7 day course on 11/21  - Noted persistent leukocytosis - repeat blood cultures with NGTD    #Right foot ulceration  #MRSA  - Case prior d/w vascular surgeon Dr. Traylor - who advised podiatry eval for possible debridement  - Podiatry noted; XR of right foot with no bony destruction or fx. Continue local wound care for now, advised outpatient follow up. Recommend abx as per ID  - Wound care consulted for local wound orders  - Continue local wound care   - MRSA noted in wound culture. Sensitivities noted. Continuing Vanco. ID recs appreciated   - Follow up with ID    #HAN on CKD Stage 2  - Likely ATN in setting of sepsis  - Cr back to baseline  - Avoid nephrotoxic medications  - Continue chronic dubois  - Patient pulled out dubois on 11/26, replaced same day     #Transaminitis   - Likely shock liver in setting of sepsis  - Downtrending  - Avoid hepatotoxic medications  - RUQ sono performed - unremarkable     #CAD s/p CABG  - Positive trops likely demand in setting of sepsis and shock  - Continue ASA  - Continue Metoprolol  - Cardiology recs    #Chronic Afib  - Continue rate control with Metoprolol  - Continue rhythm control with Amiodarone   - Not on AC due to prior hematuria    #Hypothyroidism  - Continue synthroid    #DVT ppx  - Heparin    GOC: DNR    Dispo: PT recommending [facility], family would prefer [facility] in [place].  Pending ID & podiatry clearance    11/26: Updated wife Kirsten ([phone])   82M with PMH HTN, CAD s/p CABG, severe cardiomyopathy (TTE 11/22: EF 20%), Afib (not on AC due to hematuria), BPH, LUE DVT, b/l LLE DVT s/p IVC filter placement pt was discharged to SNF, admitted to [facility] on 11/14/2022 for hypoxia and AMS, also hypotensive suspected 2/2 to septic shock, HAN, transaminitis, Lactic acidosis, UTI and CAP. Spo2 improved, currently on RA. Downgraded to medical floor    #Acute hypoxic respiratory failure due to CAP and decompensated systolic CHF  - Currently tolerating room air   - TTE with EF 20%  - C/w toprol, entresto, lasix, amiodarone  - Hold farxiga/sprionolactone for hypotension   - Midodrine titrated down to q12hrs, continue to titrate to off as tolerated - titrated switch from 10mg BID to 5 mg BID on 11/26  - Strict I&Os, daily weights    #Septic shock due to CAP and UTI in a patient with chronic dubois; resolved  #Persistent leukocytosis  - Hypotension improving  - Completed cefepime 7 day course on 11/21  - Noted persistent leukocytosis - repeat blood cultures with NGTD    #Right foot ulceration  #MRSA  - Case prior d/w vascular surgeon Dr. Traylor - who advised podiatry eval for possible debridement  - Podiatry noted; XR of right foot with no bony destruction or fx. Continue local wound care for now, advised outpatient follow up. Recommend abx as per ID  - Wound care consulted for local wound orders  - Continue local wound care   - MRSA noted in wound culture. Sensitivities noted. Continuing Vanco. ID recs appreciated   - Follow up with ID    #HAN on CKD Stage 2  - Likely ATN in setting of sepsis  - Cr back to baseline  - Avoid nephrotoxic medications  - Continue chronic dubois  - Patient pulled out dubois on 11/26, replaced same day     #Transaminitis   - Likely shock liver in setting of sepsis  - Downtrending  - Avoid hepatotoxic medications  - RUQ sono performed - unremarkable     #CAD s/p CABG  - Positive trops likely demand in setting of sepsis and shock  - Continue ASA  - Continue Metoprolol  - Cardiology recs    #Chronic Afib  - Continue rate control with Metoprolol  - Continue rhythm control with Amiodarone   - Not on AC due to prior hematuria    #Hypothyroidism  - Continue synthroid    #DVT ppx  - Heparin    GOC: DNR    Dispo: PT recommending [facility], family would prefer [facility] in [place].  Pending ID recs for antibiotic duration    11/26: Updated wife Kirsten ([phone])   82M with PMH HTN, CAD s/p CABG, severe cardiomyopathy (TTE 11/22: EF 20%), Afib (not on AC due to hematuria), BPH, LUE DVT, b/l LLE DVT s/p IVC filter placement pt was discharged to SNF, admitted to [facility] on 11/14/2022 for hypoxia and AMS, also hypotensive suspected 2/2 to septic shock, HAN, transaminitis, Lactic acidosis, UTI and CAP. Spo2 improved, currently on RA. Downgraded to medical floor    #Acute hypoxic respiratory failure due to CAP and decompensated systolic CHF  - Currently tolerating room air   - TTE with EF 20%  - C/w toprol, entresto, lasix, amiodarone  - Hold farxiga/sprionolactone for hypotension   - Midodrine titrated down to q12hrs, continue to titrate to off as tolerated - titrated switch from 10mg BID to 5 mg BID on 11/26  - Strict I&Os, daily weights    #Septic shock due to CAP and UTI in a patient with chronic dubois; resolved  #Persistent leukocytosis  - Hypotension improving  - Completed cefepime 7 day course on 11/21  - Noted persistent leukocytosis - repeat blood cultures with NGTD    #Right foot ulceration  #MRSA  - Case prior d/w vascular surgeon Dr. Traylor - who advised podiatry eval for possible debridement  - Podiatry noted; XR of right foot with no bony destruction or fx. Continue local wound care for now, advised outpatient follow up. Recommend abx as per ID  - Wound care consulted for local wound orders  - Continue local wound care   - MRSA noted in wound culture. Sensitivities noted. Continuing Vanco. ID recs appreciated   - Follow up with ID    #HAN on CKD Stage 2  - Likely ATN in setting of sepsis  - Cr back to baseline  - Avoid nephrotoxic medications  - Continue chronic dubois  - Patient pulled out dubois on 11/26, replaced same day   - Now hematuria, but clearing. Refused AM labs, will check CBC this afternoon     #Transaminitis   - Likely shock liver in setting of sepsis  - Downtrending  - Avoid hepatotoxic medications  - RUQ sono performed - unremarkable     #CAD s/p CABG  - Positive trops likely demand in setting of sepsis and shock  - Continue ASA  - Continue Metoprolol  - Cardiology recs    #Chronic Afib  - Continue rate control with Metoprolol  - Continue rhythm control with Amiodarone   - Not on AC due to prior hematuria    #Hypothyroidism  - Continue synthroid    #DVT ppx  - Heparin    GOC: DNR    Dispo: PT recommending [facility], family would prefer [facility] in [place].  Pending ID recs for antibiotic duration    11/27: Updated wife Kirsten ([phone])

## 2022-11-28 PROCEDURE — 99233 SBSQ HOSP IP/OBS HIGH 50: CPT

## 2022-11-28 RX ADMIN — Medication 81 MILLIGRAM(S): at 12:21

## 2022-11-28 RX ADMIN — Medication 20 MILLIGRAM(S): at 08:14

## 2022-11-28 RX ADMIN — LINEZOLID 600 MILLIGRAM(S): 600 INJECTION, SOLUTION INTRAVENOUS at 17:13

## 2022-11-28 RX ADMIN — Medication 25 MILLIGRAM(S): at 22:18

## 2022-11-28 RX ADMIN — SACUBITRIL AND VALSARTAN 1 TABLET(S): 24; 26 TABLET, FILM COATED ORAL at 05:50

## 2022-11-28 RX ADMIN — HEPARIN SODIUM 5000 UNIT(S): 5000 INJECTION INTRAVENOUS; SUBCUTANEOUS at 05:50

## 2022-11-28 RX ADMIN — AMIODARONE HYDROCHLORIDE 200 MILLIGRAM(S): 400 TABLET ORAL at 05:50

## 2022-11-28 RX ADMIN — LINEZOLID 600 MILLIGRAM(S): 600 INJECTION, SOLUTION INTRAVENOUS at 05:49

## 2022-11-28 RX ADMIN — Medication 150 MICROGRAM(S): at 06:10

## 2022-11-28 RX ADMIN — MIDODRINE HYDROCHLORIDE 10 MILLIGRAM(S): 2.5 TABLET ORAL at 17:14

## 2022-11-28 RX ADMIN — PANTOPRAZOLE SODIUM 40 MILLIGRAM(S): 20 TABLET, DELAYED RELEASE ORAL at 05:50

## 2022-11-28 RX ADMIN — MIDODRINE HYDROCHLORIDE 10 MILLIGRAM(S): 2.5 TABLET ORAL at 05:50

## 2022-11-28 RX ADMIN — SACUBITRIL AND VALSARTAN 1 TABLET(S): 24; 26 TABLET, FILM COATED ORAL at 21:22

## 2022-11-28 RX ADMIN — Medication 6 MILLIGRAM(S): at 21:22

## 2022-11-28 RX ADMIN — HEPARIN SODIUM 5000 UNIT(S): 5000 INJECTION INTRAVENOUS; SUBCUTANEOUS at 17:14

## 2022-11-28 NOTE — PROGRESS NOTE ADULT - ASSESSMENT
82M with PMH HTN, CAD s/p CABG, severe cardiomyopathy (TTE 11/22: EF 20%), Afib (not on AC due to hematuria), BPH, LUE DVT, b/l LLE DVT s/p IVC filter placement pt was discharged to SNF, admitted to [facility] on 11/14/2022 for hypoxia and AMS, also hypotensive suspected 2/2 to septic shock, HAN, transaminitis, Lactic acidosis, UTI and CAP. Spo2 improved, currently on RA. Downgraded to medical floor    Acute Hypoxic Respiratory Failure due to CAP and Decompensated Systolic CHF - stable  stable respiratory status  TTE with EF 20%  continue medical management - toprol, entresto, lasix and amiodarone  hold farxiga and spironolactone for hypotension  continue to titrate midodrine off as tolerated, currently on 5 mg BID  strict I/Os, daily weights    Septic Shock due to CAP and UTI in pt with chronic dubois - resolved  Persistent Leukocytosis  hypotension improving  completed seven days of IV cefepime on 11/21  noted persistent leukocytosis - repeat blood cultures NGTD  ID on board    RT foot ulceration  MRSA  case discussed with vascular surgeon Dr. Traylor who advised podiatry evaluation for possible debridement  podiatry evaluation noted - XR RT foot with no bony destruction or fracture  plan to continue local wound care, advised outpatient follow up  MRSA noted in wound culture, sensitivities noted  completed course of vancomycin  now converted to oral zyvox  ID following    HAN on CKD stage 2  likely ATN in setting of sepsis  Cr back to baseline  avoid nephrotoxic medications  continue chronic dubois  pt pulled out dubois on 11/26, replaced same day  resolving hematuria, CBC stable    Transaminitis  likely shock liver in s/o sepsis  downtrending  avoid hepatotoxic medications  RUQ sono unremarkable    CAD s/p CABG  positive Tn likely demand in setting of sepsis and shock  continue metoprolol and ASA  cardiology appreciated    Chronic Atrial Fibrillation  continue rate control with metoprolol  continue rhythm control with amiodarone  not on AC due to prior hematuria    Hypothyroid  continue synthroid    DVT ppx  heparin    GOC  DNR    PT recommends [facility], family would prefer [facility] in [place]  medically cleared    updated wife Kirsten [phone]

## 2022-11-28 NOTE — PROGRESS NOTE ADULT - ASSESSMENT
82y male with a past history of HTN, CAD, low Ef(20%), A fib, BPH,CVA with left sided weakness,  DVT of B/L lower extremities, IVC filter, and chronic dubois who was admitted 11/14 with hypoxic respiratory failure secondary to sepsis.  He was covered with cefepime and stabilized.   He is a DNR/DNI but would like medical measures.  His blood cultures have been negative.  He is confused, unable to give a history. He has a persistent leukocytosis.  While he has made some progress in that he is hemodynamically stable, his respiratory status improved, and he is tolerating a diet, his elevated wbc has not moderated. RUQ sonogram- negative study.  He has reduced blood flow to legs, an ischemic appearing tissue injury to left foot, and his arterial studies show vascular disease.  7 days of cefepime stopped 11/21. He has been stable off antibiotics.  Plain films of foot negative  Per podiatry, foot wound without active drainage & has fibronecrotic tissue, then unclear why a wound swab was submitted on 11/21/22, now isolating Staph aureus & enterococcus ? clinical significance, pt refuses foot exam   Bacteroides has also been isolated.No debridement is being planed.  He has been afebrile, no signs of ascending infection  Suggest:  Continue linezolid, day 4/10  Podiatry input appreciated--no clear infection, but ischemic changes as previously documented  Its difficult to completely discount improvement in his WBC with 3 days of Vancomycin, 17,000 to 14,500   oral Zyvox 600 bid for 10 days may be an option if plan for discharge and no other interventions  he is followed with podiatry for outpatient wound care  He appears stable from ID viewpoint, discharge planning per primary service.[place] guarded.

## 2022-11-28 NOTE — PHARMACOTHERAPY INTERVENTION NOTE - COMMENTS
Spoke with pt's wife Kirsten and reviewed current medications and CHF. EF 20%. Counseled on new medications metoprolol and Entresto. Reviewed reason for use, directions for use and possible side effects. Pt's wife verbalized understanding, all questions answered. 10 minutes spent on CHF education.

## 2022-11-28 NOTE — PROGRESS NOTE ADULT - SUBJECTIVE AND OBJECTIVE BOX
Patient is a 82y old  Male who presents with a chief complaint of acute hypoxic resp failure (27 Nov 2022 18:05)    Patient seen and examined at bedside.  no acute events overnight    ALLERGIES:  PC Pen VK (Other)        Vital Signs Last 24 Hrs  T(F): 98.1 (28 Nov 2022 05:30), Max: 98.6 (27 Nov 2022 20:06)  HR: 88 (28 Nov 2022 05:30) (88 - 104)  BP: 101/66 (28 Nov 2022 05:30) (101/61 - 107/62)  RR: 18 (28 Nov 2022 05:30) (16 - 18)  SpO2: 97% (28 Nov 2022 05:30) (97% - 100%)  I&O's Summary    27 Nov 2022 07:01  -  28 Nov 2022 07:00  --------------------------------------------------------  IN: 0 mL / OUT: 450 mL / NET: -450 mL    28 Nov 2022 07:01  -  28 Nov 2022 11:17  --------------------------------------------------------  IN: 420 mL / OUT: 0 mL / NET: 420 mL      MEDICATIONS:  acetaminophen     Tablet .. 975 milliGRAM(s) Oral every 8 hours PRN  aMIOdarone    Tablet 200 milliGRAM(s) Oral daily  aspirin  chewable 81 milliGRAM(s) Oral daily  furosemide    Tablet 20 milliGRAM(s) Oral every 24 hours  heparin   Injectable 5000 Unit(s) SubCutaneous every 12 hours  levothyroxine 150 MICROGram(s) Oral daily  linezolid    Tablet 600 milliGRAM(s) Oral every 12 hours  melatonin 6 milliGRAM(s) Oral at bedtime  metoprolol succinate ER 25 milliGRAM(s) Oral at bedtime  midodrine. 10 milliGRAM(s) Oral <User Schedule>  ondansetron Injectable 4 milliGRAM(s) IV Push every 6 hours PRN  pantoprazole    Tablet 40 milliGRAM(s) Oral before breakfast  sacubitril 24 mG/valsartan 26 mG 1 Tablet(s) Oral two times a day      PHYSICAL EXAM:  General: NAD, A/O x 3  ENT: MMM, no oral thrush  Neck: Supple, No JVD  Lungs: Clear to auscultation bilaterally, non labored breathing, good inspiratory effort  Cardio: S1S2 irregular  Abdomen: Soft, Nontender, Nondistended; Bowel sounds present  Extremities: No cyanosis, No edema, B/L inflated boots, RT foot wrapped in ebony    LABS:                        8.0    14.52 )-----------( 447      ( 27 Nov 2022 16:14 )             26.9     11-27    139  |  105  |  26  ----------------------------<  116  4.2   |  26  |  1.00    Ca    8.6      27 Nov 2022 16:14  Mg     2.0     11-27                11-06 Chol 99 mg/dL LDL -- HDL 31 mg/dL Trig 81 mg/dL                      Culture - Blood (collected 24 Nov 2022 09:45)  Source: .Blood Blood-Peripheral  Preliminary Report (25 Nov 2022 14:01):    No growth to date.    Culture - Blood (collected 24 Nov 2022 09:45)  Source: .Blood Blood-Peripheral  Preliminary Report (25 Nov 2022 14:01):    No growth to date.    Culture - Surgical Swab (collected 21 Nov 2022 19:30)  Source: .Surgical Swab Surgical Swab  Final Report (25 Nov 2022 13:18):    Moderate Enterococcus faecalis    Moderate Methicillin Resistant Staphylococcus aureus    Moderate Methicillin Resistant Staphylococcus aureus #2 Multiple    Morphological Strains    Rare Bacteroides vulgatus group "Susceptibilities not performed"    Few Bacteroides salyersiae "Susceptibilities not performed"  Organism: Enterococcus faecalis  Methicillin resistant Staphylococcus aureus  Methicillin resistant Staphylococcus aureus (25 Nov 2022 13:18)  Organism: Methicillin resistant Staphylococcus aureus (25 Nov 2022 13:18)      -  Ampicillin/Sulbactam: R <=8/4      -  Cefazolin: R >16      -  Clindamycin: R >4      -  Daptomycin: S 0.5      -  Erythromycin: R >4      -  Gentamicin: S <=1 Should not be used as monotherapy      -  Linezolid: S 2      -  Oxacillin: R >2      -  Penicillin: R >8      -  Rifampin: S <=1 Should not be used as monotherapy      -  Tetracycline: R >8      -  Trimethoprim/Sulfamethoxazole: R >2/38      -  Vancomycin: S 1      Method Type: ARIE  Organism: Methicillin resistant Staphylococcus aureus (25 Nov 2022 13:18)      -  Ampicillin/Sulbactam: R 16/8      -  Cefazolin: R >16      -  Clindamycin: R >4      -  Daptomycin: S 1      -  Erythromycin: R >4      -  Gentamicin: S <=1 Should not be used as monotherapy      -  Linezolid: S 2      -  Oxacillin: R >2      -  Penicillin: R >8      -  Rifampin: S <=1 Should not be used as monotherapy      -  Tetracycline: R >8      -  Trimethoprim/Sulfamethoxazole: R >2/38      -  Vancomycin: S 2      Method Type: ARIE  Organism: Enterococcus faecalis (25 Nov 2022 13:18)      -  Ampicillin: S <=2 Predicts results to ampicillin/sulbactam, amoxacillin-clavulanate and  piperacillin-tazobactam.      -  Tetracycline: R >8      -  Vancomycin: S 4      Method Type: ARIE      COVID-19 PCR: NotDetec (11-27-22 @ 13:15)  COVID-19 PCR: NotDetec (11-21-22 @ 10:55)  COVID-19 PCR: NotDetec (11-07-22 @ 07:10)  COVID-19 PCR: NotDetec (10-31-22 @ 19:50)      RADIOLOGY & ADDITIONAL TESTS:    Care Discussed with Consultants/Other Providers:

## 2022-11-28 NOTE — PROGRESS NOTE ADULT - SUBJECTIVE AND OBJECTIVE BOX
CC: f/u for leukocytosis and Rt lower extremity wounds    Patient reports: he is restless, does not want RLE dressing removed.    REVIEW OF SYSTEMS:  All other review of systems negative (Comprehensive ROS): limited by confusion    Antimicrobials Day #  :day 4/10  linezolid    Tablet 600 milliGRAM(s) Oral every 12 hours    Other Medications Reviewed  MEDICATIONS  (STANDING):  aMIOdarone    Tablet 200 milliGRAM(s) Oral daily  aspirin  chewable 81 milliGRAM(s) Oral daily  furosemide    Tablet 20 milliGRAM(s) Oral every 24 hours  heparin   Injectable 5000 Unit(s) SubCutaneous every 12 hours  levothyroxine 150 MICROGram(s) Oral daily  linezolid    Tablet 600 milliGRAM(s) Oral every 12 hours  melatonin 6 milliGRAM(s) Oral at bedtime  metoprolol succinate ER 25 milliGRAM(s) Oral at bedtime  midodrine. 10 milliGRAM(s) Oral <User Schedule>  pantoprazole    Tablet 40 milliGRAM(s) Oral before breakfast  sacubitril 24 mG/valsartan 26 mG 1 Tablet(s) Oral two times a day    T(F): 98.1 (11-28-22 @ 05:30), Max: 98.6 (11-27-22 @ 20:06)  HR: 88 (11-28-22 @ 05:30)  BP: 101/66 (11-28-22 @ 05:30)  RR: 18 (11-28-22 @ 05:30)  SpO2: 97% (11-28-22 @ 05:30)  Wt(kg): --    PHYSICAL EXAM:  General: alert, no acute distress  Eyes:  anicteric, no conjunctival injection, no discharge  Oropharynx: no lesions or injection 	  Neck: supple, without adenopathy  Lungs: clear to auscultation, decreased at bases  Heart: irregular rate and rhythm; no murmur,   Abdomen: soft, nondistended, nontender, without mass or organomegaly  Skin: RT foot dressed  Extremities: no clubbing, cyanosis, or edema  Neurologic: alert, confused, moves all extremities    LAB RESULTS:                        8.0    14.52 )-----------( 447      ( 27 Nov 2022 16:14 )             26.9     11-27    139  |  105  |  26<H>  ----------------------------<  116<H>  4.2   |  26  |  1.00    Ca    8.6      27 Nov 2022 16:14  Mg     2.0     11-27          MICROBIOLOGY:  RECENT CULTURES:  11-24 @ 09:45 .Blood Blood-Peripheral     No growth to date.          RADIOLOGY REVIEWED:    < from: Xray Foot AP + Lateral + Oblique, Right (11.22.22 @ 09:15) >  IMPRESSION: No acute finding.    < end of copied text >

## 2022-11-29 LAB
CULTURE RESULTS: SIGNIFICANT CHANGE UP
CULTURE RESULTS: SIGNIFICANT CHANGE UP
SPECIMEN SOURCE: SIGNIFICANT CHANGE UP
SPECIMEN SOURCE: SIGNIFICANT CHANGE UP

## 2022-11-29 PROCEDURE — 99232 SBSQ HOSP IP/OBS MODERATE 35: CPT

## 2022-11-29 RX ADMIN — Medication 6 MILLIGRAM(S): at 22:12

## 2022-11-29 RX ADMIN — LINEZOLID 600 MILLIGRAM(S): 600 INJECTION, SOLUTION INTRAVENOUS at 05:44

## 2022-11-29 RX ADMIN — Medication 20 MILLIGRAM(S): at 08:05

## 2022-11-29 RX ADMIN — Medication 150 MICROGRAM(S): at 05:45

## 2022-11-29 RX ADMIN — SACUBITRIL AND VALSARTAN 1 TABLET(S): 24; 26 TABLET, FILM COATED ORAL at 18:02

## 2022-11-29 RX ADMIN — SACUBITRIL AND VALSARTAN 1 TABLET(S): 24; 26 TABLET, FILM COATED ORAL at 05:45

## 2022-11-29 RX ADMIN — AMIODARONE HYDROCHLORIDE 200 MILLIGRAM(S): 400 TABLET ORAL at 05:44

## 2022-11-29 RX ADMIN — MIDODRINE HYDROCHLORIDE 10 MILLIGRAM(S): 2.5 TABLET ORAL at 18:00

## 2022-11-29 RX ADMIN — Medication 81 MILLIGRAM(S): at 12:50

## 2022-11-29 RX ADMIN — LINEZOLID 600 MILLIGRAM(S): 600 INJECTION, SOLUTION INTRAVENOUS at 18:00

## 2022-11-29 RX ADMIN — MIDODRINE HYDROCHLORIDE 10 MILLIGRAM(S): 2.5 TABLET ORAL at 05:44

## 2022-11-29 RX ADMIN — HEPARIN SODIUM 5000 UNIT(S): 5000 INJECTION INTRAVENOUS; SUBCUTANEOUS at 18:01

## 2022-11-29 RX ADMIN — HEPARIN SODIUM 5000 UNIT(S): 5000 INJECTION INTRAVENOUS; SUBCUTANEOUS at 05:42

## 2022-11-29 RX ADMIN — Medication 25 MILLIGRAM(S): at 22:42

## 2022-11-29 RX ADMIN — PANTOPRAZOLE SODIUM 40 MILLIGRAM(S): 20 TABLET, DELAYED RELEASE ORAL at 05:45

## 2022-11-29 NOTE — PROGRESS NOTE ADULT - ASSESSMENT
82M with PMH HTN, CAD s/p CABG, severe cardiomyopathy (TTE 11/22: EF 20%), Afib (not on AC due to hematuria), BPH, LUE DVT, b/l LLE DVT s/p IVC filter placement pt was discharged to SNF, admitted to [facility] on 11/14/2022 for hypoxia and AMS, also hypotensive suspected 2/2 to septic shock, HAN, transaminitis, Lactic acidosis, UTI and CAP. Spo2 improved, currently on RA. Downgraded to medical floor    Acute Hypoxic Respiratory Failure due to CAP and Decompensated Systolic CHF - stable  stable respiratory status  TTE with EF 20%  continue medical management - toprol, entresto, lasix and amiodarone  hold farxiga and spironolactone for hypotension  continue to titrate midodrine off as tolerated, currently on 10 mg BID  strict I/Os, daily weights    Septic Shock due to CAP and UTI in pt with chronic dubois - resolved  Persistent Leukocytosis  hypotension improving  completed seven days of IV cefepime on 11/21  noted persistent leukocytosis - repeat blood cultures NGTD  ID on board    RT foot ulceration  MRSA  case discussed with vascular surgeon Dr. Traylor who advised podiatry evaluation for possible debridement  podiatry evaluation noted - XR RT foot with no bony destruction or fracture  plan to continue local wound care, advised outpatient follow up  MRSA noted in wound culture, sensitivities noted  completed course of vancomycin  now converted to oral zyvox day 5/10  ID following    HAN on CKD stage 2  likely ATN in setting of sepsis  Cr back to baseline  avoid nephrotoxic medications  continue chronic dubois  pt pulled out dubois on 11/26, replaced same day  resolving hematuria, CBC stable    Transaminitis  likely shock liver in s/o sepsis  downtrending  avoid hepatotoxic medications  RUQ sono unremarkable    CAD s/p CABG  positive Tn likely demand in setting of sepsis and shock  continue metoprolol and ASA  cardiology appreciated    Chronic Atrial Fibrillation  continue rate control with metoprolol  continue rhythm control with amiodarone  not on AC due to prior hematuria    Hypothyroid  continue synthroid    DVT ppx  heparin    GOC  DNR    PT recommends [facility], family would prefer [facility] in [place]  medically cleared    updated wife Kirsten [phone]

## 2022-11-29 NOTE — PROGRESS NOTE ADULT - SUBJECTIVE AND OBJECTIVE BOX
Patient is a 82y old  Male who presents with a chief complaint of acute hypoxic resp failure (28 Nov 2022 12:43)    Patient seen and examined at bedside.  no acute events overnight    ALLERGIES:  PC Pen VK (Other)        Vital Signs Last 24 Hrs  T(F): 98 (29 Nov 2022 05:25), Max: 98.1 (28 Nov 2022 16:10)  HR: 113 (29 Nov 2022 05:25) (109 - 113)  BP: 113/77 (29 Nov 2022 05:25) (98/64 - 113/77)  RR: 17 (29 Nov 2022 05:25) (17 - 17)  SpO2: 98% (29 Nov 2022 05:25) (97% - 98%)  I&O's Summary    28 Nov 2022 07:01  -  29 Nov 2022 07:00  --------------------------------------------------------  IN: 420 mL / OUT: 2075 mL / NET: -1655 mL      MEDICATIONS:  acetaminophen     Tablet .. 975 milliGRAM(s) Oral every 8 hours PRN  aMIOdarone    Tablet 200 milliGRAM(s) Oral daily  aspirin  chewable 81 milliGRAM(s) Oral daily  furosemide    Tablet 20 milliGRAM(s) Oral every 24 hours  heparin   Injectable 5000 Unit(s) SubCutaneous every 12 hours  levothyroxine 150 MICROGram(s) Oral daily  linezolid    Tablet 600 milliGRAM(s) Oral every 12 hours  melatonin 6 milliGRAM(s) Oral at bedtime  metoprolol succinate ER 25 milliGRAM(s) Oral at bedtime  midodrine. 10 milliGRAM(s) Oral <User Schedule>  ondansetron Injectable 4 milliGRAM(s) IV Push every 6 hours PRN  pantoprazole    Tablet 40 milliGRAM(s) Oral before breakfast  sacubitril 24 mG/valsartan 26 mG 1 Tablet(s) Oral two times a day      PHYSICAL EXAM:  General: NAD, A/O x 2  ENT: MMM, no oral thrush  Neck: Supple, No JVD  Lungs: Clear to auscultation bilaterally, non labored, good inspiratory effort  Cardio: S1S2 irregular  Abdomen: Soft, Nontender, Nondistended; Bowel sounds present  Extremities: No cyanosis, No edema, B/L inflated boots, RT foot wrapped in ebony    LABS:                        8.0    14.52 )-----------( 447      ( 27 Nov 2022 16:14 )             26.9     11-27    139  |  105  |  26  ----------------------------<  116  4.2   |  26  |  1.00    Ca    8.6      27 Nov 2022 16:14  Mg     2.0     11-27 11-06 Chol 99 mg/dL LDL -- HDL 31 mg/dL Trig 81 mg/dL                      Culture - Blood (collected 24 Nov 2022 09:45)  Source: .Blood Blood-Peripheral  Preliminary Report (25 Nov 2022 14:01):    No growth to date.    Culture - Blood (collected 24 Nov 2022 09:45)  Source: .Blood Blood-Peripheral  Preliminary Report (25 Nov 2022 14:01):    No growth to date.      COVID-19 PCR: NotDetec (11-27-22 @ 13:15)  COVID-19 PCR: NotDetec (11-21-22 @ 10:55)  COVID-19 PCR: NotDetec (11-07-22 @ 07:10)  COVID-19 PCR: NotDetec (10-31-22 @ 19:50)      RADIOLOGY & ADDITIONAL TESTS:    Care Discussed with Consultants/Other Providers:

## 2022-11-29 NOTE — PROGRESS NOTE ADULT - SUBJECTIVE AND OBJECTIVE BOX
CC: f/u for sepsis    Patient reports: he remains confused    REVIEW OF SYSTEMS:  All other review of systems negative (Comprehensive ROS)    Antimicrobials Day #  :day 5/10  linezolid    Tablet 600 milliGRAM(s) Oral every 12 hours    Other Medications Reviewed  MEDICATIONS  (STANDING):  aMIOdarone    Tablet 200 milliGRAM(s) Oral daily  aspirin  chewable 81 milliGRAM(s) Oral daily  furosemide    Tablet 20 milliGRAM(s) Oral every 24 hours  heparin   Injectable 5000 Unit(s) SubCutaneous every 12 hours  levothyroxine 150 MICROGram(s) Oral daily  linezolid    Tablet 600 milliGRAM(s) Oral every 12 hours  melatonin 6 milliGRAM(s) Oral at bedtime  metoprolol succinate ER 25 milliGRAM(s) Oral at bedtime  midodrine. 10 milliGRAM(s) Oral <User Schedule>  pantoprazole    Tablet 40 milliGRAM(s) Oral before breakfast  sacubitril 24 mG/valsartan 26 mG 1 Tablet(s) Oral two times a day    T(F): 98 (11-29-22 @ 05:25), Max: 98 (11-29-22 @ 05:25)  HR: 113 (11-29-22 @ 05:25)  BP: 113/77 (11-29-22 @ 05:25)  RR: 17 (11-29-22 @ 05:25)  SpO2: 98% (11-29-22 @ 05:25)  Wt(kg): --    PHYSICAL EXAM:  General: alert, no acute distress, chronically ill appearing  Eyes:  anicteric, no conjunctival injection, no discharge  Oropharynx: no lesions or injection 	  Neck: supple, without adenopathy  Lungs: clear to auscultation  Heart: irregular rate and rhythm; no murmur, rubs or gallops  Abdomen: soft, nondistended, nontender, without mass or organomegaly  Skin: no rash  Extremities: no clubbing, cyanosis, or edema  Neurologic: alert, confused, moves all extremities  Rt foot dressed  LAB RESULTS:              MICROBIOLOGY:  RECENT CULTURES:      RADIOLOGY REVIEWED:

## 2022-11-29 NOTE — PROGRESS NOTE ADULT - ASSESSMENT
82y male with a past history of HTN, CAD, low Ef(20%), A fib, BPH,CVA with left sided weakness,  DVT of B/L lower extremities, IVC filter, and chronic dubois who was admitted 11/14 with hypoxic respiratory failure secondary to sepsis.  He was covered with cefepime and stabilized.   He is a DNR/DNI but would like medical measures.  His blood cultures have been negative.  He is confused, unable to give a history. He has a persistent leukocytosis.  While he has made some progress in that he is hemodynamically stable, his respiratory status improved, and he is tolerating a diet, his elevated wbc has not moderated. RUQ sonogram- negative study.  He has reduced blood flow to legs, an ischemic appearing tissue injury to left foot, and his arterial studies show vascular disease.  7 days of cefepime stopped 11/21. He has been stable off antibiotics.  Plain films of foot negative  Per podiatry, foot wound without active drainage & has fibronecrotic tissue, then unclear why a wound swab was submitted on 11/21/22, now isolating Staph aureus & enterococcus ? clinical significance, pt refuses foot exam   Bacteroides has also been isolated.No debridement is being planed.  He has been afebrile, no signs of ascending infection  Suggest:  Continue linezolid, day 5/10  Podiatry input appreciated--no clear infection, but ischemic changes as previously documented  Its difficult to completely discount improvement in his WBC with 3 days of Vancomycin, 17,000 to 14,500   oral Zyvox 600 bid for 10 days may be an option if plan for discharge and no other interventions  he is followed with podiatry for outpatient wound care  He appears stable from ID viewpoint, discharge planning per primary service.Juana Diaz guarded.  He is adamant about not allowing me to examine foot .

## 2022-11-30 PROCEDURE — 99232 SBSQ HOSP IP/OBS MODERATE 35: CPT

## 2022-11-30 RX ORDER — MIDODRINE HYDROCHLORIDE 2.5 MG/1
10 TABLET ORAL ONCE
Refills: 0 | Status: COMPLETED | OUTPATIENT
Start: 2022-11-30 | End: 2022-11-30

## 2022-11-30 RX ORDER — MEXILETINE HYDROCHLORIDE 150 MG/1
1 CAPSULE ORAL
Qty: 0 | Refills: 0 | DISCHARGE

## 2022-11-30 RX ORDER — AMIODARONE HYDROCHLORIDE 400 MG/1
1 TABLET ORAL
Qty: 0 | Refills: 0 | DISCHARGE

## 2022-11-30 RX ORDER — SENNA PLUS 8.6 MG/1
2 TABLET ORAL
Qty: 0 | Refills: 0 | DISCHARGE

## 2022-11-30 RX ORDER — ACETAMINOPHEN 500 MG
2 TABLET ORAL
Qty: 0 | Refills: 0 | DISCHARGE

## 2022-11-30 RX ORDER — TAMSULOSIN HYDROCHLORIDE 0.4 MG/1
1 CAPSULE ORAL
Qty: 0 | Refills: 0 | DISCHARGE
Start: 2022-11-30

## 2022-11-30 RX ORDER — SERTRALINE 25 MG/1
1 TABLET, FILM COATED ORAL
Qty: 0 | Refills: 0 | DISCHARGE

## 2022-11-30 RX ORDER — LACTULOSE 10 G/15ML
30 SOLUTION ORAL
Qty: 0 | Refills: 0 | DISCHARGE

## 2022-11-30 RX ORDER — ACETAMINOPHEN 500 MG
3 TABLET ORAL
Qty: 0 | Refills: 0 | DISCHARGE
Start: 2022-11-30

## 2022-11-30 RX ORDER — LEVOTHYROXINE SODIUM 125 MCG
1 TABLET ORAL
Qty: 0 | Refills: 0 | DISCHARGE
Start: 2022-11-30

## 2022-11-30 RX ORDER — METOPROLOL TARTRATE 50 MG
1 TABLET ORAL
Qty: 0 | Refills: 0 | DISCHARGE
Start: 2022-11-30

## 2022-11-30 RX ORDER — PANTOPRAZOLE SODIUM 20 MG/1
1 TABLET, DELAYED RELEASE ORAL
Qty: 0 | Refills: 0 | DISCHARGE
Start: 2022-11-30

## 2022-11-30 RX ORDER — LINEZOLID 600 MG/300ML
1 INJECTION, SOLUTION INTRAVENOUS
Qty: 0 | Refills: 0 | DISCHARGE
Start: 2022-11-30 | End: 2022-12-04

## 2022-11-30 RX ORDER — FUROSEMIDE 40 MG
1 TABLET ORAL
Qty: 0 | Refills: 0 | DISCHARGE
Start: 2022-11-30

## 2022-11-30 RX ORDER — ROSUVASTATIN CALCIUM 5 MG/1
1 TABLET ORAL
Qty: 0 | Refills: 0 | DISCHARGE

## 2022-11-30 RX ORDER — ASPIRIN/CALCIUM CARB/MAGNESIUM 324 MG
1 TABLET ORAL
Qty: 0 | Refills: 0 | DISCHARGE

## 2022-11-30 RX ORDER — TAMSULOSIN HYDROCHLORIDE 0.4 MG/1
1 CAPSULE ORAL
Qty: 0 | Refills: 0 | DISCHARGE

## 2022-11-30 RX ORDER — AMIODARONE HYDROCHLORIDE 400 MG/1
1 TABLET ORAL
Qty: 0 | Refills: 0 | DISCHARGE
Start: 2022-11-30

## 2022-11-30 RX ORDER — MIDODRINE HYDROCHLORIDE 2.5 MG/1
1 TABLET ORAL
Qty: 0 | Refills: 0 | DISCHARGE
Start: 2022-11-30

## 2022-11-30 RX ORDER — FINASTERIDE 5 MG/1
1 TABLET, FILM COATED ORAL
Qty: 0 | Refills: 0 | DISCHARGE
Start: 2022-11-30

## 2022-11-30 RX ORDER — TRAZODONE HCL 50 MG
0.5 TABLET ORAL
Qty: 0 | Refills: 0 | DISCHARGE

## 2022-11-30 RX ORDER — LANOLIN ALCOHOL/MO/W.PET/CERES
2 CREAM (GRAM) TOPICAL
Qty: 0 | Refills: 0 | DISCHARGE
Start: 2022-11-30

## 2022-11-30 RX ORDER — MAGNESIUM HYDROXIDE 400 MG/1
30 TABLET, CHEWABLE ORAL
Qty: 0 | Refills: 0 | DISCHARGE

## 2022-11-30 RX ORDER — FAMOTIDINE 10 MG/ML
1 INJECTION INTRAVENOUS
Qty: 0 | Refills: 0 | DISCHARGE

## 2022-11-30 RX ORDER — SACUBITRIL AND VALSARTAN 24; 26 MG/1; MG/1
1 TABLET, FILM COATED ORAL
Qty: 0 | Refills: 0 | DISCHARGE
Start: 2022-11-30

## 2022-11-30 RX ORDER — LEVOTHYROXINE SODIUM 125 MCG
1 TABLET ORAL
Qty: 0 | Refills: 0 | DISCHARGE

## 2022-11-30 RX ORDER — ASPIRIN/CALCIUM CARB/MAGNESIUM 324 MG
1 TABLET ORAL
Qty: 0 | Refills: 0 | DISCHARGE
Start: 2022-11-30

## 2022-11-30 RX ADMIN — HEPARIN SODIUM 5000 UNIT(S): 5000 INJECTION INTRAVENOUS; SUBCUTANEOUS at 05:39

## 2022-11-30 RX ADMIN — SACUBITRIL AND VALSARTAN 1 TABLET(S): 24; 26 TABLET, FILM COATED ORAL at 10:01

## 2022-11-30 RX ADMIN — MIDODRINE HYDROCHLORIDE 10 MILLIGRAM(S): 2.5 TABLET ORAL at 05:39

## 2022-11-30 RX ADMIN — Medication 81 MILLIGRAM(S): at 11:22

## 2022-11-30 RX ADMIN — MIDODRINE HYDROCHLORIDE 10 MILLIGRAM(S): 2.5 TABLET ORAL at 23:55

## 2022-11-30 RX ADMIN — Medication 6 MILLIGRAM(S): at 22:51

## 2022-11-30 RX ADMIN — Medication 975 MILLIGRAM(S): at 10:21

## 2022-11-30 RX ADMIN — PANTOPRAZOLE SODIUM 40 MILLIGRAM(S): 20 TABLET, DELAYED RELEASE ORAL at 05:39

## 2022-11-30 RX ADMIN — AMIODARONE HYDROCHLORIDE 200 MILLIGRAM(S): 400 TABLET ORAL at 05:39

## 2022-11-30 RX ADMIN — Medication 975 MILLIGRAM(S): at 10:51

## 2022-11-30 RX ADMIN — LINEZOLID 600 MILLIGRAM(S): 600 INJECTION, SOLUTION INTRAVENOUS at 05:40

## 2022-11-30 RX ADMIN — Medication 20 MILLIGRAM(S): at 10:01

## 2022-11-30 RX ADMIN — Medication 150 MICROGRAM(S): at 05:39

## 2022-11-30 NOTE — PROGRESS NOTE ADULT - ASSESSMENT
82M with PMH HTN, CAD s/p CABG, severe cardiomyopathy (TTE 11/22: EF 20%), Afib (not on AC due to hematuria), BPH, LUE DVT, b/l LLE DVT s/p IVC filter placement pt was discharged to SNF, admitted to [facility] on 11/14/2022 for hypoxia and AMS, also hypotensive suspected 2/2 to septic shock, HAN, transaminitis, Lactic acidosis, UTI and CAP. Spo2 improved, currently on RA. Downgraded to medical floor    Acute Hypoxic Respiratory Failure due to CAP and Decompensated Systolic CHF - stable  - Stable respiratory status  - TTE with EF 20%  - Continue medical management - toprol, entresto, lasix and amiodarone  - Hold farxiga and spironolactone for hypotension  - Continue to titrate midodrine off as tolerated, currently on 10 mg BID  - Strict I/Os, daily weights    Septic Shock due to CAP and UTI in pt with chronic dubois - resolved  Persistent Leukocytosis  - Hypotension improving  - Completed seven days of IV cefepime on 11/21  - Noted persistent leukocytosis - repeat blood cultures NGTD  - ID on board    RT foot ulceration  MRSA  - Case discussed with vascular surgeon Dr. Traylor who advised podiatry evaluation for possible debridement  - Podiatry evaluation noted - XR RT foot with no bony destruction or fracture  - Plan to continue local wound care, advised outpatient follow up  - MRSA noted in wound culture, sensitivities noted  - Completed course of vancomycin  - Now converted to oral zyvox day 6/10  - ID following    HAN on CKD stage 2  - Likely ATN in setting of sepsis  - Cr back to baseline  - Avoid nephrotoxic medications  - Continue chronic dubois  - Pt pulled out dubois on 11/26, replaced same day  - Resolving hematuria, CBC stable    Transaminitis  - Likely shock liver in s/o sepsis  - Downtrending  - Avoid hepatotoxic medications  - RUQ sono unremarkable    CAD s/p CABG  - Positive Tn likely demand in setting of sepsis and shock  - Continue metoprolol and ASA  - Cardiology appreciated    Chronic Atrial Fibrillation  - Continue rate control with metoprolol  - Continue rhythm control with amiodarone  - Not on AC due to prior hematuria    Hypothyroid  - Continue synthroid    DVT ppx  - Heparin    GOC  - DNR    PT recommends [facility], family would prefer [facility] in [place]  medically cleared    updated wife Kirsten [phone] 82M with PMH HTN, CAD s/p CABG, severe cardiomyopathy (TTE 11/22: EF 20%), Afib (not on AC due to hematuria), BPH, LUE DVT, b/l LLE DVT s/p IVC filter placement pt was discharged to SNF, admitted to [facility] on 11/14/2022 for hypoxia and AMS, also hypotensive suspected 2/2 to septic shock, HAN, transaminitis, Lactic acidosis, UTI and CAP. Spo2 improved, currently on RA. Downgraded to medical floor    Acute Hypoxic Respiratory Failure due to CAP and Decompensated Systolic CHF - stable  - Stable respiratory status  - TTE with EF 20%  - Continue medical management - toprol, entresto, lasix and amiodarone  - Hold farxiga and spironolactone for hypotension  - Continue to titrate midodrine off as tolerated, currently on 10 mg BID  - Strict I/Os, daily weights    Septic Shock due to CAP and UTI in pt with chronic dubois - resolved  Persistent Leukocytosis  - Hypotension improving  - Completed seven days of IV cefepime on 11/21  - Noted persistent leukocytosis - repeat blood cultures NGTD  - ID on board    RT foot ulceration  MRSA  - Case discussed with vascular surgeon Dr. Traylor who advised podiatry evaluation for possible debridement  - Podiatry evaluation noted - XR RT foot with no bony destruction or fracture  - Plan to continue local wound care, advised outpatient follow up  - MRSA noted in wound culture, sensitivities noted  - Completed course of vancomycin  - Now converted to oral zyvox day 6/10  - ID following    HAN on CKD stage 2  - Likely ATN in setting of sepsis  - Cr back to baseline  - Avoid nephrotoxic medications  - Continue chronic dubois  - Pt pulled out dubois on 11/26, replaced same day  - Resolving hematuria, CBC stable    Transaminitis  - Likely shock liver in s/o sepsis  - Downtrending  - Avoid hepatotoxic medications  - RUQ sono unremarkable    CAD s/p CABG  - Positive Tn likely demand in setting of sepsis and shock  - Continue metoprolol and ASA  - Cardiology appreciated    Chronic Atrial Fibrillation  - Continue rate control with metoprolol  - Continue rhythm control with amiodarone  - Not on AC due to prior hematuria    Hypothyroid  - Continue synthroid    DVT ppx  - Heparin    GOC  - DNR    Dispo: PT recommends [facility], family would prefer [facility] in [place]. Medically cleared for DC to [facility]    wife Kirsten [phone] 82M with PMH HTN, CAD s/p CABG, severe cardiomyopathy (TTE 11/22: EF 20%), Afib (not on AC due to hematuria), BPH, LUE DVT, b/l LLE DVT s/p IVC filter placement pt was discharged to SNF, admitted to [facility] on 11/14/2022 for hypoxia and AMS, also hypotensive suspected 2/2 to septic shock, HAN, transaminitis, Lactic acidosis, UTI and CAP. Spo2 improved, currently on RA. Downgraded to medical floor    Acute Hypoxic Respiratory Failure due to CAP and Decompensated Systolic CHF - stable  - Stable respiratory status  - TTE with EF 20%  - Continue medical management - toprol, entresto, lasix and amiodarone  - Hold farxiga and spironolactone for hypotension  - Continue to titrate midodrine off as tolerated, currently on 10 mg BID  - Strict I/Os, daily weights    Septic Shock due to CAP and UTI in pt with chronic dubois - resolved  Persistent Leukocytosis  - Hypotension improving  - Completed seven days of IV cefepime on 11/21  - Noted persistent leukocytosis - repeat blood cultures NGTD  - ID on board    RT foot ulceration  MRSA  - Case discussed with vascular surgeon Dr. Traylor who advised podiatry evaluation for possible debridement  - Podiatry evaluation noted - XR RT foot with no bony destruction or fracture  - Plan to continue local wound care, advised outpatient follow up  - MRSA noted in wound culture, sensitivities noted  - Completed course of vancomycin  - Now converted to oral zyvox day 6/10  - ID following    HAN on CKD stage 2  - Likely ATN in setting of sepsis  - Cr back to baseline  - Avoid nephrotoxic medications  - Continue chronic dubois  - Pt pulled out dubois on 11/26, replaced same day  - Resolving hematuria, CBC stable    Transaminitis  - Likely shock liver in s/o sepsis  - Downtrending  - Avoid hepatotoxic medications  - RUQ sono unremarkable    CAD s/p CABG  - Positive Tn likely demand in setting of sepsis and shock  - Continue metoprolol and ASA  - Cardiology appreciated    Chronic Atrial Fibrillation  - Continue rate control with metoprolol  - Continue rhythm control with amiodarone  - Not on AC due to prior hematuria    Hypothyroid  - Continue synthroid    DVT ppx  - Heparin    GOC  - DNR    Dispo: PT recommends [facility], family would prefer [facility] in [place]. Medically cleared for DC to [facility]    11/30 Updated wife Kirsten [phone]

## 2022-11-30 NOTE — CHART NOTE - NSCHARTNOTEFT_GEN_A_CORE
Nutrition Follow Up Note  Hospital Course   (Per Electronic Medical Record)    Source:  Patient [X]  Nursing Staff [X]   Medical Record [X]      Diet: Diet, Minced and Moist:   DASH/TLC {Sodium & Cholesterol Restricted}  Supplement Feeding Modality:  Oral  Ensure Plus High Protein Cans or Servings Per Day:  1       Frequency:  Three Times a day (11-16-22 @ 10:20) [Active]      Nutrition Follow Up: Patient with variable PO intakes, consuming % of meals at this time (as per nursing flow sheets). Tolerating minced & moist texture/consistency. +3 edema noted at right arm + right wrist & right hand. Skin intact. Receiving Ensure Plus High Protein 8oz PO TID (Provides 1,050kcal & 60grams of Protein) to optimize nutritional status.      Enteral/Parenteral Nutrition: Not Applicable    START CHEMFISH    END CHEMFISH  START MEDSACTIVEMEDICATIONS  (STANDING):  aMIOdarone    Tablet 200 milliGRAM(s) Oral daily  aspirin  chewable 81 milliGRAM(s) Oral daily  furosemide    Tablet 20 milliGRAM(s) Oral every 24 hours  heparin   Injectable 5000 Unit(s) SubCutaneous every 12 hours  levothyroxine 150 MICROGram(s) Oral daily  linezolid    Tablet 600 milliGRAM(s) Oral every 12 hours  melatonin 6 milliGRAM(s) Oral at bedtime  metoprolol succinate ER 25 milliGRAM(s) Oral at bedtime  midodrine. 10 milliGRAM(s) Oral <User Schedule>  pantoprazole    Tablet 40 milliGRAM(s) Oral before breakfast  sacubitril 24 mG/valsartan 26 mG 1 Tablet(s) Oral two times a day    MEDICATIONS  (PRN):  acetaminophen     Tablet .. 975 milliGRAM(s) Oral every 8 hours PRN Temp greater or equal to 38C (100.4F), Mild Pain (1 - 3)  ondansetron Injectable 4 milliGRAM(s) IV Push every 6 hours PRN Nausea and/or Vomiting  END MEDSACTIVE  START DIETORDEREND DIETORDER  START ADMITDXAcute respiratory failure with hypoxia    END ADMITDX  START IOFS  END IOFS  START SKINPUEND SKINPU        Pertinent Medications: MEDICATIONS  (STANDING):  aMIOdarone    Tablet 200 milliGRAM(s) Oral daily  aspirin  chewable 81 milliGRAM(s) Oral daily  furosemide    Tablet 20 milliGRAM(s) Oral every 24 hours  heparin   Injectable 5000 Unit(s) SubCutaneous every 12 hours  levothyroxine 150 MICROGram(s) Oral daily  linezolid    Tablet 600 milliGRAM(s) Oral every 12 hours  melatonin 6 milliGRAM(s) Oral at bedtime  metoprolol succinate ER 25 milliGRAM(s) Oral at bedtime  midodrine. 10 milliGRAM(s) Oral <User Schedule>  pantoprazole    Tablet 40 milliGRAM(s) Oral before breakfast  sacubitril 24 mG/valsartan 26 mG 1 Tablet(s) Oral two times a day    MEDICATIONS  (PRN):  acetaminophen     Tablet .. 975 milliGRAM(s) Oral every 8 hours PRN Temp greater or equal to 38C (100.4F), Mild Pain (1 - 3)  ondansetron Injectable 4 milliGRAM(s) IV Push every 6 hours PRN Nausea and/or Vomiting      Pertinent Labs:  11-27 Na139 mmol/L Glu 116 mg/dL<H> K+ 4.2 mmol/L Cr  1.00 mg/dL BUN 26 mg/dL<H> 11-25 Alb 2.5 g/dL<L> 11-06 Chol 99 mg/dL LDL --    HDL 31 mg/dL<L> Trig 81 mg/dL          Current Weight (lbs):  144.6 lb on 11/21  Weight Trends (lbs):   144.6 lb 11/20, 144.6 11/20, 189.3 11/19 (? possible weight error), 149 11/17, 137.7 11/15      Skin: Intact    Edema: +3 edema noted at right arm + right wrist & right hand    Last Bowel Movement: 11/29/22 noted        Estimated Needs:   [X] No Change Since Previous Assessment    Previous Nutrition Diagnosis:   Severe malnutrition    Nutrition Diagnosis is [X] Ongoing - Addressed with Ensure Plus High Protein 8oz PO TID (Provides 1,050kcal & 60grams of Protein)         Interventions:   1. Recommend continue current nutrition plan of care as tolerated, provide ongoing encouragement at meals  2. Continue with Ensure Plus High Protein 8oz PO TID (Provides 1,050kcal & 60grams of Protein) to optimize nutritional status    Monitoring & Evaluation:   [X] Weights   [X] PO Intake   [X] Skin Integrity   [X] Follow Up (Per Protocol)  [X] Tolerance to Diet Prescription   [X] Other: Labs & POCT    Registered Dietitian/Nutritionist Remains Available.  Wilfrid Neely RD, CDN    Phone# [phone]
Nutrition Follow Up Note  Hospital Course (Per Electronic Medical Record):   Source: Medical Record [X] Patient [X] Family [X] Nursing Staff [X]     Diet: DASH/TLC , Minced Moist Ensure Plus TID     Patient tolerating diet , no GI upset noted , Po intake appears to vary from % , providing PO Ensure supplements TID  due to dx of severe malnutrition & varied intake.     Current Weight: (11/21) 144.6/65.6kg- weight slightly fluctuating , Lasix therapy noted.                          (11/20) 144.6/65.6kg                          (11/19) 189.3/85.9kg - weight due to weight trend        Pertinent Medications: MEDICATIONS  (STANDING):  aMIOdarone    Tablet 200 milliGRAM(s) Oral daily  aspirin  chewable 81 milliGRAM(s) Oral daily  cefepime   IVPB 1000 milliGRAM(s) IV Intermittent <User Schedule>  furosemide    Tablet 20 milliGRAM(s) Oral daily  heparin   Injectable 5000 Unit(s) SubCutaneous every 12 hours  levothyroxine 150 MICROGram(s) Oral daily  metoprolol succinate ER 25 milliGRAM(s) Oral daily  midodrine 10 milliGRAM(s) Oral every 8 hours  pantoprazole    Tablet 40 milliGRAM(s) Oral before breakfast  sacubitril 24 mG/valsartan 26 mG 1 Tablet(s) Oral two times a day    MEDICATIONS  (PRN):      Pertinent Labs:  11-21 Na139 mmol/L Glu 90 mg/dL K+ 4.2 mmol/L Cr  0.81 mg/dL BUN 22 mg/dL 11-18 Phos 2.0 mg/dL<L> 11-21 Alb 2.3 g/dL<L> 11-06 Chol 99 mg/dL LDL --    HDL 31 mg/dL<L> Trig 81 mg/dL        Skin: Multiple skin injuries noted , Stage II on penis (R) lateral (R) foot , (L) knee abrasion-     Edema: (+1) foot     Last BM: (11/19)     Estimated Needs:   [X] No Change since Previous Assessment    Previous Nutrition Diagnosis: Severe Protein Calorie Malnutrition     Nutrition Diagnosis is [X] Ongoing,      New Nutrition Diagnosis: [X] Not Applicable    Interventions:   1. continue current diet       Monitoring & Evaluation: will monitor:  [X] Weights   [X] PO Intake   [X] Follow Up (Per Protocol)  [X] Tolerance to Diet Prescription       RD to follow as per Nutrition protocol  Rylee Plunkett, BRIANN
Nutrition Follow Up Note  Hospital Course   (Per Electronic Medical Record)    Source:  Patient [X]  Medical Record [X]      Diet:   DASH-TLC, Minced & Moist (IDDSI Level 5/Mechanical Soft/Dysphagia 2) Diet w/ Thin Liquids (IDDSI Level 0)  Tolerates Diet Consistency Well  No Chewing/Swallowing Difficulties (Per Patient Observation)   No Recent Nausea, Vomiting, Diarrhea or Constipation (as Per Patient)  Consumes % of Meals (as Per Documentation) - States Good PO Intake/Appetite (Per Patient)  on Ensure Plus High Protein 8oz PO TID (Provides 1,050kcal & 60grams of Protein)  Patient Takes Nutrition Supplement Well (Per Patient Observation)    Enteral/Parenteral Nutrition: Not Applicable    Current Weight: 144.6lb on 11/21  Weight Fluctuates  Obtain Weights Daily      Pertinent Medications: MEDICATIONS  (STANDING):  aMIOdarone    Tablet 200 milliGRAM(s) Oral daily  aspirin  chewable 81 milliGRAM(s) Oral daily  furosemide    Tablet 20 milliGRAM(s) Oral every 24 hours  heparin   Injectable 5000 Unit(s) SubCutaneous every 12 hours  levothyroxine 150 MICROGram(s) Oral daily  melatonin 6 milliGRAM(s) Oral at bedtime  metoprolol succinate ER 25 milliGRAM(s) Oral at bedtime  midodrine. 10 milliGRAM(s) Oral <User Schedule>  pantoprazole    Tablet 40 milliGRAM(s) Oral before breakfast  sacubitril 24 mG/valsartan 26 mG 1 Tablet(s) Oral two times a day  vancomycin  IVPB 750 milliGRAM(s) IV Intermittent every 12 hours    MEDICATIONS  (PRN):  acetaminophen     Tablet .. 650 milliGRAM(s) Oral every 6 hours PRN Mild Pain (1 - 3)  ondansetron Injectable 4 milliGRAM(s) IV Push every 6 hours PRN Nausea and/or Vomiting    Pertinent Labs:  11-25 Na134 mmol/L<L> Glu 136 mg/dL<H> K+ 4.3 mmol/L Cr  0.87 mg/dL BUN 22 mg/dL 11-25 Alb 2.5 g/dL<L> 11-06 Chol 99 mg/dL LDL --    HDL 31 mg/dL<L> Trig 81 mg/dL    Skin: Stage 2 Pressure Ulcer on Penis    Edema: None Noted Recently (as Per Documentation)     Last Bowel Movement: on 11/22    Estimated Needs:   [X] No Change Since Previous Assessment    Previous Nutrition Diagnosis:   Severe Malnutrition     Nutrition Diagnosis is [X] Ongoing - Continues on Nutrition Supplement & Patient Takes Nutrition Supplement     New Nutrition Diagnosis: [X] Not Applicable    Interventions:   1. Recommend Continue Nutrition Plan of Care     Monitoring & Evaluation:   [X] Weights   [X] PO Intake   [X] Skin Integrity   [X] Follow Up (Per Protocol)  [X] Tolerance to Diet Prescription   [X] Other: Labs     Registered Dietitian/Nutritionist Remains Available.  Jas Ram RDN    Phone# [phone]

## 2022-11-30 NOTE — PROVIDER CONTACT NOTE (OTHER) - ACTION/TREATMENT ORDERED:
notified to Vidal and made aware hold po metoprolol ER 25mg and sacubitril 24mg/ valsartan 26mg at bed time dose.

## 2022-11-30 NOTE — PROGRESS NOTE ADULT - ASSESSMENT
82y male with a past history of HTN, CAD, low Ef(20%), A fib, BPH,CVA with left sided weakness,  DVT of B/L lower extremities, IVC filter, and chronic dubois who was admitted 11/14 with hypoxic respiratory failure secondary to sepsis.  He was covered with cefepime and stabilized.   He is a DNR/DNI but would like medical measures.  His blood cultures have been negative.  He is confused, unable to give a history. He has a persistent leukocytosis.  While he has made some progress in that he is hemodynamically stable, his respiratory status improved, and he is tolerating a diet, his elevated wbc has not moderated. RUQ sonogram- negative study.  He has reduced blood flow to legs, an ischemic appearing tissue injury to left foot, and his arterial studies show vascular disease.  7 days of cefepime stopped 11/21. He has been stable off antibiotics.  Plain films of foot negative  Per podiatry, foot wound without active drainage & has fibronecrotic tissue, then unclear why a wound swab was submitted on 11/21/22, now isolating Staph aureus & enterococcus ? clinical significance, pt refuses foot exam   Bacteroides has also been isolated.No debridement is being planed.  He has been afebrile, no signs of ascending infection  Suggest:  Continue linezolid, day 6/10  Podiatry input appreciated--no clear infection, but ischemic changes as previously documented  Leukocytosis without other associated signs of infection  he is followed with podiatry for outpatient wound care  He appears stable from ID viewpoint, discharge planning per primary service.Hurley guarded.  He is adamant about not allowing me to examine foot .  With no additional ID w/u planned I will stop actively following, please call if ID issues arise

## 2022-11-30 NOTE — PROGRESS NOTE ADULT - SUBJECTIVE AND OBJECTIVE BOX
CC: f/u for Rt foot wounds    Patient reports: he remains confused, is combative when trying to examine patient    REVIEW OF SYSTEMS:  All other review of systems negative (Comprehensive ROS): limited by confusion    Antimicrobials Day #  :day 6/10  linezolid    Tablet 600 milliGRAM(s) Oral every 12 hours    Other Medications Reviewed  MEDICATIONS  (STANDING):  aMIOdarone    Tablet 200 milliGRAM(s) Oral daily  aspirin  chewable 81 milliGRAM(s) Oral daily  furosemide    Tablet 20 milliGRAM(s) Oral every 24 hours  heparin   Injectable 5000 Unit(s) SubCutaneous every 12 hours  levothyroxine 150 MICROGram(s) Oral daily  linezolid    Tablet 600 milliGRAM(s) Oral every 12 hours  melatonin 6 milliGRAM(s) Oral at bedtime  metoprolol succinate ER 25 milliGRAM(s) Oral at bedtime  midodrine. 10 milliGRAM(s) Oral <User Schedule>  pantoprazole    Tablet 40 milliGRAM(s) Oral before breakfast  sacubitril 24 mG/valsartan 26 mG 1 Tablet(s) Oral two times a day    T(F): 98.8 (11-30-22 @ 05:28), Max: 99 (11-29-22 @ 22:10)  HR: 88 (11-30-22 @ 05:28)  BP: 115/42 (11-30-22 @ 05:28)  RR: 17 (11-30-22 @ 05:28)  SpO2: 96% (11-30-22 @ 05:28)  Wt(kg): --    PHYSICAL EXAM:  General: alert, no acute distress, confused  Eyes:  anicteric, no conjunctival injection, no discharge  Oropharynx: no lesions or injection 	  Neck: supple, without adenopathy  Lungs: clear to auscultation  Heart: irregular rate and rhythm; no murmur,   Abdomen: soft, nondistended, nontender, without mass or organomegaly  Skin: no rash  Extremities: no clubbing, cyanosis, or edema  Neurologic: alert, confused, moves all extremities  Rt foot dressed    LAB RESULTS:              MICROBIOLOGY:  RECENT CULTURES:      RADIOLOGY REVIEWED:

## 2022-11-30 NOTE — PROGRESS NOTE ADULT - SUBJECTIVE AND OBJECTIVE BOX
Patient is a 82y old  Male who presents with a chief complaint of acute hypoxic resp failure (30 Nov 2022 07:12)    Patient seen and examined at bedside. No overnight events reported.     ALLERGIES:  PC Pen VK (Other)    MEDICATIONS  (STANDING):  aMIOdarone    Tablet 200 milliGRAM(s) Oral daily  aspirin  chewable 81 milliGRAM(s) Oral daily  furosemide    Tablet 20 milliGRAM(s) Oral every 24 hours  heparin   Injectable 5000 Unit(s) SubCutaneous every 12 hours  levothyroxine 150 MICROGram(s) Oral daily  linezolid    Tablet 600 milliGRAM(s) Oral every 12 hours  melatonin 6 milliGRAM(s) Oral at bedtime  metoprolol succinate ER 25 milliGRAM(s) Oral at bedtime  midodrine. 10 milliGRAM(s) Oral <User Schedule>  pantoprazole    Tablet 40 milliGRAM(s) Oral before breakfast  sacubitril 24 mG/valsartan 26 mG 1 Tablet(s) Oral two times a day    MEDICATIONS  (PRN):  acetaminophen     Tablet .. 975 milliGRAM(s) Oral every 8 hours PRN Temp greater or equal to 38C (100.4F), Mild Pain (1 - 3)  ondansetron Injectable 4 milliGRAM(s) IV Push every 6 hours PRN Nausea and/or Vomiting    Vital Signs Last 24 Hrs  T(F): 98.8 (30 Nov 2022 05:28), Max: 99 (29 Nov 2022 22:10)  HR: 88 (30 Nov 2022 05:28) (88 - 101)  BP: 115/42 (30 Nov 2022 05:28) (103/60 - 115/42)  RR: 17 (30 Nov 2022 05:28) (17 - 18)  SpO2: 96% (30 Nov 2022 05:28) (95% - 96%)    I&O's Summary  29 Nov 2022 07:01  -  30 Nov 2022 07:00  --------------------------------------------------------  IN: 0 mL / OUT: 1500 mL / NET: -1500 mL    PHYSICAL EXAM:  General: NAD, A/O x 2  ENT: MMM, no oral thrush  Neck: Supple, No JVD  Lungs: Clear to auscultation bilaterally, non labored, good inspiratory effort  Cardio: S1S2 irregular  Abdomen: Soft, Nontender, Nondistended; Bowel sounds present  Extremities: No cyanosis, No edema, B/L inflated boots, RT foot wrapped in ebony    LABS:                        8.0    14.52 )-----------( 447      ( 27 Nov 2022 16:14 )             26.9     11-27    139  |  105  |  26  ----------------------------<  116  4.2   |  26  |  1.00    Ca    8.6      27 Nov 2022 16:14  Mg     2.0     11-27 11-06 Chol 99 mg/dL LDL -- HDL 31 mg/dL Trig 81 mg/dL    Culture - Blood (collected 24 Nov 2022 09:45)  Source: .Blood Blood-Peripheral  Final Report (29 Nov 2022 14:01):    No Growth Final    Culture - Blood (collected 24 Nov 2022 09:45)  Source: .Blood Blood-Peripheral  Final Report (29 Nov 2022 14:01):    No Growth Final    COVID-19 PCR: NotDetec (11-27-22 @ 13:15)  COVID-19 PCR: NotDetec (11-21-22 @ 10:55)  COVID-19 PCR: NotDetec (11-07-22 @ 07:10)  COVID-19 PCR: NotDetec (10-31-22 @ 19:50)    RADIOLOGY & ADDITIONAL TESTS:    Care Discussed with Consultants/Other Providers:

## 2022-12-01 VITALS
TEMPERATURE: 98 F | DIASTOLIC BLOOD PRESSURE: 74 MMHG | OXYGEN SATURATION: 98 % | HEART RATE: 87 BPM | SYSTOLIC BLOOD PRESSURE: 106 MMHG | RESPIRATION RATE: 16 BRPM

## 2022-12-01 PROCEDURE — 85610 PROTHROMBIN TIME: CPT

## 2022-12-01 PROCEDURE — 80053 COMPREHEN METABOLIC PANEL: CPT

## 2022-12-01 PROCEDURE — 80048 BASIC METABOLIC PNL TOTAL CA: CPT

## 2022-12-01 PROCEDURE — 83605 ASSAY OF LACTIC ACID: CPT

## 2022-12-01 PROCEDURE — 84443 ASSAY THYROID STIM HORMONE: CPT

## 2022-12-01 PROCEDURE — 97530 THERAPEUTIC ACTIVITIES: CPT

## 2022-12-01 PROCEDURE — 83735 ASSAY OF MAGNESIUM: CPT

## 2022-12-01 PROCEDURE — 36415 COLL VENOUS BLD VENIPUNCTURE: CPT

## 2022-12-01 PROCEDURE — 93005 ELECTROCARDIOGRAM TRACING: CPT

## 2022-12-01 PROCEDURE — 87086 URINE CULTURE/COLONY COUNT: CPT

## 2022-12-01 PROCEDURE — 93923 UPR/LXTR ART STDY 3+ LVLS: CPT

## 2022-12-01 PROCEDURE — 87641 MR-STAPH DNA AMP PROBE: CPT

## 2022-12-01 PROCEDURE — 85730 THROMBOPLASTIN TIME PARTIAL: CPT

## 2022-12-01 PROCEDURE — 93306 TTE W/DOPPLER COMPLETE: CPT

## 2022-12-01 PROCEDURE — 82962 GLUCOSE BLOOD TEST: CPT

## 2022-12-01 PROCEDURE — 71045 X-RAY EXAM CHEST 1 VIEW: CPT

## 2022-12-01 PROCEDURE — 0225U NFCT DS DNA&RNA 21 SARSCOV2: CPT

## 2022-12-01 PROCEDURE — 99239 HOSP IP/OBS DSCHRG MGMT >30: CPT

## 2022-12-01 PROCEDURE — 87040 BLOOD CULTURE FOR BACTERIA: CPT

## 2022-12-01 PROCEDURE — 83880 ASSAY OF NATRIURETIC PEPTIDE: CPT

## 2022-12-01 PROCEDURE — 84484 ASSAY OF TROPONIN QUANT: CPT

## 2022-12-01 PROCEDURE — 73630 X-RAY EXAM OF FOOT: CPT

## 2022-12-01 PROCEDURE — 97162 PT EVAL MOD COMPLEX 30 MIN: CPT

## 2022-12-01 PROCEDURE — 87186 SC STD MICRODIL/AGAR DIL: CPT

## 2022-12-01 PROCEDURE — 84100 ASSAY OF PHOSPHORUS: CPT

## 2022-12-01 PROCEDURE — 84480 ASSAY TRIIODOTHYRONINE (T3): CPT

## 2022-12-01 PROCEDURE — 84145 PROCALCITONIN (PCT): CPT

## 2022-12-01 PROCEDURE — 87635 SARS-COV-2 COVID-19 AMP PRB: CPT

## 2022-12-01 PROCEDURE — 99285 EMERGENCY DEPT VISIT HI MDM: CPT

## 2022-12-01 PROCEDURE — 87070 CULTURE OTHR SPECIMN AEROBIC: CPT

## 2022-12-01 PROCEDURE — 85025 COMPLETE CBC W/AUTO DIFF WBC: CPT

## 2022-12-01 PROCEDURE — 82803 BLOOD GASES ANY COMBINATION: CPT

## 2022-12-01 PROCEDURE — 81001 URINALYSIS AUTO W/SCOPE: CPT

## 2022-12-01 PROCEDURE — 84436 ASSAY OF TOTAL THYROXINE: CPT

## 2022-12-01 PROCEDURE — 85027 COMPLETE CBC AUTOMATED: CPT

## 2022-12-01 PROCEDURE — 96365 THER/PROPH/DIAG IV INF INIT: CPT

## 2022-12-01 PROCEDURE — 87640 STAPH A DNA AMP PROBE: CPT

## 2022-12-01 PROCEDURE — 87077 CULTURE AEROBIC IDENTIFY: CPT

## 2022-12-01 PROCEDURE — 76705 ECHO EXAM OF ABDOMEN: CPT

## 2022-12-01 RX ORDER — ROSUVASTATIN CALCIUM 5 MG/1
1 TABLET ORAL
Qty: 30 | Refills: 0
Start: 2022-12-01 | End: 2022-12-30

## 2022-12-01 RX ADMIN — Medication 975 MILLIGRAM(S): at 13:30

## 2022-12-01 RX ADMIN — Medication 20 MILLIGRAM(S): at 12:40

## 2022-12-01 RX ADMIN — LINEZOLID 600 MILLIGRAM(S): 600 INJECTION, SOLUTION INTRAVENOUS at 06:52

## 2022-12-01 RX ADMIN — SACUBITRIL AND VALSARTAN 1 TABLET(S): 24; 26 TABLET, FILM COATED ORAL at 12:40

## 2022-12-01 RX ADMIN — Medication 150 MICROGRAM(S): at 06:51

## 2022-12-01 RX ADMIN — HEPARIN SODIUM 5000 UNIT(S): 5000 INJECTION INTRAVENOUS; SUBCUTANEOUS at 06:50

## 2022-12-01 RX ADMIN — Medication 975 MILLIGRAM(S): at 12:40

## 2022-12-01 RX ADMIN — PANTOPRAZOLE SODIUM 40 MILLIGRAM(S): 20 TABLET, DELAYED RELEASE ORAL at 06:52

## 2022-12-01 RX ADMIN — Medication 81 MILLIGRAM(S): at 12:40

## 2022-12-01 RX ADMIN — MIDODRINE HYDROCHLORIDE 10 MILLIGRAM(S): 2.5 TABLET ORAL at 06:51

## 2022-12-01 RX ADMIN — AMIODARONE HYDROCHLORIDE 200 MILLIGRAM(S): 400 TABLET ORAL at 06:51

## 2022-12-01 NOTE — PROGRESS NOTE ADULT - ASSESSMENT
82M with PMH HTN, CAD s/p CABG, severe cardiomyopathy (TTE 11/22: EF 20%), Afib (not on AC due to hematuria), BPH, LUE DVT, b/l LLE DVT s/p IVC filter placement pt was discharged to SNF, admitted to [facility] on 11/14/2022 for hypoxia and AMS, also hypotensive suspected 2/2 to septic shock, HAN, transaminitis, Lactic acidosis, UTI and CAP. Spo2 improved, currently on RA. Downgraded to medical floor    Acute Hypoxic Respiratory Failure due to CAP and Decompensated Systolic CHF - stable  - Stable respiratory status  - TTE with EF 20%  - Continue medical management - toprol, entresto, lasix and amiodarone  - Hold farxiga and spironolactone for hypotension  - Continue to titrate midodrine off as tolerated, currently on 10 mg BID  - Strict I/Os, daily weights    Septic Shock due to CAP and UTI in pt with chronic dubois - resolved  Persistent Leukocytosis  - Hypotension improving  - Completed seven days of IV cefepime on 11/21  - Noted persistent leukocytosis - repeat blood cultures NGTD  - ID on board    RT foot ulceration  MRSA  - Case discussed with vascular surgeon Dr. Traylor who advised podiatry evaluation for possible debridement  - Podiatry evaluation noted - XR RT foot with no bony destruction or fracture  - Plan to continue local wound care, advised outpatient follow up  - MRSA noted in wound culture, sensitivities noted  - Completed course of vancomycin  - Now converted to oral zyvox day 7/10  - ID following signed off    HAN on CKD stage 2  - Likely ATN in setting of sepsis  - Cr back to baseline  - Avoid nephrotoxic medications  - Continue chronic dubois  - Pt pulled out dubois on 11/26, replaced same day  - Resolving hematuria, CBC stable    Transaminitis  - Likely shock liver in s/o sepsis  - Downtrending  - Avoid hepatotoxic medications  - RUQ sono unremarkable    CAD s/p CABG  - Positive Tn likely demand in setting of sepsis and shock  - Continue metoprolol and ASA  - Cardiology appreciated    Chronic Atrial Fibrillation  - Continue rate control with metoprolol  - Continue rhythm control with amiodarone  - Not on AC due to prior hematuria    Hypothyroid  - Continue synthroid    DVT ppx  - Heparin    GOC  - DNR    Dispo: PT recommends [facility], family would prefer [facility] in [place]. Medically cleared for DC to [facility]    11/30 Updated wife Kirsten [phone]

## 2022-12-01 NOTE — PROGRESS NOTE ADULT - SUBJECTIVE AND OBJECTIVE BOX
Patient is a 82y old  Male who presents with a chief complaint of acute hypoxic resp failure (30 Nov 2022 16:42)    Patient seen and examined at bedside.  no acute events overnight    ALLERGIES:  PC Pen VK (Other)        Vital Signs Last 24 Hrs  T(F): 97.9 (01 Dec 2022 05:15), Max: 98.2 (30 Nov 2022 22:50)  HR: 76 (01 Dec 2022 05:15) (76 - 84)  BP: 106/60 (01 Dec 2022 05:15) (99/65 - 106/60)  RR: 16 (01 Dec 2022 05:15) (16 - 17)  SpO2: 95% (01 Dec 2022 05:15) (95% - 96%)  I&O's Summary    30 Nov 2022 07:01  -  01 Dec 2022 07:00  --------------------------------------------------------  IN: 480 mL / OUT: 475 mL / NET: 5 mL      MEDICATIONS:  acetaminophen     Tablet .. 975 milliGRAM(s) Oral every 8 hours PRN  aMIOdarone    Tablet 200 milliGRAM(s) Oral daily  aspirin  chewable 81 milliGRAM(s) Oral daily  furosemide    Tablet 20 milliGRAM(s) Oral every 24 hours  heparin   Injectable 5000 Unit(s) SubCutaneous every 12 hours  levothyroxine 150 MICROGram(s) Oral daily  linezolid    Tablet 600 milliGRAM(s) Oral every 12 hours  melatonin 6 milliGRAM(s) Oral at bedtime  metoprolol succinate ER 25 milliGRAM(s) Oral at bedtime  midodrine. 10 milliGRAM(s) Oral <User Schedule>  ondansetron Injectable 4 milliGRAM(s) IV Push every 6 hours PRN  pantoprazole    Tablet 40 milliGRAM(s) Oral before breakfast  sacubitril 24 mG/valsartan 26 mG 1 Tablet(s) Oral two times a day      PHYSICAL EXAM:  General: NAD, A/O x 2  ENT: MMM, no thrush  Neck: Supple, No JVD  Lungs: Clear to auscultation bilaterally, non labored, good inspiratory effort  Cardio: S1S2 irregular  Abdomen: Soft, Nontender, Nondistended; Bowel sounds present  Extremities: No cyanosis, No edema, B/L inflated boots, RT foot wrapped in ebony    LABS:                    11-06 Chol 99 mg/dL LDL -- HDL 31 mg/dL Trig 81 mg/dL                      Culture - Blood (collected 24 Nov 2022 09:45)  Source: .Blood Blood-Peripheral  Final Report (29 Nov 2022 14:01):    No Growth Final    Culture - Blood (collected 24 Nov 2022 09:45)  Source: .Blood Blood-Peripheral  Final Report (29 Nov 2022 14:01):    No Growth Final      COVID-19 PCR: NotDetec (11-27-22 @ 13:15)  COVID-19 PCR: NotDetec (11-21-22 @ 10:55)  COVID-19 PCR: NotDetec (11-07-22 @ 07:10)      RADIOLOGY & ADDITIONAL TESTS:    Care Discussed with Consultants/Other Providers:

## 2022-12-01 NOTE — PROGRESS NOTE ADULT - NUTRITIONAL ASSESSMENT
This patient has been assessed with a concern for Malnutrition and has been determined to have a diagnosis/diagnoses of Severe protein-calorie malnutrition and Underweight (BMI < 19).    This patient is being managed with:   Diet Minced and Moist-  DASH/TLC {Sodium & Cholesterol Restricted}  Supplement Feeding Modality:  Oral  Ensure Plus High Protein Cans or Servings Per Day:  1       Frequency:  Three Times a day  Entered: Nov 16 2022 10:20AM    
This patient has been assessed with a concern for Malnutrition and has been determined to have a diagnosis/diagnoses of Severe protein-calorie malnutrition and Underweight (BMI < 19).    This patient is being managed with:   Diet DASH/TLC-  Sodium & Cholesterol Restricted  Supplement Feeding Modality:  Oral  Ensure Plus High Protein Cans or Servings Per Day:  1       Frequency:  Two Times a day  Entered: Nov 15 2022 10:50AM    
This patient has been assessed with a concern for Malnutrition and has been determined to have a diagnosis/diagnoses of Severe protein-calorie malnutrition and Underweight (BMI < 19).    This patient is being managed with:   Diet Minced and Moist-  DASH/TLC {Sodium & Cholesterol Restricted}  Supplement Feeding Modality:  Oral  Ensure Plus High Protein Cans or Servings Per Day:  1       Frequency:  Three Times a day  Entered: Nov 16 2022 10:20AM    

## 2022-12-01 NOTE — PROGRESS NOTE ADULT - NS ATTEND OPT1 GEN_ALL_CORE

## 2022-12-01 NOTE — PROGRESS NOTE ADULT - PROVIDER SPECIALTY LIST ADULT
Cardiology
Cardiology
Critical Care
Critical Care
Hospitalist
Infectious Disease
Infectious Disease
Cardiology
Cardiology
Critical Care
Critical Care
Hospitalist
Hospitalist
Infectious Disease
Internal Medicine
Internal Medicine
Podiatry
Cardiology
Cardiology
Critical Care
Hospitalist
Infectious Disease
Critical Care
Hospitalist
Infectious Disease
Infectious Disease
Critical Care
Critical Care
Hospitalist
Pulmonology

## 2022-12-01 NOTE — PROGRESS NOTE ADULT - NS ATTEND AMEND GEN_ALL_CORE FT
81 y/o M with PMH HTN, CAD s/p CABG, severe cardiomyopathy (TTE 11/22: EF 20%), Afib (not on AC due to hematuria), BPH, LUE DVT, b/l LLE DVT s/p IVC filter placement, moderate peripheral artery disease, pt was discharged to SNF, admitted to ICU 11/14/2022 for hypoxia and AMS, also hypotensive suspected 2/2 to septic shock, HAN, transaminitis, Lactic acidosis, UTI and CAP - now improved. Rt foot wound - imaging reviewed, podiatry appreciated - no clear infection. ID appreciated - transition to oral zyvox 600mg BID x10 days. cont dubois. tylenol prn pain. dispo planning - awaiting placement, auth. sw/cm appreciated.
Transferred from telemetry    Acute hypoxic resp failure due to CHF, CAP  S/p septic shock from UTI,  PNA  Foot wound  Hypotension    Monitor F/.WBC count, s/p abx  Appreciate podiatry consult, waiting on xray reading of foot  Might need MRI  F/U WCX sent by podiatry  Midodrine q8hrs, will see if we can potentiallly titrate off  C/w toprol, entresto, lasix, amiodarone  c/w current medications    DVT PPX  Am labs
83 y/o M with PMH HTN, CAD s/p CABG, severe cardiomyopathy (TTE 11/22: EF 20%), Afib (not on AC due to hematuria), BPH, LUE DVT, b/l LLE DVT s/p IVC filter placement, moderate peripheral artery disease, pt was discharged to SNF, admitted to ICU 11/14/2022 for hypoxia and AMS, also hypotensive suspected 2/2 to septic shock, HAN, transaminitis, Lactic acidosis, UTI and CAP - now improved. Rt foot wound - imaging reviewed, podiatry appreciated - no clear infection. ID appreciated - continue oral zyvox 600mg BID x10 days. cont dubois. tylenol prn pain. dispo planning - awaiting placement, auth. sw/cm appreciated.
83 y/o M with PMH HTN, CAD s/p CABG, severe cardiomyopathy (TTE 11/22: EF 20%), Afib (not on AC due to hematuria), BPH, LUE DVT, b/l LLE DVT s/p IVC filter placement, moderate peripheral artery disease, pt was discharged to SNF, admitted to ICU 11/14/2022 for hypoxia and AMS, also hypotensive suspected 2/2 to septic shock, HAN, transaminitis, Lactic acidosis, UTI and CAP - now improved. Rt foot wound - imaging reviewed, podiatry appreciated - no clear infection. ID appreciated - transition to oral zyvox 600mg BID x10 days. cont dubois. tylenol prn pain. dispo planning.
83 y/o M with PMH HTN, CAD s/p CABG, severe cardiomyopathy (TTE 11/22: EF 20%), Afib (not on AC due to hematuria), BPH, LUE DVT, b/l LLE DVT s/p IVC filter placement, moderate peripheral artery disease, pt was discharged to SNF, admitted to ICU 11/14/2022 for hypoxia and AMS, also hypotensive suspected 2/2 to septic shock, HAN, transaminitis, Lactic acidosis, UTI and CAP - now improved. Rt foot wound - imaging reviewed, podiatry appreciated, f/u wound cx. ID appreciated - cont vanco, follow repeat blood cx. tylenol prn pain.
septic shock due to PNA and UTI  Respiratory failure with hypoxia due to PNA - resolved   - sepsis resolved  - cont midodrine for hypotension and titrate down as tolerated  - OP zyvox day 7/10  - stable for discharge to [facility]
83 y/o M with PMH HTN, CAD s/p CABG, severe cardiomyopathy (TTE 11/22: EF 20%), Afib (not on AC due to hematuria), BPH, LUE DVT, b/l LLE DVT s/p IVC filter placement pt was discharged to SNF, admitted to ICU 11/14/2022 for hypoxia and AMS, also hypotensive suspected 2/2 to septic shock, HAN, transaminitis, Lactic acidosis, UTI and CAP - now improved. Rt foot wound - imaging reviewed, podiatry appreciated, f/u wound cx. ID appreciated - start vanco
83 y/o M with PMH HTN, CAD s/p CABG, severe cardiomyopathy (TTE 11/22: EF 20%), Afib (not on AC due to hematuria), BPH, LUE DVT, b/l LLE DVT s/p IVC filter placement, moderate peripheral artery disease, pt was discharged to SNF, admitted to ICU 11/14/2022 for hypoxia and AMS, also hypotensive suspected 2/2 to septic shock, HAN, transaminitis, Lactic acidosis, UTI and CAP - now improved. Rt foot wound - imaging reviewed, podiatry appreciated, f/u for wound cx recc. ID appreciated - cont vanco for now - repeat blood cx NGTD. cont dubois. tylenol prn pain
Transferred from telemetry    Acute hypoxic resp failure due to CHF, CAP  S/p septic shock from UTI,  PNA  right Foot wound, r/o OM  Hypotension,  improved    Monitor F/WBC count, s/p abx  Appreciate podiatry consult, waiting on xray reading of foot  Might need MRI  F/U WCX sent by podiatry  Will titrate down Midodrine to q12hrs to see if he tolerates, will see if we can potentiallly titrate off  C/w toprol, entresto, lasix, amiodarone  c/w current medications    DVT PPX  Am labs
81 y/o M with PMH HTN, CAD s/p CABG, severe cardiomyopathy (TTE 11/22: EF 20%), Afib (not on AC due to hematuria), BPH, LUE DVT, b/l LLE DVT s/p IVC filter placement, moderate peripheral artery disease, pt was discharged to SNF, admitted to ICU 11/14/2022 for hypoxia and AMS, also hypotensive suspected 2/2 to septic shock, HAN, transaminitis, Lactic acidosis, UTI and CAP - now improved. Rt foot wound - imaging reviewed, podiatry appreciated, f/u for wound cx recc. ID appreciated - cont vanco for now - repeat blood cx NGTD. f/u ID for final abx reccs. cont dubois. tylenol prn pain.
82M with HTN, CAD hx CABG, known ICM (EF 20%), Afib (not on AC due to hematuria), known DVT with IVC filter, comes to hospital for acute hypoxic respiratory failure in setting of septic shock secondary to UTI and possibly pneumonia. Patient on empiric antibiotics. Multiorgan failure at time of admission including HNA from ATN in setting of hypotensive state, elevated troponins likely from demand ischemia, and transaminitis. Initially in ICU on pressors. Patient also with rapid AF requiring rate control. Patient much improved at this time, now out of the ICU in stable condition. Awaiting sensitivities of cultures. Will need to f/u cardio to see if any further ischemic workup to be completed while inpatient. PT stephane.

## 2022-12-01 NOTE — PROGRESS NOTE ADULT - REASON FOR ADMISSION
acute hypoxic resp failure

## 2022-12-15 ENCOUNTER — TRANSCRIPTION ENCOUNTER (OUTPATIENT)
Age: 82
End: 2022-12-15

## 2022-12-31 ENCOUNTER — INPATIENT (INPATIENT)
Facility: HOSPITAL | Age: 82
LOS: 5 days | Discharge: HOSPICE | DRG: 871 | End: 2023-01-06
Attending: STUDENT IN AN ORGANIZED HEALTH CARE EDUCATION/TRAINING PROGRAM | Admitting: STUDENT IN AN ORGANIZED HEALTH CARE EDUCATION/TRAINING PROGRAM
Payer: MEDICARE

## 2022-12-31 VITALS
DIASTOLIC BLOOD PRESSURE: 89 MMHG | TEMPERATURE: 97 F | HEART RATE: 113 BPM | OXYGEN SATURATION: 92 % | WEIGHT: 130.07 LBS | SYSTOLIC BLOOD PRESSURE: 115 MMHG | HEIGHT: 72 IN | RESPIRATION RATE: 19 BRPM

## 2022-12-31 DIAGNOSIS — Z95.1 PRESENCE OF AORTOCORONARY BYPASS GRAFT: Chronic | ICD-10-CM

## 2022-12-31 LAB
ALBUMIN SERPL ELPH-MCNC: 2.8 G/DL — LOW (ref 3.3–5)
ALP SERPL-CCNC: 160 U/L — HIGH (ref 40–120)
ALT FLD-CCNC: 23 U/L — SIGNIFICANT CHANGE UP (ref 10–45)
ANION GAP SERPL CALC-SCNC: 9 MMOL/L — SIGNIFICANT CHANGE UP (ref 5–17)
APPEARANCE UR: CLEAR — SIGNIFICANT CHANGE UP
APTT BLD: 25.6 SEC — LOW (ref 27.5–35.5)
AST SERPL-CCNC: 25 U/L — SIGNIFICANT CHANGE UP (ref 10–40)
BACTERIA # UR AUTO: NEGATIVE /HPF — SIGNIFICANT CHANGE UP
BASE EXCESS BLDV CALC-SCNC: -4.6 MMOL/L — LOW (ref -2–3)
BASOPHILS # BLD AUTO: 0.06 K/UL — SIGNIFICANT CHANGE UP (ref 0–0.2)
BASOPHILS NFR BLD AUTO: 0.3 % — SIGNIFICANT CHANGE UP (ref 0–2)
BILIRUB SERPL-MCNC: 0.6 MG/DL — SIGNIFICANT CHANGE UP (ref 0.2–1.2)
BILIRUB UR-MCNC: NEGATIVE — SIGNIFICANT CHANGE UP
BUN SERPL-MCNC: 49 MG/DL — HIGH (ref 7–23)
CALCIUM SERPL-MCNC: 9.2 MG/DL — SIGNIFICANT CHANGE UP (ref 8.4–10.5)
CHLORIDE SERPL-SCNC: 109 MMOL/L — HIGH (ref 96–108)
CO2 BLDV-SCNC: 23 MMOL/L — SIGNIFICANT CHANGE UP (ref 22–26)
CO2 SERPL-SCNC: 25 MMOL/L — SIGNIFICANT CHANGE UP (ref 22–31)
COLOR SPEC: ABNORMAL
CREAT SERPL-MCNC: 1.46 MG/DL — HIGH (ref 0.5–1.3)
DIFF PNL FLD: ABNORMAL
EGFR: 47 ML/MIN/1.73M2 — LOW
EOSINOPHIL # BLD AUTO: 0.08 K/UL — SIGNIFICANT CHANGE UP (ref 0–0.5)
EOSINOPHIL NFR BLD AUTO: 0.4 % — SIGNIFICANT CHANGE UP (ref 0–6)
EPI CELLS # UR: SIGNIFICANT CHANGE UP
GAS PNL BLDV: SIGNIFICANT CHANGE UP
GLUCOSE SERPL-MCNC: 113 MG/DL — HIGH (ref 70–99)
GLUCOSE UR QL: NEGATIVE — SIGNIFICANT CHANGE UP
HCO3 BLDV-SCNC: 22 MMOL/L — SIGNIFICANT CHANGE UP (ref 22–29)
HCT VFR BLD CALC: 28.5 % — LOW (ref 39–50)
HGB BLD-MCNC: 8.4 G/DL — LOW (ref 13–17)
IMM GRANULOCYTES NFR BLD AUTO: 1.2 % — HIGH (ref 0–0.9)
INR BLD: 1.54 RATIO — HIGH (ref 0.88–1.16)
KETONES UR-MCNC: ABNORMAL
LACTATE SERPL-SCNC: 3.3 MMOL/L — HIGH (ref 0.7–2)
LACTATE SERPL-SCNC: 4.6 MMOL/L — CRITICAL HIGH (ref 0.7–2)
LACTATE SERPL-SCNC: 5.9 MMOL/L — CRITICAL HIGH (ref 0.7–2)
LEUKOCYTE ESTERASE UR-ACNC: ABNORMAL
LYMPHOCYTES # BLD AUTO: 18.9 % — SIGNIFICANT CHANGE UP (ref 13–44)
LYMPHOCYTES # BLD AUTO: 3.56 K/UL — HIGH (ref 1–3.3)
MCHC RBC-ENTMCNC: 25.1 PG — LOW (ref 27–34)
MCHC RBC-ENTMCNC: 29.5 GM/DL — LOW (ref 32–36)
MCV RBC AUTO: 85.1 FL — SIGNIFICANT CHANGE UP (ref 80–100)
MONOCYTES # BLD AUTO: 0.87 K/UL — SIGNIFICANT CHANGE UP (ref 0–0.9)
MONOCYTES NFR BLD AUTO: 4.6 % — SIGNIFICANT CHANGE UP (ref 2–14)
NEUTROPHILS # BLD AUTO: 14.03 K/UL — HIGH (ref 1.8–7.4)
NEUTROPHILS NFR BLD AUTO: 74.6 % — SIGNIFICANT CHANGE UP (ref 43–77)
NITRITE UR-MCNC: NEGATIVE — SIGNIFICANT CHANGE UP
NRBC # BLD: 0 /100 WBCS — SIGNIFICANT CHANGE UP (ref 0–0)
NT-PROBNP SERPL-SCNC: HIGH PG/ML (ref 0–300)
PCO2 BLDV: 43 MMHG — SIGNIFICANT CHANGE UP (ref 42–55)
PH BLDV: 7.31 — LOW (ref 7.32–7.43)
PH UR: 5 — SIGNIFICANT CHANGE UP (ref 5–8)
PLATELET # BLD AUTO: 280 K/UL — SIGNIFICANT CHANGE UP (ref 150–400)
PO2 BLDV: <35 MMHG — SIGNIFICANT CHANGE UP (ref 25–45)
POTASSIUM SERPL-MCNC: 4.5 MMOL/L — SIGNIFICANT CHANGE UP (ref 3.5–5.3)
POTASSIUM SERPL-SCNC: 4.5 MMOL/L — SIGNIFICANT CHANGE UP (ref 3.5–5.3)
PROT SERPL-MCNC: 6.9 G/DL — SIGNIFICANT CHANGE UP (ref 6–8.3)
PROT UR-MCNC: 100
PROTHROM AB SERPL-ACNC: 18 SEC — HIGH (ref 10.5–13.4)
RAPID RVP RESULT: SIGNIFICANT CHANGE UP
RBC # BLD: 3.35 M/UL — LOW (ref 4.2–5.8)
RBC # FLD: 21.2 % — HIGH (ref 10.3–14.5)
RBC CASTS # UR COMP ASSIST: >50 /HPF (ref 0–4)
SAO2 % BLDV: 27.3 % — LOW (ref 67–88)
SARS-COV-2 RNA SPEC QL NAA+PROBE: SIGNIFICANT CHANGE UP
SODIUM SERPL-SCNC: 143 MMOL/L — SIGNIFICANT CHANGE UP (ref 135–145)
SP GR SPEC: 1.02 — SIGNIFICANT CHANGE UP (ref 1.01–1.02)
TROPONIN I, HIGH SENSITIVITY RESULT: 57.6 NG/L — SIGNIFICANT CHANGE UP
UROBILINOGEN FLD QL: 1
WBC # BLD: 18.82 K/UL — HIGH (ref 3.8–10.5)
WBC # FLD AUTO: 18.82 K/UL — HIGH (ref 3.8–10.5)
WBC UR QL: ABNORMAL /HPF (ref 0–5)

## 2022-12-31 PROCEDURE — 71045 X-RAY EXAM CHEST 1 VIEW: CPT | Mod: 26

## 2022-12-31 PROCEDURE — 99285 EMERGENCY DEPT VISIT HI MDM: CPT | Mod: FS,CS

## 2022-12-31 RX ORDER — METRONIDAZOLE 500 MG
500 TABLET ORAL EVERY 8 HOURS
Refills: 0 | Status: DISCONTINUED | OUTPATIENT
Start: 2022-12-31 | End: 2023-01-01

## 2022-12-31 RX ORDER — ONDANSETRON 8 MG/1
4 TABLET, FILM COATED ORAL EVERY 8 HOURS
Refills: 0 | Status: DISCONTINUED | OUTPATIENT
Start: 2022-12-31 | End: 2023-01-06

## 2022-12-31 RX ORDER — ASPIRIN/CALCIUM CARB/MAGNESIUM 324 MG
81 TABLET ORAL DAILY
Refills: 0 | Status: DISCONTINUED | OUTPATIENT
Start: 2022-12-31 | End: 2023-01-04

## 2022-12-31 RX ORDER — METOPROLOL TARTRATE 50 MG
12.5 TABLET ORAL
Refills: 0 | Status: DISCONTINUED | OUTPATIENT
Start: 2022-12-31 | End: 2023-01-02

## 2022-12-31 RX ORDER — MIDODRINE HYDROCHLORIDE 2.5 MG/1
10 TABLET ORAL THREE TIMES A DAY
Refills: 0 | Status: DISCONTINUED | OUTPATIENT
Start: 2022-12-31 | End: 2023-01-04

## 2022-12-31 RX ORDER — HEPARIN SODIUM 5000 [USP'U]/ML
5000 INJECTION INTRAVENOUS; SUBCUTANEOUS EVERY 8 HOURS
Refills: 0 | Status: DISCONTINUED | OUTPATIENT
Start: 2022-12-31 | End: 2023-01-04

## 2022-12-31 RX ORDER — METRONIDAZOLE 500 MG
500 TABLET ORAL ONCE
Refills: 0 | Status: COMPLETED | OUTPATIENT
Start: 2022-12-31 | End: 2022-12-31

## 2022-12-31 RX ORDER — AMIODARONE HYDROCHLORIDE 400 MG/1
200 TABLET ORAL DAILY
Refills: 0 | Status: DISCONTINUED | OUTPATIENT
Start: 2022-12-31 | End: 2023-01-04

## 2022-12-31 RX ORDER — METRONIDAZOLE 500 MG
TABLET ORAL
Refills: 0 | Status: DISCONTINUED | OUTPATIENT
Start: 2022-12-31 | End: 2023-01-01

## 2022-12-31 RX ORDER — CEFEPIME 1 G/1
1000 INJECTION, POWDER, FOR SOLUTION INTRAMUSCULAR; INTRAVENOUS ONCE
Refills: 0 | Status: COMPLETED | OUTPATIENT
Start: 2022-12-31 | End: 2022-12-31

## 2022-12-31 RX ORDER — CEFEPIME 1 G/1
1000 INJECTION, POWDER, FOR SOLUTION INTRAMUSCULAR; INTRAVENOUS EVERY 12 HOURS
Refills: 0 | Status: DISCONTINUED | OUTPATIENT
Start: 2022-12-31 | End: 2023-01-01

## 2022-12-31 RX ORDER — ATORVASTATIN CALCIUM 80 MG/1
40 TABLET, FILM COATED ORAL AT BEDTIME
Refills: 0 | Status: DISCONTINUED | OUTPATIENT
Start: 2022-12-31 | End: 2023-01-04

## 2022-12-31 RX ORDER — SODIUM CHLORIDE 9 MG/ML
500 INJECTION INTRAMUSCULAR; INTRAVENOUS; SUBCUTANEOUS ONCE
Refills: 0 | Status: COMPLETED | OUTPATIENT
Start: 2022-12-31 | End: 2022-12-31

## 2022-12-31 RX ORDER — ACETAMINOPHEN 500 MG
650 TABLET ORAL EVERY 6 HOURS
Refills: 0 | Status: DISCONTINUED | OUTPATIENT
Start: 2022-12-31 | End: 2023-01-06

## 2022-12-31 RX ORDER — LEVOTHYROXINE SODIUM 125 MCG
150 TABLET ORAL DAILY
Refills: 0 | Status: DISCONTINUED | OUTPATIENT
Start: 2022-12-31 | End: 2023-01-04

## 2022-12-31 RX ADMIN — CEFEPIME 100 MILLIGRAM(S): 1 INJECTION, POWDER, FOR SOLUTION INTRAMUSCULAR; INTRAVENOUS at 12:32

## 2022-12-31 RX ADMIN — Medication 100 MILLIGRAM(S): at 17:00

## 2022-12-31 RX ADMIN — Medication 100 MILLIGRAM(S): at 22:12

## 2022-12-31 RX ADMIN — HEPARIN SODIUM 5000 UNIT(S): 5000 INJECTION INTRAVENOUS; SUBCUTANEOUS at 16:15

## 2022-12-31 RX ADMIN — HEPARIN SODIUM 5000 UNIT(S): 5000 INJECTION INTRAVENOUS; SUBCUTANEOUS at 22:12

## 2022-12-31 RX ADMIN — Medication 650 MILLIGRAM(S): at 17:43

## 2022-12-31 RX ADMIN — ATORVASTATIN CALCIUM 40 MILLIGRAM(S): 80 TABLET, FILM COATED ORAL at 22:12

## 2022-12-31 RX ADMIN — SODIUM CHLORIDE 500 MILLILITER(S): 9 INJECTION INTRAMUSCULAR; INTRAVENOUS; SUBCUTANEOUS at 12:27

## 2022-12-31 RX ADMIN — Medication 650 MILLIGRAM(S): at 17:13

## 2022-12-31 RX ADMIN — MIDODRINE HYDROCHLORIDE 10 MILLIGRAM(S): 2.5 TABLET ORAL at 17:07

## 2022-12-31 NOTE — ED PROVIDER NOTE - CARE PLAN
1 Principal Discharge DX:	Hypoxia  Secondary Diagnosis:	Sepsis  Secondary Diagnosis:	Bilateral pneumonia

## 2022-12-31 NOTE — H&P ADULT - RESPIRATORY
no respiratory distress/no use of accessory muscles/no subcutaneous emphysema/respirations non-labored/no intercostal retractions/rhonchi

## 2022-12-31 NOTE — H&P ADULT - ASSESSMENT
81yo male with hx of HTN, Advanced Combined CHF, dementia, urinary retention w/ chronic Dubosi, presented to the ED from [facility] due to hypoxia and pale appearing this AM, noted to have severe sepsis and hypoxia secondary to likely persistent Bacterial PNA    #Severe Sepsis  #Bacterial PNA  -Leukocytosis + Elevated lactate POA, although leukocytosis could be secondary to hemoconcentration  -Continue Cefepime/Flagyl as he was receiving in the facility  -S/p gentle IVF in the ED  -Follow up with repeat Lactate levels    #Acute on chronic hypoxic respiratory failure   -Secondary to the above  -Back to 3L NC saturating 100%  -Continue to monitor vitals and O2 sats    #Combined CHF  #Hypotension  #A fib  -Poor advanced CHF w/ EF 20%  -Medication optimization was done, however, in the setting of acute decompensation as well as hypotension, readjustments were made  -Continue Metoprolol w/ holding parameters as minimal effect on BP  -Hold Farxiga/Entresto. Lasix on hold in the setting of sepsis, however, considering poor cardiac function, low threshold to restart  -Continue Amiodarone  -Continue Midodrine  -Close monitoring of vitals and I/Os and weights    #HAN  #Hemoconcentration  -Likely in the setting of sepsis, intravascularly low/hypovolemic   -S/p gentle IVF in the ED  -Monitor Renal function and volume state    #Chronic Anemia  -Likely multifactorial  -Baseline chronically 7-8  -No signs/symptoms of acute blood loss  -Continue to monitor    #Urinary retention  -Chronic dubois secondary to failed multiple TOVs  -Continue for now  -Holding Flomax/Finasteride as they can cause hypotension as well    Poor prognosis    DNR/DNI   83yo male with hx of HTN, Advanced Combined CHF, dementia, urinary retention w/ chronic Dubois, presented to the ED from [facility] due to hypoxia and pale appearing this AM, noted to have severe sepsis and hypoxia secondary to likely persistent Bacterial PNA    #Severe Sepsis  #Bacterial PNA  -Leukocytosis + Elevated lactate POA, although leukocytosis could be secondary to hemoconcentration  -Continue Cefepime/Flagyl as he was receiving in the facility  -S/p gentle IVF in the ED  -Follow up with repeat Lactate levels    #Acute on chronic hypoxic respiratory failure   -Secondary to the above  -Back to 3L NC saturating 100%  -Continue to monitor vitals and O2 sats    #Combined CHF  #Hypotension  #A fib  -Poor advanced CHF w/ EF 20%  -Medication optimization was done, however, in the setting of acute decompensation as well as hypotension, readjustments were made  -Continue Metoprolol w/ holding parameters as minimal effect on BP  -Hold Farxiga/Entresto. Lasix on hold in the setting of sepsis, however, considering poor cardiac function, low threshold to restart  -Continue Amiodarone  -Continue Midodrine  -Close monitoring of vitals and I/Os and weights    #HAN  #Hemoconcentration  -Likely in the setting of sepsis, intravascularly low/hypovolemic   -S/p gentle IVF in the ED  -Monitor Renal function and volume state    #Chronic Anemia  -Likely multifactorial  -Baseline chronically 7-8  -No signs/symptoms of acute blood loss  -Continue to monitor    #Urinary retention  -Chronic dubois secondary to failed multiple TOVs  -Continue for now  -Holding Flomax/Finasteride as they can cause hypotension as well    Poor prognosis    DNR/DNI    Updated wife Jerome and son at bedside

## 2022-12-31 NOTE — ED ADULT NURSE NOTE - NSIMPLEMENTINTERV_GEN_ALL_ED
Implemented All Fall with Harm Risk Interventions:  Singer to call system. Call bell, personal items and telephone within reach. Instruct patient to call for assistance. Room bathroom lighting operational. Non-slip footwear when patient is off stretcher. Physically safe environment: no spills, clutter or unnecessary equipment. Stretcher in lowest position, wheels locked, appropriate side rails in place. Provide visual cue, wrist band, yellow gown, etc. Monitor gait and stability. Monitor for mental status changes and reorient to person, place, and time. Review medications for side effects contributing to fall risk. Reinforce activity limits and safety measures with patient and family. Provide visual clues: red socks.

## 2022-12-31 NOTE — H&P ADULT - ENMT
Patient transferred to hospital room w/ transport. AAOx4. VSS, on RA. No personal belongings in recovery. Spouse updated throughout recovery and when patient transferred out of recovery.    no gross abnormalities

## 2022-12-31 NOTE — H&P ADULT - TREATMENT GUIDELINE COMMENT
-At this time, pt's family want him to be DNR/DNI but they do not want hospice or comfort care for him. They want complete treatment without the use of morphine for any distress.

## 2022-12-31 NOTE — H&P ADULT - HISTORY OF PRESENT ILLNESS
81yo male with hx of HTN, Advanced Combined CHF, dementia, urinary retention w/ chronic Starks  81yo male with hx of HTN, Advanced Combined CHF, dementia, urinary retention w/ chronic Starks, presented to the ED from [facility] due to hypoxia and pale appearing this AM. Pt is a poor historian. For the past 1-2 weeks, pt was noted to have ARFH secondary to decompensated Heart failure and volume overload with superimposed bacterial PNA as noted to have infiltrate on CXR w/ leukocytosis. Pt was treated with IV Lasix/Cefepime/Flagyl while at the facility. Supplemental O2 was also started. Pt was noted to have improvement in volume status as well as O2 sats as he was saturating 96% on 3-4L NC without any respiratory distress or tachypnea.   This AM, pt noted be more lethargic and pale appearing. Staff was unable to obtain O2 sats as it kept reading 70s-80s on 6L NC. Considering recent GOC discussion with family, pt required higher level of care at the hospital.

## 2022-12-31 NOTE — ED ADULT NURSE REASSESSMENT NOTE - NS ED NURSE REASSESS COMMENT FT1
3rd IVL placed left hand after pt pulls out 2 IVL. Dr Pettit here to pull PICC line, VSS-pt offers no complaints

## 2022-12-31 NOTE — H&P ADULT - NSHPSOCIALHISTORY_GEN_ALL_CORE
Lives with wife but has been frequently hospitalized and discharged to rehab facility for the past few months  No smoking, EtOH us

## 2022-12-31 NOTE — PATIENT PROFILE ADULT - FUNCTIONAL ASSESSMENT - BASIC MOBILITY 6.
Home
1-calculated by average/Not able to assess (calculate score using [facility] averaging method)

## 2022-12-31 NOTE — SWALLOW BEDSIDE ASSESSMENT ADULT - COMMENTS
Xray Chest 1 View-PORTABLE IMMEDIATE:   	ACC: [id number] EXAM:  XR CHEST PORTABLE IMMED 1V                        	  	PROCEDURE DATE:  12/31/2022    	  	  	  	INTERPRETATION:  AP semisupine chest on December 31, 2022 at 10:45 AM.   	Patient has sepsis.  	  	Elevated left hemidiaphragm, probable heart enlargement, and sternotomy   	are similar to November 18.  	  	Present film shows a right PICC line with the tip in the lower superior   	vena cava new since prior. There is also a mild but fairly diffuse right   	perihilar infiltrate increased from prior. Smaller infiltrate off left   	upper hilum is also likely. Clips over the left upper outer chest seen.  	  	IMPRESSION: Right PICC line at this time. Increasing bilateral   	infiltrates right greater than left. PROCEDURE DATE:  12/31/2022    	  	  	  	INTERPRETATION:  AP semisupine chest on December 31, 2022 at 10:45 AM.   	Patient has sepsis.  Elevated left hemidiaphragm, probable heart enlargement, and sternotomy   	are similar to November 18.  	  	Present film shows a right PICC line with the tip in the lower superior   	vena cava new since prior. There is also a mild but fairly diffuse right   	perihilar infiltrate increased from prior. Smaller infiltrate off left   	upper hilum is also likely. Clips over the left upper outer chest seen.  	  	IMPRESSION: Right PICC line at this time. Increasing bilateral   	infiltrates right greater than left. PROCEDURE DATE:  12/31/2022    	INTERPRETATION:  AP semisupine chest on December 31, 2022 at 10:45 AM.   	Patient has sepsis. Elevated left hemidiaphragm, probable heart enlargement, and sternotomy   	are similar to November 18. Present film shows a right PICC line with the tip in the lower superior   	vena cava new since prior. There is also a mild but fairly diffuse right   	perihilar infiltrate increased from prior. Smaller infiltrate off left   	upper hilum is also likely. Clips over the left upper outer chest seen.  	IMPRESSION: Right PICC line at this time. Increasing bilateral   	infiltrates right greater than left.

## 2022-12-31 NOTE — SWALLOW BEDSIDE ASSESSMENT ADULT - SWALLOW EVAL: DIAGNOSIS
Pt presents with mild oropharyngeal dysphagia marked by mildly prolonged mastication of soft and bite sized that appears exacerbated by poor dentition. Pt able to tolerate self fed cup sips of thin liquids with adequate rate of intake and fairly adequate appearing swallow trigger and laryngeal elevation. Pt noted with mildly delayed cough with straw sips of thins and pt able to report bolus "went down the wrong way."

## 2022-12-31 NOTE — H&P ADULT - CONVERSATION DETAILS
Discussed  pt's diagnosis and prognosis with pt's wife Kirsten and son via phone as well as face to face in the Hospital. After updating them on his acute condition, we spoke about their goal for his care. Extensive description and detail explanation of his poor prognosis and suffering were expressed to the family. I explained that pt himself has stated "he does not want anymore" as he is in pain while attempting to perform minimal intervention for his care. We spoke about comfort and what that entails as pt's wife has verbally stated she wants him to be comfortable. But at the same time she states she does not want hospice nor "throwing our hands in the air and moving back from caring for him." Her son has said, "we want DNR/DNI but that doesnt mean you let the man die. You're not going to do anything if he decompensates and let him die?"

## 2022-12-31 NOTE — ED ADULT TRIAGE NOTE - CHIEF COMPLAINT QUOTE
Patient BIB EMS from [facility] for complaints of shortness of breath and weakness x 3 days.

## 2022-12-31 NOTE — PATIENT PROFILE ADULT - FALL HARM RISK - HARM RISK INTERVENTIONS
Assistance with ambulation/Assistance OOB with selected safe patient handling equipment/Communicate Risk of Fall with Harm to all staff/Discuss with provider need for PT consult/Monitor for mental status changes/Monitor gait and stability/Move patient closer to nurses' station/Reinforce activity limits and safety measures with patient and family/Reorient to person, place and time as needed/Tailored Fall Risk Interventions/Toileting schedule using arm’s reach rule for commode and bathroom/Use of alarms - bed, chair and/or voice tab/Visual Cue: Yellow wristband and red socks/Bed in lowest position, wheels locked, appropriate side rails in place/Call bell, personal items and telephone in reach/Instruct patient to call for assistance before getting out of bed or chair/Non-slip footwear when patient is out of bed/[place] to call system/Physically safe environment - no spills, clutter or unnecessary equipment/Purposeful Proactive Rounding/Room/bathroom lighting operational, light cord in reach

## 2022-12-31 NOTE — H&P ADULT - NSVTERISKREFERASSESS_GEN_ALL_CORE
6/8/2021         RE: Kirill Butt  [address] [facility] [address]        Dear Colleague,    Thank you for referring your patient, Kirill Butt, to the [facility]. Please see a copy of my visit note below.     Current Eye Medications: prednisolone four times a day left eye     Subjective:  Pain left eye and very light sensitive  As above for the past 6 days, seems to be better over past two days, this eye has a pressure feeling in it.    Systemic arthritis mostly quiescent at present; off Humira.     Objective:  See Ophthalmology Exam.       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  Prephthisical symptoms?      Plan:  Continue Prednisolone three times daily left eye   Start Tammie 128 ointment: One squirt to left eye 2-3 times a day.  Will refer to [facility] Uveitis service for evaluation and treatment if indicated.  Weston Delgado M.D.  [phone]               Again, thank you for allowing me to participate in the care of your patient.        Sincerely,        Weston Delgado MD     Refer to the Assessment tab to view/cancel completed assessment.

## 2022-12-31 NOTE — SWALLOW BEDSIDE ASSESSMENT ADULT - SLP PERTINENT HISTORY OF CURRENT PROBLEM
81yo male with hx of HTN, Advanced Combined CHF, dementia, urinary retention w/ chronic Starks, presented to the ED from [facility] due to hypoxia and pale appearing this AM. Pt is a poor historian. For the past 1-2 weeks, pt was noted to have ARFH secondary to decompensated Heart failure and volume overload with superimposed bacterial PNA as noted to have infiltrate on CXR w/ leukocytosis. Pt was treated with IV Lasix/Cefepime/Flagyl while at the facility. Supplemental O2 was also started. Pt was noted to have improvement in volume status as well as O2 sats as he was saturating 96% on 3-4L NC without any respiratory distress or tachypnea.

## 2022-12-31 NOTE — SWALLOW BEDSIDE ASSESSMENT ADULT - SWALLOW EVAL: FUNCTIONAL LEVEL AT TIME OF EVAL
Pt found alert and oriented to self and intermittently to place and noted with overall confusion but is cooperative and pleasant throughout. Pt's wife and son at bedside provide history and report pt has been tolerating a regular diet with thin liquids throughout [facility] stay and multiple admissions to this hospital. Pt's wife reports recent difficulty at [facility] as pt found being fed in supine position and both wife and son educated at length on aspiration precautions and need to monitor position. 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

## 2022-12-31 NOTE — ED PROVIDER NOTE - ATTENDING APP SHARED VISIT CONTRIBUTION OF CARE
[facility] with HARI Robbins. 82 yr old Male with pmhx of HTN, CAD s/p CABG, severe cardiomyopathy (EF 20%), Afib (not on AC due to hematuria), BPH, LUE DVT, b/l LLE DVT s/p IVC filter placement, chronic dubois, chronic right foot ulcer with MRSA, PICC line in place, pt receiving abx for aspiration pna, recently admitted to  for hypoxia and AMS presents from [facility] due to hypoxia today and generalized weakness x 3 days. EMS reports pt was in mid 80s. Pt unable to give hx.   sepsis  upon arrival, sepsis labs, only 500ml IVF given due to severe cardiomyopathy, cefepime given   D/w Dr. Pettit, + lactate, will admit to observation    I performed a face to face bedside interview with patient regarding history of present illness, review of symptoms and past medical history. I completed an independent physical exam.  I have discussed the patient's plan of care with Physician Assistant (PA). I agree with note as stated above, having amended the EMR as needed to reflect my findings.   This includes History of Present Illness, HIV, Past Medical/Surgical/Family/Social History, Allergies and Home Medications, Review of Systems, Physical Exam, and any Progress Notes during the time I functioned as the attending physician for this patient.

## 2022-12-31 NOTE — ED PROVIDER NOTE - CLINICAL SUMMARY MEDICAL DECISION MAKING FREE TEXT BOX
82 yr old Male with pmhx of HTN, CAD s/p CABG, severe cardiomyopathy (EF 20%), Afib (not on AC due to hematuria), BPH, LUE DVT, b/l LLE DVT s/p IVC filter placement, chronic dubois, chronic right foot ulcer with MRSA, PICC line in place, pt receiving abx for aspiration pna, recently admitted to  for hypoxia and AMS [facility] due to hypoxia today and generalized weakness x 3 days. EMS reports pt was in mid 80s. Pt unable to give hx, asking for tylenol and orange juice on history.   sepsis  upon arrival, sepsis labs, only 500ml IVF given due to severe cardiomyopathy, cefepime given   D/w Dr. Pettit, + lactate, will admit to observation

## 2022-12-31 NOTE — ED PROVIDER NOTE - OBJECTIVE STATEMENT
82 yr old Male with pmhx of HTN, CAD s/p CABG, severe cardiomyopathy (EF 20%), Afib (not on AC due to hematuria), BPH, LUE DVT, b/l LLE DVT s/p IVC filter placement, chronic dubois, chronic right foot ulcer with MRSA, PICC line in place, pt receiving abx for aspiration pna, recently admitted to  for hypoxia and AMS [facility] due to hypoxia today and generalized weakness x 3 days. EMS reports pt was in mid 80s. Pt unable to give hx, asking for tylenol and orange juice on history.

## 2023-01-01 LAB
ANION GAP SERPL CALC-SCNC: 11 MMOL/L — SIGNIFICANT CHANGE UP (ref 5–17)
BASOPHILS # BLD AUTO: 0.05 K/UL — SIGNIFICANT CHANGE UP (ref 0–0.2)
BASOPHILS NFR BLD AUTO: 0.3 % — SIGNIFICANT CHANGE UP (ref 0–2)
BUN SERPL-MCNC: 53 MG/DL — HIGH (ref 7–23)
CALCIUM SERPL-MCNC: 8.9 MG/DL — SIGNIFICANT CHANGE UP (ref 8.4–10.5)
CHLORIDE SERPL-SCNC: 110 MMOL/L — HIGH (ref 96–108)
CO2 SERPL-SCNC: 23 MMOL/L — SIGNIFICANT CHANGE UP (ref 22–31)
CREAT SERPL-MCNC: 1.39 MG/DL — HIGH (ref 0.5–1.3)
CULTURE RESULTS: NO GROWTH — SIGNIFICANT CHANGE UP
EGFR: 50 ML/MIN/1.73M2 — LOW
EOSINOPHIL # BLD AUTO: 0.09 K/UL — SIGNIFICANT CHANGE UP (ref 0–0.5)
EOSINOPHIL NFR BLD AUTO: 0.5 % — SIGNIFICANT CHANGE UP (ref 0–6)
GLUCOSE BLDC GLUCOMTR-MCNC: 108 MG/DL — HIGH (ref 70–99)
GLUCOSE SERPL-MCNC: 110 MG/DL — HIGH (ref 70–99)
HCT VFR BLD CALC: 27.5 % — LOW (ref 39–50)
HGB BLD-MCNC: 8.2 G/DL — LOW (ref 13–17)
IMM GRANULOCYTES NFR BLD AUTO: 0.9 % — SIGNIFICANT CHANGE UP (ref 0–0.9)
LACTATE SERPL-SCNC: 2.8 MMOL/L — HIGH (ref 0.7–2)
LACTATE SERPL-SCNC: 3 MMOL/L — HIGH (ref 0.7–2)
LACTATE SERPL-SCNC: 3.4 MMOL/L — HIGH (ref 0.7–2)
LACTATE SERPL-SCNC: 3.7 MMOL/L — HIGH (ref 0.7–2)
LACTATE SERPL-SCNC: 4.3 MMOL/L — CRITICAL HIGH (ref 0.7–2)
LACTATE SERPL-SCNC: 5.4 MMOL/L — CRITICAL HIGH (ref 0.7–2)
LYMPHOCYTES # BLD AUTO: 21.7 % — SIGNIFICANT CHANGE UP (ref 13–44)
LYMPHOCYTES # BLD AUTO: 3.61 K/UL — HIGH (ref 1–3.3)
MCHC RBC-ENTMCNC: 25.4 PG — LOW (ref 27–34)
MCHC RBC-ENTMCNC: 29.8 GM/DL — LOW (ref 32–36)
MCV RBC AUTO: 85.1 FL — SIGNIFICANT CHANGE UP (ref 80–100)
MONOCYTES # BLD AUTO: 0.83 K/UL — SIGNIFICANT CHANGE UP (ref 0–0.9)
MONOCYTES NFR BLD AUTO: 5 % — SIGNIFICANT CHANGE UP (ref 2–14)
MRSA PCR RESULT.: SIGNIFICANT CHANGE UP
NEUTROPHILS # BLD AUTO: 11.87 K/UL — HIGH (ref 1.8–7.4)
NEUTROPHILS NFR BLD AUTO: 71.6 % — SIGNIFICANT CHANGE UP (ref 43–77)
NRBC # BLD: 0 /100 WBCS — SIGNIFICANT CHANGE UP (ref 0–0)
PLATELET # BLD AUTO: 259 K/UL — SIGNIFICANT CHANGE UP (ref 150–400)
POTASSIUM SERPL-MCNC: 4.4 MMOL/L — SIGNIFICANT CHANGE UP (ref 3.5–5.3)
POTASSIUM SERPL-SCNC: 4.4 MMOL/L — SIGNIFICANT CHANGE UP (ref 3.5–5.3)
RBC # BLD: 3.23 M/UL — LOW (ref 4.2–5.8)
RBC # FLD: 21.3 % — HIGH (ref 10.3–14.5)
S AUREUS DNA NOSE QL NAA+PROBE: DETECTED
SODIUM SERPL-SCNC: 144 MMOL/L — SIGNIFICANT CHANGE UP (ref 135–145)
SPECIMEN SOURCE: SIGNIFICANT CHANGE UP
WBC # BLD: 16.6 K/UL — HIGH (ref 3.8–10.5)
WBC # FLD AUTO: 16.6 K/UL — HIGH (ref 3.8–10.5)

## 2023-01-01 PROCEDURE — 71045 X-RAY EXAM CHEST 1 VIEW: CPT | Mod: 26

## 2023-01-01 RX ORDER — VANCOMYCIN HCL 1 G
1000 VIAL (EA) INTRAVENOUS ONCE
Refills: 0 | Status: COMPLETED | OUTPATIENT
Start: 2023-01-01 | End: 2023-01-01

## 2023-01-01 RX ORDER — SODIUM CHLORIDE 9 MG/ML
250 INJECTION INTRAMUSCULAR; INTRAVENOUS; SUBCUTANEOUS
Refills: 0 | Status: DISCONTINUED | OUTPATIENT
Start: 2023-01-01 | End: 2023-01-02

## 2023-01-01 RX ORDER — CEFEPIME 1 G/1
INJECTION, POWDER, FOR SOLUTION INTRAMUSCULAR; INTRAVENOUS
Refills: 0 | Status: DISCONTINUED | OUTPATIENT
Start: 2023-01-01 | End: 2023-01-01

## 2023-01-01 RX ORDER — VANCOMYCIN HCL 1 G
1000 VIAL (EA) INTRAVENOUS EVERY 24 HOURS
Refills: 0 | Status: DISCONTINUED | OUTPATIENT
Start: 2023-01-02 | End: 2023-01-04

## 2023-01-01 RX ORDER — VANCOMYCIN HCL 1 G
VIAL (EA) INTRAVENOUS
Refills: 0 | Status: DISCONTINUED | OUTPATIENT
Start: 2023-01-01 | End: 2023-01-04

## 2023-01-01 RX ORDER — SODIUM CHLORIDE 9 MG/ML
500 INJECTION INTRAMUSCULAR; INTRAVENOUS; SUBCUTANEOUS
Refills: 0 | Status: DISCONTINUED | OUTPATIENT
Start: 2023-01-01 | End: 2023-01-02

## 2023-01-01 RX ORDER — MEROPENEM 1 G/30ML
1000 INJECTION INTRAVENOUS EVERY 12 HOURS
Refills: 0 | Status: DISCONTINUED | OUTPATIENT
Start: 2023-01-01 | End: 2023-01-04

## 2023-01-01 RX ORDER — CEFEPIME 1 G/1
1000 INJECTION, POWDER, FOR SOLUTION INTRAMUSCULAR; INTRAVENOUS EVERY 12 HOURS
Refills: 0 | Status: DISCONTINUED | OUTPATIENT
Start: 2023-01-01 | End: 2023-01-01

## 2023-01-01 RX ADMIN — ATORVASTATIN CALCIUM 40 MILLIGRAM(S): 80 TABLET, FILM COATED ORAL at 21:48

## 2023-01-01 RX ADMIN — Medication 250 MILLIGRAM(S): at 09:32

## 2023-01-01 RX ADMIN — CEFEPIME 100 MILLIGRAM(S): 1 INJECTION, POWDER, FOR SOLUTION INTRAMUSCULAR; INTRAVENOUS at 05:03

## 2023-01-01 RX ADMIN — Medication 100 MILLIGRAM(S): at 05:05

## 2023-01-01 RX ADMIN — HEPARIN SODIUM 5000 UNIT(S): 5000 INJECTION INTRAVENOUS; SUBCUTANEOUS at 21:33

## 2023-01-01 RX ADMIN — Medication 650 MILLIGRAM(S): at 04:32

## 2023-01-01 RX ADMIN — Medication 150 MICROGRAM(S): at 05:04

## 2023-01-01 RX ADMIN — Medication 650 MILLIGRAM(S): at 21:44

## 2023-01-01 RX ADMIN — HEPARIN SODIUM 5000 UNIT(S): 5000 INJECTION INTRAVENOUS; SUBCUTANEOUS at 13:39

## 2023-01-01 RX ADMIN — SODIUM CHLORIDE 50 MILLILITER(S): 9 INJECTION INTRAMUSCULAR; INTRAVENOUS; SUBCUTANEOUS at 04:04

## 2023-01-01 RX ADMIN — HEPARIN SODIUM 5000 UNIT(S): 5000 INJECTION INTRAVENOUS; SUBCUTANEOUS at 05:04

## 2023-01-01 RX ADMIN — Medication 650 MILLIGRAM(S): at 04:02

## 2023-01-01 RX ADMIN — MEROPENEM 100 MILLIGRAM(S): 1 INJECTION INTRAVENOUS at 18:14

## 2023-01-01 RX ADMIN — Medication 650 MILLIGRAM(S): at 21:14

## 2023-01-01 RX ADMIN — Medication 650 MILLIGRAM(S): at 14:39

## 2023-01-01 RX ADMIN — MIDODRINE HYDROCHLORIDE 10 MILLIGRAM(S): 2.5 TABLET ORAL at 05:04

## 2023-01-01 RX ADMIN — Medication 81 MILLIGRAM(S): at 13:38

## 2023-01-01 RX ADMIN — MIDODRINE HYDROCHLORIDE 10 MILLIGRAM(S): 2.5 TABLET ORAL at 13:38

## 2023-01-01 RX ADMIN — MIDODRINE HYDROCHLORIDE 10 MILLIGRAM(S): 2.5 TABLET ORAL at 18:14

## 2023-01-01 RX ADMIN — AMIODARONE HYDROCHLORIDE 200 MILLIGRAM(S): 400 TABLET ORAL at 05:04

## 2023-01-01 RX ADMIN — Medication 650 MILLIGRAM(S): at 13:39

## 2023-01-01 NOTE — PROGRESS NOTE ADULT - ASSESSMENT
83yo male with hx of HTN, Advanced Combined CHF, dementia, urinary retention w/ chronic Dubois, presented to the ED from [facility] due to hypoxia and pale appearing this AM, noted to have severe sepsis and hypoxia secondary to likely persistent Bacterial PNA    #Severe Sepsis POA  #Bacterial PNA  - on Cefepime/Flagyl that he was receiving at [facility]. changed to vanco and cefepime this am.   -S/p gentle IVF in the ED  -Follow up with repeat Lactate levels: trending down  - wbc inproving  - SLP eval. feed with assisstance    #Acute on chronic hypoxic respiratory failure   -Secondary to the above  -Back to 5L NC saturating 90% however cold fingers.   - placed on continuous O2 monitoring  - repeat CXR ordered as patient was 100% on 3L per H and P and received  ml  -Continue to monitor vitals and O2 sats    # chronic Combined CHF  #Hypotension  #A fib  -Poor advanced CHF w/ EF 20%  -Medication optimization was done, however, in the setting of acute decompensation as well as hypotension, readjustments were made  -Continue Metoprolol w/ holding parameters as minimal effect on BP  -Hold Farxiga/Entresto. Lasix on hold in the setting of sepsis, however, considering poor cardiac function, low threshold to restart. will follow up CXR today. use lasix PRN for now  -Continue Amiodarone  -Continue Midodrine  -Close monitoring of vitals and I/Os and weights    #HAN  #Hemoconcentration  -Likely in the setting of sepsis, intravascularly low/hypovolemic   -S/p gentle IVF in the ED  -Monitor Renal function and volume state    #Chronic Anemia  -Likely multifactorial  -Baseline chronically 7-8  -No signs/symptoms of acute blood loss  -Continue to monitor    #Urinary retention  -Chronic dubois secondary to failed multiple TOVs  -Continue for now  -Holding Flomax/Finasteride as they can cause hypotension as well    # b/l leg wound  - h/o wound culture growinf MRSA in the past  - contact isolation  - podiatry cx. informed Dr. Kay.     Poor prognosis    GOC: DNR/DNI. wife says patient does not want comfort care now. however she is open to speak to palliative team. cx requested    Updated wife Jerome over phone.  81yo male with hx of HTN, Advanced Combined CHF, dementia, urinary retention w/ chronic Dubois, presented to the ED from [facility] due to hypoxia and pale appearing this AM, noted to have severe sepsis and hypoxia secondary to likely persistent Bacterial PNA    #Severe Sepsis POA  #Bacterial PNA  - on Cefepime/Flagyl that he was receiving at [facility]. changed to vanco and cefepime this am.   -S/p gentle IVF in the ED  -Follow up with repeat Lactate levels: trending down  - wbc inproving  - SLP eval. feed with assisstance    #Acute on chronic hypoxic respiratory failure   -Secondary to the above  -Back to 5L NC saturating 90% however cold fingers.   - placed on continuous O2 monitoring  - repeat CXR ordered as patient was 100% on 3L per H and P and received  ml  -Continue to monitor vitals and O2 sats    # chronic Combined CHF  #Hypotension  #A fib  -Poor advanced CHF w/ EF 20%  -Medication optimization was done, however, in the setting of acute decompensation as well as hypotension, readjustments were made  -Continue Metoprolol w/ holding parameters as minimal effect on BP  -Hold Farxiga/Entresto for hypotension/sepsis/HAN. Lasix on hold in the setting of sepsis, however, considering poor cardiac function, low threshold to restart. will follow up CXR today. use lasix PRN for now. not a candidate for MRA for hypotension/HAN  -Continue Amiodarone  -Continue Midodrine  -Close monitoring of vitals and I/Os and weights    #HAN  #Hemoconcentration  -Likely in the setting of sepsis, intravascularly low/hypovolemic   -S/p gentle IVF in the ED  -Monitor Renal function and volume state    #Chronic Anemia  -Likely multifactorial  -Baseline chronically 7-8  -No signs/symptoms of acute blood loss  -Continue to monitor    #Urinary retention  -Chronic dubois secondary to failed multiple TOVs  -Continue for now  -Holding Flomax/Finasteride as they can cause hypotension as well    # b/l leg wound  - h/o wound culture growinf MRSA in the past  - contact isolation  - podiatry cx. informed Dr. Kay.     Poor prognosis    GOC: DNR/DNI. wife says patient does not want comfort care now. however she is open to speak to palliative team. cx requested    Updated wife Jerome over phone.  83yo male with hx of HTN, Advanced Combined CHF, dementia, urinary retention w/ chronic Dubois, presented to the ED from [facility] due to hypoxia and pale appearing this AM, noted to have severe sepsis and hypoxia secondary to likely persistent Bacterial PNA    #Severe Sepsis POA  #Bacterial PNA  - on Cefepime/Flagyl that he was receiving at [facility]. changed to vanco and cefepime this am.   -S/p gentle IVF in the ED  -Follow up with repeat Lactate levels: trending down  - wbc inproving  - SLP eval. feed with assisstance  - MRSA PCR, sputum cx, urine legionella    #Acute on chronic hypoxic respiratory failure   -Secondary to the above  -Back to 5L NC saturating 90% however cold fingers.   - placed on continuous O2 monitoring  - repeat CXR ordered as patient was 100% on 3L per H and P and received  ml  -Continue to monitor vitals and O2 sats    # chronic Combined CHF  #Hypotension  #A fib  -Poor advanced CHF w/ EF 20%  -Medication optimization was done, however, in the setting of acute decompensation as well as hypotension, readjustments were made  -Continue Metoprolol w/ holding parameters as minimal effect on BP  -Hold Farxiga/Entresto for hypotension/sepsis/HAN. Lasix on hold in the setting of sepsis, however, considering poor cardiac function, low threshold to restart. will follow up CXR today. use lasix PRN for now. not a candidate for MRA for hypotension/HAN  -Continue Amiodarone  -Continue Midodrine  -Close monitoring of vitals and I/Os and weights    #HAN  #Hemoconcentration  -Likely in the setting of sepsis, intravascularly low/hypovolemic   -S/p gentle IVF in the ED  -Monitor Renal function and volume state    #Chronic Anemia  -Likely multifactorial  -Baseline chronically 7-8  -No signs/symptoms of acute blood loss  -Continue to monitor    #Urinary retention  -Chronic dubois secondary to failed multiple TOVs  -Continue for now  -Holding Flomax/Finasteride as they can cause hypotension as well    # b/l leg wound  - h/o wound culture growinf MRSA in the past  - contact isolation  - podiatry cx. informed Dr. Kay.     Poor prognosis    GOC: DNR/DNI. wife says patient does not want comfort care now. however she is open to speak to palliative team. cx requested    Updated wife Jerome over phone.  83yo male with hx of HTN, Advanced Combined CHF, dementia, urinary retention w/ chronic Dubois, presented to the ED from [facility] due to hypoxia and pale appearing this AM, noted to have severe sepsis and hypoxia secondary to likely persistent Bacterial PNA    #Severe Sepsis POA  #Bacterial PNA  - on Cefepime/Flagyl that he was receiving at [facility]. changed to vanco and cefepime this am.   -S/p gentle IVF in the ED  -Follow up with repeat Lactate levels: trending down  - wbc inproving  - SLP eval. feed with assisstance  - MRSA PCR, sputum cx, urine legionella    #Acute on chronic hypoxic respiratory failure   -Secondary to the above  -Back to 5L NC saturating 90% however cold fingers.   - placed on continuous O2 monitoring  - repeat CXR ordered as patient was 100% on 3L per H and P and received  ml  -Continue to monitor vitals and O2 sats    # chronic Combined CHF  #Hypotension  #A fib  -Poor advanced CHF w/ EF 20%  -Medication optimization was done, however, in the setting of acute decompensation as well as hypotension, readjustments were made  - on Metoprolol tart  w/ holding parameter. changed to coreg with holding  -Hold Farxiga/Entresto for hypotension/sepsis/HAN. Lasix on hold in the setting of sepsis, however, considering poor cardiac function, low threshold to restart. will follow up CXR today. use lasix PRN for now. not a candidate for MRA for hypotension/HAN  -Continue Amiodarone  -Continue Midodrine  -Close monitoring of vitals and I/Os and weights    #HAN  #Hemoconcentration  -Likely in the setting of sepsis, intravascularly low/hypovolemic   -S/p gentle IVF in the ED  -Monitor Renal function and volume state    #Chronic Anemia  -Likely multifactorial  -Baseline chronically 7-8  -No signs/symptoms of acute blood loss  -Continue to monitor    #Urinary retention  -Chronic dubois secondary to failed multiple TOVs  -Continue for now  -Holding Flomax/Finasteride as they can cause hypotension as well    # b/l leg wound  - h/o wound culture growinf MRSA in the past  - contact isolation  - podiatry cx. informed Dr. Kay.     Poor prognosis    GOC: DNR/DNI. wife says patient does not want comfort care now. however she is open to speak to palliative team. cx requested    Updated wife Jerome over phone.

## 2023-01-01 NOTE — CONSULT NOTE ADULT - SUBJECTIVE AND OBJECTIVE BOX
HPI:   Patient is a 82y male with a past history of HTN,A Fib, CHF with EF of 20%, PAD with arterial insufficiency ulcers to both feet, BPH with chronic dubois, admitted  with shortness of breath and hypoxia not responding to Cefepime and metronidazole at his SNF.  He was admitted  with  a sepsis syndome and hypoxic respiratory failure, he received 1 week of cefepime for sepsis/possible pneumonia.  He also is s/p a CVA with left sided weakness.He had  areas of Rt foot gangrene last admission and a left foot tissue injury that he refused examination of.  He also received a 10 day course of linezolid as MRSA was isolated from wound.  He had arterial insufficiency which was not corrected.  He is now admitted with confusion and hypoxia. No fever.He was evaluated last admission for a persistent leukocytosis, no explanation found.Blood cultures were negative.He has been given cefepime and vanco at [facility].    REVIEW OF SYSTEMS:  All other review of systems negative (Comprehensive ROS): limited, he is not able to express himself    PAST MEDICAL & SURGICAL HISTORY:  Chronic atrial fibrillation      History of cardiomyopathy      CAD (coronary artery disease)      BPH with obstruction/lower urinary tract symptoms      Hyperlipidemia      History of CVA (cerebrovascular accident)  left sided weakness      S/P CABG (coronary artery bypass graft)          Allergies    PC Pen VK (Other)    Intolerances        Antimicrobials Day #  :  cefepime   IVPB 1000 milliGRAM(s) IV Intermittent every 12 hours  vancomycin  IVPB        Other Medications:  acetaminophen     Tablet .. 650 milliGRAM(s) Oral every 6 hours PRN  aMIOdarone    Tablet 200 milliGRAM(s) Oral daily  aspirin  chewable 81 milliGRAM(s) Oral daily  atorvastatin 40 milliGRAM(s) Oral at bedtime  heparin   Injectable 5000 Unit(s) SubCutaneous every 8 hours  levothyroxine 150 MICROGram(s) Oral daily  metoprolol tartrate 12.5 milliGRAM(s) Oral two times a day  midodrine. 10 milliGRAM(s) Oral three times a day  ondansetron Injectable 4 milliGRAM(s) IV Push every 8 hours PRN  sodium chloride 0.9%. 250 milliLiter(s) IV Continuous <Continuous>      FAMILY HISTORY:  No pertinent family history in first degree relatives        SOCIAL HISTORY:  Smoking: ?   ETOH:  ?   Drug Use: x      T(F): 98.3 (23 @ 12:48), Max: 98.3 (23 @ 12:48)  HR: 96 (23 @ 12:48)  BP: 95/66 (23 @ 12:48)  RR: 20 (23 @ 12:48)  SpO2: 96% (23 @ 12:48)  Wt(kg): --    PHYSICAL EXAM:  General: alert, mild respiratory distress  Eyes:  anicteric, no conjunctival injection, no discharge  Oropharynx: retained food	  Neck: supple, without adenopathy  Lungs: distant , decreased at bases  Heart: regular rate and rhythm; no murmur, rubs or gallops  Abdomen: soft, nondistended, nontender, without mass or organomegaly  Skin: no rash  Extremities: no clubbing, cyanosis, + edema  Neurology: confused, left  side is weak   Dubois  Rt foot with a few araes of dry gangrene-lateral foot and plantar region of hallus.  Left foot with morev extensive ulcerations of lateral foot with poorly granulating base.No Odor and no ascending cellulitis    LAB RESULTS:                        8.2    16.60 )-----------( 259      ( 2023 08:15 )             27.5         144  |  110<H>  |  53<H>  ----------------------------<  110<H>  4.4   |  23  |  1.39<H>    Ca    8.9      2023 08:15    TPro  6.9  /  Alb  2.8<L>  /  TBili  0.6  /  DBili  x   /  AST  25  /  ALT  23  /  AlkPhos  160<H>  12-    LIVER FUNCTIONS - ( 31 Dec 2022 10:47 )  Alb: 2.8 g/dL / Pro: 6.9 g/dL / ALK PHOS: 160 U/L / ALT: 23 U/L / AST: 25 U/L / GGT: x           Urinalysis Basic - ( 31 Dec 2022 15:57 )    Color: Brown / Appearance: Clear / S.020 / pH: x  Gluc: x / Ketone: Trace  / Bili: Negative / Urobili: 1   Blood: x / Protein: 100 / Nitrite: Negative   Leuk Esterase: Moderate / RBC: >50 /HPF / WBC 11-25 /HPF   Sq Epi: x / Non Sq Epi: Neg.-Few / Bacteria: Negative /HPF        MICROBIOLOGY:  RECENT CULTURES:        RADIOLOGY REVIEWED:  < from: Xray Chest 1 View-PORTABLE IMMEDIATE (22 @ 10:46) >  IMPRESSION: Right PICC line at this time. Increasing bilateral   infiltrates right greater than left.    --- End of Report ---    < end of copied text >  < from: US Abdomen Limited (22 @ 09:35) >  IMPRESSION:    Limited visualization of the pancreas, otherwise unremarkable right upper   quadrant ultrasound.    < end of copied text >  < from: [facility] Physiol Extremity Lower 3+ Level, BI (22 @ 14:10) >    Findings/  Impression:  Right lower extremity: The ankle brachial index is 0.67. The pulse   waveforms are diminished in the ankle and foot.    Left lower extremity: The ankle brachial index is unobtainable. The pulse   waveformsare diminished throughout.    The patient could not tolerate right posterior tibial and entire left leg   blood pressure cuff measurements.  At least a moderate peripheral artery disease was detected on the right.    --- End of Report ---    < end of copied text >  < from: US Kidney and Bladder (10.26.22 @ 15:12) >  IMPRESSION:  No hydronephrosis bilaterally.  Limited bladder evaluation. Bladder debris and diffuse wall thickening.   Correlate clinically and with cystoscopy as warranted    < end of copied text >

## 2023-01-01 NOTE — CONSULT NOTE ADULT - SUBJECTIVE AND OBJECTIVE BOX
PULMONARY CONSULT  Location of Patient : [facility] 0[facility] W1 ([facility])  Attending requesting Consult:Rosaura Montesinos  Chief Complaint :     Reason For consult :      Initial HPI on admission:  HPI:  83yo male with hx of HTN, Advanced Combined CHF, dementia, urinary retention w/ chronic Starks, presented to the ED from [facility] due to hypoxia and pale appearing this AM. Pt is a poor historian. For the past 1-2 weeks, pt was noted to have ARFH secondary to decompensated Heart failure and volume overload with superimposed bacterial PNA as noted to have infiltrate on CXR w/ leukocytosis. Pt was treated with IV Lasix/Cefepime/Flagyl while at the facility. Supplemental O2 was also started. Pt was noted to have improvement in volume status as well as O2 sats as he was saturating 96% on 3-4L NC without any respiratory distress or tachypnea.   This AM, pt noted be more lethargic and pale appearing. Staff was unable to obtain O2 sats as it kept reading 70s-80s on 6L NC. Considering recent GOC discussion with family, pt required higher level of care at the hospital.  (31 Dec 2022 12:36)      BRIEF HOSPITAL COURSE: ***    PAST MEDICAL & SURGICAL HISTORY:  Chronic atrial fibrillation      History of cardiomyopathy      CAD (coronary artery disease)      BPH with obstruction/lower urinary tract symptoms      Hyperlipidemia      History of CVA (cerebrovascular accident)  left sided weakness      S/P CABG (coronary artery bypass graft)        Allergies    PC Pen VK (Other)    Intolerances      FAMILY HISTORY:  No pertinent family history in first degree relatives      Social history: Social History:  Lives with wife but has been frequently hospitalized and discharged to rehab facility for the past few months  No smoking, EtOH us (31 Dec 2022 12:36)       Smoking:     Drinking:     Drug use:    Review of Systems: as stated above    CONSTITUTIONAL: No fever, No chills, No fatigue  EYES: No eye pain, No visual disturbances, No discharge  ENMT:  No difficulty hearing, No tinnitus, No vertigo; No sinus or throat pain  NECK: No pain, No stiffness  RESPIRATORY: No Cough, No SOB, No Secretions  CARDIOVASCULAR: No chest pain, No palpitations, No dizziness, or No leg swelling  GASTROINTESTINAL: No abdominal or epigastric pain. No nausea, No vomiting, No hematemesis; No diarrhea, No constipation. No melena, No hematochezia.  GENITOURINARY: No dysuria, No frequency, No hematuria, No incontinence  NEUROLOGICAL: No headaches, No memory loss, No loss of strength, No numbness, No tremors  SKIN: No itching, No burning, No rashes, No lesions   MUSCULOSKELETAL: No joint pain or swelling; No muscle, back, No extremity pain  PSYCHIATRIC: No depression, No anxiety, No mood swings, No difficulty sleeping      Medications:  MEDICATIONS  (STANDING):  aMIOdarone    Tablet 200 milliGRAM(s) Oral daily  aspirin  chewable 81 milliGRAM(s) Oral daily  atorvastatin 40 milliGRAM(s) Oral at bedtime  cefepime   IVPB 1000 milliGRAM(s) IV Intermittent every 12 hours  heparin   Injectable 5000 Unit(s) SubCutaneous every 8 hours  levothyroxine 150 MICROGram(s) Oral daily  metoprolol tartrate 12.5 milliGRAM(s) Oral two times a day  midodrine. 10 milliGRAM(s) Oral three times a day  sodium chloride 0.9%. 250 milliLiter(s) (50 mL/Hr) IV Continuous <Continuous>  vancomycin  IVPB        MEDICATIONS  (PRN):  acetaminophen     Tablet .. 650 milliGRAM(s) Oral every 6 hours PRN Temp greater or equal to 38C (100.4F), Mild Pain (1 - 3)  ondansetron Injectable 4 milliGRAM(s) IV Push every 8 hours PRN Nausea and/or Vomiting      Antibiotics History  cefepime   IVPB 1000 milliGRAM(s) IV Intermittent every 12 hours, 22 @ 12:30  cefepime   IVPB 1000 milliGRAM(s) IV Intermittent once, 22 @ 10:12, Stop order after: 1 Doses  cefepime   IVPB    , 23 @ 08:09  cefepime   IVPB 1000 milliGRAM(s) IV Intermittent every 12 hours, 23 @ 08:15  metroNIDAZOLE  IVPB    , 22 @ 17:00  metroNIDAZOLE  IVPB 500 milliGRAM(s) IV Intermittent once, 22 @ 12:36  metroNIDAZOLE  IVPB 500 milliGRAM(s) IV Intermittent every 8 hours, 22 @ 22:00  vancomycin  IVPB    , 23 @ 08:17  vancomycin  IVPB 1000 milliGRAM(s) IV Intermittent once, 23 @ 08:17, Stop order after: 1 Doses  vancomycin  IVPB 1000 milliGRAM(s) IV Intermittent every 24 hours, 23 @ 08:17, Stop order after: 7 Days      Heme Medications   aspirin  chewable 81 milliGRAM(s) Oral daily, 22 @ 12:34  heparin   Injectable 5000 Unit(s) SubCutaneous every 8 hours, 22 @ 12:26      GI Medications        Home Medications:  Last Order Reconciliation Date: 22 @ 12:36 (Admission Reconciliation)  acetaminophen 325 mg oral tablet: 3 tab(s) orally every 8 hours, As needed, Temp greater or equal to 38C (100.4F), Mild Pain (1 - 3) (22 @ 12:34)  amiodarone 200 mg oral tablet: 1 tab(s) orally once a day (22 @ 12:34)  aspirin 81 mg oral tablet, chewable: 1 tab(s) orally once a day (22 @ 12:34)  atorvastatin 80 mg oral tablet: 1 tab(s) orally once a day (22 @ 12:34)  Dulcolax Laxative 10 mg rectal suppository: 1 suppository(ies) rectal once a day, As Needed (22 @ 12:34)  finasteride 5 mg oral tablet: 1 tab(s) orally once a day (22 @ 12:34)  furosemide 20 mg oral tablet: 1 tab(s) orally 2 times a day (22 @ 12:34)  levothyroxine 150 mcg (0.15 mg) oral tablet: 1 tab(s) orally once a day (22 @ 12:34)  Metoprolol Tartrate 25 mg oral tablet: 0.5 tab(s) orally 2 times a day (22 @ 12:34)  midodrine 10 mg oral tablet: 1 tab(s) orally 2 times a day (22 @ 12:34)  sacubitril-valsartan 24 mg-26 mg oral tablet: 1 tab(s) orally 2 times a day (22 @ 12:34)  tamsulosin 0.4 mg oral capsule: 1 cap(s) orally once a day (22 @ 12:34)      LABS:                        8.2    16.60 )-----------( 259      ( 2023 08:15 )             27.5         144  |  110<H>  |  53<H>  ----------------------------<  110<H>  4.4   |  23  |  1.39<H>    Ca    8.9      2023 08:15    TPro  6.9  /  Alb  2.8<L>  /  TBili  0.6  /  DBili  x   /  AST  25  /  ALT  23  /  AlkPhos  160<H>              PT/INR - ( 31 Dec 2022 10:47 )   PT: 18.0 sec;   INR: 1.54 ratio         PTT - ( 31 Dec 2022 10:47 )  PTT:25.6 sec  Urinalysis Basic - ( 31 Dec 2022 15:57 )    Color: Brown / Appearance: Clear / S.020 / pH: x  Gluc: x / Ketone: Trace  / Bili: Negative / Urobili: 1   Blood: x / Protein: 100 / Nitrite: Negative   Leuk Esterase: Moderate / RBC: >50 /HPF / WBC 11-25 /HPF   Sq Epi: x / Non Sq Epi: Neg.-Few / Bacteria: Negative /HPF        Serum Pro-Brain Natriuretic Peptide: 18727 pg/mL (22 @ 10:47)        CULTURES: (if applicable)    Rapid RVP Result: NotDetec (22 @ 10:50)        CAPILLARY BLOOD GLUCOSE      POCT Blood Glucose.: 108 mg/dL (2023 09:10)      RADIOLOGY  CXR:      CT:    ECHO:      VITALS:  T(C): 36.8 (23 @ 12:48), Max: 36.8 (23 @ 12:48)  T(F): 98.3 (23 @ 12:48), Max: 98.3 (23 @ 12:48)  HR: 96 (23 @ 12:48) (69 - 110)  BP: 95/66 (23 @ 12:48) (94/73 - 108/61)  BP(mean): --  ABP: --  ABP(mean): --  RR: 20 (23 @ 12:48) (14 - 20)  SpO2: 96% (23 @ 12:48) (93% - 100%)  CVP(mm Hg): --  CVP(cm H2O): --    Ins and Outs     22 @ 07:  -  23 @ 07:00  --------------------------------------------------------  IN: 0 mL / OUT: 100 mL / NET: -100 mL    23 @ 07:  -  23 @ 13:05  --------------------------------------------------------  IN: 500 mL / OUT: 0 mL / NET: 500 mL        Height (cm): 182.9 (22 @ 09:45)  Weight (kg): 59 (22 @ 09:45)  BMI (kg/m2): 17.6 (22 @ 09:45)        I&O's Detail    31 Dec 2022 07:  -  2023 07:00  --------------------------------------------------------  IN:  Total IN: 0 mL    OUT:    Indwelling Catheter - Urethral (mL): 100 mL  Total OUT: 100 mL    Total NET: -100 mL      2023 07:01  -  2023 13:05  --------------------------------------------------------  IN:    Oral Fluid: 500 mL  Total IN: 500 mL    OUT:  Total OUT: 0 mL    Total NET: 500 mL          Physical Examination:  GENERAL:               Alert, Oriented, No acute distress.    HEENT:                    Pupils equal, reactive to light.  Symmetric. No JVD, Moist MM  PULM:                     Bilateral air entry, Clear to auscultation bilaterally, no significant sputum production, No Rales, No Rhonchi, No Wheezing  CVS:                         S1, S2,  No Murmur  ABD:                        Soft, nondistended, nontender, normoactive bowel sounds,   EXT:                         No edema, nontender, No Cyanosis or Clubbing   Vascular:                Warm Extremities, Normal Capillary refill, Normal Distal Pulses  SKIN:                       Warm and well perfused, no rashes noted.   NEURO:                  Alert, oriented, interactive, nonfocal, follows commands  PSYC:                      Calm, + Insight.     PULMONARY CONSULT  Location of Patient : [facility] 0[facility] W1 ( TELE)  Attending requesting Consult:Rosaura Montesinos  Chief Complaint :     Reason For consult :      Initial HPI on admission:  HPI:  82 year old male with hx of CAD s/p CABG with severe cardiomyopathy with advanced Combined CHF, atrial Fib,  dementia, urinary retention w/ chronic Starks, presented to the ED from [facility] due to hypoxia and pale/ill appearing this AM.     Pt is a poor historian. For the past 1-2 weeks, pt was noted to have ARFH secondary to decompensated Heart failure and volume overload with superimposed bacterial PNA as noted to have infiltrate on CXR w/ leukocytosis.   Pulmonary consult called for sob  patient sat 100% on 2 L n/c   patient is alert, confused, unable ot provide history or ROS to state what brought him here  denies fever chills, cough      PAST MEDICAL & SURGICAL HISTORY:  Chronic atrial fibrillation  History of cardiomyopathy  CAD (coronary artery disease)  BPH with obstruction/lower urinary tract symptoms  Hyperlipidemia  History of CVA (cerebrovascular accident) left sided weakness  S/P CABG (coronary artery bypass graft)  Allergies  PC Pen VK (Other)    Due to current medical status patient unable to provide medical, social, family history.  History obtained from available medical record.       Medications:  MEDICATIONS  (STANDING):  aMIOdarone    Tablet 200 milliGRAM(s) Oral daily  aspirin  chewable 81 milliGRAM(s) Oral daily  atorvastatin 40 milliGRAM(s) Oral at bedtime  cefepime   IVPB 1000 milliGRAM(s) IV Intermittent every 12 hours  heparin   Injectable 5000 Unit(s) SubCutaneous every 8 hours  levothyroxine 150 MICROGram(s) Oral daily  metoprolol tartrate 12.5 milliGRAM(s) Oral two times a day  midodrine. 10 milliGRAM(s) Oral three times a day  sodium chloride 0.9%. 250 milliLiter(s) (50 mL/Hr) IV Continuous <Continuous>  vancomycin  IVPB        MEDICATIONS  (PRN):  acetaminophen     Tablet .. 650 milliGRAM(s) Oral every 6 hours PRN Temp greater or equal to 38C (100.4F), Mild Pain (1 - 3)  ondansetron Injectable 4 milliGRAM(s) IV Push every 8 hours PRN Nausea and/or Vomiting      Antibiotics History  cefepime   IVPB 1000 milliGRAM(s) IV Intermittent every 12 hours, 22 @ 12:30  cefepime   IVPB 1000 milliGRAM(s) IV Intermittent once, 22 @ 10:12, Stop order after: 1 Doses  cefepime   IVPB    , 23 @ 08:09  cefepime   IVPB 1000 milliGRAM(s) IV Intermittent every 12 hours, 23 @ 08:15  metroNIDAZOLE  IVPB    , 22 @ 17:00  metroNIDAZOLE  IVPB 500 milliGRAM(s) IV Intermittent once, 22 @ 12:36  metroNIDAZOLE  IVPB 500 milliGRAM(s) IV Intermittent every 8 hours, 22 @ 22:00  vancomycin  IVPB    , 23 @ 08:17  vancomycin  IVPB 1000 milliGRAM(s) IV Intermittent once, 23 @ 08:17, Stop order after: 1 Doses  vancomycin  IVPB 1000 milliGRAM(s) IV Intermittent every 24 hours, 23 @ 08:17, Stop order after: 7 Days      Heme Medications   aspirin  chewable 81 milliGRAM(s) Oral daily, 22 @ 12:34  heparin   Injectable 5000 Unit(s) SubCutaneous every 8 hours, 22 @ 12:26      GI Medications        Home Medications:  Last Order Reconciliation Date: 22 @ 12:36 (Admission Reconciliation)  acetaminophen 325 mg oral tablet: 3 tab(s) orally every 8 hours, As needed, Temp greater or equal to 38C (100.4F), Mild Pain (1 - 3) (22 @ 12:34)  amiodarone 200 mg oral tablet: 1 tab(s) orally once a day (22 @ 12:34)  aspirin 81 mg oral tablet, chewable: 1 tab(s) orally once a day (22 @ 12:34)  atorvastatin 80 mg oral tablet: 1 tab(s) orally once a day (22 @ 12:34)  Dulcolax Laxative 10 mg rectal suppository: 1 suppository(ies) rectal once a day, As Needed (22 @ 12:34)  finasteride 5 mg oral tablet: 1 tab(s) orally once a day (22 @ 12:34)  furosemide 20 mg oral tablet: 1 tab(s) orally 2 times a day (22 @ 12:34)  levothyroxine 150 mcg (0.15 mg) oral tablet: 1 tab(s) orally once a day (22 @ 12:34)  Metoprolol Tartrate 25 mg oral tablet: 0.5 tab(s) orally 2 times a day (22 @ 12:34)  midodrine 10 mg oral tablet: 1 tab(s) orally 2 times a day (22 @ 12:34)  sacubitril-valsartan 24 mg-26 mg oral tablet: 1 tab(s) orally 2 times a day (22 @ 12:34)  tamsulosin 0.4 mg oral capsule: 1 cap(s) orally once a day (22 @ 12:34)      LABS:                        8.2    16.60 )-----------( 259      ( 2023 08:15 )             27.5         144  |  110<H>  |  53<H>  ----------------------------<  110<H>  4.4   |  23  |  1.39<H>    Ca    8.9      2023 08:15    TPro  6.9  /  Alb  2.8<L>  /  TBili  0.6  /  DBili  x   /  AST  25  /  ALT  23  /  AlkPhos  160<H>    PT/INR - ( 31 Dec 2022 10:47 )   PT: 18.0 sec;   INR: 1.54 ratio         PTT - ( 31 Dec 2022 10:47 )  PTT:25.6 sec  Urinalysis Basic - ( 31 Dec 2022 15:57 )    Color: Brown / Appearance: Clear / S.020 / pH: x  Gluc: x / Ketone: Trace  / Bili: Negative / Urobili: 1   Blood: x / Protein: 100 / Nitrite: Negative   Leuk Esterase: Moderate / RBC: >50 /HPF / WBC 11-25 /HPF   Sq Epi: x / Non Sq Epi: Neg.-Few / Bacteria: Negative /HPF    WBC Trend  23 @ 08:15   -  16.60<H>  22 @ 10:47   -  18.82<H>      RADIOLOGY  CXR:  b/l haziness Right > Left    < from: TTE Echo Complete w/o Contrast w/ Doppler (11.15.22 @ 08:37) >  Summary:   1. Severely decreased segmental left ventricular systolic function.   2. Left ventricular ejection fraction, by visual estimation, is 20%.   3. Severe global left ventricle hypokinesis with regional variation: the inferolateral wall, inferior wall and basal and mid inferoseptal wall appear akinetic.   4. Mildly increased left ventricular internal cavity size.   5. The mitral in-flow pattern reveals no discernable A-wave, therefore no comment on diastolic function can be made.   6. There is mild septal left ventricular hypertrophy.   7. Moderately enlarged right ventricle.   8. Moderately reduced RV systolic function.   9. Moderate to severe left atrialenlargement.  10. Right atrial enlargement.  11. Mild mitral annular calcification.  12. Mild thickening and calcification of the anterior and posterior   mitral valve leaflets.  13. Severe mitral valve regurgitation.  14. Mild-moderate tricuspid regurgitation.  15. Aortic valve leaflet calcification.  16. Sclerotic aortic valve with normal opening.  17. Moderate aortic regurgitation.  18. Mild pulmonic valve regurgitation.  19. Dilated proximal ascending aorta (3.8 cm).  20. Moderately dilated pulmonary artery.  21. Estimated pulmonary artery systolic pressure is 40.4 mmHg assuming a   right atrial pressure of 15 mmHg, which is consistent with mild pulmonary   hypertension.  22. There is no evidence of pericardial effusion.    [id number] Vignesh Wallace MD Electronically signed on   11/15/2022 at 1:53:05 PM    < end of copied text >    Lactic Acid Trend  23 @ 11:05   -   3.7<H>  23 @ 08:15   -   3.0<H>  23 @ 01:10   -   5.4<HH>  22 @ 18:17   -   4.6<HH>  22 @ 12:31   -   5.9<HH>  22 @ 10:47   -   3.3<H>      VITALS:  T(C): 36.8 (23 @ 12:48), Max: 36.8 (23 @ 12:48)  T(F): 98.3 (23 @ 12:48), Max: 98.3 (23 @ 12:48)  HR: 96 (23 @ 12:48) (69 - 110)  BP: 95/66 (23 @ 12:48) (94/73 - 108/61)  BP(mean): --  ABP: --  ABP(mean): --  RR: 20 (23 @ 12:48) (14 - 20)  SpO2: 96% (23 @ 12:48) (93% - 100%)  CVP(mm Hg): --  CVP(cm H2O): --    Ins and Outs     22 @ 07:  -  23 @ 07:00  --------------------------------------------------------  IN: 0 mL / OUT: 100 mL / NET: -100 mL    23 @ 07:01  -  23 @ 13:05  --------------------------------------------------------  IN: 500 mL / OUT: 0 mL / NET: 500 mL        Height (cm): 182.9 (22 @ 09:45)  Weight (kg): 59 (22 @ 09:45)  BMI (kg/m2): 17.6 (22 @ 09:45)        I&O's Detail    31 Dec 2022 07:01  -  2023 07:00  --------------------------------------------------------  IN:  Total IN: 0 mL    OUT:    Indwelling Catheter - Urethral (mL): 100 mL  Total OUT: 100 mL    Total NET: -100 mL      2023 07:01  -  2023 13:05  --------------------------------------------------------  IN:    Oral Fluid: 500 mL  Total IN: 500 mL    OUT:  Total OUT: 0 mL    Total NET: 500 mL          Physical Examination:  GENERAL:               Alert, Confused, No acute distress.    HEENT:                   No JVD, Dry  MM  PULM:                     Bilateral air entry, Clear to auscultation bilaterally, no significant sputum production, No Rales, No Rhonchi, No Wheezing  CVS:                         S1, S2,  +  Murmur  ABD:                        Soft, nondistended, nontender, normoactive bowel sounds,   EXT:                         No edema, nontender, No Cyanosis or Clubbing   Vascular:                cool Extremities, poor Capillary refill, decreased Distal Pulses  NEURO:                  Alert,  interactive, nonfocal, follows commands  PSYC:                      Calm, Limited Insight.

## 2023-01-01 NOTE — CONSULT NOTE ADULT - SUBJECTIVE AND OBJECTIVE BOX
S : 82y year old Male seen at bedside for BL foot ulcerations. Patient readmitted for hypoxia.    Chief Complaint : Patient is a 82y old  Male who presents with a chief complaint of Hypoxia (01 Jan 2023 13:59)    HPI : HPI:  83yo male with hx of HTN, Advanced Combined CHF, dementia, urinary retention w/ chronic Starks, presented to the ED from [facility] due to hypoxia and pale appearing this AM. Pt is a poor historian. For the past 1-2 weeks, pt was noted to have ARFH secondary to decompensated Heart failure and volume overload with superimposed bacterial PNA as noted to have infiltrate on CXR w/ leukocytosis. Pt was treated with IV Lasix/Cefepime/Flagyl while at the facility. Supplemental O2 was also started. Pt was noted to have improvement in volume status as well as O2 sats as he was saturating 96% on 3-4L NC without any respiratory distress or tachypnea.   This AM, pt noted be more lethargic and pale appearing. Staff was unable to obtain O2 sats as it kept reading 70s-80s on 6L NC. Considering recent GOC discussion with family, pt required higher level of care at the hospital.  (31 Dec 2022 12:36)          PMH: Chronic atrial fibrillation    History of cardiomyopathy    CAD (coronary artery disease)    BPH with obstruction/lower urinary tract symptoms    Hyperlipidemia    History of CVA (cerebrovascular accident)      PSH:S/P CABG (coronary artery bypass graft)        Allergies:PC Pen VK (Other)      Labs:                          8.2    16.60 )-----------( 259      ( 01 Jan 2023 08:15 )             27.5     WBC Trend  16.60<H> Date (01-01 @ 08:15)  18.82<H> Date (12-31 @ 10:47)      Chem  01-01    144  |  110<H>  |  53<H>  ----------------------------<  110<H>  4.4   |  23  |  1.39<H>    Ca    8.9      01 Jan 2023 08:15    TPro  6.9  /  Alb  2.8<L>  /  TBili  0.6  /  DBili  x   /  AST  25  /  ALT  23  /  AlkPhos  160<H>  12-31          T(F): 98.3 (01-01-23 @ 12:48), Max: 98.3 (01-01-23 @ 12:48)  HR: 96 (01-01-23 @ 12:48) (69 - 110)  BP: 95/66 (01-01-23 @ 12:48) (94/73 - 108/61)  RR: 20 (01-01-23 @ 12:48) (14 - 20)  SpO2: 96% (01-01-23 @ 12:48) (93% - 100%)  Wt(kg): --    O: Vascular: non-palpable pedal pulses.       Ortho: Patient is contracted on the left lower side      Derm: Extensive ulceration on the lateral aspect of the right foot with mostly granular tissue and centrally, fibrosis. No active purulence present. No ascending cellulitis.               On the left foot, there are dry eschars plantar left hallux and lateral side.    A:Bilateral foot ulcerations, PAD      P:   Chart reviewed and Patient evaluated  Discussed diagnosis and treatment with patient  Continue with wound care with NS cleanse and Aquacel  Decubitus protocol   Would recommend vascular consultation  Continue with IV abx as per ID

## 2023-01-01 NOTE — PROGRESS NOTE ADULT - SUBJECTIVE AND OBJECTIVE BOX
Medicine Progress Note    Patient is a 82y old  Male who presents with a chief complaint of Hypoxia (31 Dec 2022 12:36)      SUBJECTIVE / OVERNIGHT EVENTS:  seen and examined  Chart reviewed  No overnight events  BM+  reported pain allover the body when moved and did allow allow him to be turned.   denied complaints except whole body pain. and hunger. wants food. breakfast was given. after that, reported no issues  IN Abx for PNA  ID cx pending  O2 sat 90% with 5L    ADDITIONAL REVIEW OF SYSTEMS:  no fever/chills/CP/palpitation/dizziness/ nausea/vomiting/diarrhea/constipation/headaches. all other ROS neg    MEDICATIONS  (STANDING):  aMIOdarone    Tablet 200 milliGRAM(s) Oral daily  aspirin  chewable 81 milliGRAM(s) Oral daily  atorvastatin 40 milliGRAM(s) Oral at bedtime  cefepime   IVPB 1000 milliGRAM(s) IV Intermittent every 12 hours  heparin   Injectable 5000 Unit(s) SubCutaneous every 8 hours  levothyroxine 150 MICROGram(s) Oral daily  metoprolol tartrate 12.5 milliGRAM(s) Oral two times a day  midodrine. 10 milliGRAM(s) Oral three times a day  sodium chloride 0.9%. 250 milliLiter(s) (50 mL/Hr) IV Continuous <Continuous>  vancomycin  IVPB        MEDICATIONS  (PRN):  acetaminophen     Tablet .. 650 milliGRAM(s) Oral every 6 hours PRN Temp greater or equal to 38C (100.4F), Mild Pain (1 - 3)  ondansetron Injectable 4 milliGRAM(s) IV Push every 8 hours PRN Nausea and/or Vomiting    CAPILLARY BLOOD GLUCOSE      POCT Blood Glucose.: 108 mg/dL (2023 09:10)    I&O's Summary    31 Dec 2022 07:01  -  2023 07:00  --------------------------------------------------------  IN: 0 mL / OUT: 100 mL / NET: -100 mL    2023 07:01  -  2023 11:14  --------------------------------------------------------  IN: 500 mL / OUT: 0 mL / NET: 500 mL        PHYSICAL EXAM:  Vital Signs Last 24 Hrs  T(C): 36.7 (2023 05:02), Max: 36.7 (2023 05:02)  T(F): 98.1 (2023 05:02), Max: 98.1 (2023 05:02)  HR: 94 (2023 05:02) (69 - 110)  BP: 108/61 (2023 05:02) (94/73 - 108/61)  BP(mean): --  RR: 14 (2023 05:02) (14 - 18)  SpO2: 93% (2023 05:02) (93% - 100%)    Parameters below as of 2023 05:02  Patient On (Oxygen Delivery Method): nasal cannula  O2 Flow (L/min): 3    GENERAL: Not in distress. Alert    HEENT: clear conjuctiva, MMM. no pallor or icterus  CARDIOVASCULAR: RRR S1, S2. No murmur/rubs/gallop  LUNGS: BLAE+, no rales, no wheezing, no rhonchi.    ABDOMEN: ND. Soft,  NT, no guarding / rebound / rigidity. BS normoactive  BACK: No spine tenderness.  EXTREMITIES: no edema. no leg or calf TP.  SKIN: warm and dry  PSYCHIATRIC: Calm.  No agitation.    LABS:                        8.2    16.60 )-----------( 259      ( 2023 08:15 )             27.5         144  |  110<H>  |  53<H>  ----------------------------<  110<H>  4.4   |  23  |  1.39<H>    Ca    8.9      2023 08:15    TPro  6.9  /  Alb  2.8<L>  /  TBili  0.6  /  DBili  x   /  AST  25  /  ALT  23  /  AlkPhos  160<H>  12-31    PT/INR - ( 31 Dec 2022 10:47 )   PT: 18.0 sec;   INR: 1.54 ratio         PTT - ( 31 Dec 2022 10:47 )  PTT:25.6 sec      Urinalysis Basic - ( 31 Dec 2022 15:57 )    Color: Brown / Appearance: Clear / S.020 / pH: x  Gluc: x / Ketone: Trace  / Bili: Negative / Urobili: 1   Blood: x / Protein: 100 / Nitrite: Negative   Leuk Esterase: Moderate / RBC: >50 /HPF / WBC 11-25 /HPF   Sq Epi: x / Non Sq Epi: Neg.-Few / Bacteria: Negative /HPF        SARS-CoV-2: NotDetec (31 Dec 2022 10:50)  COVID-19 PCR: NotDetec (2022 13:15)  COVID-19 PCR: NotDetec (2022 10:55)  SARS-CoV-2: NotDetec (2022 19:45)  COVID-19 PCR: NotDetec (2022 07:10)  COVID-19 PCR: NotDetec (31 Oct 2022 19:50)      RADIOLOGY & ADDITIONAL TESTS:  Imaging from Last 24 Hours:    Electrocardiogram/QTc Interval:    COORDINATION OF CARE:  Care Discussed with Consultants/Other Providers:   Medicine Progress Note    Patient is a 82y old  Male who presents with a chief complaint of Hypoxia (31 Dec 2022 12:36)      SUBJECTIVE / OVERNIGHT EVENTS:  seen and examined  Chart reviewed  No overnight events  BM+  reported pain allover the body when moved and did allow allow him to be turned.   denied complaints except whole body pain. and hunger. wants food. breakfast was given. after that, reported no issues  IN Abx for PNA  ID cx pending  O2 sat 90% with 5L    ADDITIONAL REVIEW OF SYSTEMS:  no fever/chills/CP/palpitation/dizziness/ nausea/vomiting/diarrhea/constipation/headaches. all other ROS neg    MEDICATIONS  (STANDING):  aMIOdarone    Tablet 200 milliGRAM(s) Oral daily  aspirin  chewable 81 milliGRAM(s) Oral daily  atorvastatin 40 milliGRAM(s) Oral at bedtime  cefepime   IVPB 1000 milliGRAM(s) IV Intermittent every 12 hours  heparin   Injectable 5000 Unit(s) SubCutaneous every 8 hours  levothyroxine 150 MICROGram(s) Oral daily  metoprolol tartrate 12.5 milliGRAM(s) Oral two times a day  midodrine. 10 milliGRAM(s) Oral three times a day  sodium chloride 0.9%. 250 milliLiter(s) (50 mL/Hr) IV Continuous <Continuous>  vancomycin  IVPB        MEDICATIONS  (PRN):  acetaminophen     Tablet .. 650 milliGRAM(s) Oral every 6 hours PRN Temp greater or equal to 38C (100.4F), Mild Pain (1 - 3)  ondansetron Injectable 4 milliGRAM(s) IV Push every 8 hours PRN Nausea and/or Vomiting    CAPILLARY BLOOD GLUCOSE      POCT Blood Glucose.: 108 mg/dL (2023 09:10)    I&O's Summary    31 Dec 2022 07:01  -  2023 07:00  --------------------------------------------------------  IN: 0 mL / OUT: 100 mL / NET: -100 mL    2023 07:01  -  2023 11:14  --------------------------------------------------------  IN: 500 mL / OUT: 0 mL / NET: 500 mL        PHYSICAL EXAM:  Vital Signs Last 24 Hrs  T(C): 36.7 (2023 05:02), Max: 36.7 (2023 05:02)  T(F): 98.1 (2023 05:02), Max: 98.1 (2023 05:02)  HR: 94 (2023 05:02) (69 - 110)  BP: 108/61 (2023 05:02) (94/73 - 108/61)  BP(mean): --  RR: 14 (2023 05:02) (14 - 18)  SpO2: 93% (2023 05:02) (93% - 100%)    Parameters below as of 2023 05:02  Patient On (Oxygen Delivery Method): nasal cannula  O2 Flow (L/min): 3    GENERAL: Not in distress. Alert    HEENT: clear conjuctiva, MMM. no pallor or icterus  CARDIOVASCULAR: RRR S1, S2. No murmur/rubs/gallop  LUNGS: BLAE+, no rales, no wheezing, no rhonchi.    ABDOMEN: ND. Soft,  NT, no guarding / rebound / rigidity. BS normoactive  BACK: No spine tenderness.  EXTREMITIES: no edema. no leg or calf TP.   MSK: reported pain b/l UE and LE even with superficial touch  SKIN: warm and dry  PSYCHIATRIC: Calm.  No agitation.  CNS: AAO*3. moving limbs    LABS:                        8.2    16.60 )-----------( 259      ( 2023 08:15 )             27.5     01-    144  |  110<H>  |  53<H>  ----------------------------<  110<H>  4.4   |  23  |  1.39<H>    Ca    8.9      2023 08:15    TPro  6.9  /  Alb  2.8<L>  /  TBili  0.6  /  DBili  x   /  AST  25  /  ALT  23  /  AlkPhos  160<H>  12-31    PT/INR - ( 31 Dec 2022 10:47 )   PT: 18.0 sec;   INR: 1.54 ratio         PTT - ( 31 Dec 2022 10:47 )  PTT:25.6 sec      Urinalysis Basic - ( 31 Dec 2022 15:57 )    Color: Brown / Appearance: Clear / S.020 / pH: x  Gluc: x / Ketone: Trace  / Bili: Negative / Urobili: 1   Blood: x / Protein: 100 / Nitrite: Negative   Leuk Esterase: Moderate / RBC: >50 /HPF / WBC 11-25 /HPF   Sq Epi: x / Non Sq Epi: Neg.-Few / Bacteria: Negative /HPF        SARS-CoV-2: NotDetec (31 Dec 2022 10:50)  COVID-19 PCR: NotDetec (2022 13:15)  COVID-19 PCR: NotDetec (2022 10:55)  SARS-CoV-2: NotDetec (2022 19:45)  COVID-19 PCR: NotDetec (2022 07:10)  COVID-19 PCR: NotDetec (31 Oct 2022 19:50)      RADIOLOGY & ADDITIONAL TESTS:  Imaging from Last 24 Hours:    Electrocardiogram/QTc Interval:    COORDINATION OF CARE:  Care Discussed with Consultants/Other Providers:

## 2023-01-02 DIAGNOSIS — A41.9 SEPSIS, UNSPECIFIED ORGANISM: ICD-10-CM

## 2023-01-02 LAB
ANION GAP SERPL CALC-SCNC: 10 MMOL/L — SIGNIFICANT CHANGE UP (ref 5–17)
ANION GAP SERPL CALC-SCNC: 12 MMOL/L — SIGNIFICANT CHANGE UP (ref 5–17)
BASE EXCESS BLDA CALC-SCNC: -6.7 MMOL/L — LOW (ref -2–3)
BLD GP AB SCN SERPL QL: SIGNIFICANT CHANGE UP
BUN SERPL-MCNC: 60 MG/DL — HIGH (ref 7–23)
BUN SERPL-MCNC: 64 MG/DL — HIGH (ref 7–23)
CALCIUM SERPL-MCNC: 8.8 MG/DL — SIGNIFICANT CHANGE UP (ref 8.4–10.5)
CALCIUM SERPL-MCNC: 9 MG/DL — SIGNIFICANT CHANGE UP (ref 8.4–10.5)
CHLORIDE SERPL-SCNC: 106 MMOL/L — SIGNIFICANT CHANGE UP (ref 96–108)
CHLORIDE SERPL-SCNC: 107 MMOL/L — SIGNIFICANT CHANGE UP (ref 96–108)
CO2 BLDA-SCNC: 16 MMOL/L — LOW (ref 19–24)
CO2 SERPL-SCNC: 21 MMOL/L — LOW (ref 22–31)
CO2 SERPL-SCNC: 23 MMOL/L — SIGNIFICANT CHANGE UP (ref 22–31)
CREAT SERPL-MCNC: 1.52 MG/DL — HIGH (ref 0.5–1.3)
CREAT SERPL-MCNC: 1.65 MG/DL — HIGH (ref 0.5–1.3)
EGFR: 41 ML/MIN/1.73M2 — LOW
EGFR: 45 ML/MIN/1.73M2 — LOW
GAS PNL BLDA: SIGNIFICANT CHANGE UP
GLUCOSE SERPL-MCNC: 106 MG/DL — HIGH (ref 70–99)
GLUCOSE SERPL-MCNC: 98 MG/DL — SIGNIFICANT CHANGE UP (ref 70–99)
HCO3 BLDA-SCNC: 16 MMOL/L — LOW (ref 21–28)
HCT VFR BLD CALC: 22.7 % — LOW (ref 39–50)
HCT VFR BLD CALC: 24.6 % — LOW (ref 39–50)
HCT VFR BLD CALC: 27.4 % — LOW (ref 39–50)
HGB BLD-MCNC: 6.8 G/DL — CRITICAL LOW (ref 13–17)
HGB BLD-MCNC: 7.2 G/DL — LOW (ref 13–17)
HGB BLD-MCNC: 8.1 G/DL — LOW (ref 13–17)
HOROWITZ INDEX BLDA+IHG-RTO: 52 — SIGNIFICANT CHANGE UP
LACTATE SERPL-SCNC: 2.9 MMOL/L — HIGH (ref 0.7–2)
LACTATE SERPL-SCNC: 3.2 MMOL/L — HIGH (ref 0.7–2)
LACTATE SERPL-SCNC: 3.4 MMOL/L — HIGH (ref 0.7–2)
LACTATE SERPL-SCNC: 3.6 MMOL/L — HIGH (ref 0.7–2)
LEGIONELLA AG UR QL: NEGATIVE — SIGNIFICANT CHANGE UP
MAGNESIUM SERPL-MCNC: 2.2 MG/DL — SIGNIFICANT CHANGE UP (ref 1.6–2.6)
MCHC RBC-ENTMCNC: 24.4 PG — LOW (ref 27–34)
MCHC RBC-ENTMCNC: 25.7 PG — LOW (ref 27–34)
MCHC RBC-ENTMCNC: 29.3 GM/DL — LOW (ref 32–36)
MCHC RBC-ENTMCNC: 29.6 GM/DL — LOW (ref 32–36)
MCV RBC AUTO: 83.4 FL — SIGNIFICANT CHANGE UP (ref 80–100)
MCV RBC AUTO: 87 FL — SIGNIFICANT CHANGE UP (ref 80–100)
NRBC # BLD: 0 /100 WBCS — SIGNIFICANT CHANGE UP (ref 0–0)
NRBC # BLD: 0 /100 WBCS — SIGNIFICANT CHANGE UP (ref 0–0)
PCO2 BLDA: <19 MMHG — LOW (ref 35–48)
PH BLDA: 7.55 — HIGH (ref 7.35–7.45)
PLATELET # BLD AUTO: 253 K/UL — SIGNIFICANT CHANGE UP (ref 150–400)
PLATELET # BLD AUTO: 254 K/UL — SIGNIFICANT CHANGE UP (ref 150–400)
PO2 BLDA: 174 MMHG — HIGH (ref 83–108)
POTASSIUM SERPL-MCNC: 4.7 MMOL/L — SIGNIFICANT CHANGE UP (ref 3.5–5.3)
POTASSIUM SERPL-MCNC: 4.9 MMOL/L — SIGNIFICANT CHANGE UP (ref 3.5–5.3)
POTASSIUM SERPL-SCNC: 4.7 MMOL/L — SIGNIFICANT CHANGE UP (ref 3.5–5.3)
POTASSIUM SERPL-SCNC: 4.9 MMOL/L — SIGNIFICANT CHANGE UP (ref 3.5–5.3)
RBC # BLD: 2.95 M/UL — LOW (ref 4.2–5.8)
RBC # BLD: 3.15 M/UL — LOW (ref 4.2–5.8)
RBC # FLD: 21.2 % — HIGH (ref 10.3–14.5)
RBC # FLD: 21.5 % — HIGH (ref 10.3–14.5)
SAO2 % BLDA: 99.9 % — HIGH (ref 94–98)
SODIUM SERPL-SCNC: 139 MMOL/L — SIGNIFICANT CHANGE UP (ref 135–145)
SODIUM SERPL-SCNC: 140 MMOL/L — SIGNIFICANT CHANGE UP (ref 135–145)
TROPONIN I, HIGH SENSITIVITY RESULT: 45.9 NG/L — SIGNIFICANT CHANGE UP
WBC # BLD: 14.24 K/UL — HIGH (ref 3.8–10.5)
WBC # BLD: 16.11 K/UL — HIGH (ref 3.8–10.5)
WBC # FLD AUTO: 14.24 K/UL — HIGH (ref 3.8–10.5)
WBC # FLD AUTO: 16.11 K/UL — HIGH (ref 3.8–10.5)

## 2023-01-02 PROCEDURE — 99233 SBSQ HOSP IP/OBS HIGH 50: CPT

## 2023-01-02 PROCEDURE — 99223 1ST HOSP IP/OBS HIGH 75: CPT

## 2023-01-02 PROCEDURE — 71045 X-RAY EXAM CHEST 1 VIEW: CPT | Mod: 26

## 2023-01-02 RX ORDER — SODIUM CHLORIDE 9 MG/ML
1000 INJECTION, SOLUTION INTRAVENOUS
Refills: 0 | Status: DISCONTINUED | OUTPATIENT
Start: 2023-01-02 | End: 2023-01-03

## 2023-01-02 RX ORDER — ALBUMIN HUMAN 25 %
250 VIAL (ML) INTRAVENOUS ONCE
Refills: 0 | Status: DISCONTINUED | OUTPATIENT
Start: 2023-01-02 | End: 2023-01-02

## 2023-01-02 RX ORDER — SODIUM CHLORIDE 9 MG/ML
500 INJECTION INTRAMUSCULAR; INTRAVENOUS; SUBCUTANEOUS ONCE
Refills: 0 | Status: DISCONTINUED | OUTPATIENT
Start: 2023-01-02 | End: 2023-01-02

## 2023-01-02 RX ORDER — CARVEDILOL PHOSPHATE 80 MG/1
3.12 CAPSULE, EXTENDED RELEASE ORAL EVERY 12 HOURS
Refills: 0 | Status: DISCONTINUED | OUTPATIENT
Start: 2023-01-02 | End: 2023-01-04

## 2023-01-02 RX ORDER — ALBUMIN HUMAN 25 %
100 VIAL (ML) INTRAVENOUS ONCE
Refills: 0 | Status: COMPLETED | OUTPATIENT
Start: 2023-01-02 | End: 2023-01-02

## 2023-01-02 RX ADMIN — Medication 250 MILLIGRAM(S): at 16:30

## 2023-01-02 RX ADMIN — Medication 650 MILLIGRAM(S): at 19:46

## 2023-01-02 RX ADMIN — SODIUM CHLORIDE 75 MILLILITER(S): 9 INJECTION, SOLUTION INTRAVENOUS at 16:14

## 2023-01-02 RX ADMIN — MIDODRINE HYDROCHLORIDE 10 MILLIGRAM(S): 2.5 TABLET ORAL at 21:08

## 2023-01-02 RX ADMIN — Medication 150 MICROGRAM(S): at 05:55

## 2023-01-02 RX ADMIN — Medication 650 MILLIGRAM(S): at 06:24

## 2023-01-02 RX ADMIN — MEROPENEM 100 MILLIGRAM(S): 1 INJECTION INTRAVENOUS at 18:19

## 2023-01-02 RX ADMIN — Medication 650 MILLIGRAM(S): at 05:54

## 2023-01-02 RX ADMIN — MIDODRINE HYDROCHLORIDE 10 MILLIGRAM(S): 2.5 TABLET ORAL at 05:56

## 2023-01-02 RX ADMIN — CARVEDILOL PHOSPHATE 3.12 MILLIGRAM(S): 80 CAPSULE, EXTENDED RELEASE ORAL at 06:53

## 2023-01-02 RX ADMIN — MIDODRINE HYDROCHLORIDE 10 MILLIGRAM(S): 2.5 TABLET ORAL at 14:19

## 2023-01-02 RX ADMIN — HEPARIN SODIUM 5000 UNIT(S): 5000 INJECTION INTRAVENOUS; SUBCUTANEOUS at 21:08

## 2023-01-02 RX ADMIN — HEPARIN SODIUM 5000 UNIT(S): 5000 INJECTION INTRAVENOUS; SUBCUTANEOUS at 14:20

## 2023-01-02 RX ADMIN — Medication 50 MILLILITER(S): at 13:58

## 2023-01-02 RX ADMIN — SODIUM CHLORIDE 75 MILLILITER(S): 9 INJECTION, SOLUTION INTRAVENOUS at 21:08

## 2023-01-02 RX ADMIN — MEROPENEM 100 MILLIGRAM(S): 1 INJECTION INTRAVENOUS at 05:53

## 2023-01-02 RX ADMIN — ATORVASTATIN CALCIUM 40 MILLIGRAM(S): 80 TABLET, FILM COATED ORAL at 21:08

## 2023-01-02 RX ADMIN — Medication 650 MILLIGRAM(S): at 18:19

## 2023-01-02 RX ADMIN — Medication 81 MILLIGRAM(S): at 14:19

## 2023-01-02 RX ADMIN — AMIODARONE HYDROCHLORIDE 200 MILLIGRAM(S): 400 TABLET ORAL at 05:57

## 2023-01-02 RX ADMIN — HEPARIN SODIUM 5000 UNIT(S): 5000 INJECTION INTRAVENOUS; SUBCUTANEOUS at 06:51

## 2023-01-02 NOTE — CHART NOTE - NSCHARTNOTEFT_GEN_A_CORE
Notified by nursing that patient is becoming more lethargic, breathing labored, and appears pale. Patient seen and examined at bedside. /60, HR 85-11    General: Acute distress, tachypneic, pale, not responding verbally, but reactive to painful stimuli  Respiratory: Rhonchi auscultated b/l, breathing labored  EENT: Conjunctiva and mucous membranes pale appearing    Patient was placed on 6L NC supplemental O2. STAT CXR, lactate, cbc, bmp, abg were ordered. Patient is in respiratory alkalosis. Dr. Cullen consulted. ICU PA consulted for US guided intravascular access. Albumin orderd, as well as 500mL NS bolus, as patient appears volume depleted. Will continue to monitor.    Wife Kirsten updated Notified by nursing that patient is becoming more lethargic, breathing labored, and appears pale. Patient seen and examined at bedside. /60, HR 85-11    General: Acute distress, tachypneic, pale, not responding verbally, but reactive to painful stimuli  Respiratory: Rhonchi auscultated b/l, breathing labored  EENT: Conjunctiva and mucous membranes pale appearing    Patient was placed on 6L NC supplemental O2.   - STAT CXR, lactate, cbc, bmp, abg were ordered  - Patient is in respiratory alkalosis  - CXR with worsening RLL infiltate, will place on pureed diet and re-eval speech and swallow  - CBC with anemia, will get repeat H/H at 17:00 along with type and screen for possible blood transfusion  - discussed with Dr. Rodriguez at bedside, although CXR with some vascular congestion, patient appears clinically dry and therefore may benefit from some fluid resuscitation. Ordered for albumin and maintenance IVF  - ICU PA consulted for US guided intravascular access    Wife Kirsten updated over the phone and at bedside. Palliative consulted. Notified by nursing that patient is becoming more lethargic, breathing labored, and appears pale. Patient seen and examined at bedside. /60, HR     General: Acute distress, tachypneic, pale, not responding verbally, but reactive to painful stimuli  Respiratory: Rhonchi auscultated b/l, breathing labored  EENT: Conjunctiva and mucous membranes pale appearing    Patient was placed on 6L NC supplemental O2.   - STAT CXR, lactate, cbc, bmp, abg were ordered  - Patient is in respiratory alkalosis  - CXR with worsening RLL infiltate, will place on pureed diet and re-eval speech and swallow  - CBC with anemia, will get repeat H/H at 17:00 along with type and screen for possible blood transfusion  - discussed with Dr. Rodriguez at bedside, although CXR with some vascular congestion, patient appears clinically dry and therefore may benefit from some fluid resuscitation. Ordered for albumin and maintenance IVF  - ICU PA consulted for US guided intravascular access    Wife Kirsten updated over the phone and at bedside. Palliative consulted.

## 2023-01-02 NOTE — PROGRESS NOTE ADULT - NS ATTEND AMEND GEN_ALL_CORE FT
83yo male with hx of HTN, Advanced Combined CHF, dementia, urinary retention w/ chronic Starks, presented to the ED from [facility] due to hypoxia and pale appearing this AM, noted to have severe sepsis and hypoxia secondary to likely persistent Bacterial PNA    #Severe Sepsis POA  #Bacterial PNA  - s/p cefepime and flagyl at [facility], now on vanco and meropenem  - lactate trending down however now up with new episode of hypotension this morning  - will place on pureed diet and speech and swallow eval  - palliative consult    #Acute on chronic hypoxic respiratory failure   - likely from bacterial pna  - continue treatment as above  - CXR today as patient was hypotensive and lethargic later in the day, r/o aspiration- appears worse on RLL. Diet modified and speech eval palced  - continue to monitor o2 saturations  - discussed at length with Dr. Rodriguez    #chronic Combined CHF  #Hypotension  #A fib  - Poor advanced CHF w/ EF 20%  - patient appears volume depleted at this time, will order for albumin x1 and maintenance IVF after. Monitor volume status closely   - cardio consulted    #Chronic Anemia  - Likely multifactorial  - Baseline chronically 7-8 however noted to have drop from yesterday  - will repeat level later today and consider transfusion depending on clinical status    discussed with wife Kirsten over the phone. Patient is very sick, will continue GOC conversation, does not want hospice at this time. Palliative consulted 81yo male with hx of HTN, Advanced Combined CHF, dementia, urinary retention w/ chronic Starks, presented to the ED from [facility] due to hypoxia and pale appearing this AM, noted to have severe sepsis and hypoxia secondary to likely persistent Bacterial PNA    #Severe Sepsis POA  #Bacterial PNA  - s/p cefepime and flagyl at [facility], now on vanco and meropenem  - lactate trending down however now up with new episode of hypotension this morning  - will place on pureed diet and speech and swallow eval  - palliative consult    #Acute on chronic hypoxic respiratory failure   - likely from bacterial pna  - continue treatment as above  - CXR today as patient was hypotensive and lethargic later in the day, r/o aspiration- appears worse on RLL. Diet modified and speech eval placed  - continue to monitor o2 saturations  - discussed at length with Dr. Rodriguez    #chronic Combined CHF  #Hypotension  #A fib  - Poor advanced CHF w/ EF 20%  - patient appears volume depleted at this time, will order for albumin x1 and maintenance IVF after. Monitor volume status closely   - cardio consulted    #Chronic Anemia  - Likely multifactorial  - Baseline chronically 7-8 however noted to have drop from yesterday  - will repeat level later today and consider transfusion depending on clinical status    discussed with wife Kirsten over the phone. Patient is very sick, will continue GOC conversation, does not want hospice at this time. Palliative consulted    addendum:  had extensive GOC conversation with family regarding further care for patient. It was informed that patient is in fragile state and prognosis is guarded, although patient appears to do better right now than a few hours ago, he has had multiple hospitalizations and likely will require more in the future given current medical condition. He has been treated aggressively for PNA and doesn't appear to respond too well, continues to have acute resp failure and has poor EF, which will likely cause multiple readmissions to the hospital in the future. Family understands however does not want to "give up" and pursue hospice or comfort measures for the patient. They are agreeable to keeping him DNR/DNI but if he needs pressor support, they are agreeable to continue with that. It was explained that given his PAD and poor circulation, pressors might be of more harm to him however would want to continue if needed. Consent also obtained for blood transfusions if needed. At this time it appears besides DNR/DNI, family wants everything to be done for patient. Open to speaking to palliative care however do not want to pursue limited interventions.

## 2023-01-02 NOTE — DIETITIAN INITIAL EVALUATION ADULT - ENTER FROM (CAL/KG)
Quality 110: Preventive Care And Screening: Influenza Immunization: Influenza immunization was not ordered or administered, reason not given Detail Level: Detailed 25

## 2023-01-02 NOTE — PROGRESS NOTE ADULT - ASSESSMENT
81yo male with hx of HTN, Advanced Combined CHF, dementia, urinary retention w/ chronic Dubois, presented to the ED from [facility] due to hypoxia and pale appearing this AM, noted to have severe sepsis and hypoxia secondary to likely persistent Bacterial PNA    #Severe Sepsis POA  #Bacterial PNA  - on Cefepime/Flagyl that he was receiving at [facility]. changed to vanco and meropenem   -S/p gentle IVF in the ED  -Follow up with repeat Lactate levels: trending down  - wbc improving  - SLP eval. feed with assisstance  - MRSA PCR negative  - F/U  sputum cx, urine legionella  -Palliative consult     #Acute on chronic hypoxic respiratory failure   - Secondary to the above  -Trial off of supplemental O2  - continuous O2 monitoring  - repeat CXR 1/1 shows persistent b/l infiltrates   - Continue to monitor vitals and O2 sats    #chronic Combined CHF  #Hypotension  #A fib  -Poor advanced CHF w/ EF 20%  -Medication optimization was done, however, in the setting of acute decompensation as well as hypotension, readjustments were made  - on Metoprolol tart  w/ holding parameter. changed to coreg with holding  -Hold Farxiga/Entresto for hypotension/sepsis/HAN. Lasix on hold in the setting of sepsis, however, considering poor cardiac function, low threshold to restart.  use lasix PRN for now. not a candidate for MRA for hypotension/HAN  -Continue Amiodarone  -Continue Midodrine  -Close monitoring of vitals and I/Os and weights  -Cardio consult    #HAN  #Hemoconcentration  -Likely in the setting of sepsis, intravascularly low/hypovolemic   -S/p gentle IVF in the ED  -Monitor Renal function and volume state    #Chronic Anemia  -Likely multifactorial  -Baseline chronically 7-8  -No signs/symptoms of acute blood loss  -Continue to monitor    #Urinary retention  -Chronic dubois secondary to failed multiple TOVs  -Continue for now  -Holding Flomax/Finasteride as they can cause hypotension as well    # b/l leg wound  - h/o wound culture growing MRSA in the past  - contact isolation  - podiatry cx. appreciated  - SALEEM tests ordered    Poor prognosis    GOC: DNR/DNI.    1/2/23 Updated wife Jerome over phone. Wife says she does not want patient on hospice or comfort measures currently, but is open to palliative care and is looking forward to speaking with Dr. Scott, consult placed 83yo male with hx of HTN, Advanced Combined CHF, dementia, urinary retention w/ chronic Dubois, presented to the ED from [facility] due to hypoxia and pale appearing this AM, noted to have severe sepsis and hypoxia secondary to likely persistent Bacterial PNA    #Severe Sepsis POA  #Bacterial PNA  - on Cefepime/Flagyl that he was receiving at [facility]. changed to vanco and meropenem   -S/p gentle IVF in the ED  -Follow up with repeat Lactate levels: trending down  - wbc improving  - SLP eval. feed with assisstance  - MRSA PCR negative  - F/U  sputum cx, urine legionella  -Palliative consult     #Acute on chronic hypoxic respiratory failure   - Secondary to the above  -Trial off of supplemental O2  - continuous O2 monitoring  - repeat CXR 1/1 shows persistent b/l infiltrates   - Continue to monitor vitals and O2 sats    #chronic Combined CHF  #Hypotension  #A fib  -Poor advanced CHF w/ EF 20%  -Medication optimization was done, however, in the setting of acute decompensation as well as hypotension, readjustments were made  - on Metoprolol tart  w/ holding parameter. changed to coreg with holding  -Hold Farxiga/Entresto for hypotension/sepsis/HAN. Lasix on hold in the setting of sepsis, however, considering poor cardiac function, low threshold to restart.  use lasix PRN for now. not a candidate for MRA for hypotension/HAN  -Continue Amiodarone  -Continue Midodrine  -Close monitoring of vitals and I/Os and weights  -Cardio consult    #HAN  #Hemoconcentration  -Likely in the setting of sepsis, intravascularly low/hypovolemic   -S/p gentle IVF in the ED  -Monitor Renal function and volume state    #Chronic Anemia  -Likely multifactorial  -Baseline chronically 7-8  -No signs/symptoms of acute blood loss  -Continue to monitor    #Urinary retention  -Chronic dubois secondary to failed multiple TOVs  -Continue for now  -Holding Flomax/Finasteride as they can cause hypotension as well    # b/l leg wound  - h/o wound culture growing MRSA in the past  - contact isolation  - podiatry cx. appreciated  - Discussed with wife about possible vascular intervention, she is unsure at this time and will think about it   - SALEEM tests ordered    Poor prognosis    GOC: DNR/DNI.    1/2/23 Updated wife Jerome over phone. Wife says she does not want patient on hospice or comfort measures currently, but is open to palliative care and is looking forward to speaking with Dr. Scott, consult placed

## 2023-01-02 NOTE — PROGRESS NOTE ADULT - ASSESSMENT
Physical Examination:  GENERAL:               Alert, Confused, No acute distress.    HEENT:                   No JVD, Dry  MM  PULM:                     Bilateral air entry, Clear to auscultation bilaterally, no significant sputum production, No Rales, No Rhonchi, No Wheezing  CVS:                         S1, S2,  +  Murmur   Vascular:                cool Extremities, poor Capillary refill, decreased Distal Pulses  NEURO:                  Alert,  interactive, nonfocal, follows commands  PSYC:                      Calm, Limited Insight.      Assessment  82 year old male with hx of CAD s/p CABG with severe cardiomyopathy with advanced Combined CHF, atrial Fib,  dementia, urinary retention w/ chronic Starks, presented to the ED from [facility] due to hypoxia and pale/ill appearing this AM.     Problem  1. Severe Sepsis due to  B/L pneumonia  2. Overlapping Acute on chronic Systolic CHF could be playing role here  3. Lactic acidosis     Plan  Metabolic ecnephelopathy  despite cxr showing mild vasc congestion suspect pt will need IVF, will give albumin and Maintenance fluid  Consider ID eval    n/c o2 to maintain sat  Continue antibiotics  f/u cultures  consider CT chest    Consider cardio f/u  diet as per speech and swallow   d/w Dr. Regalado  patient does not require icu at this time.   advanced directives should be obtained  prognosis is poor

## 2023-01-02 NOTE — DIETITIAN INITIAL EVALUATION ADULT - PERTINENT MEDS FT
MEDICATIONS  (STANDING):  albumin human  5% IVPB 250 milliLiter(s) IV Intermittent once  aMIOdarone    Tablet 200 milliGRAM(s) Oral daily  aspirin  chewable 81 milliGRAM(s) Oral daily  atorvastatin 40 milliGRAM(s) Oral at bedtime  carvedilol 3.125 milliGRAM(s) Oral every 12 hours  heparin   Injectable 5000 Unit(s) SubCutaneous every 8 hours  levothyroxine 150 MICROGram(s) Oral daily  meropenem  IVPB 1000 milliGRAM(s) IV Intermittent every 12 hours  midodrine. 10 milliGRAM(s) Oral three times a day  vancomycin  IVPB 1000 milliGRAM(s) IV Intermittent every 24 hours  vancomycin  IVPB        MEDICATIONS  (PRN):  acetaminophen     Tablet .. 650 milliGRAM(s) Oral every 6 hours PRN Temp greater or equal to 38C (100.4F), Mild Pain (1 - 3)  ondansetron Injectable 4 milliGRAM(s) IV Push every 8 hours PRN Nausea and/or Vomiting

## 2023-01-02 NOTE — DIETITIAN INITIAL EVALUATION ADULT - NS FNS DIET ORDER
DASH/TLC + Minced & Moist Otezla Pregnancy And Lactation Text: This medication is Pregnancy Category C and it isn't known if it is safe during pregnancy. It is unknown if it is excreted in breast milk.

## 2023-01-02 NOTE — PROGRESS NOTE ADULT - SUBJECTIVE AND OBJECTIVE BOX
Patient is a 82y old  Male who presents with a chief complaint of Hypoxia (2023 15:55)      Patient seen and examined at bedside. No overnight events reported.     ALLERGIES:  PC Pen VK (Other)    MEDICATIONS  (STANDING):  aMIOdarone    Tablet 200 milliGRAM(s) Oral daily  aspirin  chewable 81 milliGRAM(s) Oral daily  atorvastatin 40 milliGRAM(s) Oral at bedtime  carvedilol 3.125 milliGRAM(s) Oral every 12 hours  heparin   Injectable 5000 Unit(s) SubCutaneous every 8 hours  levothyroxine 150 MICROGram(s) Oral daily  meropenem  IVPB 1000 milliGRAM(s) IV Intermittent every 12 hours  midodrine. 10 milliGRAM(s) Oral three times a day  sodium chloride 0.9%. 500 milliLiter(s) (50 mL/Hr) IV Continuous <Continuous>  sodium chloride 0.9%. 250 milliLiter(s) (50 mL/Hr) IV Continuous <Continuous>  vancomycin  IVPB 1000 milliGRAM(s) IV Intermittent every 24 hours  vancomycin  IVPB        MEDICATIONS  (PRN):  acetaminophen     Tablet .. 650 milliGRAM(s) Oral every 6 hours PRN Temp greater or equal to 38C (100.4F), Mild Pain (1 - 3)  ondansetron Injectable 4 milliGRAM(s) IV Push every 8 hours PRN Nausea and/or Vomiting    Vital Signs Last 24 Hrs  T(F): 97.3 (2023 05:40), Max: 98.3 (2023 12:48)  HR: 56 (2023 05:40) (56 - 107)  BP: 123/77 (2023 05:40) (90/66 - 123/77)  RR: 16 (2023 05:40) (16 - 20)  SpO2: 96% (2023 05:40) (96% - 100%)  I&O's Summary    2023 07:01  -  2023 07:00  --------------------------------------------------------  IN: 1170 mL / OUT: 0 mL / NET: 1170 mL    2023 07:01  -  2023 10:29  --------------------------------------------------------  IN: 400 mL / OUT: 0 mL / NET: 400 mL      PHYSICAL EXAM:  General: NAD, lethargic, ill appearing  ENT: No gross hearing impairment, Moist mucous membranes, no thrush  Neck: Supple, No JVD  Lungs: Clear to auscultation bilaterally, good air entry, non-labored breathing  Cardio: RRR, S1/S2, No murmur  Abdomen: Soft, Nontender, Nondistended; Bowel sounds present  Extremities: No calf tenderness, No cyanosis, No pitting edema  Psych: Appropriate mood and affect    LABS:                        8.1    14.24 )-----------( 254      ( 2023 07:45 )             27.4         139  |  106  |  60  ----------------------------<  98  4.7   |  21  |  1.52    Ca    9.0      2023 07:45  Mg     2.2         TPro  6.9  /  Alb  2.8  /  TBili  0.6  /  DBili  x   /  AST  25  /  ALT  23  /  AlkPhos  160  12-31          PT/INR - ( 31 Dec 2022 10:47 )   PT: 18.0 sec;   INR: 1.54 ratio         PTT - ( 31 Dec 2022 10:47 )  PTT:25.6 sec  Lactate, Blood: 2.9 mmol/L ( @ 07:45)  Lactate, Blood: 3.4 mmol/L ( @ 00:44)  Lactate, Blood: 3.4 mmol/L ( @ 21:55)  Lactate, Blood: 2.8 mmol/L ( @ 18:28)  Lactate, Blood: 4.3 mmol/L ( @ 14:46)      CARDIAC MARKERS ( 31 Dec 2022 10:47 )  x     / 57.6 ng/L / x     / x     / x          11-06 Chol 99 mg/dL LDL -- HDL 31 mg/dL Trig 81 mg/dL    10:43 - VBG - pH: 7.31  | pCO2: 43    | pO2: <35   | Lactate:                      Urinalysis Basic - ( 31 Dec 2022 15:57 )    Color: Brown / Appearance: Clear / S.020 / pH: x  Gluc: x / Ketone: Trace  / Bili: Negative / Urobili: 1   Blood: x / Protein: 100 / Nitrite: Negative   Leuk Esterase: Moderate / RBC: >50 /HPF / WBC 11-25 /HPF   Sq Epi: x / Non Sq Epi: Neg.-Few / Bacteria: Negative /HPF        Culture - Urine (collected 31 Dec 2022 15:57)  Source: Clean Catch Clean Catch (Midstream)  Final Report (2023 20:44):    No growth    Culture - Blood (collected 31 Dec 2022 10:47)  Source: .Blood Blood-Peripheral  Preliminary Report (2023 15:01):    No growth to date.    Culture - Blood (collected 31 Dec 2022 10:47)  Source: .Blood Blood-Peripheral  Preliminary Report (2023 15:01):    No growth to date.        RADIOLOGY & ADDITIONAL TESTS:  < from: Xray Chest 1 View- PORTABLE-Urgent (Xray Chest 1 View- PORTABLE-Urgent .) (23 @ 14:04) >  IMPRESSION: Persistent bilateral infiltrates.    < end of copied text >      Care Discussed with Consultants/Other Providers:    Patient is a 82y old  Male who presents with a chief complaint of Hypoxia (2023 15:55)      Patient seen and examined at bedside. No overnight events reported.     ALLERGIES:  PC Pen VK (Other)    MEDICATIONS  (STANDING):  aMIOdarone    Tablet 200 milliGRAM(s) Oral daily  aspirin  chewable 81 milliGRAM(s) Oral daily  atorvastatin 40 milliGRAM(s) Oral at bedtime  carvedilol 3.125 milliGRAM(s) Oral every 12 hours  heparin   Injectable 5000 Unit(s) SubCutaneous every 8 hours  levothyroxine 150 MICROGram(s) Oral daily  meropenem  IVPB 1000 milliGRAM(s) IV Intermittent every 12 hours  midodrine. 10 milliGRAM(s) Oral three times a day  sodium chloride 0.9%. 500 milliLiter(s) (50 mL/Hr) IV Continuous <Continuous>  sodium chloride 0.9%. 250 milliLiter(s) (50 mL/Hr) IV Continuous <Continuous>  vancomycin  IVPB 1000 milliGRAM(s) IV Intermittent every 24 hours  vancomycin  IVPB        MEDICATIONS  (PRN):  acetaminophen     Tablet .. 650 milliGRAM(s) Oral every 6 hours PRN Temp greater or equal to 38C (100.4F), Mild Pain (1 - 3)  ondansetron Injectable 4 milliGRAM(s) IV Push every 8 hours PRN Nausea and/or Vomiting    Vital Signs Last 24 Hrs  T(F): 97.3 (2023 05:40), Max: 98.3 (2023 12:48)  HR: 56 (2023 05:40) (56 - 107)  BP: 123/77 (2023 05:40) (90/66 - 123/77)  RR: 16 (2023 05:40) (16 - 20)  SpO2: 96% (2023 05:40) (96% - 100%)  I&O's Summary    2023 07:01  -  2023 07:00  --------------------------------------------------------  IN: 1170 mL / OUT: 0 mL / NET: 1170 mL    2023 07:01  -  2023 10:29  --------------------------------------------------------  IN: 400 mL / OUT: 0 mL / NET: 400 mL      PHYSICAL EXAM:  General: NAD, lethargic, ill appearing, talking  ENT: No gross hearing impairment, Moist mucous membranes, no thrush  Neck: Supple, No JVD  Lungs: ronchi to auscultation bilaterally, good air entry, non-labored breathing  Cardio: RRR, S1/S2, No murmur  Abdomen: Soft, Nontender, Nondistended; Bowel sounds present  Extremities: No calf tenderness, No cyanosis, No pitting edema  Psych: Appropriate mood and affect    LABS:                        8.1    14.24 )-----------( 254      ( 2023 07:45 )             27.4         139  |  106  |  60  ----------------------------<  98  4.7   |  21  |  1.52    Ca    9.0      2023 07:45  Mg     2.2         TPro  6.9  /  Alb  2.8  /  TBili  0.6  /  DBili  x   /  AST  25  /  ALT  23  /  AlkPhos  160  12-31          PT/INR - ( 31 Dec 2022 10:47 )   PT: 18.0 sec;   INR: 1.54 ratio         PTT - ( 31 Dec 2022 10:47 )  PTT:25.6 sec  Lactate, Blood: 2.9 mmol/L ( @ 07:45)  Lactate, Blood: 3.4 mmol/L ( @ 00:44)  Lactate, Blood: 3.4 mmol/L ( @ 21:55)  Lactate, Blood: 2.8 mmol/L ( @ 18:28)  Lactate, Blood: 4.3 mmol/L ( @ 14:46)      CARDIAC MARKERS ( 31 Dec 2022 10:47 )  x     / 57.6 ng/L / x     / x     / x          11-06 Chol 99 mg/dL LDL -- HDL 31 mg/dL Trig 81 mg/dL    10:43 - VBG - pH: 7.31  | pCO2: 43    | pO2: <35   | Lactate:                      Urinalysis Basic - ( 31 Dec 2022 15:57 )    Color: Brown / Appearance: Clear / S.020 / pH: x  Gluc: x / Ketone: Trace  / Bili: Negative / Urobili: 1   Blood: x / Protein: 100 / Nitrite: Negative   Leuk Esterase: Moderate / RBC: >50 /HPF / WBC 11-25 /HPF   Sq Epi: x / Non Sq Epi: Neg.-Few / Bacteria: Negative /HPF        Culture - Urine (collected 31 Dec 2022 15:57)  Source: Clean Catch Clean Catch (Midstream)  Final Report (2023 20:44):    No growth    Culture - Blood (collected 31 Dec 2022 10:47)  Source: .Blood Blood-Peripheral  Preliminary Report (2023 15:01):    No growth to date.    Culture - Blood (collected 31 Dec 2022 10:47)  Source: .Blood Blood-Peripheral  Preliminary Report (2023 15:01):    No growth to date.        RADIOLOGY & ADDITIONAL TESTS:  < from: Xray Chest 1 View- PORTABLE-Urgent (Xray Chest 1 View- PORTABLE-Urgent .) (23 @ 14:04) >  IMPRESSION: Persistent bilateral infiltrates.    < end of copied text >      Care Discussed with Consultants/Other Providers:

## 2023-01-02 NOTE — DIETITIAN INITIAL EVALUATION ADULT - PERTINENT LABORATORY DATA
01-02    139  |  106  |  60<H>  ----------------------------<  98  4.7   |  21<L>  |  1.52<H>    Ca    9.0      02 Jan 2023 07:45  Mg     2.2     01-02

## 2023-01-02 NOTE — DIETITIAN INITIAL EVALUATION ADULT - ADD RECOMMEND
1. Recommend Ensure Plus High Protein 8oz PO TID (Provides 1,050kcal & 60grams of Protein) to optimize nutritional status\  2. Monitor tolerance to minced & moist textured modified diet  3. Recommend obtaining daily PO intakes and weights

## 2023-01-02 NOTE — DIETITIAN NUTRITION RISK NOTIFICATION - ADDITIONAL COMMENTS/DIETITIAN RECOMMENDATIONS
Recommend Ensure Plus High Protein 8oz PO TID (Provides 1,050kcal & 60grams of Protein) to optimize PO intake and nutritional status

## 2023-01-02 NOTE — PROGRESS NOTE ADULT - SUBJECTIVE AND OBJECTIVE BOX
Follow-up Pulmonary Progress Note  Chief Complaint : Sepsis      patient seen and examined  lethargic today  waxing waning mental status  as per bedside team patient was alert and responsive  when seen lethargic.  poor IV access had to place midline  and then wakes up asking for water.       Allergies :PC Pen VK (Other)      PAST MEDICAL & SURGICAL HISTORY:  Chronic atrial fibrillation  History of cardiomyopathy  CAD (coronary artery disease)  BPH with obstruction/lower urinary tract symptoms  Hyperlipidemia  History of CVA (cerebrovascular accident) left sided weakness  S/P CABG (coronary artery bypass graft)        Medications:  MEDICATIONS  (STANDING):  albumin human 25% IVPB 100 milliLiter(s) IV Intermittent once  aMIOdarone    Tablet 200 milliGRAM(s) Oral daily  aspirin  chewable 81 milliGRAM(s) Oral daily  atorvastatin 40 milliGRAM(s) Oral at bedtime  carvedilol 3.125 milliGRAM(s) Oral every 12 hours  heparin   Injectable 5000 Unit(s) SubCutaneous every 8 hours  levothyroxine 150 MICROGram(s) Oral daily  meropenem  IVPB 1000 milliGRAM(s) IV Intermittent every 12 hours  midodrine. 10 milliGRAM(s) Oral three times a day  vancomycin  IVPB 1000 milliGRAM(s) IV Intermittent every 24 hours  vancomycin  IVPB        MEDICATIONS  (PRN):  acetaminophen     Tablet .. 650 milliGRAM(s) Oral every 6 hours PRN Temp greater or equal to 38C (100.4F), Mild Pain (1 - 3)  ondansetron Injectable 4 milliGRAM(s) IV Push every 8 hours PRN Nausea and/or Vomiting      Antibiotics History  cefepime   IVPB 1000 milliGRAM(s) IV Intermittent every 12 hours, 23 @ 08:15  cefepime   IVPB    , 23 @ 08:09  cefepime   IVPB 1000 milliGRAM(s) IV Intermittent every 12 hours, 22 @ 12:30  cefepime   IVPB 1000 milliGRAM(s) IV Intermittent once, 22 @ 10:12, Stop order after: 1 Doses  meropenem  IVPB 1000 milliGRAM(s) IV Intermittent every 12 hours, 23 @ 14:21, Stop order after: 7 Days  metroNIDAZOLE  IVPB 500 milliGRAM(s) IV Intermittent once, 22 @ 12:36  metroNIDAZOLE  IVPB 500 milliGRAM(s) IV Intermittent every 8 hours, 22 @ 22:00  metroNIDAZOLE  IVPB    , 22 @ 17:00  vancomycin  IVPB 1000 milliGRAM(s) IV Intermittent once, 23 @ 08:17, Stop order after: 1 Doses  vancomycin  IVPB 1000 milliGRAM(s) IV Intermittent every 24 hours, 23 @ 08:17, Stop order after: 7 Days  vancomycin  IVPB    , 23 @ 08:17       Heme Medications   aspirin  chewable 81 milliGRAM(s) Oral daily, 22 @ 12:34  heparin   Injectable 5000 Unit(s) SubCutaneous every 8 hours, 22 @ 12:26      GI Medications        LABS:                        7.2    16.11 )-----------( 253      ( 2023 13:30 )             24.6         140  |  107  |  64<H>  ----------------------------<  106<H>  4.9   |  23  |  1.65<H>    Ca    8.8      2023 13:30  Mg     2.2            WBC Trend  23 @ 13:30   -  16.11<H>  23 @ 07:45   -  14.24<H>  23 @ 08:15   -  16.60<H>  22 @ 10:47   -  18.82<H>    H/H Trend  23 @ 13:30   -   7.2<L>/ 24.6<L>  23 @ 07:45   -   8.1<L>/ 27.4<L>  23 @ 08:15   -   8.2<L>/ 27.5<L>  22 @ 10:47   -   8.4<L>/ 28.5<L>    Stool Occult Blood    Platelet Trend  23 @ 13:30   -  253  23 @ 07:45   -  254  23 @ 08:15   -  259  22 @ 10:47   -  280    Trend Sodium  23 @ 13:30   -  140  23 @ 07:45   -  139  23 @ 08:15   -  144  22 @ 10:47   -  143    Trend Potassium  23 @ 13:30   -  4.9  23 @ 07:45   -  4.7  23 @ 08:15   -  4.4  22 @ 10:47   -  4.5    Trend Bun/Cr  23 @ 13:30  BUN/CR -  64<H> / 1.65<H>  23 @ 07:45  BUN/CR -  60<H> / 1.52<H>  23 @ 08:15  BUN/CR -  53<H> / 1.39<H>  22 @ 10:47  BUN/CR -  49<H> / 1.46<H>    Lactic Acid Trend  23 @ 07:45   -   2.9<H>  23 @ 00:44   -   3.4<H>  23 @ 21:55   -   3.4<H>  23 @ 18:28   -   2.8<H>  23 @ 14:46   -   4.3<HH>  23 @ 11:05   -   3.7<H>    ABG Trend  23 @ 11:49   - 7.55<H>/<19<L>/174<H>/99.9<H>    Trend AST/ALT/ALK Phos/Bili  22 @ 10:47   25/23/160<H>/0.6  22 @ 08:35   31/72<H>/147<H>/0.4  22 @ 07:15   31/83<H>/152<H>/0.4  22 @ 06:06   24/91<H>/167<H>/0.4  22 @ 08:08   32/116<H>/179<H>/0.5  22 @ 06:23   42<H>/173<H>/210<H>/0.5       Albumin Trend  22 @ 10:47   -   2.8<L>  22 @ 08:35   -   2.5<L>  22 @ 07:15   -   2.3<L>  22 @ 06:06   -   2.3<L>  22 @ 08:08   -   2.3<L>  22 @ 06:23   -   2.6<L>             Urinalysis Basic - ( 31 Dec 2022 15:57 )    Color: Brown / Appearance: Clear / S.020 / pH: x  Gluc: x / Ketone: Trace  / Bili: Negative / Urobili: 1   Blood: x / Protein: 100 / Nitrite: Negative   Leuk Esterase: Moderate / RBC: >50 /HPF / WBC 11-25 /HPF   Sq Epi: x / Non Sq Epi: Neg.-Few / Bacteria: Negative /HPF        Serum Pro-Brain Natriuretic Peptide: 70960 pg/mL (22 @ 10:47)        CULTURES: (if applicable)    Culture - Urine (collected 22 @ 15:57)  Source: Clean Catch Clean Catch (Midstream)  Final Report (23 @ 20:44):    No growth    Culture - Blood (collected 22 @ 10:47)  Source: .Blood Blood-Peripheral  Preliminary Report (23 @ 15:01):    No growth to date.    Culture - Blood (collected 22 @ 10:47)  Source: .Blood Blood-Peripheral  Preliminary Report (23 @ 15:01):    No growth to date.      Rapid RVP Result: NotDetec (22 @ 10:50)      ABG - ( 2023 11:49 )  pH, Arterial: 7.55  pH, Blood: x     /  pCO2: <19   /  pO2: 174   / HCO3: 16    / Base Excess: -6.7  /  SaO2: 99.9             VITALS:  T(C): 36.3 (23 @ 05:40), Max: 36.7 (23 @ 20:55)  T(F): 97.3 (23 @ 05:40), Max: 98 (23 @ 20:55)  HR: 56 (23 @ 05:40) (56 - 107)  BP: 123/77 (23 @ 05:40) (90/66 - 123/77)  BP(mean): --  ABP: --  ABP(mean): --  RR: 16 (23 @ 05:40) (16 - 16)  SpO2: 96% (23 @ 05:40) (96% - 100%)  CVP(mm Hg): --  CVP(cm H2O): --    Ins and Outs     23 @ 07:01  -  23 @ 07:00  --------------------------------------------------------  IN: 1170 mL / OUT: 0 mL / NET: 1170 mL    23 @ 07:01  -  23 @ 14:05  --------------------------------------------------------  IN: 400 mL / OUT: 0 mL / NET: 400 mL        Height (cm): 180.3 (23 @ 20:55)  Weight (kg): 76 (23 @ 20:55)  BMI (kg/m2): 23.4 (23 @ 20:55)        I&O's Detail    2023 07:  -  2023 07:00  --------------------------------------------------------  IN:    IV PiggyBack: 50 mL    Oral Fluid: 1120 mL  Total IN: 1170 mL    OUT:  Total OUT: 0 mL    Total NET: 1170 mL      2023 07:  -  2023 14:05  --------------------------------------------------------  IN:    Oral Fluid: 400 mL  Total IN: 400 mL    OUT:  Total OUT: 0 mL    Total NET: 400 mL

## 2023-01-02 NOTE — DIETITIAN INITIAL EVALUATION ADULT - OTHER INFO
Patient with suboptimal PO intakes at this time, consuming 0-50% of meals per nursing flow sheets. Patient requires feeding assistance with meals. SLP stephane noted (12/13). Tolerating minced & moist modified diet. NFPE indicated moderate malnutrition. Recommend Ensure Plus High Protein 8oz PO TID (Provides 1,050kcal & 60grams of Protein) to optimize PO intake and nutritional status.

## 2023-01-02 NOTE — CONSULT NOTE ADULT - SUBJECTIVE AND OBJECTIVE BOX
CHIEF COMPLAINT:  Patient is a 82y old  Male who presents with a chief complaint of Hypoxia (2023 14:04)    HPI:  81yo male with hx of HTN, Advanced Combined CHF, dementia, urinary retention w/ chronic Starks, presented to the ED from [facility] due to hypoxia and pale appearing this AM. Pt is a poor historian. For the past 1-2 weeks, pt was noted to have ARFH secondary to decompensated Heart failure and volume overload with superimposed bacterial PNA as noted to have infiltrate on CXR w/ leukocytosis. Pt was treated with IV Lasix/Cefepime/Flagyl while at the facility. Supplemental O2 was also started. Pt was noted to have improvement in volume status as well as O2 sats as he was saturating 96% on 3-4L NC without any respiratory distress or tachypnea.   This AM, pt noted be more lethargic and pale appearing. Staff was unable to obtain O2 sats as it kept reading 70s-80s on 6L NC. Considering recent GOC discussion with family, pt required higher level of care at the hospital.  (31 Dec 2022 12:36)    Patient seen, son and wife present, history obtained. Pior CAD, remote CABG, s/p cath [facility] low ef 20% oer family. Prior CVA adminited with pneumonia. Consult regarding cardiac status. Patient denies c/p, sob, cough.      PMH:   Chronic atrial fibrillation    History of cardiomyopathy    CAD (coronary artery disease)    BPH with obstruction/lower urinary tract symptoms    Hyperlipidemia    History of CVA (cerebrovascular accident)        PSH:   S/P CABG (coronary artery bypass graft)      FAMILY HISTORY:  No pertinent family history in first degree relatives    No pertinent family history in first degree relatives        SOCIAL HISTORY:  Smoking:  no  Alcohol:  Drugs:    ALLERGIES:  PC Pen VK (Other)      Home Medications:  acetaminophen 325 mg oral tablet: 3 tab(s) orally every 8 hours, As needed, Temp greater or equal to 38C (100.4F), Mild Pain (1 - 3) (31 Dec 2022 12:34)  amiodarone 200 mg oral tablet: 1 tab(s) orally once a day (31 Dec 2022 12:34)  aspirin 81 mg oral tablet, chewable: 1 tab(s) orally once a day (31 Dec 2022 12:34)  atorvastatin 80 mg oral tablet: 1 tab(s) orally once a day (2023 10:35)  Dulcolax Laxative 10 mg rectal suppository: 1 suppository(ies) rectal once a day, As Needed (31 Dec 2022 12:34)  finasteride 5 mg oral tablet: 1 tab(s) orally once a day (31 Dec 2022 12:34)  furosemide 20 mg oral tablet: 1 tab(s) orally 2 times a day (2023 10:35)  levothyroxine 150 mcg (0.15 mg) oral tablet: 1 tab(s) orally once a day (31 Dec 2022 12:34)  Metoprolol Tartrate 25 mg oral tablet: 0.5 tab(s) orally 2 times a day (2023 10:35)  midodrine 10 mg oral tablet: 1 tab(s) orally 2 times a day (31 Dec 2022 12:34)  sacubitril-valsartan 24 mg-26 mg oral tablet: 1 tab(s) orally 2 times a day (31 Dec 2022 12:34)  tamsulosin 0.4 mg oral capsule: 1 cap(s) orally once a day (31 Dec 2022 12:34)      MEDICATIONS:  acetaminophen     Tablet .. 650 milliGRAM(s) Oral every 6 hours PRN  aMIOdarone    Tablet 200 milliGRAM(s) Oral daily  aspirin  chewable 81 milliGRAM(s) Oral daily  atorvastatin 40 milliGRAM(s) Oral at bedtime  carvedilol 3.125 milliGRAM(s) Oral every 12 hours  heparin   Injectable 5000 Unit(s) SubCutaneous every 8 hours  lactated ringers. 1000 milliLiter(s) IV Continuous <Continuous>  levothyroxine 150 MICROGram(s) Oral daily  meropenem  IVPB 1000 milliGRAM(s) IV Intermittent every 12 hours  midodrine. 10 milliGRAM(s) Oral three times a day  ondansetron Injectable 4 milliGRAM(s) IV Push every 8 hours PRN  vancomycin  IVPB      vancomycin  IVPB 1000 milliGRAM(s) IV Intermittent every 24 hours      REVIEW OF SYSTEMS:  not meaningful due to patient's mental status    PHYSICAL EXAM:  T(C): 36.3 (23 @ 05:40), Max: 36.7 (23 @ 20:55)  HR: 56 (23 @ 05:40) (56 - 107)  BP: 123/77 (23 @ 05:40) (90/66 - 123/77)  RR: 16 (23 @ 05:40) (16 - 16)  SpO2: 96% (23 @ 05:40) (96% - 100%)  Wt(kg): --    GENERAL: NAD, well-groomed, well-developed  HEAD:  Atraumatic, Normocephalic  EYES: EOMI, conjunctiva and sclera clear  ENT: dry mm  NECK: Supple, +JVD  CHEST/LUNG: scattered rales  HEART:  no gallop  ABDOMEN: Soft, Nontender, Nondistended; Bowel sounds present  EXTREMITIES:   No clubbing, cyanosis, or edema  SKIN: No rashes or lesions  NERVOUS SYSTEM:  Alert     Cardiovascular Diagnostic Testing:  ECG:  < from: 12 Lead ECG (22 @ 09:50) >    Ventricular Rate 116 BPM    Atrial Rate 326 BPM    QRS Duration 106 ms    Q-T Interval 362 ms    QTC Calculation(Bazett) 503 ms    R Axis 1 degrees    T Axis 141 degrees    Diagnosis Line Atrial fibrillation with rapid ventricular response. PVCs   ST and T wave abnormality, consider lateral ischemia  Abnormal ECG  When compared with ECG of 2022 20:28,  PVCs are now present   Confirmed by MARVA LYNN, THERESA S () on 2023 8:00:33 AM    < end of copied text >      LABS:                        7.2    16.11 )-----------( 253      ( 2023 13:30 )             24.6     -    140  |  107  |  64<H>  ----------------------------<  106<H>  4.9   |  23  |  1.65<H>    Ca    8.8      2023 13:30  Mg     2.2         Serum Pro-Brain Natriuretic Peptide: 47435 pg/mL ( @ 10:47)          Troponin I, High Sensitivity Result: 45.9 ng/L (23 @ 13:30)        IMAGING:  < from: Xray Chest 1 View-PORTABLE IMMEDIATE (Xray Chest 1 View-PORTABLE IMMEDIATE .) (23 @ 11:53) >    ACC: [id number] EXAM:  XR CHEST PORTABLE IMMED 1V                          PROCEDURE DATE:  2023          INTERPRETATION:  XR CHEST IMMEDIATE DATED 2023 11:53 AM    INDICATION: 82 years-old Male with sob, change of status.    PRIOR STUDIES:Prior radiograph from same hospitalization    Findings/  Impression:    Multifocal bilateral infiltrates stable to slightly progressed to prior.   Small left pleural effusion.    Stable widening of the cardiac silhouette    Midline sternotomy. Advanced bilateral shoulder degenerative changes.    --- End of Report ---    MOHAMMAD HALAIBEH MD; Attending Radiologist  This document has been electronically signed. 2023 11:56AM    < end of copied text >    < from: TTE Echo Complete w/o Contrast w/ Doppler (11.15.22 @ 08:37) >    ACC: [id number] EXAM:  ECHO TTE WO CON COMP W DOPP                          PROCEDURE DATE:  11/15/2022          INTERPRETATION:  TRANSTHORACIC ECHOCARDIOGRAM REPORT        Patient Name:   YUNIEL ALBERTO Patient Location: [address] Rec #:  [id number]    Accession #:      [id number]  Account #:      [id number]       Height:           72.0 in 183.0 cm  YOB: 1940    Weight:           147.0 lb 66.70 kg  Patient Age:    82 years     BSA:              1.87 m²  Patient Gender: M            BP:               92/58 mmHg      Date of Exam:        11/15/2022 8:37:57 AM  Sonographer:         Cristian Sun  Referring Physician: MICHELLE VALLES    Procedure:     2D Echo/Doppler/Color Doppler Complete.  Indications:   Cardiogenic shock - R57.0  Diagnosis:     Unspecified systolic (congestive) heart failure - I50.20  Study Details: Technically fair study.        2D AND M-MODE MEASUREMENTS (normal ranges within parentheses):  Left                 Normal   Aorta/Left            Normal  Ventricle:                 Atrium:  IVSd (2D):    1.30  (0.7-1.1) Aortic Root   3.36  (2.4-3.7)                 cm             (2D):          cm  LVPWd (2D):   1.00  (0.7-1.1) Aortic Root   3.33  (2.4-3.7)                 cm             (Mmode):       cm  LVIDd (2D):   5.82  (3.4-5.7) AoV Cusp      1.74  (1.5-2.6)                 cm             Separation:    cm  LVIDs (2D):   5.59            Left Atrium   5.34  (1.9-4.0)                 cm             (2D):          cm  LV FS (2D):   4.0 %  (>25%)   Left Atrium   4.22  (1.9-4.0)  LV EF (2D):    9 %   (>55%)   (Mmode):       cm  Relative Wall 0.34   (<0.42)  LA Volume     47.6  Thickness                     Index        ml/m²                                Right Ventricle:   RVd (2D):        3.67 cm                                TAPSE:           1.10 cm    LV SYSTOLIC FUNCTION BY 2D PLANIMETRY (MOD):  EF-A4C View: 11.7 % EF-A2C View: 11.3 % EF-Biplane: 10 %    LV DIASTOLIC FUNCTION:  MV Peak E: 1.01 m/s Decel Time: 74 msec  MV Peak A: 0.42 m/s  E/A Ratio: 2.39    SPECTRAL DOPPLER ANALYSIS (where applicable):  Mitral Valve:  MV P1/2 Time: 21.39 msec  MV Area, PHT: 10.28 cm²    Aortic Valve: AoV Max Trav: 0.65 m/s AoV Peak P.7 mmHg AoV Mean P.3 mmHg    LVOT Vmax: 0.66 m/s LVOT VTI: 0.100 m LVOT Diameter: 2.34 cm    AoV Area, Vmax: 4.37 cm² AoV Area, VTI: 4.09 cm² AoV Area, Vmn: 3.42 cm²  Ao VTI: 0.105  Aortic Insufficiency:  AI Half-time:  553 msec  AI Decel Rate: 1.90 m/s²    Tricuspid Valve and PA/RV Systolic Pressure: TR Max Velocity: 2.52 m/s RA   Pressure: 15 mmHg RVSP/PASP: 40.4 mmHg      PHYSICIAN INTERPRETATION:  Left Ventricle: The left ventricular internal cavity size is mildly   increased. There is mild septal left ventricular hypertrophy involving   the septal wall. The LVH involves septal walls.  Left ventricular ejection fraction, by visual estimation, is 20%.   Severely decreased segmental left ventricular systolic function. The   mitral in-flow pattern reveals no discernable A-wave, therefore no   comment on diastolic function can be made.  Severe global left ventricle hypokinesis with regional variation: the   inferolateral wall, inferior wall and basal and mid inferoseptal wall   appear akinetic.  Right Ventricle: The right ventricular size is moderately enlarged. RV   systolic function is moderately reduced.  Left Atrium: Moderate to severe left atrial enlargement.  Right Atrium: Right atrial enlargement.  Pericardium: There is no evidence of pericardial effusion.  Mitral Valve: Mild thickening and calcification of the anterior and   posterior mitral valve leaflets. There is mild mitral annular   calcification. Severe mitral valve regurgitation is seen.  Tricuspid Valve: The tricuspid valve is normal in structure.   Mild-moderate tricuspid regurgitation is visualized. Estimated pulmonary   artery systolic pressure is 40.4 mmHg assuming a right atrial pressure of   15 mmHg, which is consistent with mild pulmonary hypertension.  Aortic Valve: The aortic valve is trileaflet. Sclerotic aortic valve with   normal opening. Moderate aortic valve regurgitation is seen. Aortic valve   leaflet calcification.  Pulmonic Valve: The pulmonic valve is normal. Mild pulmonic valve   regurgitation.  Aorta: Aortic root measured at Sinus of Valsalva is normal. Dilated   proximal ascending aorta (3.8 cm).  Pulmonary Artery: The pulmonary artery is moderately dilated.  Venous: The inferior vena cava is abnormal. The inferior vena cava was   dilated, with respiratory size variation less than 50%, consistent with   elevated right atrial pressure.      Summary:   1. Severely decreased segmental left ventricular systolic function.   2. Left ventricular ejection fraction, by visual estimation, is 20%.   3. Severe global left ventricle hypokinesis with regional variation: the   inferolateral wall, inferior wall and basal and mid inferoseptal wall   appear akinetic.   4. Mildly increased left ventricular internal cavity size.   5. The mitral in-flow pattern reveals no discernable A-wave, therefore   no comment on diastolic function can be made.   6. There is mild septal left ventricular hypertrophy.   7. Moderately enlarged right ventricle.   8. Moderately reduced RV systolic function.   9. Moderate to severe left atrialenlargement.  10. Right atrial enlargement.  11. Mild mitral annular calcification.  12. Mild thickening and calcification of the anterior and posterior   mitral valve leaflets.  13. Severe mitral valve regurgitation.  14. Mild-moderate tricuspid regurgitation.  15. Aortic valve leaflet calcification.  16. Sclerotic aortic valve with normal opening.  17. Moderate aortic regurgitation.  18. Mild pulmonic valve regurgitation.  19. Dilated proximal ascending aorta (3.8 cm).  20. Moderately dilated pulmonary artery.  21. Estimated pulmonary artery systolic pressure is 40.4 mmHg assuming a   right atrial pressure of 15 mmHg, which is consistent with mild pulmonary   hypertension.  22. There is no evidence of pericardial effusion.    [id number] Vignesh Wallace MD Electronically signed on   11/15/2022 at 1:53:05 PM            *** Final ***    < end of copied text >

## 2023-01-02 NOTE — DIETITIAN NUTRITION RISK NOTIFICATION - TREATMENT: THE FOLLOWING DIET HAS BEEN RECOMMENDED
Diet, Minced and Moist:   DASH/TLC {Sodium & Cholesterol Restricted}  Supplement Feeding Modality:  Oral  Ensure Plus High Protein Cans or Servings Per Day:  1       Frequency:  Three Times a day (01-02-23 @ 12:07) [Pending Verification By Attending]  Diet, Regular:   DASH/TLC {Sodium & Cholesterol Restricted}  Minced and Moist (MINCEDMOIST) (12-31-22 @ 15:08) [Active]
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
## 2023-01-03 LAB
ANION GAP SERPL CALC-SCNC: 15 MMOL/L — SIGNIFICANT CHANGE UP (ref 5–17)
BUN SERPL-MCNC: 67 MG/DL — HIGH (ref 7–23)
CALCIUM SERPL-MCNC: 9.2 MG/DL — SIGNIFICANT CHANGE UP (ref 8.4–10.5)
CHLORIDE SERPL-SCNC: 105 MMOL/L — SIGNIFICANT CHANGE UP (ref 96–108)
CO2 SERPL-SCNC: 17 MMOL/L — LOW (ref 22–31)
CREAT SERPL-MCNC: 1.84 MG/DL — HIGH (ref 0.5–1.3)
EGFR: 36 ML/MIN/1.73M2 — LOW
GLUCOSE SERPL-MCNC: 103 MG/DL — HIGH (ref 70–99)
HCT VFR BLD CALC: 30.6 % — LOW (ref 39–50)
HGB BLD-MCNC: 9.2 G/DL — LOW (ref 13–17)
LACTATE SERPL-SCNC: 2.7 MMOL/L — HIGH (ref 0.7–2)
LACTATE SERPL-SCNC: 3.1 MMOL/L — HIGH (ref 0.7–2)
LACTATE SERPL-SCNC: 3.9 MMOL/L — HIGH (ref 0.7–2)
LACTATE SERPL-SCNC: 4.2 MMOL/L — CRITICAL HIGH (ref 0.7–2)
MCHC RBC-ENTMCNC: 25.6 PG — LOW (ref 27–34)
MCHC RBC-ENTMCNC: 30.1 GM/DL — LOW (ref 32–36)
MCV RBC AUTO: 85 FL — SIGNIFICANT CHANGE UP (ref 80–100)
NRBC # BLD: 0 /100 WBCS — SIGNIFICANT CHANGE UP (ref 0–0)
PLATELET # BLD AUTO: 285 K/UL — SIGNIFICANT CHANGE UP (ref 150–400)
POTASSIUM SERPL-MCNC: 5.7 MMOL/L — HIGH (ref 3.5–5.3)
POTASSIUM SERPL-SCNC: 5.7 MMOL/L — HIGH (ref 3.5–5.3)
RBC # BLD: 3.6 M/UL — LOW (ref 4.2–5.8)
RBC # FLD: 20.2 % — HIGH (ref 10.3–14.5)
SODIUM SERPL-SCNC: 137 MMOL/L — SIGNIFICANT CHANGE UP (ref 135–145)
VANCOMYCIN TROUGH SERPL-MCNC: 13.2 UG/ML — SIGNIFICANT CHANGE UP (ref 10–20)
WBC # BLD: 20.42 K/UL — HIGH (ref 3.8–10.5)
WBC # FLD AUTO: 20.42 K/UL — HIGH (ref 3.8–10.5)

## 2023-01-03 PROCEDURE — 99232 SBSQ HOSP IP/OBS MODERATE 35: CPT

## 2023-01-03 PROCEDURE — 99233 SBSQ HOSP IP/OBS HIGH 50: CPT

## 2023-01-03 PROCEDURE — 99223 1ST HOSP IP/OBS HIGH 75: CPT

## 2023-01-03 PROCEDURE — 71250 CT THORAX DX C-: CPT | Mod: 26

## 2023-01-03 PROCEDURE — 74176 CT ABD & PELVIS W/O CONTRAST: CPT | Mod: 26

## 2023-01-03 RX ADMIN — HEPARIN SODIUM 5000 UNIT(S): 5000 INJECTION INTRAVENOUS; SUBCUTANEOUS at 05:45

## 2023-01-03 RX ADMIN — CARVEDILOL PHOSPHATE 3.12 MILLIGRAM(S): 80 CAPSULE, EXTENDED RELEASE ORAL at 05:45

## 2023-01-03 RX ADMIN — MIDODRINE HYDROCHLORIDE 10 MILLIGRAM(S): 2.5 TABLET ORAL at 18:14

## 2023-01-03 RX ADMIN — Medication 150 MICROGRAM(S): at 05:45

## 2023-01-03 RX ADMIN — AMIODARONE HYDROCHLORIDE 200 MILLIGRAM(S): 400 TABLET ORAL at 05:45

## 2023-01-03 RX ADMIN — Medication 650 MILLIGRAM(S): at 21:35

## 2023-01-03 RX ADMIN — MIDODRINE HYDROCHLORIDE 10 MILLIGRAM(S): 2.5 TABLET ORAL at 05:45

## 2023-01-03 RX ADMIN — HEPARIN SODIUM 5000 UNIT(S): 5000 INJECTION INTRAVENOUS; SUBCUTANEOUS at 21:03

## 2023-01-03 RX ADMIN — MEROPENEM 100 MILLIGRAM(S): 1 INJECTION INTRAVENOUS at 18:15

## 2023-01-03 RX ADMIN — HEPARIN SODIUM 5000 UNIT(S): 5000 INJECTION INTRAVENOUS; SUBCUTANEOUS at 15:10

## 2023-01-03 RX ADMIN — Medication 650 MILLIGRAM(S): at 21:03

## 2023-01-03 RX ADMIN — Medication 650 MILLIGRAM(S): at 06:58

## 2023-01-03 RX ADMIN — MIDODRINE HYDROCHLORIDE 10 MILLIGRAM(S): 2.5 TABLET ORAL at 11:14

## 2023-01-03 RX ADMIN — Medication 81 MILLIGRAM(S): at 11:13

## 2023-01-03 RX ADMIN — Medication 250 MILLIGRAM(S): at 11:12

## 2023-01-03 RX ADMIN — Medication 650 MILLIGRAM(S): at 06:27

## 2023-01-03 RX ADMIN — ATORVASTATIN CALCIUM 40 MILLIGRAM(S): 80 TABLET, FILM COATED ORAL at 21:03

## 2023-01-03 RX ADMIN — MEROPENEM 100 MILLIGRAM(S): 1 INJECTION INTRAVENOUS at 05:44

## 2023-01-03 NOTE — CONSULT NOTE ADULT - ASSESSMENT
elderly man with remote CABG, remote cva, known dilated cardiomyopathy admitted with pneumonia, hypoxemia.  has severe LV dysfunction, relative hypotension, on midodrine.      suggest  maintain LA b blocker  resume entresto  doubt if will tolerate aldactone or farxiga at this time  
Palliative:  Call placed to wife to review molst.  She was not availble at this time.  I will review dnr and suggest further measures tomorrow.
Patient is a 82y male with a past history of HTN,A Fib, CHF with EF of 20%, PAD with arterial insufficiency ulcers to both feet, BPH with chronic dubois, admitted 12/31 with shortness of breath and hypoxia not responding to Cefepime and metronidazole at his SNF.  He was admitted 11/14 with  a sepsis syndome and hypoxic respiratory failure, he received 1 week of cefepime for sepsis/possible pneumonia.  He also is s/p a CVA with left sided weakness.He had  areas of Rt foot gangrene last admission and a left foot tissue injury that he refused examination of.  He also received a 10 day course of linezolid as MRSA was isolated from wound.  He had arterial insufficiency which was not corrected.  He is now admitted with confusion and hypoxia. No fever.He was evaluated last admission for a persistent leukocytosis, no explanation found.Blood cultures were negative.He has been given cefepime and vanco at [facility].  I am not sure he has an antibiotic responsive process. his leukocytosis is chronic, predated this admission.  He has B/L foot ulcers which appear both pressure induced and ischemic, I am not sure there is any secondary gain from antibiotics with his feet  He has PAD, hence do not expect healing.  Whether he has chronic aspirations or simply CHF is hard to say. 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  Suggest:  1. Will place on meropenem and vanco  2.Await blood cultures, sputum if productive  2.MRSA nasal screen, urine for legionella antigen  4,Favor goals of care discussions, if nothing else than to inform that extending his life may be very painful with progressive leg wounds.  5.Consider f/u Echo if it will   6. He appeared to be approaching end of life in November and nothing has changed.  
Assessment  82 year old male with hx of CAD s/p CABG with severe cardiomyopathy with advanced Combined CHF, atrial Fib,  dementia, urinary retention w/ chronic Starks, presented to the ED from [facility] due to hypoxia and pale/ill appearing this AM.     Problem  1. Severe Sepsis due to  B/L pneumonia  2. Overlapping Acute on chronic Systolic CHF could be playing role here  3. Lactic acidosis     Plan  n/c o2 to maintain sat  Continue antibiotics  WBC improving  f/u cultures  consider CT chest  Consider cardio f/u  diet as per speech and swallow   d/w Dr. tay and Dr. Montesinos

## 2023-01-03 NOTE — PROGRESS NOTE ADULT - ASSESSMENT
Assessment:  Gabriel De La Cruz is an 82 year old man with past medical history of Coronary artery disease (s/p CABG, recent angiogram with 2/3 occluded vein grafts in 10/2022, medically managed), HFrEF (LVEF 20% in 9/2022), Hypertension, Atrial fibrillation (not on anticoagulation due to hematuria), CVA and BPH with recurrent hospitalizations, most recently in November for septic shock/urosepsis and pneumonia and congestive heart failure, now sent in from living facility due to hypoxia, found to have pneumonia.     ECG consistent with atrial fibrillation with RVR, lateral ST abnormalities, similar to prior ECG. Troponins are not elevated in range to suggest an acute coronary syndrome. Prior echo consistent with LVEF 20% with regional wall motion abnormalities, severe MR, mild pulmonary hypertension. CXR consistent with multifocal bilateral infiltrates. Prior coronary angiogram (10/2022) consistent with 2/3 occluded bypass grafts that was medically managed at [facility].       Recommendations:  [] Hypoxia, pneumonia: IV antibiotics and management per ID  [] Chronic systolic heart failure & Severe mitral regurgitation: Does not appear significantly volume overloaded, would avoid additional IV fluids- would not want to precipitate CHF exacerbation. May continue low dose Coreg (with hold parameters), agree with holding Entresto due to hypotension. Continue Midodrine for now. In regards to primary prevention ICD, patient was not amenable to this in the past, can re-evaluate as well as re-evaluate if candidate for valvular intervention for severe MR, but due to age and frailty, the risks of intervention would be high, will need to discuss with family   [] Atrial fibrillation: Currently in sinus rhythm. Not on anticoagulation due to history of recurrent hematuria in past     Discussed with primary team. Goals of care being addressed. We will continue to follow along.     Vignesh Wallace MD  Cardiology

## 2023-01-03 NOTE — PROGRESS NOTE ADULT - SUBJECTIVE AND OBJECTIVE BOX
YUNIEL ALBERTO  [id number]      Chief Complaint: Hypoxia/Pneumonia/Chronic HFrEF    Interval History: The patient is fatigued today, denies chest discomfort, breathing appears stable.     Tele: sinus rhythm 90s BPM, episodes of AF      Current meds:   acetaminophen     Tablet .. 650 milliGRAM(s) Oral every 6 hours PRN  aMIOdarone    Tablet 200 milliGRAM(s) Oral daily  aspirin  chewable 81 milliGRAM(s) Oral daily  atorvastatin 40 milliGRAM(s) Oral at bedtime  carvedilol 3.125 milliGRAM(s) Oral every 12 hours  heparin   Injectable 5000 Unit(s) SubCutaneous every 8 hours  lactated ringers. 1000 milliLiter(s) IV Continuous <Continuous>  levothyroxine 150 MICROGram(s) Oral daily  meropenem  IVPB 1000 milliGRAM(s) IV Intermittent every 12 hours  midodrine. 10 milliGRAM(s) Oral three times a day  ondansetron Injectable 4 milliGRAM(s) IV Push every 8 hours PRN  vancomycin  IVPB      vancomycin  IVPB 1000 milliGRAM(s) IV Intermittent every 24 hours        Objective:    Vital Signs:   T(C): 36.3 (01-03-23 @ 05:40), Max: 37.1 (01-02-23 @ 20:05)  HR: 90 (01-03-23 @ 05:40) (87 - 95)  BP: 100/66 (01-03-23 @ 05:40) (92/56 - 106/75)  RR: 18 (01-03-23 @ 05:40) (16 - 21)  SpO2: 98% (01-03-23 @ 05:40) (98% - 100%)  Wt(kg): --    Physical Exam:   General: elderly man, frail, no acute distress  Neck: supple  CVS: JVP ~ 9 cm H20, RRR, s1, s2  Pulm: unlabored respirations, decreased breath sounds  Ext: no lower extremity edema b/l  Neuro: awake, alert       Labs:   03 Jan 2023 06:00    137    |  105    |  67     ----------------------------<  103    5.7     |  17     |  1.84     Ca    9.2        03 Jan 2023 06:00  Mg     2.2       02 Jan 2023 07:45                            9.2    20.42 )-----------( 285      ( 03 Jan 2023 06:00 )             30.6           [facility] records obtained:    TTE (9/12/22):  LVEF 20%, minor regional variation  Normal RV size and severely reduced RV systolic function  Moderate MR, Mild AR   No pericardial effusion     Coronary angiogram (10/3/22):  LIMA-LAD: patent  SVG-LCx: occluded  SVG-RCA: occluded  Recommendations: Medical therapy, EP consult       TTE (10/18/22):  LVEF 20-25%; the inferolateral wall and inferior wall appear akinetic  Moderate septal LVH  Moderately reduced RV systolic function  Severe LA enlargement  Moderate MR, Mild TR, Moderate AR  Dilated proximal ascending aorta (4.4 cm).  Large pleural effusion in the left lateral region.  No pericardial effusion       TTE (11/15/22):  LVEF 20% with RWMA  Dilated RV with reduced RV systolic function  Biatrial enlargement  Severe MR, Mild-moderate TR, Moderate AR  Dilated proximal ascending aorta (3.8 cm).  Moderately dilated pulmonary artery  Mild pulmonary hypertension   No pericardial effusion     ECG (12/31/22): atrial fibrillation with RVR, aberrant conduction, lateral ST abnormality (similar to prior ECG)    CXR (1/2/23):  Multifocal bilateral infiltrates stable to slightly progressed to prior.   Small left pleural effusion.  Stable widening of the cardiac silhouette  Midline sternotomy. Advanced bilateral shoulder degenerative changes.

## 2023-01-03 NOTE — PROGRESS NOTE ADULT - ASSESSMENT
81yo male with hx of HTN, Advanced Combined CHF, dementia, urinary retention w/ chronic Dubois, presented to the ED from [facility] due to hypoxia and pale appearing this AM, noted to have severe sepsis and hypoxia secondary to likely persistent Bacterial PNA    #Severe Sepsis POA  #Bacterial PNA, b/l infiltrates and effusions on CT of chest today  #Lactic Acidosis; 2.9>4.2>2.7  - cont vanco and meropenem, day 3 (he has a midline now)-- ID following  -pt afebrile, wbc 20 today and lactate was elevated but improving this am after having IVF  -hold off on further IVF due to CHF, EF 20%  - MRSA PCR negative  - BC and UC so far NGTD    #Acute on chronic hypoxic respiratory failure   - Secondary to the above  - continuous O2 monitoring, on 5L nc and O2 sats 98%  - repeat CXR 1/2 and CT today with persistent b/l infiltrates  - Pulm f/u today; await further input-- cont current medical mgmt    #chronic Combined CHF  #Hypotension  #A fib  -Poor advanced CHF w/ EF 20%  -Medication optimization was done, however, in the setting of acute decompensation as well as hypotension, readjustments were made  -Cont Coreg per Cardio and not a candidate for Entresto/Farxiga at this time due to hypotension/sepsis/HAN and  Lasix on hold also, continue Amiodarone  -Continue Midodrine for hypotension  -Close monitoring of vitals and I/Os and weights  -Cardio following; note appreciated today    #HAN  #Hemoconcentration  -Likely in the setting of sepsis, intravascularly low/hypovolemic   -Monitor Renal function and volume state  -avoid further IVF due to heart failure    #Chronic Anemia  -Likely multifactorial  -pt is s/p transfusion of PRBC on 1/2, Hgb improved to 9.2 this am  -Baseline chronically 7-8  -No signs/symptoms of acute blood loss  -Continue to monitor    #Urinary retention  -Chronic dubois secondary to failed multiple TOVs  -Holding Flomax/Finasteride as they can cause hypotension as well  -reposition dubois today due to CT findings    # b/l leg wounds  - h/o wound culture growing MRSA in the past  - contact isolation  - podiatry cx. appreciated  - hold off on vascular intervention for now, not a candidate  - SALEEM tests ordered    DVT ppx - heparin    Dispo:  overall poor prognosis, pt is DNR/DNI.  Wife Kirsten De La Cruz updated today, she would not want him to have a CVP line placed. She is open to speaking to Palliative care today.

## 2023-01-03 NOTE — CONSULT NOTE ADULT - SUBJECTIVE AND OBJECTIVE BOX
HPI:  81yo male with hx of HTN, Advanced Combined CHF, dementia, urinary retention w/ chronic Starks, presented to the ED from [facility] due to hypoxia and pale appearing this AM. Pt is a poor historian. For the past 1-2 weeks, pt was noted to have ARFH secondary to decompensated Heart failure and volume overload with superimposed bacterial PNA as noted to have infiltrate on CXR w/ leukocytosis. Pt was treated with IV Lasix/Cefepime/Flagyl while at the facility. Supplemental O2 was also started. Pt was noted to have improvement in volume status as well as O2 sats as he was saturating 96% on 3-4L NC without any respiratory distress or tachypnea.   This AM, pt noted be more lethargic and pale appearing. Staff was unable to obtain O2 sats as it kept reading 70s-80s on 6L NC. Considering recent GOC discussion with family, pt required higher level of care at the hospital.  (31 Dec 2022 12:36)  Palliative:  call placed to wife Kirsten.  No answer    PAST MEDICAL & SURGICAL HISTORY:  Chronic atrial fibrillation      History of cardiomyopathy      CAD (coronary artery disease)      BPH with obstruction/lower urinary tract symptoms      Hyperlipidemia      History of CVA (cerebrovascular accident)  left sided weakness      S/P CABG (coronary artery bypass graft)          SOCIAL HISTORY:    Admitted from:  [facility]   Substance abuse history:    no          Tobacco hx:                  Alcohol hx:              Home Opioid hx:  Congregational:                                    Preferred Language:    Surrogate/HCP/Guardian:    Kirsten De La Cruz        Phone#:    FAMILY HISTORY:  No pertinent family history in first degree relatives      Baseline ADLs (prior to admission): needs assist with feeding    Allergies    PC Pen VK (Other)    Intolerances      Present Symptoms:   Dyspnea:   Nausea/Vomiting:   Anxiety:  Depressed   Fatigue:  Loss of appetite:   Pain:                                location:          Review of Systems: [All others negative or Unable to obtain due to poor mentation]    MEDICATIONS  (STANDING):  aMIOdarone    Tablet 200 milliGRAM(s) Oral daily  aspirin  chewable 81 milliGRAM(s) Oral daily  atorvastatin 40 milliGRAM(s) Oral at bedtime  carvedilol 3.125 milliGRAM(s) Oral every 12 hours  heparin   Injectable 5000 Unit(s) SubCutaneous every 8 hours  levothyroxine 150 MICROGram(s) Oral daily  meropenem  IVPB 1000 milliGRAM(s) IV Intermittent every 12 hours  midodrine. 10 milliGRAM(s) Oral three times a day  vancomycin  IVPB      vancomycin  IVPB 1000 milliGRAM(s) IV Intermittent every 24 hours    MEDICATIONS  (PRN):  acetaminophen     Tablet .. 650 milliGRAM(s) Oral every 6 hours PRN Temp greater or equal to 38C (100.4F), Mild Pain (1 - 3)  ondansetron Injectable 4 milliGRAM(s) IV Push every 8 hours PRN Nausea and/or Vomiting      PHYSICAL EXAM:    Vital Signs Last 24 Hrs  T(C): 36.3 (03 Jan 2023 05:40), Max: 37.1 (02 Jan 2023 20:05)  T(F): 97.4 (03 Jan 2023 05:40), Max: 98.8 (02 Jan 2023 20:05)  HR: 101 (03 Jan 2023 11:15) (87 - 101)  BP: 105/71 (03 Jan 2023 11:15) (92/56 - 106/75)  BP(mean): --  RR: 18 (03 Jan 2023 05:40) (16 - 21)  SpO2: 98% (03 Jan 2023 05:40) (98% - 100%)    Parameters below as of 03 Jan 2023 05:40  Patient On (Oxygen Delivery Method): nasal cannula  O2 Flow (L/min): 5      General: alert  confused and agitated  HEENT: normal    Lungs: comfortable  CV: normal   GI: normal      : normal    Musculoskeletal:  weakness  edema              Skin: as per hospitalist  Neuro: no deficits cognitive impairment dsyphagia/dysarthria paresis: other:  Oral intake ability: ok  Diet: [NPO]    LABS:                        9.2    20.42 )-----------( 285      ( 03 Jan 2023 06:00 )             30.6     01-03    137  |  105  |  67<H>  ----------------------------<  103<H>  5.7<H>   |  17<L>  |  1.84<H>    Ca    9.2      03 Jan 2023 06:00  Mg     2.2     01-02          RADIOLOGY & ADDITIONAL STUDIES:    ADVANCE DIRECTIVES:   Advanced Care Planning discussion total time spent:

## 2023-01-03 NOTE — PROGRESS NOTE ADULT - ASSESSMENT
Physical Examination:  GENERAL:               Alert, Confused, No acute distress.    HEENT:                   No JVD, Dry  MM  PULM:                     Bilateral air entry, Clear to auscultation bilaterally, no significant sputum production, No Rales, No Rhonchi, No Wheezing  CVS:                         S1, S2,  +  Murmur   Vascular:                cool Extremities, poor Capillary refill, decreased Distal Pulses  NEURO:                  Alert,  interactive, nonfocal, follows commands  PSYC:                      Calm, Limited Insight.      Assessment  82 year old male with hx of CAD s/p CABG with severe cardiomyopathy with advanced Combined CHF, atrial Fib,  dementia, urinary retention w/ chronic Starks, presented to the ED from [facility] due to hypoxia and pale/ill appearing this AM.     Problem  1. Severe Sepsis due to  B/L pneumonia  2. Overlapping Acute on chronic Systolic CHF could be playing role here  3. Lactic acidosis suspect from chf and PVD.     Plan  noted Metabolic encephelopathy  hold IVF  IV ABX  n/c o2 to maintain sat  f/u cultures    Consider cardio f/u  diet as per speech and swallow   d/w Dr. Regalado    patient does not require icu at this time.   advanced directives should be obtained  prognosis is poor

## 2023-01-03 NOTE — PROGRESS NOTE ADULT - NS ATTEND AMEND GEN_ALL_CORE FT
83yo male with hx of HTN, Advanced Combined CHF, dementia, urinary retention w/ chronic Dubois, presented to the ED from [facility] due to hypoxia and pale appearing this AM, noted to have severe sepsis and hypoxia secondary to likely persistent Bacterial PNA    #Severe Sepsis POA  #Bacterial PNA  - s/p cefepime and flagyl at [facility], now on vanco and meropenem  - lactate trending upwards again, s/p IVF- hold further IVF for now to prevent CHF exacerbation  - CT chest/abd/pelvis ordered- continues to show multifocal PNA and distended bladder with misplaced dubois- informed nursing staff and will correct  - continue pureed diet and speech and swallow eval  - palliative consult    #Acute on chronic hypoxic respiratory failure   - likely from bacterial pna  - continue treatment as above  - currently on 5L NC, continue to monitor o2 saturations  - monitor volume status closely  - discussed at length with Dr. Rodriguez    #chronic Combined CHF  #Hypotension  #A fib  - Poor advanced CHF w/ EF 20%  - appears dehydrated, s/p IVF, albumin and PRBC yesterday. Monitor fluid status today- no further IVF for now  - cannot start entresto/acei/arb given hypotension and tatyana  - cardio consulted- discussed with Dr. Wallace    #Chronic Anemia  - Likely multifactorial  - Baseline chronically 7-8 however noted to have drop to 6.8  - s/p PRBC x1 unit 1/2    prognosis appears poor- ongoing GOC with family

## 2023-01-03 NOTE — PROGRESS NOTE ADULT - SUBJECTIVE AND OBJECTIVE BOX
Patient is a 82y old  Male who presents with a chief complaint of Hypoxia (2023 09:45)      Patient seen and examined at bedside. No overnight events reported.  Pt c/o being tired.    ALLERGIES:  PC Pen VK (Other)    MEDICATIONS  (STANDING):  aMIOdarone    Tablet 200 milliGRAM(s) Oral daily  aspirin  chewable 81 milliGRAM(s) Oral daily  atorvastatin 40 milliGRAM(s) Oral at bedtime  carvedilol 3.125 milliGRAM(s) Oral every 12 hours  heparin   Injectable 5000 Unit(s) SubCutaneous every 8 hours  lactated ringers. 1000 milliLiter(s) (75 mL/Hr) IV Continuous <Continuous>  levothyroxine 150 MICROGram(s) Oral daily  meropenem  IVPB 1000 milliGRAM(s) IV Intermittent every 12 hours  midodrine. 10 milliGRAM(s) Oral three times a day  vancomycin  IVPB      vancomycin  IVPB 1000 milliGRAM(s) IV Intermittent every 24 hours    MEDICATIONS  (PRN):  acetaminophen     Tablet .. 650 milliGRAM(s) Oral every 6 hours PRN Temp greater or equal to 38C (100.4F), Mild Pain (1 - 3)  ondansetron Injectable 4 milliGRAM(s) IV Push every 8 hours PRN Nausea and/or Vomiting    Vital Signs Last 24 Hrs  T(F): 97.4 (2023 05:40), Max: 98.8 (2023 20:05)  HR: 101 (2023 11:15) (87 - 101)  BP: 105/71 (2023 11:15) (92/56 - 106/75)  RR: 18 (2023 05:40) (16 - 21)  SpO2: 98% (2023 05:40) (98% - 100%)  I&O's Summary    2023 07:01  -  2023 07:00  --------------------------------------------------------  IN: 2140 mL / OUT: 0 mL / NET: 2140 mL      PHYSICAL EXAM:  General: pt. is alert, frail, appears ill.  ENT: No gross hearing impairment, Moist mucous membranes, no thrush  Neck: Supple, No JVD  Lungs:  non-labored breathing, diminished breath sounds  Cardio: RRR, S1/S2, No murmur  Abdomen: Soft, Nontender, Nondistended; Bowel sounds present  :  +[place]  Extremities: b/l feet dressed, +dry gangrene at left great toe   Neuro:  nonfocal    LABS:                        9.2    20.42 )-----------( 285      ( 2023 06:00 )             30.6         137  |  105  |  67  ----------------------------<  103  5.7   |  17  |  1.84    Ca    9.2      2023 06:00  Mg     2.2                   Lactate, Blood: 2.7 mmol/L ( @ 08:40)  Lactate, Blood: 4.2 mmol/L ( @ 06:00)  Lactate, Blood: 3.9 mmol/L ( @ 23:50)  Lactate, Blood: 3.6 mmol/L ( @ 20:25)  Lactate, Blood: 3.2 mmol/L ( @ 13:30)      CARDIAC MARKERS ( 2023 13:30 )  x     / 45.9 ng/L / x     / x     / x          11-06 Chol 99 mg/dL LDL -- HDL 31 mg/dL Trig 81 mg/dL      ABG - ( 2023 11:49 )  pH, Arterial: 7.55  pH, Blood: x     /  pCO2: <19   /  pO2: 174   / HCO3: 16    / Base Excess: -6.7  /  SaO2: 99.9                        Urinalysis Basic - ( 31 Dec 2022 15:57 )    Color: Brown / Appearance: Clear / S.020 / pH: x  Gluc: x / Ketone: Trace  / Bili: Negative / Urobili: 1   Blood: x / Protein: 100 / Nitrite: Negative   Leuk Esterase: Moderate / RBC: >50 /HPF / WBC 11-25 /HPF   Sq Epi: x / Non Sq Epi: Neg.-Few / Bacteria: Negative /HPF        Culture - Urine (collected 31 Dec 2022 15:57)  Source: Clean Catch Clean Catch (Midstream)  Final Report (2023 20:44):    No growth    Culture - Blood (collected 31 Dec 2022 10:47)  Source: .Blood Blood-Peripheral  Preliminary Report (2023 15:01):    No growth to date.    Culture - Blood (collected 31 Dec 2022 10:47)  Source: .Blood Blood-Peripheral  Preliminary Report (2023 15:01):    No growth to date.        RADIOLOGY & ADDITIONAL TESTS:  < from: CT Abdomen and Pelvis No Cont (23 @ 09:46) >    IMPRESSION:  1. Patchy bilateral parenchymal airspace opacities may represent   bilateral pneumonia versus pulmonary edema. Bilateral pleural effusions.  2. Bladder distention. Minimal hyperdensity within the dependent portion   of the bladder may represent small stones. Bladder diverticula. An   element of bladder wall thickening is noted; cannot exclude cystitis. The   indwelling [place] catheter demonstrates an intraprostatic location;   repositioning of the M[place] catheter is recommended.  3. Fecal material scattered throughout the colon. An element of rectal   wall thickening identified. No evidence for mechanical bowel obstruction.  4.    < end of copied text >    Care Discussed with Consultants/Other Providers:

## 2023-01-03 NOTE — CHART NOTE - NSCHARTNOTEFT_GEN_A_CORE
Interval events: Received sign out stating patient became more lethargic and tachypneic during the day, workup done, patient with advanced CHF with EF 20% but clinically appears volume depleted, given albumin and put on maintenance IVF, sent out stat CBC, lactate and bmp. This evening H/H dropped to 6.8, lactate 3.6, patient hypotensive BP 96/67, MV91-122g, O2 100% 5L NC, RR19. On arrival patient laying in bed pale appearing and confused, arousable and responds to name. At this time will transfuse 1unit PRBC over 3.5 hours and monitor closely for volume status. Blood transfusion consent in chart. If patient remains hypotensive and/or become volume overloaded after blood transfusion will consult ICU for higher level of care. As per hospitalist Fabricio note "At this time it appears besides DNR/DNI, family wants everything to be done for patient." Discussed case with domi Hamm. Will continue to closely monitor. Rest of care per primary team.

## 2023-01-03 NOTE — PROVIDER CONTACT NOTE (CRITICAL VALUE NOTIFICATION) - ACTION/TREATMENT ORDERED:
Per PA, keep fluid LR running at 75ml/hr. Per PA, run the remainder of the IV LR (400ml) as a bolus.

## 2023-01-04 LAB
ANION GAP SERPL CALC-SCNC: 14 MMOL/L — SIGNIFICANT CHANGE UP (ref 5–17)
BUN SERPL-MCNC: 76 MG/DL — HIGH (ref 7–23)
CALCIUM SERPL-MCNC: 8.7 MG/DL — SIGNIFICANT CHANGE UP (ref 8.4–10.5)
CHLORIDE SERPL-SCNC: 104 MMOL/L — SIGNIFICANT CHANGE UP (ref 96–108)
CO2 SERPL-SCNC: 17 MMOL/L — LOW (ref 22–31)
CREAT SERPL-MCNC: 2.01 MG/DL — HIGH (ref 0.5–1.3)
EGFR: 32 ML/MIN/1.73M2 — LOW
GLUCOSE SERPL-MCNC: 99 MG/DL — SIGNIFICANT CHANGE UP (ref 70–99)
HCT VFR BLD CALC: 28.8 % — LOW (ref 39–50)
HGB BLD-MCNC: 8.7 G/DL — LOW (ref 13–17)
LACTATE SERPL-SCNC: 2.4 MMOL/L — HIGH (ref 0.7–2)
LACTATE SERPL-SCNC: 3.1 MMOL/L — HIGH (ref 0.7–2)
LEGIONELLA AG UR QL: NEGATIVE — SIGNIFICANT CHANGE UP
MCHC RBC-ENTMCNC: 25.6 PG — LOW (ref 27–34)
MCHC RBC-ENTMCNC: 30.2 GM/DL — LOW (ref 32–36)
MCV RBC AUTO: 84.7 FL — SIGNIFICANT CHANGE UP (ref 80–100)
MRSA PCR RESULT.: SIGNIFICANT CHANGE UP
NRBC # BLD: 0 /100 WBCS — SIGNIFICANT CHANGE UP (ref 0–0)
PLATELET # BLD AUTO: 227 K/UL — SIGNIFICANT CHANGE UP (ref 150–400)
POTASSIUM SERPL-MCNC: 5.4 MMOL/L — HIGH (ref 3.5–5.3)
POTASSIUM SERPL-SCNC: 5.4 MMOL/L — HIGH (ref 3.5–5.3)
RBC # BLD: 3.4 M/UL — LOW (ref 4.2–5.8)
RBC # FLD: 20.3 % — HIGH (ref 10.3–14.5)
S AUREUS DNA NOSE QL NAA+PROBE: DETECTED
SODIUM SERPL-SCNC: 135 MMOL/L — SIGNIFICANT CHANGE UP (ref 135–145)
WBC # BLD: 16.61 K/UL — HIGH (ref 3.8–10.5)
WBC # FLD AUTO: 16.61 K/UL — HIGH (ref 3.8–10.5)

## 2023-01-04 PROCEDURE — 99232 SBSQ HOSP IP/OBS MODERATE 35: CPT

## 2023-01-04 PROCEDURE — 99497 ADVNCD CARE PLAN 30 MIN: CPT

## 2023-01-04 PROCEDURE — 99233 SBSQ HOSP IP/OBS HIGH 50: CPT

## 2023-01-04 RX ORDER — SODIUM CHLORIDE 9 MG/ML
250 INJECTION INTRAMUSCULAR; INTRAVENOUS; SUBCUTANEOUS ONCE
Refills: 0 | Status: DISCONTINUED | OUTPATIENT
Start: 2023-01-04 | End: 2023-01-04

## 2023-01-04 RX ORDER — SCOPALAMINE 1 MG/3D
1 PATCH, EXTENDED RELEASE TRANSDERMAL
Refills: 0 | Status: DISCONTINUED | OUTPATIENT
Start: 2023-01-04 | End: 2023-01-06

## 2023-01-04 RX ORDER — FUROSEMIDE 40 MG
20 TABLET ORAL DAILY
Refills: 0 | Status: DISCONTINUED | OUTPATIENT
Start: 2023-01-04 | End: 2023-01-04

## 2023-01-04 RX ORDER — MORPHINE SULFATE 50 MG/1
1 CAPSULE, EXTENDED RELEASE ORAL
Refills: 0 | Status: DISCONTINUED | OUTPATIENT
Start: 2023-01-04 | End: 2023-01-05

## 2023-01-04 RX ADMIN — MORPHINE SULFATE 1 MILLIGRAM(S): 50 CAPSULE, EXTENDED RELEASE ORAL at 15:29

## 2023-01-04 RX ADMIN — AMIODARONE HYDROCHLORIDE 200 MILLIGRAM(S): 400 TABLET ORAL at 05:03

## 2023-01-04 RX ADMIN — Medication 250 MILLIGRAM(S): at 10:04

## 2023-01-04 RX ADMIN — MIDODRINE HYDROCHLORIDE 10 MILLIGRAM(S): 2.5 TABLET ORAL at 05:02

## 2023-01-04 RX ADMIN — Medication 81 MILLIGRAM(S): at 11:44

## 2023-01-04 RX ADMIN — MORPHINE SULFATE 1 MILLIGRAM(S): 50 CAPSULE, EXTENDED RELEASE ORAL at 15:19

## 2023-01-04 RX ADMIN — MIDODRINE HYDROCHLORIDE 10 MILLIGRAM(S): 2.5 TABLET ORAL at 11:57

## 2023-01-04 RX ADMIN — Medication 150 MICROGRAM(S): at 05:03

## 2023-01-04 RX ADMIN — HEPARIN SODIUM 5000 UNIT(S): 5000 INJECTION INTRAVENOUS; SUBCUTANEOUS at 05:02

## 2023-01-04 RX ADMIN — MEROPENEM 100 MILLIGRAM(S): 1 INJECTION INTRAVENOUS at 05:02

## 2023-01-04 RX ADMIN — CARVEDILOL PHOSPHATE 3.12 MILLIGRAM(S): 80 CAPSULE, EXTENDED RELEASE ORAL at 05:03

## 2023-01-04 RX ADMIN — HEPARIN SODIUM 5000 UNIT(S): 5000 INJECTION INTRAVENOUS; SUBCUTANEOUS at 13:23

## 2023-01-04 NOTE — PROGRESS NOTE ADULT - SUBJECTIVE AND OBJECTIVE BOX
Patient is a 82y old  Male who presents with a chief complaint of Hypoxia (04 Jan 2023 10:03)      Patient seen and examined at bedside. No overnight events reported.  Pt c/o feeling "terrible", he states he has pain all over.    ALLERGIES:  PC Pen VK (Other)    MEDICATIONS  (STANDING):  aMIOdarone    Tablet 200 milliGRAM(s) Oral daily  aspirin  chewable 81 milliGRAM(s) Oral daily  atorvastatin 40 milliGRAM(s) Oral at bedtime  carvedilol 3.125 milliGRAM(s) Oral every 12 hours  heparin   Injectable 5000 Unit(s) SubCutaneous every 8 hours  levothyroxine 150 MICROGram(s) Oral daily  meropenem  IVPB 1000 milliGRAM(s) IV Intermittent every 12 hours  midodrine. 10 milliGRAM(s) Oral three times a day  sodium chloride 0.9% Bolus 250 milliLiter(s) IV Bolus once  vancomycin  IVPB      vancomycin  IVPB 1000 milliGRAM(s) IV Intermittent every 24 hours    MEDICATIONS  (PRN):  acetaminophen     Tablet .. 650 milliGRAM(s) Oral every 6 hours PRN Temp greater or equal to 38C (100.4F), Mild Pain (1 - 3)  ondansetron Injectable 4 milliGRAM(s) IV Push every 8 hours PRN Nausea and/or Vomiting    Vital Signs Last 24 Hrs  T(F): 97.4 (04 Jan 2023 04:55), Max: 97.4 (04 Jan 2023 04:55)  HR: 110 (04 Jan 2023 04:55) (84 - 110)  BP: 92/59 (04 Jan 2023 11:58) (89/60 - 111/68)  RR: 15 (04 Jan 2023 11:58) (15 - 22)  SpO2: 99% (04 Jan 2023 11:58) (95% - 100%)  I&O's Summary    03 Jan 2023 07:01  -  04 Jan 2023 07:00  --------------------------------------------------------  IN: 1280 mL / OUT: 1350 mL / NET: -70 mL    04 Jan 2023 07:01  -  04 Jan 2023 12:04  --------------------------------------------------------  IN: 120 mL / OUT: 0 mL / NET: 120 mL      PHYSICAL EXAM:  General: NAD, Alert, very pale appearing and frail.  ENT: No gross hearing impairment, Moist mucous membranes, no thrush  Neck: Supple, No JVD  Lungs: non-labored breathing, decreased BS b/l  Cardio: RRR, S1/S2, No murmur  Abdomen: Soft, Nontender, Nondistended; Bowel sounds present  Extremities: b/l lower exts dressed, right great toe with dry gangrene  Neuro:  alert, lethargic    LABS:                        8.7    16.61 )-----------( 227      ( 04 Jan 2023 06:40 )             28.8     01-04    135  |  104  |  76  ----------------------------<  99  5.4   |  17  |  2.01    Ca    8.7      04 Jan 2023 06:40  Mg     2.2     01-02              Lactate, Blood: 2.4 mmol/L (01-04 @ 10:25)  Lactate, Blood: 3.1 mmol/L (01-04 @ 06:40)  Lactate, Blood: 3.1 mmol/L (01-03 @ 12:30)  Lactate, Blood: 2.7 mmol/L (01-03 @ 08:40)  Lactate, Blood: 4.2 mmol/L (01-03 @ 06:00)      CARDIAC MARKERS ( 02 Jan 2023 13:30 )  x     / 45.9 ng/L / x     / x     / x          11-06 Chol 99 mg/dL LDL -- HDL 31 mg/dL Trig 81 mg/dL      ABG - ( 02 Jan 2023 11:49 )  pH, Arterial: 7.55  pH, Blood: x     /  pCO2: <19   /  pO2: 174   / HCO3: 16    / Base Excess: -6.7  /  SaO2: 99.9                            Culture - Urine (collected 31 Dec 2022 15:57)  Source: Clean Catch Clean Catch (Midstream)  Final Report (01 Jan 2023 20:44):    No growth    Culture - Blood (collected 31 Dec 2022 10:47)  Source: .Blood Blood-Peripheral  Preliminary Report (01 Jan 2023 15:01):    No growth to date.    Culture - Blood (collected 31 Dec 2022 10:47)  Source: .Blood Blood-Peripheral  Preliminary Report (01 Jan 2023 15:01):    No growth to date.        RADIOLOGY & ADDITIONAL TESTS:  < from: CT Abdomen and Pelvis No Cont (01.03.23 @ 09:46) >    IMPRESSION:  1. Patchy bilateral parenchymal airspace opacities may represent   bilateral pneumonia versus pulmonary edema. Bilateral pleural effusions.  2. Bladder distention. Minimal hyperdensity within the dependent portion   of the bladder may represent small stones. Bladder diverticula. An   element of bladder wall thickening is noted; cannot exclude cystitis. The   indwelling Starks catheter demonstrates an intraprostatic location;   repositioning of the Starks catheter is recommended.  3. Fecal material scattered throughout the colon. An element of rectal   wall thickening identified. No evidence for mechanical bowel obstruction.    < end of copied text >    Care Discussed with Consultants/Other Providers:

## 2023-01-04 NOTE — PROGRESS NOTE ADULT - ASSESSMENT
Assessment:  Gabriel De La Cruz is an 82 year old man with past medical history of Coronary artery disease (s/p CABG, recent angiogram with 2/3 occluded vein grafts in 10/2022, medically managed), HFrEF (LVEF 20% in 9/2022), Hypertension, Atrial fibrillation (not on anticoagulation due to hematuria), CVA and BPH with recurrent hospitalizations, most recently in November for septic shock/urosepsis and pneumonia and congestive heart failure, now sent in from living facility due to hypoxia, found to have pneumonia.     ECG consistent with atrial fibrillation with RVR, lateral ST abnormalities, similar to prior ECG. Troponins are not elevated in range to suggest an acute coronary syndrome. Prior echo consistent with LVEF 20% with regional wall motion abnormalities, severe MR, mild pulmonary hypertension. CXR consistent with multifocal bilateral infiltrates. Prior coronary angiogram (10/2022) consistent with 2/3 occluded bypass grafts that was medically managed at [facility].       Recommendations:  [] Hypoxia, pneumonia: IV antibiotics and management per ID  [] Chronic systolic heart failure & Severe mitral regurgitation: Does not appear significantly volume overloaded, pro BNP lower than prior hospitalizations, would avoid additional IV fluids- would not want to precipitate CHF exacerbation, may want to resume low dose PO lasix. May continue low dose Coreg (with hold parameters), agree with holding Entresto due to hypotension. Continue Midodrine for now. In regards to primary prevention ICD, patient was not amenable to this in the past, can re-evaluate as well as re-evaluate if candidate for valvular intervention for severe MR, but due to age and frailty, the risks of intervention would be high, will need to discuss with family and need ongoing goals of care discussion due to recurrent admissions, follow up Palliative care   [] Atrial fibrillation: Currently in sinus rhythm. Not on anticoagulation due to history of recurrent hematuria in past     Goals of care being addressed. Prognosis appears poor. Will sign out to cardiologist to follow along tomorrow.      Vignesh Wallace MD  Cardiology

## 2023-01-04 NOTE — PROGRESS NOTE ADULT - SUBJECTIVE AND OBJECTIVE BOX
YUNIEL ALBERTO  [id number]        Chief Complaint: Hypoxia/Pneumonia/Chronic HFrEF    Interval History: The patient is fatigued today, and appears more confused.     Tele: sinus 100s      Current meds:   acetaminophen     Tablet .. 650 milliGRAM(s) Oral every 6 hours PRN  aMIOdarone    Tablet 200 milliGRAM(s) Oral daily  aspirin  chewable 81 milliGRAM(s) Oral daily  atorvastatin 40 milliGRAM(s) Oral at bedtime  carvedilol 3.125 milliGRAM(s) Oral every 12 hours  heparin   Injectable 5000 Unit(s) SubCutaneous every 8 hours  levothyroxine 150 MICROGram(s) Oral daily  meropenem  IVPB 1000 milliGRAM(s) IV Intermittent every 12 hours  midodrine. 10 milliGRAM(s) Oral three times a day  ondansetron Injectable 4 milliGRAM(s) IV Push every 8 hours PRN  vancomycin  IVPB      vancomycin  IVPB 1000 milliGRAM(s) IV Intermittent every 24 hours      Objective:     Vital Signs:   T(C): 36.3 (01-04-23 @ 04:55), Max: 36.3 (01-03-23 @ 20:10)  HR: 110 (01-04-23 @ 04:55) (84 - 110)  BP: 111/68 (01-04-23 @ 04:55) (89/60 - 111/68)  RR: 21 (01-04-23 @ 04:55) (21 - 22)  SpO2: 100% (01-04-23 @ 04:55) (95% - 100%)  Wt(kg): --    Physical Exam:   General: elderly man, frail, no acute distress  Neck: supple  CVS: JVP ~ 9 cm H20, RRR, s1, s2  Pulm: unlabored respirations, decreased breath sounds  Ext: no lower extremity edema b/l  Neuro: awake, alert     Labs:   04 Jan 2023 06:40    135    |  104    |  76     ----------------------------<  99     5.4     |  17     |  2.01     Ca    8.7        04 Jan 2023 06:40                            8.7    16.61 )-----------( 227      ( 04 Jan 2023 06:40 )             28.8             [facility] records obtained:    TTE (9/12/22):  LVEF 20%, minor regional variation  Normal RV size and severely reduced RV systolic function  Moderate MR, Mild AR   No pericardial effusion     Coronary angiogram (10/3/22):  LIMA-LAD: patent  SVG-LCx: occluded  SVG-RCA: occluded  Recommendations: Medical therapy, EP consult       TTE (10/18/22):  LVEF 20-25%; the inferolateral wall and inferior wall appear akinetic  Moderate septal LVH  Moderately reduced RV systolic function  Severe LA enlargement  Moderate MR, Mild TR, Moderate AR  Dilated proximal ascending aorta (4.4 cm).  Large pleural effusion in the left lateral region.  No pericardial effusion       TTE (11/15/22):  LVEF 20% with RWMA  Dilated RV with reduced RV systolic function  Biatrial enlargement  Severe MR, Mild-moderate TR, Moderate AR  Dilated proximal ascending aorta (3.8 cm).  Moderately dilated pulmonary artery  Mild pulmonary hypertension   No pericardial effusion     ECG (12/31/22): atrial fibrillation with RVR, aberrant conduction, lateral ST abnormality (similar to prior ECG)    CXR (1/2/23):  Multifocal bilateral infiltrates stable to slightly progressed to prior.   Small left pleural effusion.  Stable widening of the cardiac silhouette  Midline sternotomy. Advanced bilateral shoulder degenerative changes.

## 2023-01-04 NOTE — PROGRESS NOTE ADULT - SUBJECTIVE AND OBJECTIVE BOX
CC: f/u for pneumonia    Patient reports he wants to sit up better    REVIEW OF SYSTEMS:  All other review of systems negative (Comprehensive ROS)    Antimicrobials Day #  :    Other Medications Reviewed    T(F): 97.4 (01-04-23 @ 04:55), Max: 97.4 (01-04-23 @ 04:55)  HR: 110 (01-04-23 @ 04:55)  BP: 92/59 (01-04-23 @ 11:58)  RR: 15 (01-04-23 @ 11:58)  SpO2: 99% (01-04-23 @ 11:58)  Wt(kg): --    PHYSICAL EXAM:  General: alert, moans   Eyes:  anicteric, no conjunctival injection, no discharge  Oropharynx: no lesions or injection 	  Neck: supple, without adenopathy  Lungs: poor effort  to auscultation  Heart: regular rate and rhythm; no murmur, rubs or gallops  Abdomen: soft, nondistended, nontender, without mass or organomegaly  Skin: no lesions  Extremities: no clubbing, cyanosis, or edema, dry wounds feet  Neurologic: alert, not following or moving well    LAB RESULTS:                        8.7    16.61 )-----------( 227      ( 04 Jan 2023 06:40 )             28.8     01-04    135  |  104  |  76<H>  ----------------------------<  99  5.4<H>   |  17<L>  |  2.01<H>    Ca    8.7      04 Jan 2023 06:40          MICROBIOLOGY:  RECENT CULTURES:  12-31 @ 15:57 Clean Catch Clean Catch (Midstream)     No growth      12-31 @ 10:47 .Blood Blood-Peripheral     No growth to date.          RADIOLOGY REVIEWED:              Assessment:  Elderly man with pvd, ohs, recent stay for hypoxic respiratory failure s/p a week of cefepime and 10 d of zyvox, now returns with hypoxia , fluctuating level of consciousness, ongoing leukocytosis, ischemic changes in the feet but no wet gangrene, infiltrates. Concern raised for pneumonia so placed  vanco and meropenem. Now planned for comfort as goal, off abx   Plan:  aspiration precautions  call us if further input is needed

## 2023-01-04 NOTE — PROGRESS NOTE ADULT - CONVERSATION DETAILS
Pt with cva and dilated cardiomyopathy with rapidly progressive failure to thrive now with recurrent aspiration.  dnr/comfort care reviewed.  Pt will be placed on comfort care   If clinically stable will consider inpatient hospice.  Further recommendatioions to be reviewed tomorrw

## 2023-01-04 NOTE — PROGRESS NOTE ADULT - SUBJECTIVE AND OBJECTIVE BOX
Follow-up Pulmonary Progress Note  Chief Complaint : Sepsis      patient seen and examined  o2 sat brought up to 8 L, and then o2 sat being checked on forehead, changed and placed on finger, noted sat 100% on finger as well, o2 sat monitor location changed to finger and tapered fio2 to 5L  maintaining sat mid 90s  pt alert, confused, states he watnts water.         Allergies :PC Pen VK (Other)      PAST MEDICAL & SURGICAL HISTORY:  Chronic atrial fibrillation    History of cardiomyopathy    CAD (coronary artery disease)    BPH with obstruction/lower urinary tract symptoms    Hyperlipidemia    History of CVA (cerebrovascular accident)  left sided weakness    S/P CABG (coronary artery bypass graft)        Medications:  MEDICATIONS  (STANDING):  aMIOdarone    Tablet 200 milliGRAM(s) Oral daily  aspirin  chewable 81 milliGRAM(s) Oral daily  atorvastatin 40 milliGRAM(s) Oral at bedtime  carvedilol 3.125 milliGRAM(s) Oral every 12 hours  heparin   Injectable 5000 Unit(s) SubCutaneous every 8 hours  levothyroxine 150 MICROGram(s) Oral daily  meropenem  IVPB 1000 milliGRAM(s) IV Intermittent every 12 hours  midodrine. 10 milliGRAM(s) Oral three times a day  sodium chloride 0.9% Bolus 250 milliLiter(s) IV Bolus once  vancomycin  IVPB      vancomycin  IVPB 1000 milliGRAM(s) IV Intermittent every 24 hours    MEDICATIONS  (PRN):  acetaminophen     Tablet .. 650 milliGRAM(s) Oral every 6 hours PRN Temp greater or equal to 38C (100.4F), Mild Pain (1 - 3)  ondansetron Injectable 4 milliGRAM(s) IV Push every 8 hours PRN Nausea and/or Vomiting      Antibiotics History  cefepime   IVPB    , 01-01-23 @ 08:09  cefepime   IVPB 1000 milliGRAM(s) IV Intermittent every 12 hours, 01-01-23 @ 08:15  cefepime   IVPB 1000 milliGRAM(s) IV Intermittent once, 12-31-22 @ 10:12, Stop order after: 1 Doses  cefepime   IVPB 1000 milliGRAM(s) IV Intermittent every 12 hours, 12-31-22 @ 12:30  meropenem  IVPB 1000 milliGRAM(s) IV Intermittent every 12 hours, 01-01-23 @ 14:21, Stop order after: 7 Days  metroNIDAZOLE  IVPB 500 milliGRAM(s) IV Intermittent once, 12-31-22 @ 12:36  metroNIDAZOLE  IVPB 500 milliGRAM(s) IV Intermittent every 8 hours, 12-31-22 @ 22:00  metroNIDAZOLE  IVPB    , 12-31-22 @ 17:00  vancomycin  IVPB    , 01-01-23 @ 08:17  vancomycin  IVPB 1000 milliGRAM(s) IV Intermittent once, 01-01-23 @ 08:17, Stop order after: 1 Doses  vancomycin  IVPB 1000 milliGRAM(s) IV Intermittent every 24 hours, 01-02-23 @ 08:17, Stop order after: 7 Days      Heme Medications   aspirin  chewable 81 milliGRAM(s) Oral daily, 12-31-22 @ 12:34  heparin   Injectable 5000 Unit(s) SubCutaneous every 8 hours, 12-31-22 @ 12:26    Home Medications:  acetaminophen 325 mg oral tablet: 3 tab(s) orally every 8 hours, As needed, Temp greater or equal to 38C (100.4F), Mild Pain (1 - 3) (31 Dec 2022 12:34)  amiodarone 200 mg oral tablet: 1 tab(s) orally once a day (31 Dec 2022 12:34)  aspirin 81 mg oral tablet, chewable: 1 tab(s) orally once a day (31 Dec 2022 12:34)  atorvastatin 80 mg oral tablet: 1 tab(s) orally once a day (02 Jan 2023 10:35)  Dulcolax Laxative 10 mg rectal suppository: 1 suppository(ies) rectal once a day, As Needed (31 Dec 2022 12:34)  finasteride 5 mg oral tablet: 1 tab(s) orally once a day (31 Dec 2022 12:34)  furosemide 20 mg oral tablet: 1 tab(s) orally 2 times a day (02 Jan 2023 10:35)  levothyroxine 150 mcg (0.15 mg) oral tablet: 1 tab(s) orally once a day (31 Dec 2022 12:34)  Metoprolol Tartrate 25 mg oral tablet: 0.5 tab(s) orally 2 times a day (02 Jan 2023 10:35)  midodrine 10 mg oral tablet: 1 tab(s) orally 2 times a day (31 Dec 2022 12:34)  sacubitril-valsartan 24 mg-26 mg oral tablet: 1 tab(s) orally 2 times a day (31 Dec 2022 12:34)  tamsulosin 0.4 mg oral capsule: 1 cap(s) orally once a day (31 Dec 2022 12:34)    GI Medications        LABS:                        8.7    16.61 )-----------( 227      ( 04 Jan 2023 06:40 )             28.8     01-04    135  |  104  |  76<H>  ----------------------------<  99  5.4<H>   |  17<L>  |  2.01<H>    Ca    8.7      04 Jan 2023 06:40      CULTURES: (if applicable)    Culture - Urine (collected 12-31-22 @ 15:57)  Source: Clean Catch Clean Catch (Midstream)  Final Report (01-01-23 @ 20:44):    No growth    Culture - Blood (collected 12-31-22 @ 10:47)  Source: .Blood Blood-Peripheral  Preliminary Report (01-01-23 @ 15:01):    No growth to date.    Culture - Blood (collected 12-31-22 @ 10:47)  Source: .Blood Blood-Peripheral  Preliminary Report (01-01-23 @ 15:01):    No growth to date.      Rapid RVP Result: NotDetec (12-31-22 @ 10:50)      COVID  12-31-22 @ 10:50  COVID -   NotDetec  11-27-22 @ 13:15  COVID -   NotDetec  11-21-22 @ 10:55  COVID -   NotDetec  11-14-22 @ 19:45  COVID -   NotDetec  11-07-22 @ 07:10  COVID -   NotDetec  10-31-22 @ 19:50  COVID -   NotDetec         Trend BNP  12-31-22 @ 10:47   -  35308<H>  11-15-22 @ 06:00   -  >14135<H>  11-15-22 @ 01:50   -  >24322<H>  10-17-22 @ 19:55   -  84558<H>    Procalcitonin Trend  11-15-22 @ 06:00   -   0.20<H>  10-17-22 @ 23:15   -   0.02    WBC Trend  01-04-23 @ 06:40   -  16.61<H>  01-03-23 @ 06:00   -  20.42<H>  01-02-23 @ 13:30   -  16.11<H>  01-02-23 @ 07:45   -  14.24<H>    H/H Trend  01-04-23 @ 06:40   -   8.7<L>/ 28.8<L>  01-03-23 @ 06:00   -   9.2<L>/ 30.6<L>  01-02-23 @ 20:25   -   6.8<LL>/ 22.7<L>  01-02-23 @ 13:30   -   7.2<L>/ 24.6<L>  01-02-23 @ 07:45   -   8.1<L>/ 27.4<L>  01-01-23 @ 08:15   -   8.2<L>/ 27.5<L>      Platelet Trend  01-04-23 @ 06:40   -  227  01-03-23 @ 06:00   -  285  01-02-23 @ 13:30   -  253  01-02-23 @ 07:45   -  254    Trend Sodium  01-04-23 @ 06:40   -  135  01-03-23 @ 06:00   -  137  01-02-23 @ 13:30   -  140  01-02-23 @ 07:45   -  139    Trend Potassium  01-04-23 @ 06:40   -  5.4<H>  01-03-23 @ 06:00   -  5.7<H>  01-02-23 @ 13:30   -  4.9  01-02-23 @ 07:45   -  4.7    Trend Bun/Cr  01-04-23 @ 06:40  BUN/CR -  76<H> / 2.01<H>  01-03-23 @ 06:00  BUN/CR -  67<H> / 1.84<H>  01-02-23 @ 13:30  BUN/CR -  64<H> / 1.65<H>  01-02-23 @ 07:45  BUN/CR -  60<H> / 1.52<H>    Lactic Acid Trend  01-04-23 @ 10:25   -   2.4<H>  01-04-23 @ 06:40   -   3.1<H>  01-03-23 @ 12:30   -   3.1<H>  01-03-23 @ 08:40   -   2.7<H>  01-03-23 @ 06:00   -   4.2<HH>  01-02-23 @ 23:50   -   3.9<H>    ABG Trend  01-02-23 @ 11:49   - 7.55<H>/<19<L>/174<H>/99.9<H>    Trend AST/ALT/ALK Phos/Bili  12-31-22 @ 10:47   25/23/160<H>/0.6  11-25-22 @ 08:35   31/72<H>/147<H>/0.4  11-24-22 @ 07:15   31/83<H>/152<H>/0.4  11-22-22 @ 06:06   24/91<H>/167<H>/0.4     VITALS:  T(C): 36.3 (01-04-23 @ 04:55), Max: 36.3 (01-03-23 @ 20:10)  T(F): 97.4 (01-04-23 @ 04:55), Max: 97.4 (01-04-23 @ 04:55)  HR: 110 (01-04-23 @ 04:55) (84 - 110)  BP: 92/59 (01-04-23 @ 11:58) (89/60 - 111/68)  BP(mean): --  ABP: --  ABP(mean): --  RR: 15 (01-04-23 @ 11:58) (15 - 22)  SpO2: 99% (01-04-23 @ 11:58) (95% - 100%)  CVP(mm Hg): --  CVP(cm H2O): --    Ins and Outs     01-03-23 @ 07:01  -  01-04-23 @ 07:00  --------------------------------------------------------  IN: 1280 mL / OUT: 1350 mL / NET: -70 mL    01-04-23 @ 07:01  -  01-04-23 @ 13:12  --------------------------------------------------------  IN: 120 mL / OUT: 0 mL / NET: 120 mL        Height (cm): 180.3 (01-01-23 @ 20:55)  Weight (kg): 76 (01-01-23 @ 20:55)  BMI (kg/m2): 23.4 (01-01-23 @ 20:55)        I&O's Detail    03 Jan 2023 07:01  -  04 Jan 2023 07:00  --------------------------------------------------------  IN:    IV PiggyBack: 50 mL    IV PiggyBack: 250 mL    Oral Fluid: 980 mL  Total IN: 1280 mL    OUT:    Indwelling Catheter - Urethral (mL): 1350 mL  Total OUT: 1350 mL    Total NET: -70 mL      04 Jan 2023 07:01  -  04 Jan 2023 13:12  --------------------------------------------------------  IN:    Oral Fluid: 120 mL  Total IN: 120 mL    OUT:  Total OUT: 0 mL    Total NET: 120 mL

## 2023-01-04 NOTE — PROGRESS NOTE ADULT - ASSESSMENT
81yo male with hx of HTN, Advanced Combined CHF, dementia, urinary retention w/ chronic Dubois, presented to the ED from [facility] due to hypoxia noted to have severe sepsis and hypoxia secondary to likely persistent Bacterial PNA.      #Severe Sepsis POA  #Bacterial PNA, persistent b/l infiltrates and effusions on CT   #Lactic Acidosis; 2.9>4.2>2.7>2.4 secondary to CHF  - cont vanco and meropenem, day 4 (he has a midline now)-- ID following  -pt afebrile, wbc trending down  - MRSA PCR negative  - BC and UC so far NGTD    #persistent Hypotension; BP 75/50 this am  -cont Midodrine  -avoid Lasix, Entresto  -will give 250 cc NS IV bolus now    #Acute on chronic hypoxic respiratory failure   - Secondary to the above  - continuous O2 monitoring, on 8L nc and O2 sats 98%  - as above, imaging showing persistent b/l infiltrates and pleural effusions  - Pulm f/u today; await further input-- cont current medical mgmt    #chronic Combined CHF, reduced EF  #A fib  -Poor advanced CHF w/ EF 20%  -Medication optimization was done, however, in the setting of acute decompensation as well as hypotension, readjustments were made  -Cont Coreg per Cardio and not a candidate for Entresto/Farxiga at this time due to hypotension/sepsis/HAN and  Lasix on hold also, continue Amiodarone  -Close monitoring of vitals and I/Os and weights  -Cardio following; note appreciated today    #HAN  #Hemoconcentration  -Likely in the setting of sepsis, intravascularly low/hypovolemic   -Monitor Renal function and volume state  -avoid further IVF due to heart failure    #Chronic Anemia  -Likely multifactorial  -pt is s/p transfusion of PRBC on 1/2, Hgb improved   -Baseline chronically 7-8  -No signs/symptoms of acute blood loss  -Continue to monitor    #Urinary retention  -Chronic dubois secondary to failed multiple TOVs  -Holding Flomax/Finasteride as they can cause hypotension as well  -reposition dubois today due to CT findings    # b/l leg wounds  - h/o wound culture growing MRSA in the past  - contact isolation  - podiatry cx. appreciated  - hold off on vascular intervention for now, not a candidate  - SALEEM tests ordered    DVT ppx - heparin    Dispo:  overall poor prognosis, pt is DNR/DNI.  Wife Kirsten De La Cruz updated today, she would not want him to have vasopressors and moved to ICU-- she is leaning towards full COMFORT CARE.  Will meet with her today at bedside when she comes to visit along with Palliative to discuss plans going forward.

## 2023-01-04 NOTE — PROGRESS NOTE ADULT - SUBJECTIVE AND OBJECTIVE BOX
Progress: more agitated this am.  Now more lethargic    Present Symptoms:   Dyspnea: y  Nausea/Vomiting: n  Anxiety:  n  Depressed Mood: y  Fatigue: y  Loss of appetite: y  Pain:   n               Review of Systems:  Unable to obtain due to poor mentation    MEDICATIONS  (STANDING):  scopolamine 1 mG/72 Hr(s) Patch 1 Patch Transdermal every 72 hours    MEDICATIONS  (PRN):  acetaminophen     Tablet .. 650 milliGRAM(s) Oral every 6 hours PRN Temp greater or equal to 38C (100.4F), Mild Pain (1 - 3)  morphine  - Injectable 1 milliGRAM(s) IV Push every 2 hours PRN SOB, pain, resp distress  ondansetron Injectable 4 milliGRAM(s) IV Push every 8 hours PRN Nausea and/or Vomiting      PHYSICAL EXAM:  Vital Signs Last 24 Hrs  T(C): 36.3 (04 Jan 2023 04:55), Max: 36.3 (03 Jan 2023 20:10)  T(F): 97.4 (04 Jan 2023 04:55), Max: 97.4 (04 Jan 2023 04:55)  HR: 110 (04 Jan 2023 04:55) (84 - 110)  BP: 92/59 (04 Jan 2023 11:58) (92/59 - 111/68)  BP(mean): --  RR: 15 (04 Jan 2023 11:58) (15 - 21)  SpO2: 99% (04 Jan 2023 11:58) (95% - 100%)    Parameters below as of 04 Jan 2023 11:58  Patient On (Oxygen Delivery Method): nasal cannula  O2 Flow (L/min): 5    General: alert  oriented x ____      HEENT: normal    Lungs: scattered coarse bs  CV: normal    GI: normal    : normal    Musculoskeletal: bed bound  Skin: scattered excoriations and heal ulcers  Neuro:agitation and confusion  Oral intake ability: comfort feeds  Diet: NPO,     LABS:                          8.7    16.61 )-----------( 227      ( 04 Jan 2023 06:40 )             28.8     01-04    135  |  104  |  76<H>  ----------------------------<  99  5.4<H>   |  17<L>  |  2.01<H>    Ca    8.7      04 Jan 2023 06:40          RADIOLOGY & ADDITIONAL STUDIES:    ADVANCE DIRECTIVES:  Advanced Care Planning discussion total time spent:

## 2023-01-04 NOTE — PROGRESS NOTE ADULT - ASSESSMENT
Physical Examination:  GENERAL:               Alert, Confused, No acute distress.    HEENT:                   No JVD, Dry  MM  PULM:                     Bilateral air entry, Clear to auscultation bilaterally, no significant sputum production, No Rales, No Rhonchi, No Wheezing  CVS:                         S1, S2,  +  Murmur   Vascular:                cool Extremities, poor Capillary refill, decreased Distal Pulses  NEURO:                  Alert,  interactive, nonfocal, follows commands  PSYC:                      Calm, Limited Insight.      Assessment  82 year old male with hx of CAD s/p CABG with severe cardiomyopathy with advanced Combined CHF, atrial Fib,  dementia, urinary retention w/ chronic Starks, presented to the ED from [facility] due to hypoxia and pale/ill appearing this AM.     Problem  1. Severe Sepsis due to  B/L pneumonia  2. Overlapping Acute on chronic Systolic CHF could be playing role here  3. Lactic acidosis suspect from chf and PVD.     Plan  noted Metabolic encephelopathy  noted worsening renal function and patient appears dry   IV ABX  IVF as clinically dry and borderline BP  n/c o2 to maintain sat, tapered to 5  f/u cultures    Consider cardio f/u  diet as per speech and swallow   d/w Dr. Regalado    patient does not require icu at this time.   d/w Dr Regalado plan for family meeting later today. for further discussion of advanced directives    prognosis is poor

## 2023-01-04 NOTE — PROGRESS NOTE ADULT - ASSESSMENT
83yo male with hx of HTN, Advanced Combined CHF, dementia, urinary retention w/ chronic Dubois, presented to the ED from [facility] due to hypoxia noted to have severe sepsis and hypoxia secondary to likely persistent Bacterial PNA.      #Severe Sepsis POA  #Bacterial PNA, persistent b/l infiltrates and effusions on CT   #Lactic Acidosis; 2.9>4.2>2.7>2.4 secondary to CHF  - cont vanco and meropenem, day 4 (he has a midline now)-- ID following  -pt afebrile, wbc trending down  - MRSA PCR negative  - BC and UC so far NGTD    #persistent Hypotension; BP 75/50 this am  -cont Midodrine  -avoid Lasix, Entresto  -will give 250 cc NS IV bolus now    #Acute on chronic hypoxic respiratory failure   - Secondary to the above  - continuous O2 monitoring, on 8L nc and O2 sats 98%  - as above, imaging showing persistent b/l infiltrates and pleural effusions  - Pulm f/u today; await further input-- cont current medical mgmt    #chronic Combined CHF, reduced EF  #A fib  -Poor advanced CHF w/ EF 20%  -Medication optimization was done, however, in the setting of acute decompensation as well as hypotension, readjustments were made  -Cont Coreg per Cardio and not a candidate for Entresto/Farxiga at this time due to hypotension/sepsis/HAN and  Lasix on hold also, continue Amiodarone  -Close monitoring of vitals and I/Os and weights  -Cardio following; note appreciated today    #HAN  #Hemoconcentration  -Likely in the setting of sepsis, intravascularly low/hypovolemic   -Monitor Renal function and volume state  -avoid further IVF due to heart failure    #Chronic Anemia  -Likely multifactorial  -pt is s/p transfusion of PRBC on 1/2, Hgb improved   -Baseline chronically 7-8  -No signs/symptoms of acute blood loss  -Continue to monitor    #Urinary retention  -Chronic dubois secondary to failed multiple TOVs  -Holding Flomax/Finasteride as they can cause hypotension as well  -reposition dubois today due to CT findings    # b/l leg wounds  - h/o wound culture growing MRSA in the past  - contact isolation  - podiatry cx. appreciated  - hold off on vascular intervention for now, not a candidate  - SALEEM tests ordered    DVT ppx - heparin    Dispo:  overall poor prognosis, pt is DNR/DNI.  Wife Kirsten De La Cruz updated today, she would not want him to have vasopressors and moved to ICU-- she is leaning towards full COMFORT CARE.  .

## 2023-01-04 NOTE — PROGRESS NOTE ADULT - NS ATTEND AMEND GEN_ALL_CORE FT
81yo male with hx of HTN, Advanced Combined CHF, dementia, urinary retention w/ chronic Starks, presented to the ED from [facility] due to hypoxia and pale appearing this AM, noted to have severe sepsis and hypoxia secondary to likely persistent Bacterial PNA    #Severe Sepsis POA  #Bacterial PNA  - s/p cefepime and flagyl at [facility], now on vanco and meropenem  - desaturated yesterday and now on 8L NC, hypotensive later in the day and ordered for small bolus IVF  - palliative discussed with family, patient made comfort measures only  - continue o2 and feeds, morphine q2 prn    prognosis poor- discussed with wife Kristen and son at bedside

## 2023-01-05 ENCOUNTER — TRANSCRIPTION ENCOUNTER (OUTPATIENT)
Age: 83
End: 2023-01-05

## 2023-01-05 PROCEDURE — 99233 SBSQ HOSP IP/OBS HIGH 50: CPT | Mod: FS

## 2023-01-05 PROCEDURE — 99233 SBSQ HOSP IP/OBS HIGH 50: CPT

## 2023-01-05 RX ORDER — HYDROMORPHONE HYDROCHLORIDE 2 MG/ML
0.5 INJECTION INTRAMUSCULAR; INTRAVENOUS; SUBCUTANEOUS
Refills: 0 | Status: DISCONTINUED | OUTPATIENT
Start: 2023-01-05 | End: 2023-01-06

## 2023-01-05 RX ORDER — SCOPALAMINE 1 MG/3D
1 PATCH, EXTENDED RELEASE TRANSDERMAL
Qty: 0 | Refills: 0 | DISCHARGE
Start: 2023-01-05

## 2023-01-05 RX ADMIN — SCOPALAMINE 1 PATCH: 1 PATCH, EXTENDED RELEASE TRANSDERMAL at 02:54

## 2023-01-05 RX ADMIN — SCOPALAMINE 1 PATCH: 1 PATCH, EXTENDED RELEASE TRANSDERMAL at 22:29

## 2023-01-05 RX ADMIN — MORPHINE SULFATE 1 MILLIGRAM(S): 50 CAPSULE, EXTENDED RELEASE ORAL at 02:54

## 2023-01-05 RX ADMIN — SCOPALAMINE 1 PATCH: 1 PATCH, EXTENDED RELEASE TRANSDERMAL at 08:53

## 2023-01-05 NOTE — PROGRESS NOTE ADULT - ASSESSMENT
Physical Examination:  GENERAL:               Alert, Confused, No acute distress.    HEENT:                   No JVD, Dry  MM  PULM:                     Bilateral air entry, Clear to auscultation bilaterally, no significant sputum production, No Rales, No Rhonchi, No Wheezing  CVS:                         S1, S2,  +  Murmur   Vascular:                cool Extremities, poor Capillary refill, decreased Distal Pulses  NEURO:                  Alert,  interactive, nonfocal,   PSYC:                      Calm, Limited Insight.      Assessment  82 year old male with hx of CAD s/p CABG with severe cardiomyopathy with advanced Combined CHF, atrial Fib,  dementia, urinary retention w/ chronic Starks, presented to the ED from [facility] due to hypoxia and pale/ill appearing this AM.     Problem  1. Severe Sepsis due to  B/L pneumonia  2. Overlapping Acute on chronic Systolic CHF could be playing role here  3. Lactic acidosis suspect from chf and PVD.   4. Metabolic Encephelopathy    Plan  NOw on comfort care  off antibiotics  on Dilaudid    supplemental o2 for comfort  as patient now on comfort measures no aggressive care desired, will sign off  d/w bedside team and family bedside.

## 2023-01-05 NOTE — PROGRESS NOTE ADULT - SUBJECTIVE AND OBJECTIVE BOX
Follow-up Pulmonary Progress Note  Chief Complaint : Sepsis        patient transitioned to comfort care  now on morphine  on n/c o2  no cp, sob, palp, n/v, cough      Allergies :PC Pen VK (Other)      PAST MEDICAL & SURGICAL HISTORY:  Chronic atrial fibrillation    History of cardiomyopathy    CAD (coronary artery disease)    BPH with obstruction/lower urinary tract symptoms    Hyperlipidemia    History of CVA (cerebrovascular accident)  left sided weakness    S/P CABG (coronary artery bypass graft)        Medications:  MEDICATIONS  (STANDING):  scopolamine 1 mG/72 Hr(s) Patch 1 Patch Transdermal every 72 hours    MEDICATIONS  (PRN):  acetaminophen     Tablet .. 650 milliGRAM(s) Oral every 6 hours PRN Temp greater or equal to 38C (100.4F), Mild Pain (1 - 3)  HYDROmorphone  Injectable 0.5 milliGRAM(s) IV Push every 2 hours PRN SOB or pain  ondansetron Injectable 4 milliGRAM(s) IV Push every 8 hours PRN Nausea and/or Vomiting      Antibiotics History  cefepime   IVPB    , 01-01-23 @ 08:09  cefepime   IVPB 1000 milliGRAM(s) IV Intermittent every 12 hours, 01-01-23 @ 08:15  cefepime   IVPB 1000 milliGRAM(s) IV Intermittent once, 12-31-22 @ 10:12, Stop order after: 1 Doses  cefepime   IVPB 1000 milliGRAM(s) IV Intermittent every 12 hours, 12-31-22 @ 12:30  meropenem  IVPB 1000 milliGRAM(s) IV Intermittent every 12 hours, 01-01-23 @ 14:21, Stop order after: 7 Days  metroNIDAZOLE  IVPB 500 milliGRAM(s) IV Intermittent once, 12-31-22 @ 12:36  metroNIDAZOLE  IVPB 500 milliGRAM(s) IV Intermittent every 8 hours, 12-31-22 @ 22:00  metroNIDAZOLE  IVPB    , 12-31-22 @ 17:00  vancomycin  IVPB    , 01-01-23 @ 08:17  vancomycin  IVPB 1000 milliGRAM(s) IV Intermittent once, 01-01-23 @ 08:17, Stop order after: 1 Doses  vancomycin  IVPB 1000 milliGRAM(s) IV Intermittent every 24 hours, 01-02-23 @ 08:17, Stop order after: 7 Days      Heme Medications       GI Medications        LABS:                        8.7    16.61 )-----------( 227      ( 04 Jan 2023 06:40 )             28.8     01-04    135  |  104  |  76<H>  ----------------------------<  99  5.4<H>   |  17<L>  |  2.01<H>    Ca    8.7      04 Jan 2023 06:40         CULTURES: (if applicable)    Culture - Urine (collected 12-31-22 @ 15:57)  Source: Clean Catch Clean Catch (Midstream)  Final Report (01-01-23 @ 20:44):    No growth    Culture - Blood (collected 12-31-22 @ 10:47)  Source: .Blood Blood-Peripheral  Final Report (01-05-23 @ 15:01):    No Growth Final    Culture - Blood (collected 12-31-22 @ 10:47)  Source: .Blood Blood-Peripheral  Final Report (01-05-23 @ 15:01):    No Growth Final      Rapid RVP Result: NotDetec (12-31-22 @ 10:50)       VITALS:  T(C): --  T(F): --  HR: 90 (01-05-23 @ 15:56) (90 - 112)  BP: 96/64 (01-05-23 @ 15:56) (96/64 - 109/68)  BP(mean): --  ABP: --  ABP(mean): --  RR: 16 (01-05-23 @ 15:56) (16 - 18)  SpO2: 92% (01-05-23 @ 15:56) (92% - 96%)  CVP(mm Hg): --  CVP(cm H2O): --    Ins and Outs     01-04-23 @ 07:01  -  01-05-23 @ 07:00  --------------------------------------------------------  IN: 175 mL / OUT: 450 mL / NET: -275 mL    01-05-23 @ 07:01  -  01-05-23 @ 19:51  --------------------------------------------------------  IN: 15 mL / OUT: 500 mL / NET: -485 mL                I&O's Detail    04 Jan 2023 07:01  -  05 Jan 2023 07:00  --------------------------------------------------------  IN:    Oral Fluid: 175 mL  Total IN: 175 mL    OUT:    Indwelling Catheter - Urethral (mL): 450 mL  Total OUT: 450 mL    Total NET: -275 mL      05 Jan 2023 07:01  -  05 Jan 2023 19:51  --------------------------------------------------------  IN:    Oral Fluid: 15 mL  Total IN: 15 mL    OUT:    Voided (mL): 500 mL  Total OUT: 500 mL    Total NET: -485 mL

## 2023-01-05 NOTE — DISCHARGE NOTE PROVIDER - NSDCMRMEDTOKEN_GEN_ALL_CORE_FT
acetaminophen 325 mg oral tablet: 3 tab(s) orally every 8 hours, As needed, Temp greater or equal to 38C (100.4F), Mild Pain (1 - 3)  amiodarone 200 mg oral tablet: 1 tab(s) orally once a day  aspirin 81 mg oral tablet, chewable: 1 tab(s) orally once a day  atorvastatin 80 mg oral tablet: 1 tab(s) orally once a day  Dulcolax Laxative 10 mg rectal suppository: 1 suppository(ies) rectal once a day, As Needed  finasteride 5 mg oral tablet: 1 tab(s) orally once a day  furosemide 20 mg oral tablet: 1 tab(s) orally 2 times a day  levothyroxine 150 mcg (0.15 mg) oral tablet: 1 tab(s) orally once a day  Metoprolol Tartrate 25 mg oral tablet: 0.5 tab(s) orally 2 times a day  midodrine 10 mg oral tablet: 1 tab(s) orally 2 times a day  sacubitril-valsartan 24 mg-26 mg oral tablet: 1 tab(s) orally 2 times a day  tamsulosin 0.4 mg oral capsule: 1 cap(s) orally once a day   acetaminophen 325 mg oral tablet: 3 tab(s) orally every 8 hours, As needed, Temp greater or equal to 38C (100.4F), Mild Pain (1 - 3)  amiodarone 200 mg oral tablet: 1 tab(s) orally once a day  aspirin 81 mg oral tablet, chewable: 1 tab(s) orally once a day  atorvastatin 80 mg oral tablet: 1 tab(s) orally once a day  Dulcolax Laxative 10 mg rectal suppository: 1 suppository(ies) rectal once a day, As Needed  finasteride 5 mg oral tablet: 1 tab(s) orally once a day  furosemide 20 mg oral tablet: 1 tab(s) orally 2 times a day  levothyroxine 150 mcg (0.15 mg) oral tablet: 1 tab(s) orally once a day  Metoprolol Tartrate 25 mg oral tablet: 0.5 tab(s) orally 2 times a day  midodrine 10 mg oral tablet: 1 tab(s) orally 2 times a day  sacubitril-valsartan 24 mg-26 mg oral tablet: 1 tab(s) orally 2 times a day  scopolamine: 1 milligram(s) transdermal every 72 hours  1 patch  tamsulosin 0.4 mg oral capsule: 1 cap(s) orally once a day   acetaminophen 325 mg oral tablet: 3 tab(s) orally every 8 hours, As needed, Temp greater or equal to 38C (100.4F), Mild Pain (1 - 3)  HYDROmorphone: 0.5 milligram(s) intravenous every 2 hours, As Needed  scopolamine: 1 milligram(s) transdermal every 72 hours  1 patch

## 2023-01-05 NOTE — DISCHARGE NOTE PROVIDER - NSDCCPCAREPLAN_GEN_ALL_CORE_FT
PRINCIPAL DISCHARGE DIAGNOSIS  Diagnosis: Hypoxia  Assessment and Plan of Treatment: -You were admitted for hypoxia  -You were diagnosed with bilateral pneumonia  -You were treated with IV antibiotics and supplemental oxygen  -You and your family had a discussion with palliative care and it was decided to be placed on comfort measures       PRINCIPAL DISCHARGE DIAGNOSIS  Diagnosis: Hypoxia  Assessment and Plan of Treatment: -You were admitted for hypoxia  -You were diagnosed with bilateral pneumonia  -You were treated with IV antibiotics and supplemental oxygen  -You and your family had a discussion with palliative care and you were accepted to inpatient hospice

## 2023-01-05 NOTE — PROGRESS NOTE ADULT - ASSESSMENT
81yo male with hx of HTN, Advanced Combined CHF,  chronic anemia, dementia, urinary retention w/ chronic Dubois, presented to the ED from [facility] due to hypoxia noted to have severe sepsis and hypoxia secondary to likely persistent Bacterial PNA.  Now comfort measures only, no more blood draws, diagnostics, or treatment of chronic issues.     #Severe Sepsis POA  #Bacterial PNA, persistent b/l infiltrates and effusions on CT   #Lactic Acidosis; 2.9>4.2>2.7>2.4 secondary to CHF  - Patient is now full comfort measures  - discontinued vanco and meropenem, received 4 days  - MRSA PCR negative  - BC and UC so far NGTD  - As full comfort, no more blood draws  - Awaiting inpatient hospice eval  - Appreciate palliative care input     #persistent Hypotension  -Full comfort measures only  -avoid Lasix, Entresto    #Acute on chronic hypoxic respiratory failure   - Secondary to the above  - on 5L nc and O2 sats 96%  - as above, imaging showing persistent b/l infiltrates and pleural effusions  - Comfort measures only    #chronic Combined CHF, reduced EF  #A fib  -Poor advanced CHF w/ EF 20%  -Medication optimization was done, however, in the setting of acute decompensation as well as hypotension, readjustments were made  - Discontinued Coreg and amiodarone as now comfort measures only  -Cardio recs appreciated      #Urinary retention  -Chronic dubois secondary to failed multiple TOVs  -Comfort      DVT ppx - comfort measures only    Dispo:  overall poor prognosis, pt is DNR/DNI.  Wife Kirsten De La Cruz updated today, she would not want him to have vasopressors and moved to ICU-- patient full comfort measures

## 2023-01-05 NOTE — PROGRESS NOTE ADULT - SUBJECTIVE AND OBJECTIVE BOX
Progress: less agitated.  No apparent pain. Received ms at 3 am today.    Present Symptoms:   Dyspnea: n  Nausea/Vomiting: n  Anxiety:  n  Depressed Mood: n  Fatigue:n   Loss of appetite: y  Pain:        resolved           location:   Review of Systems:  Unable to obtain due to poor mentation    MEDICATIONS  (STANDING):  scopolamine 1 mG/72 Hr(s) Patch 1 Patch Transdermal every 72 hours    MEDICATIONS  (PRN):  acetaminophen     Tablet .. 650 milliGRAM(s) Oral every 6 hours PRN Temp greater or equal to 38C (100.4F), Mild Pain (1 - 3)  morphine  - Injectable 1 milliGRAM(s) IV Push every 2 hours PRN SOB, pain, resp distress  ondansetron Injectable 4 milliGRAM(s) IV Push every 8 hours PRN Nausea and/or Vomiting      PHYSICAL EXAM:  Vital Signs Last 24 Hrs  T(C): --  T(F): --  HR: 112 (05 Jan 2023 05:13) (112 - 112)  BP: 109/68 (05 Jan 2023 05:13) (109/68 - 109/68)  BP(mean): --  RR: 18 (05 Jan 2023 05:13) (18 - 18)  SpO2: 96% (05 Jan 2023 05:13) (96% - 96%)    Parameters below as of 05 Jan 2023 05:13  Patient On (Oxygen Delivery Method): nasal cannula  O2 Flow (L/min): 5    General: alert  oriented x __2__      HEENT: normal    Lungs: comfortable   CV: normal    GI: normal    : normal    Musculoskeletal: weakness  Skin: normal    Neuro: no deficits   Oral intake ability:  comfort feed  Diet: NPO,     LABS:                          8.7    16.61 )-----------( 227      ( 04 Jan 2023 06:40 )             28.8     01-04    135  |  104  |  76<H>  ----------------------------<  99  5.4<H>   |  17<L>  |  2.01<H>    Ca    8.7      04 Jan 2023 06:40          RADIOLOGY & ADDITIONAL STUDIES:    ADVANCE DIRECTIVES:  Advanced Care Planning discussion total time spent:

## 2023-01-05 NOTE — DISCHARGE NOTE PROVIDER - CARE PROVIDER_API CALL
Bhupinder Weller (DO)  Internal Medicine  [address] [id number]  Phone: [phone]  Fax: [phone]  Follow Up Time:

## 2023-01-05 NOTE — DISCHARGE NOTE PROVIDER - HOSPITAL COURSE
Hospital Course  HPI:  81yo male with hx of HTN, Advanced Combined CHF, dementia, urinary retention w/ chronic Starks, presented to the ED from [facility] due to hypoxia and pale appearing this AM. Pt is a poor historian. For the past 1-2 weeks, pt was noted to have ARFH secondary to decompensated Heart failure and volume overload with superimposed bacterial PNA as noted to have infiltrate on CXR w/ leukocytosis. Pt was treated with IV Lasix/Cefepime/Flagyl while at the facility. Supplemental O2 was also started. Pt was noted to have improvement in volume status as well as O2 sats as he was saturating 96% on 3-4L NC without any respiratory distress or tachypnea.   This AM, pt noted be more lethargic and pale appearing. Staff was unable to obtain O2 sats as it kept reading 70s-80s on 6L NC. Considering recent GOC discussion with family, pt required higher level of care at the hospital. (31 Dec 2022 12:36)    Upon admission, patient was diagnosed with severe sepsis secondary to b/l multifocal pneumonia. Blood cultures were obtained x 2, NGTD. MRSA PCR and legionella urine antigen negative. Patient was evaluated by infectious disease and patient was continued on IV antibiotics vancomycin and meropenem. Patient was also evaluated by pulmonology. Patient was continued on supplemental O2.      You were admitted for   You were diagnosed with   You were treated with   You were prescribed the following new medications:    You will need to follow up with your primary care physician.    Source of Infection:  Antibiotic / Last Day:    Palliative Care / Advanced Care Planning  Code Status:  Patient/Family agreeable to Hospice/Palliative (Y/N)?  Summary of Goals of Care Conversation:    Discharging Provider:  HARI Yepez  Contact Info: [phone] - Please call with any questions or concerns.    Outpatient Provider:     SNF Provider: Hospital Course  HPI:  81yo male with hx of HTN, Advanced Combined CHF, dementia, urinary retention w/ chronic Starks, presented to the ED from [facility] due to hypoxia and pale appearing this AM. Pt is a poor historian. For the past 1-2 weeks, pt was noted to have ARFH secondary to decompensated Heart failure and volume overload with superimposed bacterial PNA as noted to have infiltrate on CXR w/ leukocytosis. Pt was treated with IV Lasix/Cefepime/Flagyl while at the facility. Supplemental O2 was also started. Pt was noted to have improvement in volume status as well as O2 sats as he was saturating 96% on 3-4L NC without any respiratory distress or tachypnea.   This AM, pt noted be more lethargic and pale appearing. Staff was unable to obtain O2 sats as it kept reading 70s-80s on 6L NC. Considering recent GOC discussion with family, pt required higher level of care at the hospital. (31 Dec 2022 12:36)    Upon admission, patient was diagnosed with severe sepsis secondary to b/l multifocal pneumonia. Blood cultures were obtained x 2, NGTD. MRSA PCR and legionella urine antigen negative. Patient has a wound positive for MRSA on R foot. Patient was evaluated by infectious disease and patient was continued on IV antibiotics vancomycin and meropenem. Patient was also evaluated by pulmonology. Patient was continued on supplemental O2. Patient has hx of advanced combined CHF, cardio consult was obtained. Patient had period of decompensation involving respiratory alkalosis, and a midline was placed to start patient on albumin and maintenance fluids. Patient also received 1 unit of PRBCs on 1/3. Given patient's ongoing numerous chronic medical conditions and overall poor prognosis, palliative care was consulted. Patient was made comfort measures only after discussion with wife. Patient was accepted to inpatient hospice.      You were admitted for hypoxia  You were diagnosed with bilateral pneumonia  You were treated with IV antibiotics and supplemental oxygen  You were prescribed the following new medications:    You will need to follow up with your primary care physician.    Source of Infection:  Antibiotic / Last Day:    Palliative Care / Advanced Care Planning  Code Status:  Patient/Family agreeable to Hospice/Palliative (Y/N)?  Summary of Goals of Care Conversation:    Discharging Provider:  HARI Yepez  Contact Info: [phone] - Please call with any questions or concerns.    Outpatient Provider:     SNF Provider: Hospital Course  HPI:  83yo male with hx of HTN, Advanced Combined CHF, dementia, urinary retention w/ chronic Starks, presented to the ED from [facility] due to hypoxia and pale appearing this AM. Pt is a poor historian. For the past 1-2 weeks, pt was noted to have ARFH secondary to decompensated Heart failure and volume overload with superimposed bacterial PNA as noted to have infiltrate on CXR w/ leukocytosis. Pt was treated with IV Lasix/Cefepime/Flagyl while at the facility. Supplemental O2 was also started. Pt was noted to have improvement in volume status as well as O2 sats as he was saturating 96% on 3-4L NC without any respiratory distress or tachypnea.   This AM, pt noted be more lethargic and pale appearing. Staff was unable to obtain O2 sats as it kept reading 70s-80s on 6L NC. Considering recent GOC discussion with family, pt required higher level of care at the hospital. (31 Dec 2022 12:36)    Upon admission, patient was diagnosed with severe sepsis secondary to b/l multifocal pneumonia. Blood cultures were obtained x 2, NGTD. MRSA PCR and legionella urine antigen negative. Patient has a wound positive for MRSA on R foot. Patient was evaluated by infectious disease and patient was continued on IV antibiotics vancomycin and meropenem. Patient was also evaluated by pulmonology. Patient was continued on supplemental O2. Patient has hx of advanced combined CHF, cardio consult was obtained and adjustments to medications were made. Patient was unable to stay on entresto and lasix due to hypotension. Patient had period of decompensation involving respiratory alkalosis, and a midline was placed to start patient on albumin and maintenance fluids. Patient also received 1 unit of PRBCs on 1/3. Given patient's ongoing numerous chronic medical conditions and overall poor prognosis, palliative care was consulted. Patient was made comfort measures only after discussion with wife. Patient was accepted to inpatient hospice.      You were admitted for hypoxia  You were diagnosed with bilateral pneumonia  You were treated with IV antibiotics and supplemental oxygen      You will need to follow up with your primary care physician.      Palliative Care / Advanced Care Planning  Code Status:DNR/DNI  Patient/Family agreeable to Hospice      Discharging Provider:  HARI Yepez  Contact Info: [phone] - Please call with any questions or concerns.    Outpatient Provider: Dr. Weller-notified     Hospital Course  HPI:  83yo male with hx of HTN, Advanced Combined CHF, dementia, urinary retention w/ chronic Starks, presented to the ED from [facility] due to hypoxia and pale appearing this AM. Pt is a poor historian. For the past 1-2 weeks, pt was noted to have ARFH secondary to decompensated Heart failure and volume overload with superimposed bacterial PNA as noted to have infiltrate on CXR w/ leukocytosis. Pt was treated with IV Lasix/Cefepime/Flagyl while at the facility. Supplemental O2 was also started. Pt was noted to have improvement in volume status as well as O2 sats as he was saturating 96% on 3-4L NC without any respiratory distress or tachypnea.   This AM, pt noted be more lethargic and pale appearing. Staff was unable to obtain O2 sats as it kept reading 70s-80s on 6L NC. Considering recent GOC discussion with family, pt required higher level of care at the hospital. (31 Dec 2022 12:36)    Upon admission, patient was diagnosed with severe sepsis secondary to b/l multifocal pneumonia. Blood cultures were obtained x 2, NGTD. MRSA PCR and legionella urine antigen negative. Patient has a wound positive for MRSA on R foot. Patient was evaluated by infectious disease and patient was continued on IV antibiotics vancomycin and meropenem. Patient was also evaluated by pulmonology. Patient was continued on supplemental O2. Patient has hx of advanced combined CHF, cardio consult was obtained and adjustments to medications were made. Patient was unable to stay on entresto and lasix due to hypotension. Patient had period of decompensation involving respiratory alkalosis, and a midline was placed to start patient on albumin and maintenance fluids. Patient also received 1 unit of PRBCs on 1/3. Given patient's ongoing numerous chronic medical conditions and overall poor prognosis, palliative care was consulted. Patient was made comfort measures only after discussion with wife. [facility].    You were admitted for hypoxia  You were diagnosed with bilateral pneumonia  You were treated with IV antibiotics and supplemental oxygen      Palliative Care / Advanced Care Planning  Code Status:DNR/DNI  Patient/Family agreeable to Hospice      Discharging Provider:  HARI Yepez  Contact Info: [phone] - Please call with any questions or concerns.    Outpatient Provider: Dr. Weller-notified

## 2023-01-05 NOTE — PROGRESS NOTE ADULT - NS ATTEND AMEND GEN_ALL_CORE FT
comfort measures only as per signout  switched to dilaudid prn as per Hospice MD recs  [id number] done  [facility]  detailed problem based plan no longer needed

## 2023-01-05 NOTE — PROGRESS NOTE ADULT - ASSESSMENT
83yo male with hx of HTN, Advanced Combined CHF,  chronic anemia, dementia, urinary retention w/ chronic Dubois, presented to the ED from [facility] due to hypoxia noted to have severe sepsis and hypoxia secondary to likely persistent Bacterial PNA.  Now comfort measures only, no more blood draws, diagnostics, or treatment of chronic issues.     #Severe Sepsis POA  #Bacterial PNA, persistent b/l infiltrates and effusions on CT   #Lactic Acidosis; 2.9>4.2>2.7>2.4 secondary to CHF  - Patient is now full comfort measures  - discontinued vanco and meropenem, received 4 days  - MRSA PCR negative  - BC and UC so far NGTD  - As full comfort, no more blood draws  - Awaiting inpatient hospice eval  - Appreciate palliative care input     #persistent Hypotension  -Full comfort measures only  -avoid Lasix, Entresto    #Acute on chronic hypoxic respiratory failure   - Secondary to the above  - on 5L nc and O2 sats 96%  - as above, imaging showing persistent b/l infiltrates and pleural effusions  - Comfort measures only    #chronic Combined CHF, reduced EF  #A fib  -Poor advanced CHF w/ EF 20%  -Medication optimization was done, however, in the setting of acute decompensation as well as hypotension, readjustments were made  - Discontinued Coreg and amiodarone as now comfort measures only  -Cardio recs appreciated      #Urinary retention  -Chronic dubois secondary to failed multiple TOVs  -Comfort      DVT ppx - comfort measures only    Dispo:consider hospice cares

## 2023-01-05 NOTE — PROGRESS NOTE ADULT - SUBJECTIVE AND OBJECTIVE BOX
Patient is a 82y old  Male who presents with a chief complaint of Hypoxia (04 Jan 2023 20:08)      Patient seen and examined at bedside. No overnight events reported.     ALLERGIES:  PC Pen VK (Other)    MEDICATIONS  (STANDING):  scopolamine 1 mG/72 Hr(s) Patch 1 Patch Transdermal every 72 hours    MEDICATIONS  (PRN):  acetaminophen     Tablet .. 650 milliGRAM(s) Oral every 6 hours PRN Temp greater or equal to 38C (100.4F), Mild Pain (1 - 3)  morphine  - Injectable 1 milliGRAM(s) IV Push every 2 hours PRN SOB, pain, resp distress  ondansetron Injectable 4 milliGRAM(s) IV Push every 8 hours PRN Nausea and/or Vomiting    Vital Signs Last 24 Hrs  T(F): --  HR: 112 (05 Jan 2023 05:13) (112 - 112)  BP: 109/68 (05 Jan 2023 05:13) (92/59 - 109/68)  RR: 18 (05 Jan 2023 05:13) (15 - 18)  SpO2: 96% (05 Jan 2023 05:13) (96% - 99%)  I&O's Summary    04 Jan 2023 07:01  -  05 Jan 2023 07:00  --------------------------------------------------------  IN: 175 mL / OUT: 450 mL / NET: -275 mL      PHYSICAL EXAM:  General: NAD, awake,  ill appearing, frail  ENT: No gross hearing impairment, Moist mucous membranes, no thrush  Neck: Supple, No JVD  Lungs: Decreased bs to auscultation bilaterally, good air entry, non-labored breathing  Cardio: RRR, S1/S2, No murmur  Abdomen: Soft, Nontender, Nondistended; Bowel sounds present  Extremities: b/l lower exts dressed, right great toe with dry gangrene, No calf tenderness, No cyanosis, No pitting edema  Psych: Appropriate mood and affect    LABS:                        8.7    16.61 )-----------( 227      ( 04 Jan 2023 06:40 )             28.8     01-04    135  |  104  |  76  ----------------------------<  99  5.4   |  17  |  2.01    Ca    8.7      04 Jan 2023 06:40              Lactate, Blood: 2.4 mmol/L (01-04 @ 10:25)  Lactate, Blood: 3.1 mmol/L (01-04 @ 06:40)  Lactate, Blood: 3.1 mmol/L (01-03 @ 12:30)  Lactate, Blood: 2.7 mmol/L (01-03 @ 08:40)  Lactate, Blood: 4.2 mmol/L (01-03 @ 06:00)      CARDIAC MARKERS ( 02 Jan 2023 13:30 )  x     / 45.9 ng/L / x     / x     / x          11-06 Chol 99 mg/dL LDL -- HDL 31 mg/dL Trig 81 mg/dL      ABG - ( 02 Jan 2023 11:49 )  pH, Arterial: 7.55  pH, Blood: x     /  pCO2: <19   /  pO2: 174   / HCO3: 16    / Base Excess: -6.7  /  SaO2: 99.9                            Culture - Urine (collected 31 Dec 2022 15:57)  Source: Clean Catch Clean Catch (Midstream)  Final Report (01 Jan 2023 20:44):    No growth    Culture - Blood (collected 31 Dec 2022 10:47)  Source: .Blood Blood-Peripheral  Preliminary Report (01 Jan 2023 15:01):    No growth to date.    Culture - Blood (collected 31 Dec 2022 10:47)  Source: .Blood Blood-Peripheral  Preliminary Report (01 Jan 2023 15:01):    No growth to date.        RADIOLOGY & ADDITIONAL TESTS:    Care Discussed with Consultants/Other Providers:

## 2023-01-06 ENCOUNTER — TRANSCRIPTION ENCOUNTER (OUTPATIENT)
Age: 83
End: 2023-01-06

## 2023-01-06 VITALS
RESPIRATION RATE: 16 BRPM | HEART RATE: 118 BPM | OXYGEN SATURATION: 91 % | TEMPERATURE: 99 F | DIASTOLIC BLOOD PRESSURE: 54 MMHG | SYSTOLIC BLOOD PRESSURE: 92 MMHG

## 2023-01-06 PROCEDURE — 99232 SBSQ HOSP IP/OBS MODERATE 35: CPT

## 2023-01-06 PROCEDURE — 86850 RBC ANTIBODY SCREEN: CPT

## 2023-01-06 PROCEDURE — 92526 ORAL FUNCTION THERAPY: CPT

## 2023-01-06 PROCEDURE — 80048 BASIC METABOLIC PNL TOTAL CA: CPT

## 2023-01-06 PROCEDURE — 36600 WITHDRAWAL OF ARTERIAL BLOOD: CPT

## 2023-01-06 PROCEDURE — 84484 ASSAY OF TROPONIN QUANT: CPT

## 2023-01-06 PROCEDURE — 85018 HEMOGLOBIN: CPT

## 2023-01-06 PROCEDURE — P9016: CPT

## 2023-01-06 PROCEDURE — 83880 ASSAY OF NATRIURETIC PEPTIDE: CPT

## 2023-01-06 PROCEDURE — 85014 HEMATOCRIT: CPT

## 2023-01-06 PROCEDURE — 87640 STAPH A DNA AMP PROBE: CPT

## 2023-01-06 PROCEDURE — 36415 COLL VENOUS BLD VENIPUNCTURE: CPT

## 2023-01-06 PROCEDURE — G0378: CPT

## 2023-01-06 PROCEDURE — 85027 COMPLETE CBC AUTOMATED: CPT

## 2023-01-06 PROCEDURE — 86923 COMPATIBILITY TEST ELECTRIC: CPT

## 2023-01-06 PROCEDURE — 96374 THER/PROPH/DIAG INJ IV PUSH: CPT

## 2023-01-06 PROCEDURE — 87641 MR-STAPH DNA AMP PROBE: CPT

## 2023-01-06 PROCEDURE — 96375 TX/PRO/DX INJ NEW DRUG ADDON: CPT

## 2023-01-06 PROCEDURE — 85610 PROTHROMBIN TIME: CPT

## 2023-01-06 PROCEDURE — 86901 BLOOD TYPING SEROLOGIC RH(D): CPT

## 2023-01-06 PROCEDURE — 85730 THROMBOPLASTIN TIME PARTIAL: CPT

## 2023-01-06 PROCEDURE — 71045 X-RAY EXAM CHEST 1 VIEW: CPT

## 2023-01-06 PROCEDURE — 80202 ASSAY OF VANCOMYCIN: CPT

## 2023-01-06 PROCEDURE — 74176 CT ABD & PELVIS W/O CONTRAST: CPT

## 2023-01-06 PROCEDURE — 99239 HOSP IP/OBS DSCHRG MGMT >30: CPT

## 2023-01-06 PROCEDURE — 83735 ASSAY OF MAGNESIUM: CPT

## 2023-01-06 PROCEDURE — 82962 GLUCOSE BLOOD TEST: CPT

## 2023-01-06 PROCEDURE — 87040 BLOOD CULTURE FOR BACTERIA: CPT

## 2023-01-06 PROCEDURE — 92610 EVALUATE SWALLOWING FUNCTION: CPT

## 2023-01-06 PROCEDURE — 83605 ASSAY OF LACTIC ACID: CPT

## 2023-01-06 PROCEDURE — 0225U NFCT DS DNA&RNA 21 SARSCOV2: CPT

## 2023-01-06 PROCEDURE — 86900 BLOOD TYPING SEROLOGIC ABO: CPT

## 2023-01-06 PROCEDURE — 80053 COMPREHEN METABOLIC PANEL: CPT

## 2023-01-06 PROCEDURE — 99285 EMERGENCY DEPT VISIT HI MDM: CPT | Mod: 25

## 2023-01-06 PROCEDURE — 87449 NOS EACH ORGANISM AG IA: CPT

## 2023-01-06 PROCEDURE — P9047: CPT

## 2023-01-06 PROCEDURE — 93005 ELECTROCARDIOGRAM TRACING: CPT

## 2023-01-06 PROCEDURE — 82803 BLOOD GASES ANY COMBINATION: CPT

## 2023-01-06 PROCEDURE — 36430 TRANSFUSION BLD/BLD COMPNT: CPT

## 2023-01-06 PROCEDURE — 81001 URINALYSIS AUTO W/SCOPE: CPT

## 2023-01-06 PROCEDURE — 71250 CT THORAX DX C-: CPT

## 2023-01-06 PROCEDURE — 85025 COMPLETE CBC W/AUTO DIFF WBC: CPT

## 2023-01-06 PROCEDURE — 96376 TX/PRO/DX INJ SAME DRUG ADON: CPT

## 2023-01-06 PROCEDURE — 87086 URINE CULTURE/COLONY COUNT: CPT

## 2023-01-06 RX ORDER — HYDROMORPHONE HYDROCHLORIDE 2 MG/ML
0.5 INJECTION INTRAMUSCULAR; INTRAVENOUS; SUBCUTANEOUS
Qty: 0 | Refills: 0 | DISCHARGE
Start: 2023-01-06

## 2023-01-06 RX ORDER — ATORVASTATIN CALCIUM 80 MG/1
1 TABLET, FILM COATED ORAL
Qty: 0 | Refills: 0 | DISCHARGE

## 2023-01-06 RX ORDER — METOPROLOL TARTRATE 50 MG
0.5 TABLET ORAL
Qty: 0 | Refills: 0 | DISCHARGE

## 2023-01-06 RX ADMIN — HYDROMORPHONE HYDROCHLORIDE 0.5 MILLIGRAM(S): 2 INJECTION INTRAMUSCULAR; INTRAVENOUS; SUBCUTANEOUS at 08:37

## 2023-01-06 RX ADMIN — SCOPALAMINE 1 PATCH: 1 PATCH, EXTENDED RELEASE TRANSDERMAL at 07:13

## 2023-01-06 NOTE — DISCHARGE NOTE NURSING/CASE MANAGEMENT/SOCIAL WORK - PATIENT PORTAL LINK FT
You can access the FollowMyHealth Patient Portal offered by [facility] by registering at the following website: [email or web address]. By joining [facility]’s FollowMyHealth portal, you will also be able to view your health information using other applications (apps) compatible with our system.

## 2023-01-06 NOTE — PROGRESS NOTE ADULT - ASSESSMENT
81yo male with hx of HTN, Advanced Combined CHF,  chronic anemia, dementia, urinary retention w/ chronic Dubois, presented to the ED from [facility] due to hypoxia noted to have severe sepsis and hypoxia secondary to likely persistent Bacterial PNA.  Now comfort measures only, no more blood draws, diagnostics, or treatment of chronic issues.     #Severe Sepsis POA  #Bacterial PNA, persistent b/l infiltrates and effusions on CT   #Lactic Acidosis; 2.9>4.2>2.7>2.4 secondary to CHF  - Patient is now full comfort measures  - As full comfort, no more blood draws  - Accepted to inpatient hospice at [facility]  - Appreciate palliative care input     #persistent Hypotension  -Full comfort measures only  -avoid Lasix, Entresto    #Acute on chronic hypoxic respiratory failure   - Secondary to the above  - on 5L nc and O2 sats 91%  - Comfort measures only    #chronic Combined CHF, reduced EF  #A fib  -Poor advanced CHF w/ EF 20%  -Comfort measures only      #Urinary retention  -Chronic dubois secondary to failed multiple TOVs  -Comfort      DVT ppx - comfort measures only    Dispo:  overall poor prognosis, pt is DNR/DNI.  Wife Kirsten De La Cruz updated today, she would not want him to have vasopressors and moved to ICU-- patient full comfort measures. Patient accepted to inpatient hospice at [facility] 81yo male with hx of HTN, Advanced Combined CHF,  chronic anemia, dementia, urinary retention w/ chronic Starks, presented to the ED from [facility] due to hypoxia noted to have severe sepsis and hypoxia secondary to likely persistent Bacterial PNA.  Now comfort measures only, no more blood draws, diagnostics, or treatment of chronic issues.     #Severe Sepsis POA  #Bacterial PNA  #Acute on chronic hypoxic respiratory failure   #Biventricular CHF  #Urinary retention/Chronic Starks  - Comfort measures only  - Accepted to inpatient hospice at [facility]  - Appreciate palliative care input   - Patient accepted to inpatient hospice at [facility]

## 2023-01-06 NOTE — PROGRESS NOTE ADULT - REASON FOR ADMISSION
Hypoxia

## 2023-01-06 NOTE — PROGRESS NOTE ADULT - ASSESSMENT
Palliative:  pt is accepted ti inpatient hospice and is awaiting transfer.  clinically stable for transfer

## 2023-01-06 NOTE — PROGRESS NOTE ADULT - NS ATTEND AMEND GEN_ALL_CORE FT
case d/w Dr. Smith from [facility] 1/5 - accepted pending bed.  Bed available today.  Comfort care/[facility]  spent 36 mins

## 2023-01-06 NOTE — PROGRESS NOTE ADULT - NUTRITIONAL ASSESSMENT
This patient has been assessed with a concern for Malnutrition and has been determined to have a diagnosis/diagnoses of Moderate protein-calorie malnutrition.    This patient is being managed with:   Diet Pureed-  Moderately Thick Liquids (MODTHICKLIQS)  Low Sodium  Entered: Jan 2 2023  1:18PM    The following pending diet order is being considered for treatment of Moderate protein-calorie malnutrition:  Diet Minced and Moist-  DASH/TLC {Sodium & Cholesterol Restricted}  Supplement Feeding Modality:  Oral  Ensure Plus High Protein Cans or Servings Per Day:  1       Frequency:  Three Times a day  Entered: Jan 2 2023 12:07PM  
This patient has been assessed with a concern for Malnutrition and has been determined to have a diagnosis/diagnoses of Moderate protein-calorie malnutrition.    This patient is being managed with:   Diet Regular-  Pureed (PUREED)  Mildly Thick Liquids (MILDTHICKLIQS)  Liquid via Teaspoon Only  Entered: Jan 4 2023 11:13AM    The following pending diet order is being considered for treatment of Moderate protein-calorie malnutrition:  Diet Minced and Moist-  DASH/TLC {Sodium & Cholesterol Restricted}  Supplement Feeding Modality:  Oral  Ensure Plus High Protein Cans or Servings Per Day:  1       Frequency:  Three Times a day  Entered: Jan 2 2023 12:07PM  

## 2023-01-06 NOTE — DISCHARGE NOTE NURSING/CASE MANAGEMENT/SOCIAL WORK - NSDCPEFALRISK_GEN_ALL_CORE
For information on Fall & Injury Prevention, visit: [email or web address] OR  [email or web address] OR  https://www.cdc.gov/steadi/patient.html

## 2023-01-06 NOTE — PROGRESS NOTE ADULT - NS ATTEND OPT1 GEN_ALL_CORE

## 2023-01-06 NOTE — PROGRESS NOTE ADULT - SUBJECTIVE AND OBJECTIVE BOX
Progress: pt calmer.. treated for pain earlier today    Present Symptoms:   Dyspnea: n  Nausea/Vomiting: n  Anxiety:  n  Depressed Mood: n  Fatigue: n  Loss of appetite:   Pain:     y              location: nonspecific resolved at this time  Review of Systems: Unable to obtain due to poor mentation]    MEDICATIONS  (STANDING):  scopolamine 1 mG/72 Hr(s) Patch 1 Patch Transdermal every 72 hours    MEDICATIONS  (PRN):  acetaminophen     Tablet .. 650 milliGRAM(s) Oral every 6 hours PRN Temp greater or equal to 38C (100.4F), Mild Pain (1 - 3)  HYDROmorphone  Injectable 0.5 milliGRAM(s) IV Push every 2 hours PRN SOB or pain  ondansetron Injectable 4 milliGRAM(s) IV Push every 8 hours PRN Nausea and/or Vomiting      PHYSICAL EXAM:  Vital Signs Last 24 Hrs  T(C): 37.1 (06 Jan 2023 08:43), Max: 37.1 (06 Jan 2023 08:43)  T(F): 98.7 (06 Jan 2023 08:43), Max: 98.7 (06 Jan 2023 08:43)  HR: 118 (06 Jan 2023 08:43) (90 - 118)  BP: 92/54 (06 Jan 2023 08:43) (92/54 - 96/64)  BP(mean): --  RR: 16 (06 Jan 2023 08:43) (16 - 16)  SpO2: 91% (06 Jan 2023 08:43) (91% - 92%)    Parameters below as of 06 Jan 2023 08:43  Patient On (Oxygen Delivery Method): nasal cannula  O2 Flow (L/min): 5    General: opens eyes to verbal stimuli  HEENT: normal    Lungs: comfortable   CV: normal    GI: normal    : normal    Musculoskeletal: normal   Skin: normal    Neuro: dementia  Oral intake ability:  pleasure feeds  Diet: NPO,     LABS:                RADIOLOGY & ADDITIONAL STUDIES:    ADVANCE DIRECTIVES:  Advanced Care Planning discussion total time spent:

## 2023-01-06 NOTE — CHART NOTE - NSCHARTNOTEFT_GEN_A_CORE
Nutrition Follow Up Note  Hospital Course   (Per Electronic Medical Record)    Source:  Patient [X]  Nursing Staff [X]   Medical Record [X]      Diet: Diet, Regular:   Pureed (PUREED)  Mildly Thick Liquids (MILDTHICKLIQS)  Liquid via Teaspoon Only (23 @ 11:14) [Active]  Diet, Minced and Moist:   DASH/TLC {Sodium & Cholesterol Restricted}  Supplement Feeding Modality:  Oral  Ensure Plus High Protein Cans or Servings Per Day:  1       Frequency:  Three Times a day (23 @ 12:07) [Pending Verification By Attending]        Nutrition Follow Up: Patient with minimal intakes on pureed mildly thick diet. Palliative following. Comfort measures only. No nutrition intervention warranted at this time.    Enteral/Parenteral Nutrition: Not Applicable    START CHEMFISH    END CHEMFISH  START MEDSACTIVEMEDICATIONS  (STANDING):  scopolamine 1 mG/72 Hr(s) Patch 1 Patch Transdermal every 72 hours    MEDICATIONS  (PRN):  acetaminophen     Tablet .. 650 milliGRAM(s) Oral every 6 hours PRN Temp greater or equal to 38C (100.4F), Mild Pain (1 - 3)  HYDROmorphone  Injectable 0.5 milliGRAM(s) IV Push every 2 hours PRN SOB or pain  ondansetron Injectable 4 milliGRAM(s) IV Push every 8 hours PRN Nausea and/or Vomiting  END MEDSACTIVE  START DIETORDEREND DIETORDER  START ADMITDXSepsis    END ADMITDX  START IOFS  END IOFS  START SKINPUEND SKINPU    Pertinent Medications: MEDICATIONS  (STANDING):  scopolamine 1 mG/72 Hr(s) Patch 1 Patch Transdermal every 72 hours    MEDICATIONS  (PRN):  acetaminophen     Tablet .. 650 milliGRAM(s) Oral every 6 hours PRN Temp greater or equal to 38C (100.4F), Mild Pain (1 - 3)  HYDROmorphone  Injectable 0.5 milliGRAM(s) IV Push every 2 hours PRN SOB or pain  ondansetron Injectable 4 milliGRAM(s) IV Push every 8 hours PRN Nausea and/or Vomiting      Pertinent Labs:   Na135 mmol/L Glu 99 mg/dL K+ 5.4 mmol/L<H> Cr  2.01 mg/dL<H> BUN 76 mg/dL<H> 12-31 Alb 2.8 g/dL<L>          Weight Trends:  Weight in k.3 (2023 05:02)      Skin: wound bundle noted at left forearm and b/l foot    Edema: +1 edema noted at left arm; right arm    Last Bowel Movement: 1/3/23 fecal incontinence     Estimated Needs:   [X] No Change Since Previous Assessment    Previous Nutrition Diagnosis:   Moderate Malnutrition    Nutrition Diagnosis is [X] Ongoing -        Interventions:   1. No nutrition intervention warranted at this time.    Monitoring & Evaluation:   [X] Weights   [X] PO Intake   [X] Skin Integrity   [X] Follow Up (Per Protocol)  [X] Tolerance to Diet Prescription   [X] Other: Labs & POCT    Registered Dietitian/Nutritionist Remains Available.  Wilfrid Neely RD, CDN    Phone# [phone]

## 2023-01-06 NOTE — PROGRESS NOTE ADULT - PROVIDER SPECIALTY LIST ADULT
Hospitalist
Infectious Disease
Cardiology
Hospitalist
Palliative Care
Hospitalist
Hospitalist
Infectious Disease
Pulmonology
Cardiology
Hospitalist
Palliative Care
Pulmonology
Hospitalist
Palliative Care

## 2023-01-09 ENCOUNTER — TRANSCRIPTION ENCOUNTER (OUTPATIENT)
Age: 83
End: 2023-01-09

## 2023-05-17 NOTE — PATIENT PROFILE ADULT - FALL HARM RISK - HARM RISK INTERVENTIONS
Ndc (300 Mg Prefilled Pen): [address]-66 Ndc (300 Mg Prefilled Pen): [address]-19 Assistance with ambulation/Assistance OOB with selected safe patient handling equipment/Communicate Risk of Fall with Harm to all staff/Discuss with provider need for PT consult/Monitor for mental status changes/Monitor gait and stability/Move patient closer to nurses' station/Reinforce activity limits and safety measures with patient and family/Reorient to person, place and time as needed/Tailored Fall Risk Interventions/Toileting schedule using arm’s reach rule for commode and bathroom/Use of alarms - bed, chair and/or voice tab/Visual Cue: Yellow wristband and red socks/Bed in lowest position, wheels locked, appropriate side rails in place/Call bell, personal items and telephone in reach/Instruct patient to call for assistance before getting out of bed or chair/Non-slip footwear when patient is out of bed/[place] to call system/Physically safe environment - no spills, clutter or unnecessary equipment/Purposeful Proactive Rounding/Room/bathroom lighting operational, light cord in reach

## 2023-12-01 NOTE — PROGRESS NOTE ADULT - ASSESSMENT
Assessment:  Grover De La Cruz is an 82 year old man with past medical history of Coronary artery disease (s/p CABG), HFrEF (LVEF 20% per chart notes), Hypertension, Atrial fibrillation (not on anticoagulation due to hematuria), CVA and BPH with recent hospitalization at [facility] for urosepsis and right upper extremity DVT was brought in by EMS from living facility due to lethargy and hypoxia, found to have acute on chronic systolic heart failure exacerbation, urinary tract infection, also with bilateral DVTs.    ECG consistent with atrial fibrillation, old septal infarct, no prior ECG for comparison. Troponin mildly elevated and has peaked likely demand ischemia from CHF. CT chest consistent with thoracic ascending aortic aneurysm (4.3 m) and pulmonary edema.     Records obtained confirms that patient has a history of reduced LVEF 20% and history of occluded vein bypass grafts based on recent coronary angiogram above.     Recommendations:  [] Acute on chronic systolic heart failure exacerbation: LVEF 20% on recent [facility] echo. Echo here consistent with LVEF 20-25% with wall motion abnormalities, reduced RV function, large left pleural effusion. Patient more hypotensive and appears hypovolemic, continue to hold diuretics and Entresto. BP mildly improved today, can consider low dose Metoprolol succinate 50 mg daily with hold parameters. Further optimization of CHF guideline medical therapy limited by hypotension. Plan for EP evaluation for ICD if within goals of care, patient reports he is not interested at this time, can further follow up with his primary cardiologist   [] Atrial fibrillation: Currently rate controlled, was not on anticoagulation due to hematuria in past, had recurrent hematuria and so Apixaban held   [] CAD s/p CABG: Appears stable at this time, continue Aspirin 81 mg daily  [] Ascending aortic aneurysm: Does not appear to be acute issues at this time, would benefit from Vascular evaluation     Discussed with primary team. We will continue to follow along.    Vignesh Wallace MD  Cardiology        done

## 2024-09-05 NOTE — CONSULT NOTE ADULT - ASSESSMENT
Assessment:  Gabriel De La Cruz is an 82 year old man with past medical history of Coronary artery disease (s/p CABG), HFrEF (LVEF 20% in 9/2022), Hypertension, Atrial fibrillation (not on anticoagulation due to hematuria), CVA and BPH with recent hospitalization her earlier this month for acute on chronic systolic heart failure, DVTs and IVC filter placement now sent in from living facility due to altered mental status and hypoxia.     PRELIMINARY    ECG consistent with atrial fibrillation, old septal infarct, no prior ECG for comparison. Troponin mildly elevated and has peaked likely demand ischemia from CHF. CT chest consistent with thoracic ascending aortic aneurysm (4.3 m) and pulmonary edema.     Records obtained confirms that patient has a history of reduced LVEF 20% and history of occluded vein bypass grafts based on recent coronary angiogram that was medically managed.     Patient is now s/p IVC filter placement and tolerated procedure well.      Recommendations:  [] Acute on chronic systolic heart failure exacerbation: Euvolemic. LVEF 20% on recent [facility] echo. Echo here consistent with LVEF 20-25% with wall motion abnormalities, reduced RV function, large left pleural effusion. Due to hypotension optimization of guideline directed medical therapy has been limited; continue Metoprolol succinate 50 mg daily with hold parameters, also on Losartan 25 mg daily (would consider Entresto instead if BP allows). Plan for EP evaluation for ICD if within goals of care, patient reports he is not interested at this time, can further follow up with his primary cardiologist   [] Atrial fibrillation: Currently rate controlled, not on anticoagulation due to recurrent hematuria   [] CAD s/p CABG: Appears stable at this time, consideration of Aspirin in future if safe with Urology  [] Ascending aortic aneurysm: Follow up Vascular surgery       Vignesh Wallace MD  Cardiology          Assessment:  Gabriel De La Cruz is an 82 year old man with past medical history of Coronary artery disease (s/p CABG, recent angiogram with 2/3 occluded vein grafts in 10/2022), HFrEF (LVEF 20% in 9/2022), Hypertension, Atrial fibrillation (not on anticoagulation due to hematuria), CVA and BPH with recent hospitalization her earlier this month for acute on chronic systolic heart failure, DVTs and IVC filter placement now sent in from living facility due to altered mental status and hypoxia.       PRELIMINARY    ECG consistent with atrial fibrillation, old septal infarct, no prior ECG for comparison. Troponin mildly elevated and has peaked likely demand ischemia from CHF. CT chest consistent with thoracic ascending aortic aneurysm (4.3 m) and pulmonary edema.     Records obtained confirms that patient has a history of reduced LVEF 20% and history of occluded vein bypass grafts based on recent coronary angiogram that was medically managed.     Patient is now s/p IVC filter placement and tolerated procedure well.      Recommendations:  [] Acute on chronic systolic heart failure exacerbation: Euvolemic. LVEF 20% on recent [facility] echo. Echo here consistent with LVEF 20-25% with wall motion abnormalities, reduced RV function, large left pleural effusion. Due to hypotension optimization of guideline directed medical therapy has been limited; continue Metoprolol succinate 50 mg daily with hold parameters, also on Losartan 25 mg daily (would consider Entresto instead if BP allows). Plan for EP evaluation for ICD if within goals of care, patient reports he is not interested at this time, can further follow up with his primary cardiologist   [] Atrial fibrillation: Currently rate controlled, not on anticoagulation due to recurrent hematuria   [] CAD s/p CABG: Appears stable at this time, consideration of Aspirin in future if safe with Urology  [] Ascending aortic aneurysm: Follow up Vascular surgery       Vignesh Wallace MD  Cardiology          Assessment:  Gabriel De La Cruz is an 82 year old man with past medical history of Coronary artery disease (s/p CABG, recent angiogram with 2/3 occluded vein grafts in 10/2022, medically managed), HFrEF (LVEF 20% in 9/2022), Hypertension, Atrial fibrillation (not on anticoagulation due to hematuria), CVA and BPH with recent hospitalization here earlier this month for acute on chronic systolic heart failure, DVTs and IVC filter placement now sent in from living facility due to altered mental status and hypoxia, found to have acute hypoxic respiratory failure likely multifactorial from congestive heart failure and pneumonia also with shock, acute renal injury and lactic acidosis.     ECG consistent with atrial fibrillation and new anterior ST depressions. Troponin significantly elevated and has peaked, may be due to demand ischemia from respiratory failure and acute renal injury, however cannot rule out ACS in a patient with known chronic occluded veinous bypass grafts.      Recommendations:  [] Hypoxic respiratory failure: Continue high flow nasal cannula. Increase IV diuresis as tolerated with pressor support. Not candidate for dobutamine due to arrhythmia. IV antibiotics for pneumonia (procalcitonin elevated) per ICU team.   [] Acute on chronic systolic heart failure exacerbation: LVEF 20% on recent [facility] echo, recent echo here with LVEF 20-25%. Follow up repeat echo. Patient was not able to tolerated guideline directed medical therapy in last hospitalization due to hypotension. Hold beta blocker and other CHF meds due to shock. In regards to primary prevention ICD, patient was not amenable to this on prior hospitalization.   [] Atrial fibrillation: Currently rate controlled, not on anticoagulation due to history of recurrent hematuria   [] Elevated troponins: Likely demand ischemia as per above, but cannot rule out ACS in a patient with 2/3 occluded bypass grafts that was deemed to be medically managed at [facility]. Continue medical management    The patient is critically ill. Discussed with Dr. Muro. We will continue to follow along.     Vignesh Wallace MD  Cardiology        Patient/Caregiver provided printed discharge information.

## 2024-11-18 NOTE — DIETITIAN INITIAL EVALUATION ADULT - NUTRITON FOCUSED PHYSICAL EXAM
Render In Strict Bullet Format?: No
Plan: Discussed rhofade and Cynergy Laser, patient declines treatment today
Detail Level: Zone
yes...
